# Patient Record
Sex: MALE | Race: BLACK OR AFRICAN AMERICAN | Employment: FULL TIME | URBAN - METROPOLITAN AREA
[De-identification: names, ages, dates, MRNs, and addresses within clinical notes are randomized per-mention and may not be internally consistent; named-entity substitution may affect disease eponyms.]

---

## 2017-01-23 DIAGNOSIS — F32.A DEPRESSION, UNSPECIFIED DEPRESSION TYPE: Primary | ICD-10-CM

## 2017-01-23 DIAGNOSIS — I48.0 PAROXYSMAL ATRIAL FIBRILLATION (HCC): Primary | ICD-10-CM

## 2017-01-23 DIAGNOSIS — I10 ESSENTIAL HYPERTENSION: ICD-10-CM

## 2017-01-23 DIAGNOSIS — F51.04 PSYCHOPHYSIOLOGICAL INSOMNIA: ICD-10-CM

## 2017-01-23 RX ORDER — RIVAROXABAN 20 MG/1
TABLET, FILM COATED ORAL
Qty: 30 TABLET | Refills: 5 | Status: SHIPPED | OUTPATIENT
Start: 2017-01-23 | End: 2017-03-21

## 2017-01-23 RX ORDER — CARVEDILOL 25 MG/1
TABLET ORAL
Qty: 180 TABLET | Refills: 1 | Status: SHIPPED | OUTPATIENT
Start: 2017-01-23 | End: 2017-04-20

## 2017-01-23 RX ORDER — AMLODIPINE BESYLATE 5 MG/1
TABLET ORAL
Qty: 90 TABLET | Refills: 1 | Status: SHIPPED | OUTPATIENT
Start: 2017-01-23 | End: 2017-04-20

## 2017-01-23 RX ORDER — ESCITALOPRAM OXALATE 10 MG/1
TABLET ORAL
Qty: 90 TABLET | Refills: 0 | Status: SHIPPED | OUTPATIENT
Start: 2017-01-23 | End: 2017-03-21 | Stop reason: ALTCHOICE

## 2017-01-23 RX ORDER — LOSARTAN POTASSIUM AND HYDROCHLOROTHIAZIDE 25; 100 MG/1; MG/1
TABLET ORAL
Qty: 90 TABLET | Refills: 1 | Status: SHIPPED | OUTPATIENT
Start: 2017-01-23 | End: 2017-04-20

## 2017-02-09 ENCOUNTER — PATIENT MESSAGE (OUTPATIENT)
Dept: INTERNAL MEDICINE CLINIC | Facility: CLINIC | Age: 57
End: 2017-02-09

## 2017-02-09 DIAGNOSIS — H40.9 GLAUCOMA OF BOTH EYES, UNSPECIFIED GLAUCOMA: Primary | ICD-10-CM

## 2017-02-10 ENCOUNTER — PATIENT MESSAGE (OUTPATIENT)
Dept: INTERNAL MEDICINE CLINIC | Facility: CLINIC | Age: 57
End: 2017-02-10

## 2017-02-10 NOTE — TELEPHONE ENCOUNTER
From: Cristal Montiel  To: Keith Navarro MD  Sent: 2/9/2017 4:42 PM CST  Subject: Other    I am in need referrals for Dr montalvo who I seen today and a future one for sept 12 for Dr cochran

## 2017-02-13 ENCOUNTER — MED REC SCAN ONLY (OUTPATIENT)
Dept: INTERNAL MEDICINE CLINIC | Facility: CLINIC | Age: 57
End: 2017-02-13

## 2017-02-25 ENCOUNTER — LAB ENCOUNTER (OUTPATIENT)
Dept: LAB | Facility: HOSPITAL | Age: 57
End: 2017-02-25
Attending: INTERNAL MEDICINE
Payer: COMMERCIAL

## 2017-02-25 DIAGNOSIS — C61 PROSTATE CANCER (HCC): ICD-10-CM

## 2017-02-25 DIAGNOSIS — E11.65 TYPE 2 DIABETES MELLITUS WITH HYPERGLYCEMIA (HCC): Primary | ICD-10-CM

## 2017-02-25 LAB
BUN BLD-MCNC: 12 MG/DL (ref 8–20)
CALCIUM BLD-MCNC: 8.7 MG/DL (ref 8.3–10.3)
CHLORIDE: 108 MMOL/L (ref 101–111)
CHOLEST SMN-MCNC: 141 MG/DL (ref ?–200)
CO2: 30 MMOL/L (ref 22–32)
CREAT BLD-MCNC: 1.46 MG/DL (ref 0.7–1.3)
GLUCOSE BLD-MCNC: 131 MG/DL (ref 70–99)
HDLC SERPL-MCNC: 45 MG/DL (ref 45–?)
HDLC SERPL: 3.13 {RATIO} (ref ?–4.97)
LDLC SERPL CALC-MCNC: 80 MG/DL (ref ?–130)
NONHDLC SERPL-MCNC: 96 MG/DL (ref ?–130)
POTASSIUM SERPL-SCNC: 4.1 MMOL/L (ref 3.6–5.1)
PSA SERPL-MCNC: 6.17 NG/ML (ref 0.01–4)
SODIUM SERPL-SCNC: 142 MMOL/L (ref 136–144)
TRIGLYCERIDES: 81 MG/DL (ref ?–150)
VLDL: 16 MG/DL (ref 5–40)

## 2017-02-25 PROCEDURE — 80048 BASIC METABOLIC PNL TOTAL CA: CPT

## 2017-02-25 PROCEDURE — 80061 LIPID PANEL: CPT

## 2017-02-25 PROCEDURE — 36415 COLL VENOUS BLD VENIPUNCTURE: CPT

## 2017-02-25 PROCEDURE — 84153 ASSAY OF PSA TOTAL: CPT

## 2017-03-18 ENCOUNTER — PATIENT MESSAGE (OUTPATIENT)
Dept: INTERNAL MEDICINE CLINIC | Facility: CLINIC | Age: 57
End: 2017-03-18

## 2017-03-18 DIAGNOSIS — I48.0 PAROXYSMAL ATRIAL FIBRILLATION (HCC): Primary | ICD-10-CM

## 2017-03-21 PROBLEM — C61 PROSTATE CANCER (HCC): Status: ACTIVE | Noted: 2017-03-21

## 2017-03-21 NOTE — PATIENT INSTRUCTIONS
Take antibiotic as directed until completely finished. Contact us if you experience any adverse reaction to the medication.    Continue clonazepam as needed if anxiety is severe  Continue bupropion  Stop lexapro  Start cymbalta, once daily, and monitor depr

## 2017-03-21 NOTE — PROGRESS NOTES
HPI:   Rodney Dudley is a 49year old male who presents for upper respiratory symptoms for  3  days. Patient reports cough, PND, ear pain, fever up to 101, mild cough and thick sputum, sob at night. Patient denies gi symptoms or rash.   Treatments tried: Disp: 90 tablet Rfl: 1   Pravastatin Sodium 40 MG Oral Tab Take 1 tablet (40 mg total) by mouth nightly.  Disp: 90 tablet Rfl: 1   CIALIS 20 MG Oral Tab  Disp:  Rfl: 2   Exenatide (BYETTA 10 MCG PEN) 10 MCG/0.04ML Subcutaneous Solution Pen-injector Inject 1 denies blurred vision, eye irritation or discharge  HEENT: congested; no difficulty swallowing or discharge from ears  LUNGS: denies shortness of breath, wheezing, hemoptysis  CARDIOVASCULAR: denies chest pain, palpitations or edema  GI: no nausea, vomitin The patient indicates understanding of these issues and agrees to the plan. The patient is asked to return if sx's persist or worsen.   6/15/2017 as scheduled with Dr. Mic Comer

## 2017-03-22 RX ORDER — CLONAZEPAM 1 MG/1
TABLET ORAL
Qty: 60 TABLET | Refills: 2 | OUTPATIENT
Start: 2017-03-22

## 2017-03-23 ENCOUNTER — TELEPHONE (OUTPATIENT)
Dept: INTERNAL MEDICINE CLINIC | Facility: CLINIC | Age: 57
End: 2017-03-23

## 2017-03-23 NOTE — TELEPHONE ENCOUNTER
Steve from [facility] called and asked for a CB regarding patient's Ambien and Clonazepam. Is it ok to dispense due to patient's depression, and has glaucoma. Please advise.

## 2017-03-23 NOTE — TELEPHONE ENCOUNTER
Provider aware and would like patient to continue on both medications. Patient is compliant with his eye doctor on a yearly basis. Steve informed and she verbalized understanding.

## 2017-03-30 ENCOUNTER — LAB ENCOUNTER (OUTPATIENT)
Dept: LAB | Facility: HOSPITAL | Age: 57
End: 2017-03-30
Attending: INTERNAL MEDICINE
Payer: COMMERCIAL

## 2017-03-30 DIAGNOSIS — E11.65 TYPE II DIABETES MELLITUS, UNCONTROLLED (HCC): Primary | ICD-10-CM

## 2017-03-30 PROCEDURE — 36415 COLL VENOUS BLD VENIPUNCTURE: CPT

## 2017-03-30 PROCEDURE — 80048 BASIC METABOLIC PNL TOTAL CA: CPT

## 2017-04-19 DIAGNOSIS — F41.1 GENERALIZED ANXIETY DISORDER: ICD-10-CM

## 2017-04-19 DIAGNOSIS — H65.01 RIGHT ACUTE SEROUS OTITIS MEDIA, RECURRENCE NOT SPECIFIED: ICD-10-CM

## 2017-04-19 DIAGNOSIS — F34.1 DYSTHYMIA: ICD-10-CM

## 2017-04-19 DIAGNOSIS — E78.2 MIXED HYPERLIPIDEMIA: ICD-10-CM

## 2017-04-19 DIAGNOSIS — F32.A DEPRESSION, UNSPECIFIED DEPRESSION TYPE: ICD-10-CM

## 2017-04-19 DIAGNOSIS — F51.04 PSYCHOPHYSIOLOGICAL INSOMNIA: ICD-10-CM

## 2017-04-19 DIAGNOSIS — I10 ESSENTIAL HYPERTENSION: ICD-10-CM

## 2017-04-20 RX ORDER — DULOXETIN HYDROCHLORIDE 60 MG/1
60 CAPSULE, DELAYED RELEASE ORAL DAILY
Qty: 30 CAPSULE | Refills: 2 | Status: SHIPPED | OUTPATIENT
Start: 2017-04-20 | End: 2017-06-12

## 2017-04-20 RX ORDER — PRAVASTATIN SODIUM 40 MG
40 TABLET ORAL NIGHTLY
Qty: 90 TABLET | Refills: 1 | Status: SHIPPED | OUTPATIENT
Start: 2017-04-20 | End: 2017-10-27

## 2017-04-20 RX ORDER — LOSARTAN POTASSIUM AND HYDROCHLOROTHIAZIDE 25; 100 MG/1; MG/1
1 TABLET ORAL DAILY
Qty: 90 TABLET | Refills: 1 | Status: SHIPPED | OUTPATIENT
Start: 2017-04-20 | End: 2017-08-22

## 2017-04-20 RX ORDER — ZOLPIDEM TARTRATE 10 MG/1
10 TABLET ORAL NIGHTLY PRN
Qty: 30 TABLET | Refills: 1 | Status: SHIPPED
Start: 2017-04-20 | End: 2017-08-03

## 2017-04-20 RX ORDER — BUPROPION HYDROCHLORIDE 300 MG/1
300 TABLET ORAL
Qty: 90 TABLET | Refills: 0 | Status: SHIPPED | OUTPATIENT
Start: 2017-04-20 | End: 2017-06-15

## 2017-04-20 RX ORDER — CLONAZEPAM 1 MG/1
TABLET ORAL
Qty: 60 TABLET | Refills: 2 | Status: SHIPPED
Start: 2017-04-20 | End: 2017-06-15

## 2017-04-20 RX ORDER — CARVEDILOL 25 MG/1
25 TABLET ORAL 2 TIMES DAILY WITH MEALS
Qty: 180 TABLET | Refills: 1 | Status: SHIPPED | OUTPATIENT
Start: 2017-04-20 | End: 2017-10-23

## 2017-04-20 RX ORDER — AMLODIPINE BESYLATE 5 MG/1
5 TABLET ORAL DAILY
Qty: 90 TABLET | Refills: 1 | Status: SHIPPED | OUTPATIENT
Start: 2017-04-20 | End: 2017-08-22

## 2017-04-20 RX ORDER — AMOXICILLIN AND CLAVULANATE POTASSIUM 875; 125 MG/1; MG/1
1 TABLET, FILM COATED ORAL 2 TIMES DAILY
Qty: 14 TABLET | Refills: 0 | OUTPATIENT
Start: 2017-04-20

## 2017-04-20 NOTE — TELEPHONE ENCOUNTER
From: Lara Barajas  To: Aleena Montano MD  Sent: 4/19/2017 5:33 PM CDT  Subject: Medication Renewal Request    Original authorizing provider: MD Lara Teixeira would like a refill of the following medications:  Pravastatin Sodium 40 MG

## 2017-04-20 NOTE — TELEPHONE ENCOUNTER
From: Tj Kelly  To: Lora Subramanian PA-C  Sent: 4/19/2017 5:33 PM CDT  Subject: Medication Renewal Request    Original authorizing provider: JOSH Montes De Oca would like a refill of the following medications:  DULoxetine

## 2017-05-03 ENCOUNTER — MED REC SCAN ONLY (OUTPATIENT)
Dept: INTERNAL MEDICINE CLINIC | Facility: CLINIC | Age: 57
End: 2017-05-03

## 2017-05-09 ENCOUNTER — TELEPHONE (OUTPATIENT)
Dept: INTERNAL MEDICINE CLINIC | Facility: CLINIC | Age: 57
End: 2017-05-09

## 2017-05-09 DIAGNOSIS — M25.851 IMPINGEMENT SYNDROME, HIP, RIGHT: Primary | ICD-10-CM

## 2017-05-09 NOTE — TELEPHONE ENCOUNTER
Please enter a new referral for PT DX M25.851- right hip impingement syndrome. His other referral was  on 2016.

## 2017-05-11 DIAGNOSIS — I10 ESSENTIAL HYPERTENSION: Primary | ICD-10-CM

## 2017-05-11 RX ORDER — AMLODIPINE BESYLATE 5 MG/1
TABLET ORAL
Qty: 30 TABLET | Refills: 2 | Status: SHIPPED | OUTPATIENT
Start: 2017-05-11 | End: 2017-06-08

## 2017-05-12 ENCOUNTER — OFFICE VISIT (OUTPATIENT)
Dept: INTERNAL MEDICINE CLINIC | Facility: CLINIC | Age: 57
End: 2017-05-12

## 2017-05-12 VITALS
OXYGEN SATURATION: 96 % | HEIGHT: 71 IN | SYSTOLIC BLOOD PRESSURE: 128 MMHG | TEMPERATURE: 98 F | HEART RATE: 105 BPM | BODY MASS INDEX: 44.1 KG/M2 | DIASTOLIC BLOOD PRESSURE: 86 MMHG | WEIGHT: 315 LBS | RESPIRATION RATE: 16 BRPM

## 2017-05-12 DIAGNOSIS — N39.490 OVERFLOW INCONTINENCE: Primary | ICD-10-CM

## 2017-05-12 DIAGNOSIS — R31.9 HEMATURIA: ICD-10-CM

## 2017-05-12 PROCEDURE — 81003 URINALYSIS AUTO W/O SCOPE: CPT | Performed by: STUDENT IN AN ORGANIZED HEALTH CARE EDUCATION/TRAINING PROGRAM

## 2017-05-12 PROCEDURE — 99213 OFFICE O/P EST LOW 20 MIN: CPT | Performed by: STUDENT IN AN ORGANIZED HEALTH CARE EDUCATION/TRAINING PROGRAM

## 2017-05-14 NOTE — PROGRESS NOTES
HPI:    Patient ID: Dale Beverly is a 56year old male. HPI  Patient presents today for the evaluation of her incontinence. Symptoms started about 2 weeks ago.  He started Invokana about 3-4 weeks ago and noticed polyuria and polydipsia, of which he w 25 MG Oral Tab Take 1 tablet (25 mg total) by mouth 2 (two) times daily with meals. Disp: 180 tablet Rfl: 1   Losartan Potassium-HCTZ 100-25 MG Oral Tab Take 1 tablet by mouth daily.  Disp: 90 tablet Rfl: 1   Zolpidem Tartrate 10 MG Oral Tab Take 1 tablet ( Pulmonary/Chest: Effort normal and breath sounds normal.   Abdominal: Soft. Bowel sounds are normal.   Genitourinary:   Patient declined HESHAM. Musculoskeletal: Normal range of motion. Neurological: He is alert and oriented to person, place, and time.

## 2017-05-24 ENCOUNTER — OFFICE VISIT (OUTPATIENT)
Dept: INTERNAL MEDICINE CLINIC | Facility: CLINIC | Age: 57
End: 2017-05-24

## 2017-05-24 VITALS
HEART RATE: 88 BPM | SYSTOLIC BLOOD PRESSURE: 124 MMHG | BODY MASS INDEX: 44.1 KG/M2 | TEMPERATURE: 98 F | RESPIRATION RATE: 16 BRPM | WEIGHT: 315 LBS | OXYGEN SATURATION: 95 % | HEIGHT: 71 IN | DIASTOLIC BLOOD PRESSURE: 84 MMHG

## 2017-05-24 DIAGNOSIS — M16.11 PRIMARY OSTEOARTHRITIS OF RIGHT HIP: Primary | ICD-10-CM

## 2017-05-24 DIAGNOSIS — N39.490 OVERFLOW INCONTINENCE OF URINE: ICD-10-CM

## 2017-05-24 PROCEDURE — 99214 OFFICE O/P EST MOD 30 MIN: CPT | Performed by: STUDENT IN AN ORGANIZED HEALTH CARE EDUCATION/TRAINING PROGRAM

## 2017-05-24 RX ORDER — HYDROCODONE BITARTRATE AND ACETAMINOPHEN 5; 325 MG/1; MG/1
1 TABLET ORAL EVERY 6 HOURS PRN
Qty: 20 TABLET | Refills: 0 | Status: SHIPPED | OUTPATIENT
Start: 2017-05-24 | End: 2017-06-15 | Stop reason: ALTCHOICE

## 2017-05-24 RX ORDER — GLIPIZIDE 5 MG/1
5 TABLET, EXTENDED RELEASE ORAL DAILY
Refills: 1 | COMMUNITY
Start: 2017-05-17 | End: 2019-11-05

## 2017-05-24 NOTE — PROGRESS NOTES
[facility]    REASON FOR VISIT:    Current Complaints: Patient presents with: Incontinence: 2 wk fu       HPI:  [place] is a 56year old male who presents for 2 weeks f/u. Patient was seen 2 weeks ago with c/o urinary incontinence.  Rosario Take 1 tablet (20 mg total) by mouth daily with food.  Disp: 90 tablet Rfl: 3   LANTUS SOLOSTAR 100 UNIT/ML Subcutaneous Solution Pen-injector  Disp:  Rfl: 3   VICTOZA 18 MG/3ML Subcutaneous Solution Pen-injector  Disp:  Rfl: 2   CIALIS 20 MG Oral Tab  Disp round, and reactive to light. No scleral icterus. Neck: Normal range of motion. No thyromegaly present. Cardiovascular: Normal rate, regular rhythm and normal heart sounds. Exam reveals no friction rub. No murmur heard.   Pulmonary/Chest: Effort nor

## 2017-06-05 ENCOUNTER — TELEPHONE (OUTPATIENT)
Dept: FAMILY MEDICINE CLINIC | Facility: CLINIC | Age: 57
End: 2017-06-05

## 2017-06-08 ENCOUNTER — OFFICE VISIT (OUTPATIENT)
Dept: INTERNAL MEDICINE CLINIC | Facility: CLINIC | Age: 57
End: 2017-06-08

## 2017-06-08 ENCOUNTER — APPOINTMENT (OUTPATIENT)
Dept: LAB | Age: 57
End: 2017-06-08
Attending: NURSE PRACTITIONER
Payer: COMMERCIAL

## 2017-06-08 ENCOUNTER — TELEPHONE (OUTPATIENT)
Dept: INTERNAL MEDICINE CLINIC | Facility: CLINIC | Age: 57
End: 2017-06-08

## 2017-06-08 VITALS
SYSTOLIC BLOOD PRESSURE: 140 MMHG | HEIGHT: 71 IN | BODY MASS INDEX: 44.1 KG/M2 | HEART RATE: 80 BPM | DIASTOLIC BLOOD PRESSURE: 90 MMHG | RESPIRATION RATE: 16 BRPM | WEIGHT: 315 LBS

## 2017-06-08 DIAGNOSIS — I48.0 PAROXYSMAL ATRIAL FIBRILLATION (HCC): ICD-10-CM

## 2017-06-08 DIAGNOSIS — Z79.4 TYPE 2 DIABETES MELLITUS WITHOUT COMPLICATION, WITH LONG-TERM CURRENT USE OF INSULIN (HCC): ICD-10-CM

## 2017-06-08 DIAGNOSIS — E66.01 MORBID OBESITY WITH BMI OF 45.0-49.9, ADULT (HCC): ICD-10-CM

## 2017-06-08 DIAGNOSIS — F32.A DEPRESSION, UNSPECIFIED DEPRESSION TYPE: ICD-10-CM

## 2017-06-08 DIAGNOSIS — F43.9 STRESS: ICD-10-CM

## 2017-06-08 DIAGNOSIS — Z51.81 ENCOUNTER FOR THERAPEUTIC DRUG MONITORING: ICD-10-CM

## 2017-06-08 DIAGNOSIS — E11.9 TYPE 2 DIABETES MELLITUS WITHOUT COMPLICATION, WITH LONG-TERM CURRENT USE OF INSULIN (HCC): ICD-10-CM

## 2017-06-08 DIAGNOSIS — I10 ESSENTIAL HYPERTENSION: ICD-10-CM

## 2017-06-08 DIAGNOSIS — Z51.81 ENCOUNTER FOR THERAPEUTIC DRUG MONITORING: Primary | ICD-10-CM

## 2017-06-08 PROCEDURE — 82397 CHEMILUMINESCENT ASSAY: CPT

## 2017-06-08 PROCEDURE — 82306 VITAMIN D 25 HYDROXY: CPT

## 2017-06-08 PROCEDURE — 80048 BASIC METABOLIC PNL TOTAL CA: CPT

## 2017-06-08 PROCEDURE — 99213 OFFICE O/P EST LOW 20 MIN: CPT | Performed by: NURSE PRACTITIONER

## 2017-06-08 PROCEDURE — 82607 VITAMIN B-12: CPT

## 2017-06-08 RX ORDER — LACTOBACIL 2/BIFIDO 1/S.THERMO 450B CELL
1 PACKET (EA) ORAL DAILY
Qty: 60 CAPSULE | Refills: 2 | Status: SHIPPED | COMMUNITY
Start: 2017-06-08 | End: 2018-02-13

## 2017-06-08 RX ORDER — TOPIRAMATE 25 MG/1
25 TABLET ORAL 2 TIMES DAILY
Qty: 60 TABLET | Refills: 11 | Status: SHIPPED | OUTPATIENT
Start: 2017-06-08 | End: 2017-07-06

## 2017-06-08 NOTE — TELEPHONE ENCOUNTER
Calling Dr. Aracely Levy office for clearance of topamax w/ hx of possible glaucoma. Left message on nurse line for further information.

## 2017-06-08 NOTE — PATIENT INSTRUCTIONS
Please try to work on the following dietary changes this first month  1. Drink lots of water and cut down on soda consumption if soda drinker  2.  Eat breakfast daily and focus on protein such as greek yogurt with fruit, cottage cheese, string cheese, hard Habits don’t change overnight. Setting your goals too high can leave you feeling discouraged if you can’t reach them. Be realistic. Choose one or two small changes you can make now. Set an action plan for how you are going to make these changes.  When you c · Sugars occur naturally in foods such as fruit, milk, honey, and molasses. Sugars can also be added to many foods, from cereals and yogurt to candy and desserts. Sugars raise blood sugar. · Starches are found in bread, cereals, pasta, and dried beans.  Hazel Willy · Hydrogenated oils and trans fats are formed when vegetable oils are processed into solid fats. They are found in many processed foods. Hydrogenated oils and trans fats raise LDL cholesterol and lower HDL (healthy) cholesterol.  They are not healthy for yo

## 2017-06-08 NOTE — PROGRESS NOTES
CC: Patient presents with:  Weight Check: silvio lost almost 80 pounds 1995. no regular exercise       HPI:    Obesity,  History of weight increased after getting  in [address] [place] [address] and gained 100 lbs since then and wife.  Has 4 kids between wife and him and 3 Sodium 40 MG Oral Tab Take 1 tablet (40 mg total) by mouth nightly. Disp: 90 tablet Rfl: 1   BuPROPion HCl ER, XL, 300 MG Oral Tablet 24 Hr Take 1 tablet (300 mg total) by mouth once daily. Take with 150 mg to equal 450 mg.  Disp: 90 tablet Rfl: 0   AmLODIP ([facility] [address]     Elevated PSA     Essential hypertension     Mixed hyperlipidemia     Prostate cancer [facility])          Past Surgical History    HIP REPLACEMENT SURGERY      COLONOSCOPY      OTHER SURGICAL HISTORY      Comment vocal cord palops      Family History Prescriptions Disp Refills    Probiotic Product (VSL#3) Oral Cap 60 capsule 2      Sig: Take 1 capsule by mouth daily. topiramate 25 MG Oral Tab 60 tablet 11      Sig: Take 1 tablet (25 mg total) by mouth 2 (two) times daily.           None     ASSESSM Opthamologist) that patient had been suspect of glaucoma in past and put on drops and then had stopped and no problems. Francisco Myers contacted Dr. Kenyon Yun office and cleared for use of topamax please see phone notes.  Pharmacy also concerned with topamax and dx

## 2017-06-08 NOTE — TELEPHONE ENCOUNTER
Pharmacy would like to clarify rx, sts pt has diagnosis of glaucoma and depression, still ok to give to pt?    topiramate 25 MG Oral Tab 60 tablet 11 6/8/2017 8/7/2017      Sig :  Take 1 tablet (25 mg total) by mouth 2 (two) times daily.       Route: Katie Number

## 2017-06-08 NOTE — TELEPHONE ENCOUNTER
Pharmacy informed of MD and PA recommendations,  verbalized understanding with intent to relay information to patient and will place RX on hold until pt gets clearance. All questions were answered.

## 2017-06-09 ENCOUNTER — TELEPHONE (OUTPATIENT)
Dept: INTERNAL MEDICINE CLINIC | Facility: CLINIC | Age: 57
End: 2017-06-09

## 2017-06-09 ENCOUNTER — HOSPITAL ENCOUNTER (OUTPATIENT)
Dept: PHYSICAL THERAPY | Facility: HOSPITAL | Age: 57
Setting detail: THERAPIES SERIES
Discharge: HOME | End: 2017-06-09
Attending: INTERNAL MEDICINE
Payer: COMMERCIAL

## 2017-06-09 DIAGNOSIS — M25.851 IMPINGEMENT SYNDROME, HIP, RIGHT: Primary | ICD-10-CM

## 2017-06-09 PROCEDURE — 97110 THERAPEUTIC EXERCISES: CPT

## 2017-06-09 PROCEDURE — 97161 PT EVAL LOW COMPLEX 20 MIN: CPT

## 2017-06-09 NOTE — TELEPHONE ENCOUNTER
Spoke with nurse from doctor [facility] office who relayed message clearing patient to take topamax. OK per opthalmology. Spoke with pharmacy and patient. Everyone informed.

## 2017-06-09 NOTE — TELEPHONE ENCOUNTER
Per Pharmacy Topiramate has contraindication with Depression. Notified pharmacy provider aware of depression, patient has documentation of this dx in his chart. Pharmacy verbalized understanding.

## 2017-06-11 DIAGNOSIS — F32.A DEPRESSION, UNSPECIFIED DEPRESSION TYPE: Primary | ICD-10-CM

## 2017-06-11 DIAGNOSIS — F41.1 GENERALIZED ANXIETY DISORDER: ICD-10-CM

## 2017-06-11 NOTE — PROGRESS NOTES
Quick Note:    Notified patient via [employer] normal BMP and B12 very low vitamin d ordered 50,000 twice weekly    ______

## 2017-06-12 RX ORDER — DULOXETIN HYDROCHLORIDE 60 MG/1
CAPSULE, DELAYED RELEASE ORAL
Qty: 30 CAPSULE | Refills: 2 | Status: SHIPPED | OUTPATIENT
Start: 2017-06-12 | End: 2017-10-23

## 2017-06-12 NOTE — PROGRESS NOTES
LOWER EXTREMITY EVALUATION:   Referring Physician: Dr. Maranda Anaya  Diagnosis: R hip OA     Date of Service: 6/9/2017     PATIENT SUMMARY   Chula Qiu is a 56year old male who presents to therapy today with complaints of R lateral and anterior hip pain kale impairments to decrease pain and maximize functional mobility    Precautions:  None  OBJECTIVE:   Observation: obese, anterior pelvic tilt, bilateral knee crepitus with squatting motions  Gait: R compensated trendelenberg, limited R hip extension    AROM: exercise program instructions    Education or treatment limitation: None  Rehab Potential:fair    FOTO: 33/100    Patient/Family/Caregiver was advised of these findings, precautions, and treatment options and has agreed to actively participate in planning

## 2017-06-13 ENCOUNTER — HOSPITAL ENCOUNTER (OUTPATIENT)
Dept: PHYSICAL THERAPY | Facility: HOSPITAL | Age: 57
Setting detail: THERAPIES SERIES
Discharge: HOME | End: 2017-06-13
Attending: INTERNAL MEDICINE
Payer: COMMERCIAL

## 2017-06-13 PROCEDURE — 97140 MANUAL THERAPY 1/> REGIONS: CPT

## 2017-06-13 PROCEDURE — 97110 THERAPEUTIC EXERCISES: CPT

## 2017-06-13 NOTE — PROGRESS NOTES
Dx: R hip OA         Authorized # of Visits:  61         Next MD visit: none scheduled  Fall Risk: standard         Precautions: n/a             Subjective: Patient reports that his hip felt pretty over the weekend until yesterday when he had more pain.  Kierra Love

## 2017-06-15 ENCOUNTER — TELEPHONE (OUTPATIENT)
Dept: INTERNAL MEDICINE CLINIC | Facility: CLINIC | Age: 57
End: 2017-06-15

## 2017-06-15 ENCOUNTER — OFFICE VISIT (OUTPATIENT)
Dept: INTERNAL MEDICINE CLINIC | Facility: CLINIC | Age: 57
End: 2017-06-15

## 2017-06-15 VITALS
DIASTOLIC BLOOD PRESSURE: 60 MMHG | WEIGHT: 315 LBS | HEIGHT: 71 IN | RESPIRATION RATE: 16 BRPM | TEMPERATURE: 98 F | HEART RATE: 88 BPM | SYSTOLIC BLOOD PRESSURE: 120 MMHG | BODY MASS INDEX: 44.1 KG/M2

## 2017-06-15 DIAGNOSIS — F32.A DEPRESSION, UNSPECIFIED DEPRESSION TYPE: ICD-10-CM

## 2017-06-15 DIAGNOSIS — E11.9 TYPE 2 DIABETES MELLITUS WITHOUT COMPLICATION, WITH LONG-TERM CURRENT USE OF INSULIN (HCC): Primary | ICD-10-CM

## 2017-06-15 DIAGNOSIS — I10 ESSENTIAL HYPERTENSION: ICD-10-CM

## 2017-06-15 DIAGNOSIS — Z79.4 TYPE 2 DIABETES MELLITUS WITHOUT COMPLICATION, WITH LONG-TERM CURRENT USE OF INSULIN (HCC): Primary | ICD-10-CM

## 2017-06-15 DIAGNOSIS — E78.2 MIXED HYPERLIPIDEMIA: ICD-10-CM

## 2017-06-15 PROCEDURE — 99214 OFFICE O/P EST MOD 30 MIN: CPT | Performed by: INTERNAL MEDICINE

## 2017-06-15 RX ORDER — BUPROPION HYDROCHLORIDE 300 MG/1
300 TABLET ORAL
Qty: 90 TABLET | Refills: 0 | Status: SHIPPED | OUTPATIENT
Start: 2017-06-15 | End: 2017-06-15

## 2017-06-15 RX ORDER — BUPROPION HYDROCHLORIDE 300 MG/1
300 TABLET ORAL DAILY
Qty: 270 TABLET | Refills: 1 | Status: SHIPPED | OUTPATIENT
Start: 2017-06-15 | End: 2017-12-06

## 2017-06-15 NOTE — PROGRESS NOTES
HPI:    Patient ID: Hoda Leiva is a 56year old male. Diabetes  He presents for his follow-up diabetic visit. He has type 2 diabetes mellitus. No MedicAlert identification noted. His disease course has been stable.  There are no hypoglycemic associa and decreased concentration. The patient is not nervous/anxious and is not hyperactive. All other systems reviewed and are negative.            Current Outpatient Prescriptions:  BuPROPion HCl ER, XL, 300 MG Oral Tablet 24 Hr Take 1 tablet (300 mg total) (LANTUS) 100 UNIT/ML Subcutaneous Solution Inject  into the skin nightly. 60 units qam and 80 units qhs Disp:  Rfl:    [DISCONTINUED] BuPROPion HCl ER, XL, 300 MG Oral Tablet 24 Hr Take 1 tablet (300 mg total) by mouth once daily.  Take with 150 mg to equal cont mod intense dose statin  - depression- controlled.  Cont wellbutrin    Orders Placed This Encounter  Microalb/Creat Ratio, Random Urine [E]    Meds This Visit:  Signed Prescriptions Disp Refills    BuPROPion HCl ER, XL, 300 MG Oral Tablet 24 Hr 270 tab

## 2017-06-15 NOTE — TELEPHONE ENCOUNTER
Per Dr. Sanya Iglesias pt needs microalbumin and has placed order.       LMOVM informing pt to complete test.

## 2017-06-15 NOTE — PATIENT INSTRUCTIONS
Diet: Diabetes  Food is an important tool that you can use to control diabetes and stay healthy. Eating well-balanced meals in the correct amounts will help you control your blood glucose levels and prevent low blood sugar reactions.  It will also help yo · Avoid added salt. It can contribute to high blood pressure, which can cause heart disease. People with diabetes already have a risk of high blood pressure and heart disease. · Stay at a healthy weight.  If you need to lose weight, cut down on your portio

## 2017-06-15 NOTE — TELEPHONE ENCOUNTER
Spoke with pharmacy last filled 5/18/17 and no refills on file. Ok per Dr Nicole Tena to refill. Called in .

## 2017-06-19 RX ORDER — CLONAZEPAM 1 MG/1
TABLET ORAL
Qty: 60 TABLET | Refills: 2 | OUTPATIENT
Start: 2017-06-19

## 2017-06-20 ENCOUNTER — HOSPITAL ENCOUNTER (OUTPATIENT)
Dept: PHYSICAL THERAPY | Facility: HOSPITAL | Age: 57
Setting detail: THERAPIES SERIES
Discharge: HOME | End: 2017-06-20
Attending: INTERNAL MEDICINE
Payer: COMMERCIAL

## 2017-06-20 PROCEDURE — 97110 THERAPEUTIC EXERCISES: CPT

## 2017-06-20 PROCEDURE — 97140 MANUAL THERAPY 1/> REGIONS: CPT

## 2017-06-20 NOTE — PROGRESS NOTES
Dx: R hip OA         Authorized # of Visits:  61         Next MD visit: none scheduled  Fall Risk: standard         Precautions: n/a             Subjective: Patient states that his hip is feeling better.  He still has some stiffness after prolonged sitting

## 2017-06-22 ENCOUNTER — HOSPITAL ENCOUNTER (OUTPATIENT)
Dept: PHYSICAL THERAPY | Facility: HOSPITAL | Age: 57
Setting detail: THERAPIES SERIES
Discharge: HOME | End: 2017-06-22
Attending: INTERNAL MEDICINE
Payer: COMMERCIAL

## 2017-06-22 ENCOUNTER — APPOINTMENT (OUTPATIENT)
Dept: LAB | Age: 57
End: 2017-06-22
Attending: INTERNAL MEDICINE
Payer: COMMERCIAL

## 2017-06-22 DIAGNOSIS — E11.9 TYPE 2 DIABETES MELLITUS WITHOUT COMPLICATION, WITH LONG-TERM CURRENT USE OF INSULIN (HCC): ICD-10-CM

## 2017-06-22 DIAGNOSIS — Z79.4 TYPE 2 DIABETES MELLITUS WITHOUT COMPLICATION, WITH LONG-TERM CURRENT USE OF INSULIN (HCC): ICD-10-CM

## 2017-06-22 PROCEDURE — 97140 MANUAL THERAPY 1/> REGIONS: CPT

## 2017-06-22 PROCEDURE — 82043 UR ALBUMIN QUANTITATIVE: CPT

## 2017-06-22 PROCEDURE — 97110 THERAPEUTIC EXERCISES: CPT

## 2017-06-22 PROCEDURE — 82570 ASSAY OF URINE CREATININE: CPT

## 2017-06-22 NOTE — PROGRESS NOTES
Dx: R hip OA         Authorized # of Visits:  61         Next MD visit: none scheduled  Fall Risk: standard         Precautions: n/a             Subjective: Patient reports that his hip feels \"tight\" today but remains improved overall.     Objective:   R lying hip extension stretch 6\" lateral step up, 2x10         Lateral step down, 10                  Charges: Jaycob 2( 30 min) manual x 1 ( 15 min)   Total Timed Treatment: 45 min     Total Treatment Time: 45 min

## 2017-06-27 ENCOUNTER — HOSPITAL ENCOUNTER (OUTPATIENT)
Dept: PHYSICAL THERAPY | Facility: HOSPITAL | Age: 57
Setting detail: THERAPIES SERIES
Discharge: HOME | End: 2017-06-27
Attending: INTERNAL MEDICINE
Payer: COMMERCIAL

## 2017-06-27 PROCEDURE — 97110 THERAPEUTIC EXERCISES: CPT

## 2017-06-27 PROCEDURE — 97140 MANUAL THERAPY 1/> REGIONS: CPT

## 2017-06-27 NOTE — PROGRESS NOTES
Dx: R hip OA         Authorized # of Visits:  61         Next MD visit: none scheduled  Fall Risk: standard         Precautions: n/a             Subjective: Patient was able to put down mulch over the weekend without any increase in pain.  He states that in Single leg stance w/ lateral reach      Partial lunge on step, 2x10 Side lying hip extension stretch 6\" lateral step up, 2x10 airex step to balance, 3x10        Lateral step down, 10                  Charges: Jaycob 2( 35 min) manual x 1 ( 10 min)   Total Ti

## 2017-06-30 ENCOUNTER — APPOINTMENT (OUTPATIENT)
Dept: PHYSICAL THERAPY | Facility: HOSPITAL | Age: 57
End: 2017-06-30
Attending: INTERNAL MEDICINE
Payer: COMMERCIAL

## 2017-07-06 ENCOUNTER — OFFICE VISIT (OUTPATIENT)
Dept: INTERNAL MEDICINE CLINIC | Facility: CLINIC | Age: 57
End: 2017-07-06

## 2017-07-06 VITALS
WEIGHT: 315 LBS | HEART RATE: 84 BPM | SYSTOLIC BLOOD PRESSURE: 118 MMHG | DIASTOLIC BLOOD PRESSURE: 72 MMHG | BODY MASS INDEX: 44.1 KG/M2 | HEIGHT: 71 IN | RESPIRATION RATE: 16 BRPM

## 2017-07-06 DIAGNOSIS — E66.01 MORBID OBESITY WITH BMI OF 45.0-49.9, ADULT (HCC): ICD-10-CM

## 2017-07-06 DIAGNOSIS — Z51.81 ENCOUNTER FOR THERAPEUTIC DRUG MONITORING: ICD-10-CM

## 2017-07-06 PROCEDURE — 99213 OFFICE O/P EST LOW 20 MIN: CPT | Performed by: NURSE PRACTITIONER

## 2017-07-06 RX ORDER — TOPIRAMATE 25 MG/1
25 TABLET ORAL 2 TIMES DAILY
Qty: 60 TABLET | Refills: 1 | Status: SHIPPED | OUTPATIENT
Start: 2017-07-06 | End: 2017-08-03

## 2017-07-06 NOTE — PATIENT INSTRUCTIONS
Keep  Up the great work!!      Weight Management: Exercise and Activity  Studies show that people who exercise are the most likely to lose weight and keep it off. Exercise burns calories. It helps build muscle to make your body stronger.  Make exercise an i © 6776-7846 [address], [address] [place] [employer]. All rights reserved. This information is not intended as a substitute for professional medical care. Always follow your healthcare professional's instructions.

## 2017-07-06 NOTE — PROGRESS NOTES
CC: Patient presents with:  Weight Check: lost 11 pounds       HPI:    Obesity. Patient doing great on topamax and no changes in vision and no parasthesia feels curbing carbs and he is tracking in [facility] and walking more.      Wt Readings from Last 6 Encounter mg total) by mouth nightly as needed for Sleep. Disp: 30 tablet Rfl: 1   Rivaroxaban (XARELTO) 20 MG Oral Tab Take 1 tablet (20 mg total) by mouth daily with food.  Disp: 90 tablet Rfl: 3   VICTOZA 18 MG/3ML Subcutaneous Solution Pen-injector  Disp:  Rfl: 2 SYSTEMS:   EYES:denies vision changes   LUNGS: denies shortness of breath   CARDIOVASCULAR: denies chest pain, atrial fibrillation  GI: denies constipation  PSYCHE:depression  On wellbutrinor anxiety      EXAM:   /72   Pulse 84   Resp 16   Ht 71\" pharmacy so won't fill topamax. Saw Dr. Mor Jones( Opthamologist) that patient had been suspect of glaucoma in past and put on drops and then had stopped and no problems.  Chris Low contacted Dr. Ximena Hu office and cleared for use of topamax please see phone n

## 2017-07-31 ENCOUNTER — HOSPITAL ENCOUNTER (OUTPATIENT)
Dept: GENERAL RADIOLOGY | Age: 57
Discharge: HOME | End: 2017-07-31
Attending: PHYSICIAN ASSISTANT
Payer: COMMERCIAL

## 2017-07-31 ENCOUNTER — OFFICE VISIT (OUTPATIENT)
Dept: INTERNAL MEDICINE CLINIC | Facility: CLINIC | Age: 57
End: 2017-07-31

## 2017-07-31 VITALS
RESPIRATION RATE: 16 BRPM | OXYGEN SATURATION: 99 % | DIASTOLIC BLOOD PRESSURE: 70 MMHG | TEMPERATURE: 98 F | WEIGHT: 315 LBS | HEIGHT: 70.5 IN | SYSTOLIC BLOOD PRESSURE: 136 MMHG | HEART RATE: 99 BPM | BODY MASS INDEX: 44.59 KG/M2
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
## 2017-07-31 DIAGNOSIS — Z79.4 TYPE 2 DIABETES MELLITUS WITHOUT COMPLICATION, WITH LONG-TERM CURRENT USE OF INSULIN (HCC): ICD-10-CM

## 2017-07-31 DIAGNOSIS — M25.512 ACUTE PAIN OF LEFT SHOULDER: ICD-10-CM

## 2017-07-31 DIAGNOSIS — S46.812A TRAPEZIUS MUSCLE STRAIN, LEFT, INITIAL ENCOUNTER: ICD-10-CM

## 2017-07-31 DIAGNOSIS — E11.9 TYPE 2 DIABETES MELLITUS WITHOUT COMPLICATION, WITH LONG-TERM CURRENT USE OF INSULIN (HCC): ICD-10-CM

## 2017-07-31 DIAGNOSIS — M25.512 ACUTE PAIN OF LEFT SHOULDER: Primary | ICD-10-CM

## 2017-07-31 PROCEDURE — 73030 X-RAY EXAM OF SHOULDER: CPT | Performed by: PHYSICIAN ASSISTANT

## 2017-07-31 PROCEDURE — 99214 OFFICE O/P EST MOD 30 MIN: CPT | Performed by: PHYSICIAN ASSISTANT

## 2017-07-31 PROCEDURE — 73000 X-RAY EXAM OF COLLAR BONE: CPT | Performed by: PHYSICIAN ASSISTANT

## 2017-07-31 RX ORDER — ACETAMINOPHEN AND CODEINE PHOSPHATE 300; 30 MG/1; MG/1
1 TABLET ORAL EVERY 6 HOURS PRN
Qty: 30 TABLET | Refills: 0 | Status: SHIPPED | OUTPATIENT
Start: 2017-07-31 | End: 2018-02-13

## 2017-07-31 NOTE — PATIENT INSTRUCTIONS
Have x-ray completed and we will send results    Schedule to start physical therapy. Use tylenol #3 as needed for pain. Will consider other treatment as well if x-ray is negative.

## 2017-07-31 NOTE — PROGRESS NOTES
HPI:    Patient ID: Zeynep Vargas is a 56year old male. Patient presents with:  Fall: pt fell and landed on left shoulder three weeks ago on vacation, pain still persists     Shoulder Pain    The pain is present in the left shoulder and neck.  This is a equal 450 mg. Disp: 270 tablet Rfl: 1   CLONAZEPAM 1 MG Oral Tab TAKE 1 TABLET BY MOUTH TWICE A DAY AS NEEDED FOR ANXIETY Disp: 60 tablet Rfl: 2   BuPROPion HCl ER, XL, 150 MG Oral Tablet 24 Hr Take 1 tablet (150 mg total) by mouth once daily.  Take with 30 distress. Cardiovascular: Normal rate, regular rhythm, normal heart sounds and intact distal pulses. No murmur heard. Pulmonary/Chest: Effort normal and breath sounds normal. He has no wheezes. He has no rales. He exhibits no tenderness.    Musculoske

## 2017-08-03 ENCOUNTER — OFFICE VISIT (OUTPATIENT)
Dept: INTERNAL MEDICINE CLINIC | Facility: CLINIC | Age: 57
End: 2017-08-03

## 2017-08-03 VITALS
HEIGHT: 71 IN | BODY MASS INDEX: 44.1 KG/M2 | RESPIRATION RATE: 16 BRPM | SYSTOLIC BLOOD PRESSURE: 103 MMHG | HEART RATE: 80 BPM | DIASTOLIC BLOOD PRESSURE: 80 MMHG | WEIGHT: 315 LBS

## 2017-08-03 DIAGNOSIS — E66.01 MORBID OBESITY WITH BMI OF 45.0-49.9, ADULT (HCC): ICD-10-CM

## 2017-08-03 DIAGNOSIS — Z51.81 ENCOUNTER FOR THERAPEUTIC DRUG MONITORING: ICD-10-CM

## 2017-08-03 DIAGNOSIS — F51.04 PSYCHOPHYSIOLOGICAL INSOMNIA: ICD-10-CM

## 2017-08-03 PROCEDURE — 99213 OFFICE O/P EST LOW 20 MIN: CPT | Performed by: NURSE PRACTITIONER

## 2017-08-03 RX ORDER — ZOLPIDEM TARTRATE 10 MG/1
TABLET ORAL
Qty: 30 TABLET | Refills: 1 | Status: SHIPPED
Start: 2017-08-03 | End: 2017-10-05

## 2017-08-03 RX ORDER — TOPIRAMATE 25 MG/1
25 TABLET ORAL 2 TIMES DAILY
Qty: 60 TABLET | Refills: 1 | Status: CANCELLED | OUTPATIENT
Start: 2017-08-03 | End: 2017-10-02

## 2017-08-03 RX ORDER — TOPIRAMATE 25 MG/1
50 TABLET ORAL 2 TIMES DAILY
Qty: 120 TABLET | Refills: 0 | Status: SHIPPED | OUTPATIENT
Start: 2017-08-03 | End: 2017-09-19 | Stop reason: DRUGHIGH

## 2017-08-03 NOTE — PATIENT INSTRUCTIONS
1. Drop back down to 1 tablet twice a day if any problems with eyes or other side effects. Weight Management: Fact and Fiction  Knowing the truth about losing weight can help you separate what works from what doesn’t.  Don’t be taken in by expensive w Fact: Going back to your old eating habits and giving up exercise is a sure way to regain any weight you’ve lost. The lifestyle changes that help you lose extra weight can also help keep it off.  This is why you need to make realistic changes you can stick

## 2017-08-03 NOTE — PROGRESS NOTES
CC: Patient presents with:  Weight Check: lost 1 pound       HPI:    Obesity.  Patient doing great on topamax and no changes in vision or  parasthesia but has been feelin glike carbs more and did not eat as healthy as [place] to go to [place] as wife's fa (XARELTO) 20 MG Oral Tab Take 1 tablet (20 mg total) by mouth daily with food.  Disp: 90 tablet Rfl: 3   VICTOZA 18 MG/3ML Subcutaneous Solution Pen-injector  Disp:  Rfl: 2   CIALIS 20 MG Oral Tab  Disp:  Rfl: 2   aspirin EC 81 MG Oral Tab EC Take 1 tablet CARDIOVASCULAR: denies chest pain, atrial fibrillation  GI: denies constipation  PSYCHE:depression  On wellbutrin       EXAM:   /80   Pulse 80   Resp 16   Ht 71\"   Wt (!) 340 lb   BMI 47.42 kg/m²   Body mass index is 47.42 kg/m².    GENERAL: well d

## 2017-08-15 DIAGNOSIS — E55.9 VITAMIN D DEFICIENCY: ICD-10-CM

## 2017-08-15 DIAGNOSIS — F32.A DEPRESSION, UNSPECIFIED DEPRESSION TYPE: ICD-10-CM

## 2017-08-15 RX ORDER — BUPROPION HYDROCHLORIDE 150 MG/1
150 TABLET ORAL
Qty: 90 TABLET | Refills: 1 | Status: SHIPPED
Start: 2017-08-15 | End: 2017-12-06

## 2017-08-15 RX ORDER — ERGOCALCIFEROL 1.25 MG/1
50000 CAPSULE ORAL
Qty: 8 CAPSULE | Refills: 5
Start: 2017-08-17 | End: 2018-02-13

## 2017-08-15 NOTE — TELEPHONE ENCOUNTER
From: Chayito Carrington  Sent: 8/15/2017 9:05 AM CDT  Subject: Medication Renewal Request    Rio ROLANHuber Napoles would like a refill of the following medications:  BuPROPion HCl ER, XL, 150 MG Oral Tablet 24 Hr Baltazar Garcia MD]    [facility]

## 2017-08-15 NOTE — TELEPHONE ENCOUNTER
From: Parmjit Denise  Sent: 8/15/2017 9:05 AM CDT  Subject: Medication Renewal Request    Rio Medina would like a refill of the following medications:  ergocalciferol 87758 units Oral Cap DONNA Robins]    Preferred pharmacy: [facility]

## 2017-08-18 ENCOUNTER — TELEPHONE (OUTPATIENT)
Dept: INTERNAL MEDICINE CLINIC | Facility: CLINIC | Age: 57
End: 2017-08-18

## 2017-08-18 NOTE — TELEPHONE ENCOUNTER
Received letter from [facility] order. Called patient for more information. Patient insurance is changing and would like 90 days supply to [facility] mail order. Verified with patient.  Doing well on 50 mg. Verified instructions with [facility] topNewportx 25

## 2017-08-21 ENCOUNTER — TELEPHONE (OUTPATIENT)
Dept: INTERNAL MEDICINE CLINIC | Facility: CLINIC | Age: 57
End: 2017-08-21

## 2017-08-21 NOTE — TELEPHONE ENCOUNTER
Left shoulder pain and a fall  Placed a referral on 07/31/17 but was cancelled patient does need referral. Hospital called asking for referral.

## 2017-08-21 NOTE — TELEPHONE ENCOUNTER
Referral was canceled as additional visits were added to previous referral. This request will be cancelled, additional visits added to [id number]. OV with the above information.

## 2017-08-22 ENCOUNTER — TELEPHONE (OUTPATIENT)
Dept: INTERNAL MEDICINE CLINIC | Facility: CLINIC | Age: 57
End: 2017-08-22

## 2017-08-22 ENCOUNTER — HOSPITAL ENCOUNTER (OUTPATIENT)
Dept: PHYSICAL THERAPY | Facility: HOSPITAL | Age: 57
Setting detail: THERAPIES SERIES
Discharge: HOME | End: 2017-08-22
Attending: PHYSICIAN ASSISTANT
Payer: COMMERCIAL

## 2017-08-22 DIAGNOSIS — M25.851 IMPINGEMENT SYNDROME, HIP, RIGHT: ICD-10-CM
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
## 2017-08-22 DIAGNOSIS — M25.512 ACUTE PAIN OF LEFT SHOULDER: ICD-10-CM

## 2017-08-22 DIAGNOSIS — I10 ESSENTIAL HYPERTENSION: ICD-10-CM

## 2017-08-22 PROCEDURE — 97110 THERAPEUTIC EXERCISES: CPT

## 2017-08-22 PROCEDURE — 97161 PT EVAL LOW COMPLEX 20 MIN: CPT

## 2017-08-22 RX ORDER — AMLODIPINE BESYLATE 5 MG/1
5 TABLET ORAL DAILY
Qty: 90 TABLET | Refills: 1 | OUTPATIENT
Start: 2017-08-22 | End: 2017-12-06

## 2017-08-22 RX ORDER — LOSARTAN POTASSIUM AND HYDROCHLOROTHIAZIDE 25; 100 MG/1; MG/1
1 TABLET ORAL DAILY
Qty: 90 TABLET | Refills: 1 | OUTPATIENT
Start: 2017-08-22 | End: 2017-12-06

## 2017-08-22 NOTE — TELEPHONE ENCOUNTER
Name of Medication:  Probiotic Product (VSL#3) Oral Cap 60 capsule 2 6/8/2017    Sig :  Take 1 capsule by mouth daily. Route:   Oral     Class:   Mail Order Print     Order #:   [id number]           Dose:     How is medication prescribed:    Specific

## 2017-08-22 NOTE — TELEPHONE ENCOUNTER
[facility] called and requested refills on Amlodipine, Losartan, and HCTZ. QTY 90; please call [phone] if needed.

## 2017-08-23 NOTE — PROGRESS NOTES
UPPER EXTREMITY EVALUATION:   Referring Provider: JASON Kenney  Diagnosis: L shoulder pain     Date of Service: 8/22/2017     PATIENT SUMMARY   Parmjit Denise is a 56year old male who presents to therapy today with complaints of L shoulder pain None  OBJECTIVE:   Observation/Posture: rounded shoulders, increased thoracic kyphosis  Cervical Screen: Shoulder pain with extension, R rotation, R extension quadrant  Spurlings negative  Palpation: TTP L upper trapezius, L ACJ  Sensation: intact to light weeks.     Frequency / Duration: Patient will be seen for 2 x/week or a total of 10 visits over a 90 day period. Treatment will include: Manual Therapy; Therapeutic Exercises; Neuromuscular Re-education; Therapeutic Activity;  Pt education; Home exercise pr

## 2017-08-25 ENCOUNTER — PATIENT MESSAGE (OUTPATIENT)
Dept: INTERNAL MEDICINE CLINIC | Facility: CLINIC | Age: 57
End: 2017-08-25

## 2017-08-25 NOTE — TELEPHONE ENCOUNTER
From: Davis Mejia  To: DONNA Gimenez  Sent: 8/25/2017 8:39 AM CDT  Subject: Prescription Question    looks like my mail order has the prescription for the 25 topamax and not the 50 can you send this to them.

## 2017-08-28 NOTE — TELEPHONE ENCOUNTER
Time started: 10:58am    Time ended: 11:09am    Total time spent on chart: 11 min    Per OV notes from 8/3/17:  Has paroxysmal a fib will not use any stimulants.  Denies any chest pain, or vision changes will continue topamax but increase dose to 50 mg bid

## 2017-08-29 ENCOUNTER — MED REC SCAN ONLY (OUTPATIENT)
Dept: INTERNAL MEDICINE CLINIC | Facility: CLINIC | Age: 57
End: 2017-08-29

## 2017-08-29 ENCOUNTER — APPOINTMENT (OUTPATIENT)
Dept: PHYSICAL THERAPY | Facility: HOSPITAL | Age: 57
End: 2017-08-29
Attending: PHYSICIAN ASSISTANT
Payer: COMMERCIAL

## 2017-09-05 DIAGNOSIS — F41.1 GENERALIZED ANXIETY DISORDER: ICD-10-CM

## 2017-09-06 DIAGNOSIS — F41.1 GENERALIZED ANXIETY DISORDER: ICD-10-CM

## 2017-09-06 RX ORDER — CLONAZEPAM 1 MG/1
1 TABLET ORAL 2 TIMES DAILY PRN
Qty: 60 TABLET | Refills: 2 | Status: SHIPPED
Start: 2017-09-06 | End: 2017-11-28

## 2017-09-06 RX ORDER — CLONAZEPAM 1 MG/1
TABLET ORAL
Qty: 60 TABLET | Refills: 2 | OUTPATIENT
Start: 2017-09-06

## 2017-09-06 NOTE — TELEPHONE ENCOUNTER
From: Kiel Gillette  Sent: 9/5/2017 10:11 PM CDT  Subject: Medication Renewal Request    Rio Estrella would like a refill of the following medications:  CLONAZEPAM 1 MG Oral Tab Yo Bowman MD]    Preferred pharmacy: [facility] [place] -

## 2017-09-07 DIAGNOSIS — E55.9 VITAMIN D DEFICIENCY: ICD-10-CM

## 2017-09-07 RX ORDER — ERGOCALCIFEROL 1.25 MG/1
50000 CAPSULE ORAL
Qty: 8 CAPSULE | Refills: 5
Start: 2017-09-07 | End: 2018-03-06

## 2017-09-07 NOTE — TELEPHONE ENCOUNTER
From: Kell Coronado  Sent: 9/7/2017 8:55 AM CDT  Subject: Medication Renewal Request    R[facility] Palm would like a refill of the following medications:  ergocalciferol 87275 units Oral Cap DONNA Meléndez]    Preferred pharmacy: [facility]

## 2017-09-07 NOTE — TELEPHONE ENCOUNTER
From: Parmjit Denise  Sent: 9/7/2017 8:56 AM CDT  Subject: Medication Renewal Request    R[facility] Medina would like a refill of the following medications:  ergocalciferol 18447 units Oral Cap DONNA Robins]    Preferred pharmacy: [id number] -

## 2017-09-08 ENCOUNTER — OFFICE VISIT (OUTPATIENT)
Dept: INTERNAL MEDICINE CLINIC | Facility: CLINIC | Age: 57
End: 2017-09-08

## 2017-09-08 ENCOUNTER — APPOINTMENT (OUTPATIENT)
Dept: PHYSICAL THERAPY | Facility: HOSPITAL | Age: 57
End: 2017-09-08
Attending: PHYSICIAN ASSISTANT
Payer: COMMERCIAL

## 2017-09-08 VITALS
HEART RATE: 102 BPM | BODY MASS INDEX: 44.1 KG/M2 | HEIGHT: 71 IN | TEMPERATURE: 98 F | DIASTOLIC BLOOD PRESSURE: 78 MMHG | SYSTOLIC BLOOD PRESSURE: 116 MMHG | OXYGEN SATURATION: 97 % | WEIGHT: 315 LBS

## 2017-09-08 DIAGNOSIS — Z23 NEED FOR INFLUENZA VACCINATION: ICD-10-CM

## 2017-09-08 DIAGNOSIS — J06.9 ACUTE URI: Primary | ICD-10-CM

## 2017-09-08 PROCEDURE — 90471 IMMUNIZATION ADMIN: CPT | Performed by: STUDENT IN AN ORGANIZED HEALTH CARE EDUCATION/TRAINING PROGRAM

## 2017-09-08 PROCEDURE — 90686 IIV4 VACC NO PRSV 0.5 ML IM: CPT | Performed by: STUDENT IN AN ORGANIZED HEALTH CARE EDUCATION/TRAINING PROGRAM

## 2017-09-08 PROCEDURE — 99213 OFFICE O/P EST LOW 20 MIN: CPT | Performed by: STUDENT IN AN ORGANIZED HEALTH CARE EDUCATION/TRAINING PROGRAM

## 2017-09-08 RX ORDER — LEVOCETIRIZINE DIHYDROCHLORIDE 5 MG/1
5 TABLET, FILM COATED ORAL EVERY EVENING
Qty: 30 TABLET | Refills: 0 | Status: SHIPPED | OUTPATIENT
Start: 2017-09-08 | End: 2018-04-27 | Stop reason: ALTCHOICE

## 2017-09-08 RX ORDER — AMOXICILLIN 500 MG/1
CAPSULE ORAL
Refills: 3 | COMMUNITY
Start: 2017-08-22 | End: 2017-09-19

## 2017-09-08 RX ORDER — AMOXICILLIN AND CLAVULANATE POTASSIUM 875; 125 MG/1; MG/1
1 TABLET, FILM COATED ORAL 2 TIMES DAILY
Qty: 20 TABLET | Refills: 0 | Status: SHIPPED | OUTPATIENT
Start: 2017-09-08 | End: 2017-09-18

## 2017-09-08 RX ORDER — FLUTICASONE PROPIONATE 50 MCG
1 SPRAY, SUSPENSION (ML) NASAL DAILY
Qty: 1 BOTTLE | Refills: 0 | Status: SHIPPED | OUTPATIENT
Start: 2017-09-08 | End: 2017-09-15

## 2017-09-08 NOTE — PROGRESS NOTES
HPI:    Patient ID: Rodney Dudley is a 56year old male. URI    This is a new problem. Episode onset: 4 days ago. The problem has been unchanged. Maximum temperature: Low-grade fever as per patient. The fever has been present for 1 to 2 days.  Associat (5 mg total) by mouth daily. Disp: 90 tablet Rfl: 1   BuPROPion HCl ER, XL, 150 MG Oral Tablet 24 Hr Take 1 tablet (150 mg total) by mouth once daily. Take with 300 mg to equal 450 mg.  Disp: 90 tablet Rfl: 1   ZOLPIDEM TARTRATE 10 MG Oral Tab TAKE 1 TABLET Physical Exam   Constitutional: He is oriented to person, place, and time. He appears well-developed and well-nourished. HENT:   Head: Normocephalic and atraumatic.    Right Ear: Hearing, tympanic membrane, external ear and ear canal normal.   Left Ear: Visit:  Signed Prescriptions Disp Refills    Fluticasone Propionate 50 MCG/ACT Nasal Suspension 1 Bottle 0      Si spray by Each Nare route daily.       Levocetirizine Dihydrochloride 5 MG Oral Tab 30 tablet 0      Sig: Take 1 tablet (5 mg total) by macy

## 2017-09-12 ENCOUNTER — APPOINTMENT (OUTPATIENT)
Dept: PHYSICAL THERAPY | Facility: HOSPITAL | Age: 57
End: 2017-09-12
Attending: PHYSICIAN ASSISTANT
Payer: COMMERCIAL

## 2017-09-15 ENCOUNTER — HOSPITAL ENCOUNTER (OUTPATIENT)
Dept: PHYSICAL THERAPY | Facility: HOSPITAL | Age: 57
Setting detail: THERAPIES SERIES
Discharge: HOME | End: 2017-09-15
Attending: PHYSICIAN ASSISTANT
Payer: COMMERCIAL

## 2017-09-15 DIAGNOSIS — M25.851 IMPINGEMENT SYNDROME, HIP, RIGHT: ICD-10-CM

## 2017-09-15 PROCEDURE — 97140 MANUAL THERAPY 1/> REGIONS: CPT

## 2017-09-15 PROCEDURE — 97110 THERAPEUTIC EXERCISES: CPT

## 2017-09-18 NOTE — PROGRESS NOTES
Dx: L shoulder pain         Authorized # of Visits:  12         Next MD visit: none scheduled  Fall Risk: standard         Precautions: n/a             Subjective: Patient reports that his L shoulder pain has improved since initial evaluation and that HEP

## 2017-09-19 ENCOUNTER — OFFICE VISIT (OUTPATIENT)
Dept: INTERNAL MEDICINE CLINIC | Facility: CLINIC | Age: 57
End: 2017-09-19

## 2017-09-19 ENCOUNTER — APPOINTMENT (OUTPATIENT)
Dept: PHYSICAL THERAPY | Facility: HOSPITAL | Age: 57
End: 2017-09-19
Attending: PHYSICIAN ASSISTANT
Payer: COMMERCIAL

## 2017-09-19 VITALS
DIASTOLIC BLOOD PRESSURE: 90 MMHG | HEIGHT: 71 IN | HEART RATE: 84 BPM | SYSTOLIC BLOOD PRESSURE: 140 MMHG | WEIGHT: 315 LBS | RESPIRATION RATE: 18 BRPM | BODY MASS INDEX: 44.1 KG/M2

## 2017-09-19 DIAGNOSIS — Z51.81 ENCOUNTER FOR THERAPEUTIC DRUG MONITORING: Primary | ICD-10-CM

## 2017-09-19 DIAGNOSIS — Z79.4 TYPE 2 DIABETES MELLITUS WITHOUT COMPLICATION, WITH LONG-TERM CURRENT USE OF INSULIN (HCC): ICD-10-CM

## 2017-09-19 DIAGNOSIS — E11.9 TYPE 2 DIABETES MELLITUS WITHOUT COMPLICATION, WITH LONG-TERM CURRENT USE OF INSULIN (HCC): ICD-10-CM

## 2017-09-19 DIAGNOSIS — E66.01 MORBID OBESITY WITH BMI OF 45.0-49.9, ADULT (HCC): ICD-10-CM

## 2017-09-19 PROCEDURE — 99213 OFFICE O/P EST LOW 20 MIN: CPT | Performed by: NURSE PRACTITIONER

## 2017-09-19 RX ORDER — TOPIRAMATE 50 MG/1
50 TABLET, FILM COATED ORAL 2 TIMES DAILY
Qty: 180 TABLET | Refills: 3 | Status: SHIPPED | OUTPATIENT
Start: 2017-09-19 | End: 2018-04-27 | Stop reason: ALTCHOICE

## 2017-09-19 NOTE — PROGRESS NOTES
CC: Patient presents with:  Weight Check: one pound weight gain       HPI:    Obesity. Patient doing great on increased topamax and no changes in vision or parasthesia.       Current Outpatient Prescriptions:  topiramate (TOPAMAX) 50 MG Oral Tab Take 1 tabl Rivaroxaban (XARELTO) 20 MG Oral Tab Take 1 tablet (20 mg total) by mouth daily with food.  Disp: 90 tablet Rfl: 3   VICTOZA 18 MG/3ML Subcutaneous Solution Pen-injector  Disp:  Rfl: 2   CIALIS 20 MG Oral Tab  Disp:  Rfl: 2   aspirin EC 81 MG Oral Tab EC chest pain, atrial fibrillation  GI: denies constipation  PSYCHE:depression  On wellbutrin       EXAM:   /90   Pulse 84   Resp 18   Ht 71\"   Wt (!) 341 lb   BMI 47.56 kg/m²   Body mass index is 47.56 kg/m².    GENERAL: well developed, well nourished,

## 2017-10-03 ENCOUNTER — HOSPITAL ENCOUNTER (OUTPATIENT)
Dept: GENERAL RADIOLOGY | Facility: HOSPITAL | Age: 57
Discharge: HOME | End: 2017-10-03
Attending: STUDENT IN AN ORGANIZED HEALTH CARE EDUCATION/TRAINING PROGRAM
Payer: COMMERCIAL

## 2017-10-03 ENCOUNTER — TELEPHONE (OUTPATIENT)
Dept: INTERNAL MEDICINE CLINIC | Facility: CLINIC | Age: 57
End: 2017-10-03

## 2017-10-03 ENCOUNTER — APPOINTMENT (OUTPATIENT)
Dept: LAB | Age: 57
End: 2017-10-03
Attending: UROLOGY
Payer: COMMERCIAL

## 2017-10-03 ENCOUNTER — APPOINTMENT (OUTPATIENT)
Dept: LAB | Facility: HOSPITAL | Age: 57
End: 2017-10-03
Attending: STUDENT IN AN ORGANIZED HEALTH CARE EDUCATION/TRAINING PROGRAM
Payer: COMMERCIAL

## 2017-10-03 DIAGNOSIS — R06.02 SOB (SHORTNESS OF BREATH): ICD-10-CM

## 2017-10-03 DIAGNOSIS — N52.9 IMPOTENCE, ORGANIC: ICD-10-CM

## 2017-10-03 DIAGNOSIS — J18.9 PNEUMONIA OF RIGHT UPPER LOBE DUE TO INFECTIOUS ORGANISM: Primary | ICD-10-CM

## 2017-10-03 DIAGNOSIS — R05.9 COUGH: ICD-10-CM

## 2017-10-03 DIAGNOSIS — C61 PROSTATE CANCER (HCC): ICD-10-CM

## 2017-10-03 PROCEDURE — 71020 XR CHEST PA + LAT CHEST (CPT=71020): CPT | Performed by: STUDENT IN AN ORGANIZED HEALTH CARE EDUCATION/TRAINING PROGRAM

## 2017-10-03 PROCEDURE — 36415 COLL VENOUS BLD VENIPUNCTURE: CPT

## 2017-10-03 PROCEDURE — 84153 ASSAY OF PSA TOTAL: CPT

## 2017-10-03 RX ORDER — LEVOFLOXACIN 750 MG/1
750 TABLET ORAL DAILY
Qty: 7 TABLET | Refills: 0 | Status: SHIPPED | OUTPATIENT
Start: 2017-10-03 | End: 2017-10-12 | Stop reason: ALTCHOICE

## 2017-10-03 NOTE — TELEPHONE ENCOUNTER
[facility] [place] [facility] radiology is on the line for patient's chest xray findings. Tested positive. Henny Paiz is on the line.

## 2017-10-03 NOTE — TELEPHONE ENCOUNTER
CONCLUSION:    1. Right upper lobe infiltrate may reflect pneumonia in the proper clinical setting. 2. Prominent eventration of the right hemidiaphragm. Per Dr. Desmond Smith, Rx sent to the pharmacy for Levaquin 750mg daily for 7 days.  The pt is to FU in the

## 2017-10-05 DIAGNOSIS — F51.04 PSYCHOPHYSIOLOGICAL INSOMNIA: ICD-10-CM

## 2017-10-05 RX ORDER — ZOLPIDEM TARTRATE 10 MG/1
TABLET ORAL
Qty: 30 TABLET | Refills: 1 | Status: SHIPPED
Start: 2017-10-05 | End: 2017-12-24

## 2017-10-09 ENCOUNTER — TELEPHONE (OUTPATIENT)
Dept: INTERNAL MEDICINE CLINIC | Facility: CLINIC | Age: 57
End: 2017-10-09

## 2017-10-09 NOTE — TELEPHONE ENCOUNTER
Please call patient  Patient went to work today and had seen the company nurse and they said he needed to go home to get more rest.

## 2017-10-09 NOTE — TELEPHONE ENCOUNTER
Pt returned to work. The pt was seen by the company doctor and was instructed to stay home until FU in the office on Thursday. The pt states does not need another note for work since it was the company doctor's recommendation.  The pt will need a note to re

## 2017-10-12 ENCOUNTER — OFFICE VISIT (OUTPATIENT)
Dept: INTERNAL MEDICINE CLINIC | Facility: CLINIC | Age: 57
End: 2017-10-12

## 2017-10-12 VITALS
BODY MASS INDEX: 47 KG/M2 | SYSTOLIC BLOOD PRESSURE: 136 MMHG | TEMPERATURE: 98 F | RESPIRATION RATE: 16 BRPM | HEART RATE: 57 BPM | OXYGEN SATURATION: 97 % | DIASTOLIC BLOOD PRESSURE: 88 MMHG | WEIGHT: 315 LBS

## 2017-10-12 DIAGNOSIS — E11.9 TYPE 2 DIABETES MELLITUS WITHOUT COMPLICATION, WITH LONG-TERM CURRENT USE OF INSULIN (HCC): ICD-10-CM

## 2017-10-12 DIAGNOSIS — E78.2 MIXED HYPERLIPIDEMIA: ICD-10-CM

## 2017-10-12 DIAGNOSIS — Z13.0 SCREENING FOR IRON DEFICIENCY ANEMIA: ICD-10-CM

## 2017-10-12 DIAGNOSIS — Z13.89 SCREENING FOR GENITOURINARY CONDITION: ICD-10-CM

## 2017-10-12 DIAGNOSIS — Z13.228 SCREENING FOR METABOLIC DISORDER: ICD-10-CM

## 2017-10-12 DIAGNOSIS — E55.9 VITAMIN D DEFICIENCY: ICD-10-CM

## 2017-10-12 DIAGNOSIS — Z79.4 TYPE 2 DIABETES MELLITUS WITHOUT COMPLICATION, WITH LONG-TERM CURRENT USE OF INSULIN (HCC): ICD-10-CM

## 2017-10-12 DIAGNOSIS — Z12.5 SPECIAL SCREENING FOR MALIGNANT NEOPLASM OF PROSTATE: ICD-10-CM

## 2017-10-12 DIAGNOSIS — Z13.29 SCREENING FOR THYROID DISORDER: ICD-10-CM

## 2017-10-12 DIAGNOSIS — J18.9 PNEUMONIA OF RIGHT UPPER LOBE DUE TO INFECTIOUS ORGANISM: Primary | ICD-10-CM

## 2017-10-12 PROCEDURE — 99213 OFFICE O/P EST LOW 20 MIN: CPT | Performed by: STUDENT IN AN ORGANIZED HEALTH CARE EDUCATION/TRAINING PROGRAM

## 2017-10-12 NOTE — PROGRESS NOTES
[facility]    REASON FOR VISIT:    Current Complaints: Patient presents with:  Pneumonia: 1 w fu      HPI:  Rodney Dudley is a 56year old male who presents for a f/u visit.  Patient was recently diagnosed with pneumonia, he completed a course o Disp: 8 capsule Rfl: 5   Probiotic Product (VSL#3) Oral Cap Take 1 capsule by mouth daily. Disp: 60 capsule Rfl: 2   GLIPIZIDE XL 5 MG Oral Tablet 24 Hr  Disp:  Rfl: 1   Pravastatin Sodium 40 MG Oral Tab Take 1 tablet (40 mg total) by mouth nightly.  Disp: Constitutional: Appears stated age, nourished, and pleasant. Vital signs reviewed as noted   Head: Normocephalic and atraumatic. Nose: Nose normal.   Eyes: EOM are normal. Pupils are equal, round, and reactive to light. No scleral icterus.    Neck: Norm

## 2017-10-17 ENCOUNTER — OFFICE VISIT (OUTPATIENT)
Dept: INTERNAL MEDICINE CLINIC | Facility: CLINIC | Age: 57
End: 2017-10-17

## 2017-10-17 VITALS
BODY MASS INDEX: 44.1 KG/M2 | SYSTOLIC BLOOD PRESSURE: 142 MMHG | HEART RATE: 80 BPM | DIASTOLIC BLOOD PRESSURE: 92 MMHG | WEIGHT: 315 LBS | RESPIRATION RATE: 16 BRPM | HEIGHT: 71 IN

## 2017-10-17 DIAGNOSIS — Z79.4 TYPE 2 DIABETES MELLITUS WITHOUT COMPLICATION, WITH LONG-TERM CURRENT USE OF INSULIN (HCC): ICD-10-CM

## 2017-10-17 DIAGNOSIS — E66.01 MORBID OBESITY WITH BMI OF 45.0-49.9, ADULT (HCC): ICD-10-CM

## 2017-10-17 DIAGNOSIS — E11.9 TYPE 2 DIABETES MELLITUS WITHOUT COMPLICATION, WITH LONG-TERM CURRENT USE OF INSULIN (HCC): ICD-10-CM

## 2017-10-17 DIAGNOSIS — Z51.81 ENCOUNTER FOR THERAPEUTIC DRUG MONITORING: Primary | ICD-10-CM

## 2017-10-17 PROCEDURE — 99213 OFFICE O/P EST LOW 20 MIN: CPT | Performed by: NURSE PRACTITIONER

## 2017-10-17 NOTE — PROGRESS NOTES
CC: Patient presents with:  Weight Check: lost 3 pounds       HPI:    Obesity. Patient doing great on increased topamax and no changes in vision or parasthesia.  He exercised 2 weeks regularly but had pneumonia for about 2 weeks so had been out of work and tablet (25 mg total) by mouth 2 (two) times daily with meals. Disp: 180 tablet Rfl: 1   Rivaroxaban (XARELTO) 20 MG Oral Tab Take 1 tablet (20 mg total) by mouth daily with food.  Disp: 90 tablet Rfl: 3   VICTOZA 18 MG/3ML Subcutaneous Solution Pen-injector History:  Smoking status: Never Smoker                                                              Smokeless tobacco: Never Used                      Alcohol use:  No                  REVIEW OF SYSTEMS:   EYES:denies vision changes   LUNGS: denies shortnes (around 11/14/2017).

## 2017-10-21 DIAGNOSIS — F41.1 GENERALIZED ANXIETY DISORDER: ICD-10-CM

## 2017-10-21 DIAGNOSIS — F32.A DEPRESSION, UNSPECIFIED DEPRESSION TYPE: ICD-10-CM

## 2017-10-21 DIAGNOSIS — I10 ESSENTIAL HYPERTENSION: ICD-10-CM

## 2017-10-21 NOTE — TELEPHONE ENCOUNTER
Fax request from [employer] mail order for refills on Duloxetine DR 60 mg qty 60 and Carvedilol 25 mg tabs qty 180. Papers in triage.

## 2017-10-23 ENCOUNTER — PATIENT MESSAGE (OUTPATIENT)
Dept: INTERNAL MEDICINE CLINIC | Facility: CLINIC | Age: 57
End: 2017-10-23

## 2017-10-23 RX ORDER — CARVEDILOL 25 MG/1
25 TABLET ORAL 2 TIMES DAILY WITH MEALS
Qty: 180 TABLET | Refills: 1 | Status: SHIPPED | OUTPATIENT
Start: 2017-10-23 | End: 2018-04-17

## 2017-10-23 RX ORDER — DULOXETIN HYDROCHLORIDE 60 MG/1
CAPSULE, DELAYED RELEASE ORAL
Qty: 90 CAPSULE | Refills: 0 | Status: SHIPPED | OUTPATIENT
Start: 2017-10-23 | End: 2017-12-06

## 2017-10-23 NOTE — TELEPHONE ENCOUNTER
From: Dipak Hill  To: Nyla Ellington MD  Sent: 10/23/2017 10:42 AM CDT  Subject: Other    Test for physical..  Will any of those require fasting

## 2017-10-27 DIAGNOSIS — E78.2 MIXED HYPERLIPIDEMIA: ICD-10-CM

## 2017-10-27 RX ORDER — PRAVASTATIN SODIUM 40 MG
40 TABLET ORAL NIGHTLY
Qty: 90 TABLET | Refills: 0 | Status: SHIPPED | OUTPATIENT
Start: 2017-10-27 | End: 2017-12-06

## 2017-10-27 NOTE — TELEPHONE ENCOUNTER
Rx routed to mail order. Pt was reminded to have labs completed. An appointment is scheduled for 11/2017.

## 2017-10-29 ENCOUNTER — LAB ENCOUNTER (OUTPATIENT)
Dept: LAB | Facility: HOSPITAL | Age: 57
End: 2017-10-29
Attending: UROLOGY
Payer: COMMERCIAL

## 2017-10-29 DIAGNOSIS — C61 PROSTATE CANCER (HCC): ICD-10-CM

## 2017-10-29 DIAGNOSIS — Z79.4 TYPE 2 DIABETES MELLITUS WITHOUT COMPLICATION, WITH LONG-TERM CURRENT USE OF INSULIN (HCC): ICD-10-CM

## 2017-10-29 DIAGNOSIS — Z13.29 SCREENING FOR THYROID DISORDER: ICD-10-CM

## 2017-10-29 DIAGNOSIS — Z13.0 SCREENING FOR IRON DEFICIENCY ANEMIA: ICD-10-CM

## 2017-10-29 DIAGNOSIS — N52.9 IMPOTENCE, ORGANIC: ICD-10-CM

## 2017-10-29 DIAGNOSIS — Z13.228 SCREENING FOR METABOLIC DISORDER: ICD-10-CM

## 2017-10-29 DIAGNOSIS — Z13.89 SCREENING FOR GENITOURINARY CONDITION: ICD-10-CM

## 2017-10-29 DIAGNOSIS — R97.20 ELEVATED PSA: ICD-10-CM

## 2017-10-29 DIAGNOSIS — E11.9 TYPE 2 DIABETES MELLITUS WITHOUT COMPLICATION, WITH LONG-TERM CURRENT USE OF INSULIN (HCC): ICD-10-CM

## 2017-10-29 DIAGNOSIS — E55.9 VITAMIN D DEFICIENCY: ICD-10-CM

## 2017-10-29 DIAGNOSIS — Z12.5 SPECIAL SCREENING FOR MALIGNANT NEOPLASM OF PROSTATE: ICD-10-CM

## 2017-10-29 DIAGNOSIS — E78.2 MIXED HYPERLIPIDEMIA: ICD-10-CM

## 2017-10-29 PROCEDURE — 83036 HEMOGLOBIN GLYCOSYLATED A1C: CPT

## 2017-10-29 PROCEDURE — 85025 COMPLETE CBC W/AUTO DIFF WBC: CPT

## 2017-10-29 PROCEDURE — 80053 COMPREHEN METABOLIC PANEL: CPT

## 2017-10-29 PROCEDURE — 80061 LIPID PANEL: CPT

## 2017-10-29 PROCEDURE — 82306 VITAMIN D 25 HYDROXY: CPT

## 2017-10-29 PROCEDURE — 36415 COLL VENOUS BLD VENIPUNCTURE: CPT

## 2017-10-29 PROCEDURE — 84153 ASSAY OF PSA TOTAL: CPT

## 2017-10-29 PROCEDURE — 82043 UR ALBUMIN QUANTITATIVE: CPT

## 2017-10-29 PROCEDURE — 82570 ASSAY OF URINE CREATININE: CPT

## 2017-10-29 PROCEDURE — 84443 ASSAY THYROID STIM HORMONE: CPT

## 2017-11-09 ENCOUNTER — OFFICE VISIT (OUTPATIENT)
Dept: INTERNAL MEDICINE CLINIC | Facility: CLINIC | Age: 57
End: 2017-11-09

## 2017-11-09 VITALS
WEIGHT: 315 LBS | TEMPERATURE: 98 F | OXYGEN SATURATION: 98 % | SYSTOLIC BLOOD PRESSURE: 124 MMHG | BODY MASS INDEX: 45.61 KG/M2 | HEIGHT: 69.5 IN | HEART RATE: 102 BPM | DIASTOLIC BLOOD PRESSURE: 86 MMHG | RESPIRATION RATE: 16 BRPM

## 2017-11-09 DIAGNOSIS — Z00.00 ANNUAL PHYSICAL EXAM: Primary | ICD-10-CM

## 2017-11-09 DIAGNOSIS — L91.8 CUTANEOUS SKIN TAGS: ICD-10-CM

## 2017-11-09 DIAGNOSIS — Z23 NEED FOR PNEUMOCOCCAL VACCINATION: ICD-10-CM

## 2017-11-09 PROCEDURE — 99396 PREV VISIT EST AGE 40-64: CPT | Performed by: STUDENT IN AN ORGANIZED HEALTH CARE EDUCATION/TRAINING PROGRAM

## 2017-11-09 PROCEDURE — 90471 IMMUNIZATION ADMIN: CPT | Performed by: STUDENT IN AN ORGANIZED HEALTH CARE EDUCATION/TRAINING PROGRAM

## 2017-11-09 PROCEDURE — 90670 PCV13 VACCINE IM: CPT | Performed by: STUDENT IN AN ORGANIZED HEALTH CARE EDUCATION/TRAINING PROGRAM

## 2017-11-09 RX ORDER — INSULIN GLARGINE 100 [IU]/ML
INJECTION, SOLUTION SUBCUTANEOUS
Refills: 2 | COMMUNITY
Start: 2017-10-24 | End: 2017-12-11

## 2017-11-09 NOTE — PROGRESS NOTES
[facility]    REASON FOR VISIT:    Davis Mejia is a 56year old male who presents for an [address]. Current Complaints: No complaints. Reports compliance with the medications, denies any side effects. Vaccinations:  Tdap and Screening Screen those at high risk plus screen one time for adults born 1943-1 9[birth year] No results found for: HCVAB    Tuberculosis Screen If high risk No components found for: PPDINDURAT      ALLERGIES:   No Known Allergies    CURRENT MEDICATIONS:     Current a week. with a meal. Disp: 8 capsule Rfl: 5   Probiotic Product (VSL#3) Oral Cap Take 1 capsule by mouth daily.  Disp: 60 capsule Rfl: 2   GLIPIZIDE XL 5 MG Oral Tablet 24 Hr  Disp:  Rfl: 1   Rivaroxaban (XARELTO) 20 MG Oral Tab Take 1 tablet (20 mg total) Negative for cough and shortness of breath. Cardiovascular: Negative for chest pain and palpitations. Gastrointestinal: Negative for nausea, vomiting, abdominal pain and diarrhea.    Genitourinary: Negative for urgency, frequency and difficulty urinati tags on the right side of the neck. Psychiatric: He has a normal mood and affect. His behavior is normal.     After patient consented, histofreezer was used to apply cryotherapy to the lesion on the right hand for 40 seconds. Pt tolerated well.  Instructed visits for fasting labs. Patient/Caregiver Education: There are no barriers to learning. Medical education done. Educated by: MD     The patient indicates understanding of these issues and agrees to the plan.     Diet counseling perfomed  Exercise coun

## 2017-11-14 ENCOUNTER — OFFICE VISIT (OUTPATIENT)
Dept: INTERNAL MEDICINE CLINIC | Facility: CLINIC | Age: 57
End: 2017-11-14

## 2017-11-14 VITALS
HEART RATE: 80 BPM | RESPIRATION RATE: 16 BRPM | HEIGHT: 71 IN | BODY MASS INDEX: 44.1 KG/M2 | SYSTOLIC BLOOD PRESSURE: 132 MMHG | WEIGHT: 315 LBS | DIASTOLIC BLOOD PRESSURE: 86 MMHG

## 2017-11-14 DIAGNOSIS — Z79.4 TYPE 2 DIABETES MELLITUS WITHOUT COMPLICATION, WITH LONG-TERM CURRENT USE OF INSULIN (HCC): ICD-10-CM

## 2017-11-14 DIAGNOSIS — E66.01 MORBID OBESITY WITH BMI OF 45.0-49.9, ADULT (HCC): ICD-10-CM

## 2017-11-14 DIAGNOSIS — Z51.81 ENCOUNTER FOR THERAPEUTIC DRUG MONITORING: Primary | ICD-10-CM

## 2017-11-14 DIAGNOSIS — E11.9 TYPE 2 DIABETES MELLITUS WITHOUT COMPLICATION, WITH LONG-TERM CURRENT USE OF INSULIN (HCC): ICD-10-CM

## 2017-11-14 PROCEDURE — 99213 OFFICE O/P EST LOW 20 MIN: CPT | Performed by: NURSE PRACTITIONER

## 2017-11-14 NOTE — PROGRESS NOTES
CC: Patient presents with:  Weight Check: no weight change       HPI:    Obesity. Patient doing great on topamax and no changes in vision or parasthesia. He hasnt got back to exercising regularly again.         Current Outpatient Prescriptions:  LANTUS SOLO 1   ergocalciferol 17458 units Oral Cap Take 1 capsule (50,000 Units total) by mouth twice a week. with a meal. Disp: 8 capsule Rfl: 5   Probiotic Product (VSL#3) Oral Cap Take 1 capsule by mouth daily.  Disp: 60 capsule Rfl: 2   GLIPIZIDE XL 5 MG Oral Tabl 72     aneurism   • Diabetes Sister       Social History:  Smoking status: Never Smoker                                                              Smokeless tobacco: Never Used                      Alcohol use:  No                  REVIEW OF SYSTEMS:   EY out of system    3. Stress/Depression He is doing well on wellbutrin and reminded him we have psychologist in clinic that he is welcomme to see. Still will \"think about it\" but feels better controlled right now.      Patient agrees with plan and question

## 2017-11-28 DIAGNOSIS — E55.9 VITAMIN D DEFICIENCY: ICD-10-CM

## 2017-11-28 DIAGNOSIS — F41.1 GENERALIZED ANXIETY DISORDER: ICD-10-CM

## 2017-11-28 RX ORDER — ERGOCALCIFEROL 1.25 MG/1
50000 CAPSULE ORAL
Qty: 8 CAPSULE | Refills: 5
Start: 2017-11-30 | End: 2018-05-29

## 2017-11-28 RX ORDER — CLONAZEPAM 1 MG/1
1 TABLET ORAL 2 TIMES DAILY PRN
Qty: 60 TABLET | Refills: 2 | Status: SHIPPED
Start: 2017-11-28 | End: 2018-02-25

## 2017-11-28 NOTE — TELEPHONE ENCOUNTER
From: Juanito Fagan  Sent: 11/28/2017 9:16 AM CST  Subject: Medication Renewal Request    Yuliya Donato.  Cyrus Denise would like a refill of the following medications:     ClonazePAM 1 MG Oral Tab Shonna Viveros PA-C]    Preferred pharmacy: [facility] - 90 days raul

## 2017-11-28 NOTE — TELEPHONE ENCOUNTER
From: Salima Hensley  Sent: 11/28/2017 9:16 AM CST  Subject: Medication Renewal Request    Lisette Geiger.  Joann Ordoñez would like a refill of the following medications:     ergocalciferol 27539 units Oral Cap DONNA Luna]    Preferred pharmacy: [address]

## 2017-11-30 DIAGNOSIS — E55.9 VITAMIN D DEFICIENCY: ICD-10-CM

## 2017-12-01 RX ORDER — ERGOCALCIFEROL 1.25 MG/1
CAPSULE ORAL
Qty: 8 CAPSULE | Refills: 5 | OUTPATIENT
Start: 2017-12-01

## 2017-12-06 ENCOUNTER — TELEPHONE (OUTPATIENT)
Dept: INTERNAL MEDICINE CLINIC | Facility: CLINIC | Age: 57
End: 2017-12-06

## 2017-12-06 DIAGNOSIS — F32.A DEPRESSION, UNSPECIFIED DEPRESSION TYPE: ICD-10-CM

## 2017-12-06 DIAGNOSIS — F41.1 GENERALIZED ANXIETY DISORDER: ICD-10-CM

## 2017-12-06 DIAGNOSIS — E78.2 MIXED HYPERLIPIDEMIA: ICD-10-CM

## 2017-12-06 DIAGNOSIS — I10 ESSENTIAL HYPERTENSION: ICD-10-CM

## 2017-12-06 RX ORDER — BUPROPION HYDROCHLORIDE 300 MG/1
300 TABLET ORAL DAILY
Qty: 90 TABLET | Refills: 1 | Status: SHIPPED | OUTPATIENT
Start: 2017-12-06 | End: 2017-12-06

## 2017-12-06 RX ORDER — PRAVASTATIN SODIUM 40 MG
TABLET ORAL
Qty: 90 TABLET | Refills: 2 | Status: SHIPPED | OUTPATIENT
Start: 2017-12-06 | End: 2018-10-23

## 2017-12-06 RX ORDER — BUPROPION HYDROCHLORIDE 150 MG/1
TABLET ORAL
Qty: 90 TABLET | Refills: 0 | Status: SHIPPED | OUTPATIENT
Start: 2017-12-06 | End: 2018-03-14

## 2017-12-06 RX ORDER — LOSARTAN POTASSIUM AND HYDROCHLOROTHIAZIDE 25; 100 MG/1; MG/1
1 TABLET ORAL DAILY
Qty: 90 TABLET | Refills: 1 | Status: SHIPPED | OUTPATIENT
Start: 2017-12-06 | End: 2018-04-17

## 2017-12-06 RX ORDER — AMLODIPINE BESYLATE 5 MG/1
5 TABLET ORAL DAILY
Qty: 90 TABLET | Refills: 1 | Status: SHIPPED | OUTPATIENT
Start: 2017-12-06 | End: 2018-04-17

## 2017-12-06 RX ORDER — DULOXETIN HYDROCHLORIDE 60 MG/1
CAPSULE, DELAYED RELEASE ORAL
Qty: 90 CAPSULE | Refills: 0 | Status: SHIPPED | OUTPATIENT
Start: 2017-12-06 | End: 2018-02-13

## 2017-12-06 NOTE — TELEPHONE ENCOUNTER
Refill request from [facility] mail service for   AmLODIPine Besylate 5 MG   BuPROPion HCl ER, XL, 300 MG  Losartan Potassium-HCTZ 100-25 MG     Send to [place] on file. Complete and fax forms back to [phone].

## 2017-12-08 DIAGNOSIS — F32.A DEPRESSION, UNSPECIFIED DEPRESSION TYPE: ICD-10-CM

## 2017-12-08 RX ORDER — BUPROPION HYDROCHLORIDE 150 MG/1
150 TABLET ORAL
Qty: 90 TABLET | Refills: 1 | OUTPATIENT
Start: 2017-12-08

## 2017-12-11 ENCOUNTER — TELEPHONE (OUTPATIENT)
Dept: INTERNAL MEDICINE CLINIC | Facility: CLINIC | Age: 57
End: 2017-12-11

## 2017-12-11 RX ORDER — INSULIN GLARGINE 100 [IU]/ML
60 INJECTION, SOLUTION SUBCUTANEOUS 2 TIMES DAILY
Refills: 2 | COMMUNITY
Start: 2017-12-11 | End: 2017-12-12

## 2017-12-11 NOTE — TELEPHONE ENCOUNTER
Fax request from [facility] in [id number] [facility] for a PA on [employer] . Paper in triage.

## 2017-12-11 NOTE — TELEPHONE ENCOUNTER
Need to verify dose of lantus. LMOVM TCOB. Will proceed with PA once verified. Spoke with pt he is using Lantus 60 units BID. Pt also requesting a supply of insulin syringes with needles.  Sapheneia message sent to verify if syringes that we have on

## 2017-12-12 NOTE — TELEPHONE ENCOUNTER
Request for Alissa Varela has been DENIED. Formulary alternatives include: Lantus, Levemir or [place]. Case# [id number]  ID# [id number]  [facility] [phone]    Rx resubmitted as LANTUS.

## 2017-12-18 ENCOUNTER — PATIENT MESSAGE (OUTPATIENT)
Dept: INTERNAL MEDICINE CLINIC | Facility: CLINIC | Age: 57
End: 2017-12-18

## 2017-12-18 DIAGNOSIS — E11.9 TYPE 2 DIABETES MELLITUS WITHOUT COMPLICATION, WITH LONG-TERM CURRENT USE OF INSULIN (HCC): Primary | ICD-10-CM

## 2017-12-18 DIAGNOSIS — Z79.4 TYPE 2 DIABETES MELLITUS WITHOUT COMPLICATION, WITH LONG-TERM CURRENT USE OF INSULIN (HCC): Primary | ICD-10-CM

## 2017-12-18 RX ORDER — BLOOD SUGAR DIAGNOSTIC
STRIP MISCELLANEOUS
Qty: 90 EACH | Refills: 5 | Status: SHIPPED | OUTPATIENT
Start: 2017-12-18

## 2017-12-18 NOTE — TELEPHONE ENCOUNTER
From: Ashly Sage  To: Demetrius Reyes MD  Sent: 12/18/2017 8:44 AM CST  Subject: Prescription Question    [place] Alejo Ponce  You stated that my request for BD INSULIN SYRINGE ULTRAFINE 31G X 5/16\" 1 ML     was sent to your local pharmacy.  I am not seeing thi

## 2017-12-24 DIAGNOSIS — F51.04 PSYCHOPHYSIOLOGICAL INSOMNIA: ICD-10-CM

## 2017-12-26 RX ORDER — ZOLPIDEM TARTRATE 10 MG/1
TABLET ORAL
Qty: 30 TABLET | Refills: 2 | Status: SHIPPED
Start: 2017-12-26 | End: 2018-04-27 | Stop reason: ALTCHOICE

## 2017-12-29 ENCOUNTER — PATIENT MESSAGE (OUTPATIENT)
Dept: INTERNAL MEDICINE CLINIC | Facility: CLINIC | Age: 57
End: 2017-12-29

## 2017-12-29 DIAGNOSIS — M25.851 RIGHT HIP IMPINGEMENT SYNDROME: Primary | ICD-10-CM

## 2017-12-29 RX ORDER — ACETAMINOPHEN AND CODEINE PHOSPHATE 300; 30 MG/1; MG/1
1 TABLET ORAL EVERY 8 HOURS PRN
Qty: 60 TABLET | Refills: 0 | OUTPATIENT
Start: 2017-12-29 | End: 2018-02-13

## 2017-12-29 NOTE — TELEPHONE ENCOUNTER
From: Aracelis Kwok  To: Osmany Maza MD  Sent: 12/29/2017 1:15 PM CST  Subject: Prescription Question    My right hip is in pain again can I get a referral for additional physical therapy. Until then I am in need of temporary pain meds. Urmila Robles

## 2018-01-03 ENCOUNTER — MED REC SCAN ONLY (OUTPATIENT)
Dept: INTERNAL MEDICINE CLINIC | Facility: CLINIC | Age: 58
End: 2018-01-03

## 2018-01-12 ENCOUNTER — HOSPITAL ENCOUNTER (OUTPATIENT)
Dept: PHYSICAL THERAPY | Facility: HOSPITAL | Age: 58
Setting detail: THERAPIES SERIES
Discharge: HOME | End: 2018-01-12
Attending: INTERNAL MEDICINE
Payer: COMMERCIAL

## 2018-01-12 DIAGNOSIS — M25.851 RIGHT HIP IMPINGEMENT SYNDROME: ICD-10-CM

## 2018-01-12 PROCEDURE — 97161 PT EVAL LOW COMPLEX 20 MIN: CPT

## 2018-01-12 PROCEDURE — 97110 THERAPEUTIC EXERCISES: CPT

## 2018-01-12 NOTE — PROGRESS NOTES
LOWER EXTREMITY EVALUATION:   Referring Physician: Dr. Nicole Tena  Diagnosis: R hip OA     Date of Service: 1/12/2018     PATIENT SUMMARY   Maria A Palma is a 56year old male who presents to therapy today with complaints of R lateral hip and thigh pain descr Hip Knee   Flexion: R 65; L 80  Extension: R -5; L 0  Abduction: R 30; L 40  ER: R 40; L 50  IR: R 10; L 30 Extension: R 10; L 10      PROM:   Hip   Flexion: R 70; L 90  Extension: R 0; L 10      Accessory motion: N/T     Flexibility:  Hip Flexor: R  Sev these findings, precautions, and treatment options and has agreed to actively participate in planning and for this course of care. Thank you for your referral. Please co-sign or sign and return this letter via fax as soon as possible to [phone].  If

## 2018-01-15 ENCOUNTER — HOSPITAL ENCOUNTER (OUTPATIENT)
Dept: PHYSICAL THERAPY | Facility: HOSPITAL | Age: 58
Setting detail: THERAPIES SERIES
Discharge: HOME | End: 2018-01-15
Attending: INTERNAL MEDICINE
Payer: COMMERCIAL

## 2018-01-15 PROCEDURE — 97110 THERAPEUTIC EXERCISES: CPT

## 2018-01-15 PROCEDURE — 97140 MANUAL THERAPY 1/> REGIONS: CPT

## 2018-01-15 NOTE — PROGRESS NOTES
Dx: R hip OA         Authorized # of Visits:  8         Next MD visit: none scheduled  Fall Risk: standard         Precautions: n/a             Subjective: Patient reports that his hip was feeling better after initial evaluation, except some pain with stai

## 2018-01-22 ENCOUNTER — TELEPHONE (OUTPATIENT)
Dept: INTERNAL MEDICINE CLINIC | Facility: CLINIC | Age: 58
End: 2018-01-22

## 2018-01-22 ENCOUNTER — HOSPITAL ENCOUNTER (OUTPATIENT)
Dept: PHYSICAL THERAPY | Facility: HOSPITAL | Age: 58
Setting detail: THERAPIES SERIES
Discharge: HOME | End: 2018-01-22
Attending: INTERNAL MEDICINE
Payer: COMMERCIAL

## 2018-01-22 PROCEDURE — 97110 THERAPEUTIC EXERCISES: CPT

## 2018-01-22 PROCEDURE — 97140 MANUAL THERAPY 1/> REGIONS: CPT

## 2018-01-22 NOTE — TELEPHONE ENCOUNTER
Kong:pt left a vm asking if its possible to get a letter stating that the probiotics are medically mandated for his flexible spending

## 2018-01-22 NOTE — PROGRESS NOTES
Dx: R hip OA         Authorized # of Visits:  8         Next MD visit: none scheduled  Fall Risk: standard         Precautions: n/a             Subjective: Patient reports that his hip feels stiff if he doesn't stretch it but that hip extension stretches h min

## 2018-01-23 NOTE — TELEPHONE ENCOUNTER
Patient wants a letter stating that it is medically necessary for him to take the VSL #3 so that it can be covered by his FSA. Please advise.     LOV: 11/14/17 Charli Orellana 69  RTC: 4 weeks  Future Appointments  Date Time Provider Quincy Esteban   1/24/2018 2:1

## 2018-01-24 ENCOUNTER — APPOINTMENT (OUTPATIENT)
Dept: PHYSICAL THERAPY | Facility: HOSPITAL | Age: 58
End: 2018-01-24
Attending: INTERNAL MEDICINE
Payer: COMMERCIAL

## 2018-01-24 DIAGNOSIS — E55.9 VITAMIN D DEFICIENCY: ICD-10-CM

## 2018-01-24 RX ORDER — ERGOCALCIFEROL 1.25 MG/1
CAPSULE ORAL
Qty: 8 CAPSULE | Refills: 5 | OUTPATIENT
Start: 2018-01-24

## 2018-01-24 NOTE — TELEPHONE ENCOUNTER
Can you write just a simple letter just saying VSL #3 prescribed as the probiotic can help with weight loss and prevent diarrhea or constipation with the use of weight loss medications.

## 2018-01-29 ENCOUNTER — HOSPITAL ENCOUNTER (OUTPATIENT)
Dept: PHYSICAL THERAPY | Facility: HOSPITAL | Age: 58
Setting detail: THERAPIES SERIES
Discharge: HOME | End: 2018-01-29
Attending: INTERNAL MEDICINE
Payer: COMMERCIAL

## 2018-01-29 PROCEDURE — 97110 THERAPEUTIC EXERCISES: CPT

## 2018-01-29 PROCEDURE — 97140 MANUAL THERAPY 1/> REGIONS: CPT

## 2018-01-29 NOTE — PROGRESS NOTES
Dx: R hip OA         Authorized # of Visits:  8         Next MD visit: none scheduled  Fall Risk: standard         Precautions: n/a             Subjective: Patient reports that his hip feels stiff if he doesn't stretch it but that hip extension stretches h 10 min X, 10 min        Seated hip extension stretch, 3x20 sec X, 3x20 sec         Long axis distraction, 5 min                  Charges: Jaycob 2( 30 min) manual x1 ( 15 min)   Total Timed Treatment: 45 min     Total Treatment Time: 45 min

## 2018-02-02 ENCOUNTER — APPOINTMENT (OUTPATIENT)
Dept: PHYSICAL THERAPY | Facility: HOSPITAL | Age: 58
End: 2018-02-02
Attending: INTERNAL MEDICINE
Payer: COMMERCIAL

## 2018-02-02 RX ORDER — ACETAMINOPHEN AND CODEINE PHOSPHATE 300; 30 MG/1; MG/1
TABLET ORAL
Qty: 60 TABLET | Refills: 0 | OUTPATIENT
Start: 2018-02-02 | End: 2018-02-25

## 2018-02-13 ENCOUNTER — OFFICE VISIT (OUTPATIENT)
Dept: INTERNAL MEDICINE CLINIC | Facility: CLINIC | Age: 58
End: 2018-02-13

## 2018-02-13 VITALS
RESPIRATION RATE: 16 BRPM | SYSTOLIC BLOOD PRESSURE: 128 MMHG | HEART RATE: 100 BPM | HEIGHT: 71 IN | DIASTOLIC BLOOD PRESSURE: 82 MMHG | TEMPERATURE: 98 F | WEIGHT: 315 LBS | BODY MASS INDEX: 44.1 KG/M2

## 2018-02-13 DIAGNOSIS — J20.9 ACUTE BRONCHITIS, UNSPECIFIED ORGANISM: Primary | ICD-10-CM

## 2018-02-13 PROCEDURE — 99213 OFFICE O/P EST LOW 20 MIN: CPT | Performed by: PHYSICIAN ASSISTANT

## 2018-02-13 RX ORDER — CODEINE PHOSPHATE AND GUAIFENESIN 10; 100 MG/5ML; MG/5ML
5 SOLUTION ORAL NIGHTLY PRN
Qty: 120 ML | Refills: 0 | Status: SHIPPED | OUTPATIENT
Start: 2018-02-13 | End: 2018-03-13 | Stop reason: ALTCHOICE

## 2018-02-13 NOTE — PROGRESS NOTES
HPI:   Justin Magdaleno is a 56year old male who presents for upper respiratory symptoms for  2  weeks. Patient reports dry cough, worse at night with some wheezing . Patient denies sob, fever, congestion. Treatments tried: otc cough/cold.       Current Ou Dihydrochloride 5 MG Oral Tab Take 1 tablet (5 mg total) by mouth every evening. Disp: 30 tablet Rfl: 0   GLIPIZIDE XL 5 MG Oral Tablet 24 Hr  Disp:  Rfl: 1   Rivaroxaban (XARELTO) 20 MG Oral Tab Take 1 tablet (20 mg total) by mouth daily with food.  Disp: apparent distress  SKIN: no rashes, no suspicious lesions  EYES: PERRLA, EOMI, conjunctiva are clear  HEENT: atraumatic, normocephalic, TM's pearly gray with light reflex, oropharynx without erythema, exudates or tonsillar hypertrophy  NECK: supple, no diana

## 2018-02-13 NOTE — PATIENT INSTRUCTIONS
Start symbicort: 2 puffs twice a day. Rinse mouth after use.    Take cheratussin if needed at bedtime  Use flonase daily before bed for postnasal drip

## 2018-02-15 PROBLEM — H40.003 GLAUCOMA SUSPECT OF BOTH EYES: Status: ACTIVE | Noted: 2018-02-15

## 2018-02-25 ENCOUNTER — TELEPHONE (OUTPATIENT)
Dept: INTERNAL MEDICINE CLINIC | Facility: CLINIC | Age: 58
End: 2018-02-25

## 2018-02-25 DIAGNOSIS — F41.1 GENERALIZED ANXIETY DISORDER: ICD-10-CM

## 2018-02-26 RX ORDER — CLONAZEPAM 1 MG/1
TABLET ORAL
Qty: 60 TABLET | Refills: 2 | Status: SHIPPED
Start: 2018-02-26 | End: 2018-05-21

## 2018-02-26 RX ORDER — ACETAMINOPHEN AND CODEINE PHOSPHATE 300; 30 MG/1; MG/1
TABLET ORAL
Qty: 60 TABLET | Refills: 0 | Status: SHIPPED
Start: 2018-02-26 | End: 2018-03-14

## 2018-03-12 RX ORDER — INSULIN GLARGINE 100 [IU]/ML
INJECTION, SOLUTION SUBCUTANEOUS
Qty: 105 ML | Refills: 2 | Status: SHIPPED | OUTPATIENT
Start: 2018-03-12 | End: 2018-10-23

## 2018-03-13 ENCOUNTER — OFFICE VISIT (OUTPATIENT)
Dept: FAMILY MEDICINE CLINIC | Facility: CLINIC | Age: 58
End: 2018-03-13

## 2018-03-13 VITALS
TEMPERATURE: 98 F | OXYGEN SATURATION: 98 % | RESPIRATION RATE: 18 BRPM | HEART RATE: 90 BPM | SYSTOLIC BLOOD PRESSURE: 106 MMHG | DIASTOLIC BLOOD PRESSURE: 60 MMHG

## 2018-03-13 DIAGNOSIS — J40 BRONCHITIS: Primary | ICD-10-CM

## 2018-03-13 PROCEDURE — 94640 AIRWAY INHALATION TREATMENT: CPT | Performed by: PHYSICIAN ASSISTANT

## 2018-03-13 PROCEDURE — 99213 OFFICE O/P EST LOW 20 MIN: CPT | Performed by: PHYSICIAN ASSISTANT

## 2018-03-13 RX ORDER — FLUTICASONE PROPIONATE 50 MCG
2 SPRAY, SUSPENSION (ML) NASAL DAILY
Qty: 1 INHALER | Refills: 0 | Status: SHIPPED | OUTPATIENT
Start: 2018-03-13 | End: 2018-04-27 | Stop reason: ALTCHOICE

## 2018-03-13 RX ORDER — BUDESONIDE AND FORMOTEROL FUMARATE DIHYDRATE 80; 4.5 UG/1; UG/1
2 AEROSOL RESPIRATORY (INHALATION) 2 TIMES DAILY
Qty: 1 INHALER | Refills: 0 | Status: SHIPPED | OUTPATIENT
Start: 2018-03-13 | End: 2018-04-27 | Stop reason: ALTCHOICE

## 2018-03-13 RX ORDER — BENZONATATE 200 MG/1
200 CAPSULE ORAL 3 TIMES DAILY PRN
Qty: 30 CAPSULE | Refills: 0 | Status: SHIPPED | OUTPATIENT
Start: 2018-03-13 | End: 2018-04-27 | Stop reason: ALTCHOICE

## 2018-03-13 RX ORDER — ALBUTEROL SULFATE 2.5 MG/3ML
2.5 SOLUTION RESPIRATORY (INHALATION) ONCE
Status: COMPLETED | OUTPATIENT
Start: 2018-03-13 | End: 2018-03-13

## 2018-03-13 RX ORDER — ALBUTEROL SULFATE 90 UG/1
2 AEROSOL, METERED RESPIRATORY (INHALATION) EVERY 4 HOURS PRN
Qty: 1 INHALER | Refills: 0 | Status: SHIPPED | OUTPATIENT
Start: 2018-03-13 | End: 2018-04-27 | Stop reason: ALTCHOICE

## 2018-03-13 RX ORDER — ALBUTEROL SULFATE 2.5 MG/3ML
2.5 SOLUTION RESPIRATORY (INHALATION) EVERY 6 HOURS PRN
Qty: 1 BOTTLE | Refills: 0 | Status: SHIPPED | OUTPATIENT
Start: 2018-03-13 | End: 2018-04-27 | Stop reason: ALTCHOICE

## 2018-03-13 RX ADMIN — ALBUTEROL SULFATE 2.5 MG: 2.5 SOLUTION RESPIRATORY (INHALATION) at 11:58:00

## 2018-03-13 NOTE — PROGRESS NOTES
CHIEF COMPLAINT:   Patient presents with:  Cough        HPI:   Dale Beverly is a 56year old male who presents for cough for  2  days. Can't sleep well due to cough.   Seen 1 month ago for same symptoms and started on symbicort, cheratussin, flonase wit directed Disp: 90 each Rfl: 5   PRAVASTATIN SODIUM 40 MG Oral Tab TAKE 1 TABLET BY MOUTH NIGHTLY. PLEASE HAVE LABS COMPLETED Disp: 90 tablet Rfl: 2   AmLODIPine Besylate 5 MG Oral Tab Take 1 tablet (5 mg total) by mouth daily.  Disp: 90 tablet Rfl: 1   Johanna Social History:  Smoking status: Never Smoker                                                              Smokeless tobacco: Never Used                      Alcohol use: No                 REVIEW OF SYSTEMS:     Positive for stated complaint: cough. benzonatate 200 MG Oral Cap 30 capsule 0      Sig: Take 1 capsule (200 mg total) by mouth 3 (three) times daily as needed for cough.       albuterol sulfate (2.5 MG/3ML) 0.083% Inhalation Nebu Soln 1 Bottle 0      Sig: Take 3 mL (2.5 mg total) by Jenelle Haines

## 2018-03-14 ENCOUNTER — OFFICE VISIT (OUTPATIENT)
Dept: INTERNAL MEDICINE CLINIC | Facility: CLINIC | Age: 58
End: 2018-03-14

## 2018-03-14 VITALS
WEIGHT: 315 LBS | HEART RATE: 80 BPM | SYSTOLIC BLOOD PRESSURE: 122 MMHG | HEIGHT: 71 IN | DIASTOLIC BLOOD PRESSURE: 80 MMHG | BODY MASS INDEX: 44.1 KG/M2 | RESPIRATION RATE: 16 BRPM

## 2018-03-14 DIAGNOSIS — E11.9 TYPE 2 DIABETES MELLITUS WITHOUT COMPLICATION, WITH LONG-TERM CURRENT USE OF INSULIN (HCC): ICD-10-CM

## 2018-03-14 DIAGNOSIS — Z79.4 TYPE 2 DIABETES MELLITUS WITHOUT COMPLICATION, WITH LONG-TERM CURRENT USE OF INSULIN (HCC): ICD-10-CM

## 2018-03-14 DIAGNOSIS — I48.0 PAROXYSMAL ATRIAL FIBRILLATION (HCC): ICD-10-CM

## 2018-03-14 DIAGNOSIS — H40.003 GLAUCOMA SUSPECT OF BOTH EYES: ICD-10-CM

## 2018-03-14 DIAGNOSIS — Z51.81 ENCOUNTER FOR THERAPEUTIC DRUG MONITORING: Primary | ICD-10-CM

## 2018-03-14 DIAGNOSIS — E66.01 MORBID OBESITY WITH BMI OF 45.0-49.9, ADULT (HCC): ICD-10-CM

## 2018-03-14 PROCEDURE — 99214 OFFICE O/P EST MOD 30 MIN: CPT | Performed by: INTERNAL MEDICINE

## 2018-03-14 NOTE — PROGRESS NOTES
HISTORY OF PRESENT ILLNESS  Patient presents with:  Weight Check: up 5 lb      Dale Beverly is a 56year old male here for follow up in medical weight loss program.     Denies chest pain, shortness of breath, dizziness, blurred vision, headache, paresth 7.6 10/29/2017   ALB 3.6 10/29/2017    10/29/2017   K 3.9 10/29/2017    10/29/2017   CO2 26.0 10/29/2017       Lab Results  Component Value Date   EAG  10/29/2017   Comment: To be confirmed   A1C  10/29/2017   Comment:    To be confirmed   A1C 5/16\" 1 ML Does not apply Misc As directed Disp: 90 each Rfl: 5   PRAVASTATIN SODIUM 40 MG Oral Tab TAKE 1 TABLET BY MOUTH NIGHTLY.  PLEASE HAVE LABS COMPLETED Disp: 90 tablet Rfl: 2   AmLODIPine Besylate 5 MG Oral Tab Take 1 tablet (5 mg total) by mouth d diabetes mellitus without complication, with long-term current use of insulin (HCC)    Glaucoma suspect of both eyes    Other orders  -     Naltrexone-Bupropion HCl ER (CONTRAVE) 8-90 MG Oral Tablet 12 Hr; Week 1: 1 tablet in AM      None in PM  Week 2: 1

## 2018-04-03 ENCOUNTER — OFFICE VISIT (OUTPATIENT)
Dept: INTERNAL MEDICINE CLINIC | Facility: CLINIC | Age: 58
End: 2018-04-03

## 2018-04-03 DIAGNOSIS — Z79.4 TYPE 2 DIABETES MELLITUS WITHOUT COMPLICATION, WITH LONG-TERM CURRENT USE OF INSULIN (HCC): ICD-10-CM

## 2018-04-03 DIAGNOSIS — E11.9 TYPE 2 DIABETES MELLITUS WITHOUT COMPLICATION, WITH LONG-TERM CURRENT USE OF INSULIN (HCC): ICD-10-CM

## 2018-04-03 DIAGNOSIS — E66.01 CLASS 3 SEVERE OBESITY DUE TO EXCESS CALORIES WITH SERIOUS COMORBIDITY AND BODY MASS INDEX (BMI) OF 45.0 TO 49.9 IN ADULT (HCC): Primary | ICD-10-CM

## 2018-04-03 PROCEDURE — 97802 MEDICAL NUTRITION INDIV IN: CPT | Performed by: DIETITIAN, REGISTERED

## 2018-04-04 VITALS — BODY MASS INDEX: 48 KG/M2 | WEIGHT: 315 LBS

## 2018-04-04 NOTE — PROGRESS NOTES
INITIAL OUTPATIENT NUTRITION CONSULTATION    Nutrition Assessment    Medical Diagnosis: Obesity     Physical Findings: hip pain    Client Age and Gender: 56year old male    Marital Status and Occupation:  with children 4 living at home.   Works by mouth 3 (three) times daily as needed for cough. Disp: 30 capsule Rfl: 0   albuterol sulfate (2.5 MG/3ML) 0.083% Inhalation Nebu Soln Take 3 mL (2.5 mg total) by nebulization every 6 (six) hours as needed for Wheezing.  Disp: 1 Bottle Rfl: 0   LANTUS SOL Disp:  Rfl:      No current facility-administered medications on file prior to visit.      Labs:   No components found for: HGBA1C    TRIGLYCERIDES   Date Value Ref Range Status   08/08/2014 69 <150 mg/dL Final   ----------  Triglycerides   Date Value Ref R weight. Meal pattern: 3 meals/d, 2-3 snacks/d    Number of meals/week eaten at restaurants: ? Alcohol Intake: 0 ozs/wk    Diet Quality: Unable to assess currently.   Pt was seen in 2013 and at this time diet quality was poor    Estimated caloric n

## 2018-04-05 ENCOUNTER — PATIENT MESSAGE (OUTPATIENT)
Dept: INTERNAL MEDICINE CLINIC | Facility: CLINIC | Age: 58
End: 2018-04-05

## 2018-04-05 DIAGNOSIS — M16.11 PRIMARY OSTEOARTHRITIS OF RIGHT HIP: Primary | ICD-10-CM

## 2018-04-06 ENCOUNTER — TELEPHONE (OUTPATIENT)
Dept: INTERNAL MEDICINE CLINIC | Facility: CLINIC | Age: 58
End: 2018-04-06

## 2018-04-06 RX ORDER — ACETAMINOPHEN AND CODEINE PHOSPHATE 300; 30 MG/1; MG/1
TABLET ORAL
Qty: 60 TABLET | Refills: 0 | Status: SHIPPED
Start: 2018-04-06 | End: 2018-04-25

## 2018-04-06 NOTE — TELEPHONE ENCOUNTER
Informed the pharmacy the pt has discontinued the Rx for Contrave. There is no interaction for this pt. The pharmacist verbalized understanding.

## 2018-04-06 NOTE — TELEPHONE ENCOUNTER
From: Krayn Rogers  To: Helio Camargo MD  Sent: 4/5/2018 11:01 PM CDT  Subject: Prescription Question    I have been taking the contrave as prescribed. . I am experiencing swelling in my feet. .  I am discontinuing the medication until I hear from you.   Malissa Rice

## 2018-04-06 NOTE — TELEPHONE ENCOUNTER
[facility] pharmacy called and stated they are refilling Tylenol  #3, and see a RX prescribed by a Dr. Elle Sherwood that interacts with this Tylenol. Please advise if ok to dispense.

## 2018-04-06 NOTE — TELEPHONE ENCOUNTER
From: Janae Kenny  To: Larisa Roman MD  Sent: 4/5/2018 11:09 PM CDT  Subject: Prescription Question    Good day. .  The weight loss clinic prescribed contrave for weight loss. I am experiencing swelling into my feet.  I am discontinuing the medication

## 2018-04-09 ENCOUNTER — PATIENT MESSAGE (OUTPATIENT)
Dept: INTERNAL MEDICINE CLINIC | Facility: CLINIC | Age: 58
End: 2018-04-09

## 2018-04-10 RX ORDER — LACTOBACIL 2/BIFIDO 1/S.THERMO 450B CELL
PACKET (EA) ORAL
Qty: 90 CAPSULE | Refills: 0 | Status: SHIPPED | OUTPATIENT
Start: 2018-04-10 | End: 2018-08-16

## 2018-04-10 NOTE — TELEPHONE ENCOUNTER
From: Cristal Montiel  To: Denver Shadow, MD  Sent: 4/9/2018 9:13 PM CDT  Subject: Prescription Question    Good day. .. I am in need of a prescription for the VLS#3 probiotic so it will be covered under my flexible spending. Will this be possible.   Andree Smith

## 2018-04-10 NOTE — TELEPHONE ENCOUNTER
Garima Goldberg has given patient prescription to patient for this in the past and even wrote a letter for him to provide to his insurance for it to be covered under his FSA.

## 2018-04-10 NOTE — TELEPHONE ENCOUNTER
Requesting Probiotic VSL#3  LOV: 3/14/18  RTC: 4 weeks        Filled:  # with  refills    Future Appointments  Date Time Provider Quincy Parkisti   4/16/2018 3:50 PM Angely Frank MD [facility] [place]   4/19/2018 1:20 PM Galen Porter MD E

## 2018-04-13 ENCOUNTER — PATIENT MESSAGE (OUTPATIENT)
Dept: INTERNAL MEDICINE CLINIC | Facility: CLINIC | Age: 58
End: 2018-04-13

## 2018-04-13 NOTE — TELEPHONE ENCOUNTER
From: Mary Watkins  To: Forest Mendiola MD  Sent: 4/13/2018 2:43 PM CDT  Subject: Referral Request    I have scheduled an appointment with Dr Jimmy Farnsworth for my hip impingement for April 24 at 11:00 am.  Thanks in advance  Sergio Guidry

## 2018-04-17 DIAGNOSIS — I10 ESSENTIAL HYPERTENSION: ICD-10-CM

## 2018-04-17 RX ORDER — AMLODIPINE BESYLATE 5 MG/1
TABLET ORAL
Qty: 90 TABLET | Refills: 0 | Status: SHIPPED | OUTPATIENT
Start: 2018-04-17 | End: 2018-07-31

## 2018-04-17 RX ORDER — CARVEDILOL 25 MG/1
TABLET ORAL
Qty: 180 TABLET | Refills: 0 | Status: SHIPPED | OUTPATIENT
Start: 2018-04-17 | End: 2018-07-20

## 2018-04-17 RX ORDER — LOSARTAN POTASSIUM AND HYDROCHLOROTHIAZIDE 25; 100 MG/1; MG/1
1 TABLET ORAL DAILY
Qty: 90 TABLET | Refills: 0 | Status: SHIPPED | OUTPATIENT
Start: 2018-04-17 | End: 2018-07-31

## 2018-04-24 ENCOUNTER — TELEPHONE (OUTPATIENT)
Dept: SURGERY | Facility: CLINIC | Age: 58
End: 2018-04-24

## 2018-04-24 ENCOUNTER — TELEPHONE (OUTPATIENT)
Dept: INTERNAL MEDICINE CLINIC | Facility: CLINIC | Age: 58
End: 2018-04-24

## 2018-04-24 DIAGNOSIS — M25.851 RIGHT HIP IMPINGEMENT SYNDROME: Primary | ICD-10-CM

## 2018-04-24 DIAGNOSIS — M16.11 PRIMARY OSTEOARTHRITIS OF RIGHT HIP: Primary | ICD-10-CM

## 2018-04-24 NOTE — TELEPHONE ENCOUNTER
[facility]   Cc: [facility] Referral Pool   Phone Number: [phone]             .Reason for the order/referral:Hip Pain   PCP: Caron Reardon   Refer to Provider: Sameer Mendoza   Specialty:Pain Management   Patient Insurance: [place]

## 2018-04-24 NOTE — TELEPHONE ENCOUNTER
Please enter a new referral for patient; he is being seen in a few hours with Dr. Delle Aschoff for M25.851 (ICD-10-CM) - 726.5 (ICD-9-CM) - Right hip impingement syndrome.

## 2018-04-24 NOTE — TELEPHONE ENCOUNTER
Pt scheduled NP appt 4/25 with Dr Araceli Espinoza for right hip pain,referred by Dr Bibiana Aldana referral also

## 2018-04-25 ENCOUNTER — OFFICE VISIT (OUTPATIENT)
Dept: SURGERY | Facility: CLINIC | Age: 58
End: 2018-04-25

## 2018-04-25 VITALS
HEART RATE: 93 BPM | WEIGHT: 315 LBS | BODY MASS INDEX: 44.1 KG/M2 | SYSTOLIC BLOOD PRESSURE: 130 MMHG | HEIGHT: 71 IN | DIASTOLIC BLOOD PRESSURE: 90 MMHG

## 2018-04-25 DIAGNOSIS — M16.11 PRIMARY OSTEOARTHRITIS OF RIGHT HIP: ICD-10-CM

## 2018-04-25 DIAGNOSIS — M54.16 LUMBAR RADICULITIS: Primary | ICD-10-CM

## 2018-04-25 PROCEDURE — 99204 OFFICE O/P NEW MOD 45 MIN: CPT | Performed by: ANESTHESIOLOGY

## 2018-04-25 NOTE — PROGRESS NOTES
HPI:    Patient ID: Hoda Leiva is a 56year old male.     HPI    Review of Systems         Current Outpatient Prescriptions:  CARVEDILOL 25 MG Oral Tab TAKE 1 TABLET BY MOUTH TWICE DAILY WITH MEALS Disp: 180 tablet Rfl: 0   LOSARTAN POTASSIUM-HCTZ 100- Insulin Syringe-Needle U-100 (BD INSULIN SYRINGE ULTRAFINE) 31G X 5/16\" 1 ML Does not apply Misc As directed Disp: 90 each Rfl: 5   PRAVASTATIN SODIUM 40 MG Oral Tab TAKE 1 TABLET BY MOUTH NIGHTLY.  PLEASE HAVE LABS COMPLETED Disp: 90 tablet Rfl: 2   MAYRA Past Treatments for Current Pain Condition:   Other n/a    Prior diagnostic testing for your pain:  yes

## 2018-04-25 NOTE — PATIENT INSTRUCTIONS
Refill policies:    • Allow 2-3 business days for refills; controlled substances may take longer.   • Contact your pharmacy at least 5 days prior to running out of medication and have them send an electronic request or submit request through the “request re entire amount billed. Precertification and Prior Authorizations: If your physician has recommended that you have a procedure or additional testing performed.   [facility]) will contact your insurance carrier to obtain pre-certi procedure if you are experiencing any symptoms of infection such as cough, fever, chills, urinary symptoms, or have recently been prescribed antibiotics, have open wounds, have recently had surgery or dental procedures.       Day of Procedure:  • Please tyron requiring a preauthorization for all procedures. In the event that your insurance does not authorize your procedure within 48 hours of the scheduled date, your procedure will be cancelled and rescheduled to a later date.   Please contact your insurance car

## 2018-04-26 RX ORDER — ACETAMINOPHEN AND CODEINE PHOSPHATE 300; 30 MG/1; MG/1
TABLET ORAL
Qty: 60 TABLET | Refills: 0 | Status: SHIPPED
Start: 2018-04-26 | End: 2018-05-10

## 2018-04-27 ENCOUNTER — TELEPHONE (OUTPATIENT)
Dept: SURGERY | Facility: CLINIC | Age: 58
End: 2018-04-27

## 2018-04-27 DIAGNOSIS — M70.61 TROCHANTERIC BURSITIS OF RIGHT HIP: ICD-10-CM

## 2018-04-27 DIAGNOSIS — M16.11 OSTEOARTHRITIS OF RIGHT HIP, UNSPECIFIED OSTEOARTHRITIS TYPE: Primary | ICD-10-CM

## 2018-04-27 NOTE — TELEPHONE ENCOUNTER
Clearance requested from Dr. Demetria Amezquita for upcoming procedure on 5/29/18. Awaiting response.

## 2018-04-28 NOTE — PROGRESS NOTES
Name: Kiel Gillette   : 1960   DOS: 2018     Chief complaint: Low back pain    History of present illness:  Kiel Gillette is a 56year old male complaining of   pain in the lower back for 1 year.  Pain started suddenly without any injury or Disp: 90 tablet Rfl: 3   LANTUS SOLOSTAR 100 UNIT/ML Subcutaneous Solution Pen-injector INJECT 60 UNITS UNDER THE SKIN TWICE DAILY (Patient taking differently: INJECT 50 UNITS UNDER THE SKIN TWICE DAILY) Disp: 105 mL Rfl: 2   CLONAZEPAM 1 MG Oral Tab TAKE other endocrine problems. Respiratory:  Denies shortness of breath, wheezing, coughing. Gastrointestinal:  Denies abdominal pain, nausea, vomiting, constipation, diarrhea, symptoms of GI bleeding. Neurologic: Negative.  Specifically denies any fainting, Examination:    R   L   C5:     N   N   C6:     N   N   C7:     N   N   C8:     N   N   T1:     N   N    Cardiovascular system: Regular rate and rhythm. S1, S2 normal. No murmurs. Respiratory system: Breath sounds equal bilaterally. No crackles, rhonchi. right hip  Sacroiliitis  Lumbar radiculitis    Plan: My recommendation would be right hip joint injection and right trochanteric bursa injection. I will also  recommend him to do home physical therapy.   If needed I will bring him back for right sacroiliac

## 2018-05-01 ENCOUNTER — HOSPITAL ENCOUNTER (OUTPATIENT)
Dept: MRI IMAGING | Age: 58
Discharge: HOME | End: 2018-05-01
Attending: ANESTHESIOLOGY
Payer: COMMERCIAL

## 2018-05-01 ENCOUNTER — HOSPITAL ENCOUNTER (OUTPATIENT)
Dept: MRI IMAGING | Facility: HOSPITAL | Age: 58
Discharge: HOME | End: 2018-05-01
Attending: ANESTHESIOLOGY
Payer: COMMERCIAL

## 2018-05-01 DIAGNOSIS — M54.16 LUMBAR RADICULITIS: ICD-10-CM

## 2018-05-01 PROCEDURE — 72148 MRI LUMBAR SPINE W/O DYE: CPT | Performed by: ANESTHESIOLOGY

## 2018-05-03 ENCOUNTER — TELEPHONE (OUTPATIENT)
Dept: SURGERY | Facility: CLINIC | Age: 58
End: 2018-05-03

## 2018-05-03 NOTE — TELEPHONE ENCOUNTER
----- Message from DONNA Fuchs sent at 5/3/2018  1:09 PM CDT -----  Dr. Pradeep Lomeli has reviewed MRI lumbar spine. He has minimal bulging discs and arthritis. Dr. Pradeep Lomeli recommends proceeding with scheduled injections and follow up afterward.

## 2018-05-07 ENCOUNTER — TELEPHONE (OUTPATIENT)
Dept: INTERNAL MEDICINE CLINIC | Facility: CLINIC | Age: 58
End: 2018-05-07

## 2018-05-07 DIAGNOSIS — I10 ESSENTIAL HYPERTENSION: ICD-10-CM

## 2018-05-07 DIAGNOSIS — Z79.4 TYPE 2 DIABETES MELLITUS WITHOUT COMPLICATION, WITH LONG-TERM CURRENT USE OF INSULIN (HCC): ICD-10-CM

## 2018-05-07 DIAGNOSIS — I48.20 CHRONIC ATRIAL FIBRILLATION (HCC): ICD-10-CM

## 2018-05-07 DIAGNOSIS — E78.2 MIXED HYPERLIPIDEMIA: ICD-10-CM

## 2018-05-07 DIAGNOSIS — C61 PROSTATE CANCER (HCC): ICD-10-CM

## 2018-05-07 DIAGNOSIS — E11.9 TYPE 2 DIABETES MELLITUS WITHOUT COMPLICATION, WITH LONG-TERM CURRENT USE OF INSULIN (HCC): ICD-10-CM

## 2018-05-07 DIAGNOSIS — Z01.84 IMMUNITY STATUS TESTING: Primary | ICD-10-CM

## 2018-05-07 NOTE — TELEPHONE ENCOUNTER
Nery Resendiz from [facility] called and stated that she was looking at patient's DM follow up's etc, and noticed that patient had completed an A1C on 10/29/2017, and per ADA and IHP it is recommended to re-do in 6 months.  She is requesting an order to be placed, and l

## 2018-05-07 NOTE — TELEPHONE ENCOUNTER
Pt due for 6 mo f/u and labs. LMOVM TCOB and schedule and that lab orders have been placed for Lisa Platts. Please complete prior to appt. Message included: You will need to fast 8-12 hours prior to having your blood drawn.  The lab will also be collecting a ur

## 2018-05-08 ENCOUNTER — TELEPHONE (OUTPATIENT)
Dept: SURGERY | Facility: CLINIC | Age: 58
End: 2018-05-08

## 2018-05-08 ENCOUNTER — TELEPHONE (OUTPATIENT)
Dept: NEUROLOGY | Facility: CLINIC | Age: 58
End: 2018-05-08

## 2018-05-08 RX ORDER — PREGABALIN 75 MG/1
75 CAPSULE ORAL EVERY 12 HOURS
Qty: 60 CAPSULE | Refills: 0 | Status: SHIPPED
Start: 2018-05-08 | End: 2018-06-05

## 2018-05-08 RX ORDER — METHOCARBAMOL 750 MG/1
750 TABLET, FILM COATED ORAL EVERY 8 HOURS PRN
Qty: 90 TABLET | Refills: 0 | Status: SHIPPED | OUTPATIENT
Start: 2018-05-08 | End: 2018-05-31

## 2018-05-08 NOTE — TELEPHONE ENCOUNTER
Called pt to informed him of the medical clearance instructions and he stated that currently he takes Tylenol# 3 for the pain with no relief. Pt asking for different medication. Tylenol# 3 is prescribed by pt's PCP.  RN explained that providers will be noti

## 2018-05-08 NOTE — TELEPHONE ENCOUNTER
Spoke to pt and notified him that per Dr. Eileen Laureano he can hold his Xarelto for 3 days prior to his 5/29 injection with Dr. Kavon Rocha. Document send to scanning.

## 2018-05-08 NOTE — TELEPHONE ENCOUNTER
Response received to Medical Clearance Letter from Dr Heidi Amezquita for upcoming Dr Steven Connolly procedures,endorsed to Pain Nurse Medical Clearance folder in [address] [place] [address]

## 2018-05-08 NOTE — TELEPHONE ENCOUNTER
I have discussed with Dr. Zafar Skinner who has again reviewed his MRI. Dr. Zafar Skinner didn't feel that his bulging disc is severe enough to warrant a stronger opioid medication so we will leave that decision to Dr. Juanito Owens.  Dr. Zafar Skinner has recommended that patient add to his

## 2018-05-09 ENCOUNTER — LAB ENCOUNTER (OUTPATIENT)
Dept: LAB | Age: 58
End: 2018-05-09
Attending: INTERNAL MEDICINE
Payer: COMMERCIAL

## 2018-05-09 DIAGNOSIS — E11.9 TYPE 2 DIABETES MELLITUS WITHOUT COMPLICATION, WITH LONG-TERM CURRENT USE OF INSULIN (HCC): ICD-10-CM

## 2018-05-09 DIAGNOSIS — Z01.84 IMMUNITY STATUS TESTING: ICD-10-CM

## 2018-05-09 DIAGNOSIS — I10 ESSENTIAL HYPERTENSION: ICD-10-CM

## 2018-05-09 DIAGNOSIS — Z79.4 TYPE 2 DIABETES MELLITUS WITHOUT COMPLICATION, WITH LONG-TERM CURRENT USE OF INSULIN (HCC): ICD-10-CM

## 2018-05-09 DIAGNOSIS — C61 PROSTATE CANCER (HCC): ICD-10-CM

## 2018-05-09 DIAGNOSIS — I48.20 CHRONIC ATRIAL FIBRILLATION (HCC): ICD-10-CM

## 2018-05-09 DIAGNOSIS — E78.2 MIXED HYPERLIPIDEMIA: ICD-10-CM

## 2018-05-09 PROCEDURE — 81003 URINALYSIS AUTO W/O SCOPE: CPT

## 2018-05-09 PROCEDURE — 80061 LIPID PANEL: CPT

## 2018-05-09 PROCEDURE — 36415 COLL VENOUS BLD VENIPUNCTURE: CPT

## 2018-05-09 PROCEDURE — 82043 UR ALBUMIN QUANTITATIVE: CPT

## 2018-05-09 PROCEDURE — 86803 HEPATITIS C AB TEST: CPT

## 2018-05-09 PROCEDURE — 84153 ASSAY OF PSA TOTAL: CPT

## 2018-05-09 PROCEDURE — 85025 COMPLETE CBC W/AUTO DIFF WBC: CPT

## 2018-05-09 PROCEDURE — 80053 COMPREHEN METABOLIC PANEL: CPT

## 2018-05-09 PROCEDURE — 82570 ASSAY OF URINE CREATININE: CPT

## 2018-05-09 PROCEDURE — 83036 HEMOGLOBIN GLYCOSYLATED A1C: CPT

## 2018-05-09 NOTE — TELEPHONE ENCOUNTER
Spoke with patient and informed him of Mari's recommendations. Verbalized understanding. Instructed him to call if he is not able to  Lyrica; it may need additional insurance authorizations. He noted that he picked up the Robaxin yesterday.   Aylin

## 2018-05-10 ENCOUNTER — OFFICE VISIT (OUTPATIENT)
Dept: INTERNAL MEDICINE CLINIC | Facility: CLINIC | Age: 58
End: 2018-05-10

## 2018-05-10 ENCOUNTER — TELEPHONE (OUTPATIENT)
Dept: NEUROLOGY | Facility: CLINIC | Age: 58
End: 2018-05-10

## 2018-05-10 VITALS
TEMPERATURE: 98 F | HEIGHT: 71 IN | SYSTOLIC BLOOD PRESSURE: 126 MMHG | OXYGEN SATURATION: 97 % | BODY MASS INDEX: 44.1 KG/M2 | WEIGHT: 315 LBS | HEART RATE: 107 BPM | RESPIRATION RATE: 16 BRPM | DIASTOLIC BLOOD PRESSURE: 74 MMHG

## 2018-05-10 DIAGNOSIS — L91.8 SKIN TAGS, MULTIPLE ACQUIRED: ICD-10-CM

## 2018-05-10 DIAGNOSIS — E78.2 MIXED HYPERLIPIDEMIA: ICD-10-CM

## 2018-05-10 DIAGNOSIS — E11.9 TYPE 2 DIABETES MELLITUS WITHOUT COMPLICATION, WITH LONG-TERM CURRENT USE OF INSULIN (HCC): Primary | ICD-10-CM

## 2018-05-10 DIAGNOSIS — I10 ESSENTIAL HYPERTENSION: ICD-10-CM

## 2018-05-10 DIAGNOSIS — M16.11 PRIMARY OSTEOARTHRITIS OF RIGHT HIP: ICD-10-CM

## 2018-05-10 DIAGNOSIS — Z79.4 TYPE 2 DIABETES MELLITUS WITHOUT COMPLICATION, WITH LONG-TERM CURRENT USE OF INSULIN (HCC): Primary | ICD-10-CM

## 2018-05-10 PROCEDURE — 17110 DESTRUCTION B9 LES UP TO 14: CPT | Performed by: STUDENT IN AN ORGANIZED HEALTH CARE EDUCATION/TRAINING PROGRAM

## 2018-05-10 PROCEDURE — 99214 OFFICE O/P EST MOD 30 MIN: CPT | Performed by: STUDENT IN AN ORGANIZED HEALTH CARE EDUCATION/TRAINING PROGRAM

## 2018-05-10 RX ORDER — ACETAMINOPHEN AND CODEINE PHOSPHATE 300; 30 MG/1; MG/1
TABLET ORAL
Qty: 60 TABLET | Refills: 0 | Status: SHIPPED | OUTPATIENT
Start: 2018-05-10 | End: 2018-05-31

## 2018-05-10 NOTE — TELEPHONE ENCOUNTER
Left a message for patient to return call. When returns call please relay recommendations below. Patient called back. Informed below. He verbalized understanding.

## 2018-05-10 NOTE — TELEPHONE ENCOUNTER
Would you ask him to call us one week after the right hip injection and let us know how he is doing. If he continues to have pain in the right sacral area, we can then plan the right SI joint injection. Thanks!

## 2018-05-10 NOTE — TELEPHONE ENCOUNTER
Patient called back. Relayed results and recommendations below. Patient agreeable to proceeding with back injections. He is scheduled for Hip joint injection 5/29/18.      Per Dr Kyler Munoz notes 4/25/18:   Plan: My recommendation would be right hip joint i

## 2018-05-10 NOTE — TELEPHONE ENCOUNTER
----- Message from DONNA Busch sent at 5/3/2018  1:09 PM CDT -----  Dr. Jovanny Hansen has reviewed MRI lumbar spine. He has minimal bulging discs and arthritis. Dr. Jovanny Hansen recommends proceeding with scheduled injections and follow up afterward.

## 2018-05-10 NOTE — PROGRESS NOTES
[facility]    REASON FOR VISIT:    Current Complaints: Patient presents with:  Diabetes: 6 m f/u  Lab Results      HPI:  Tom Ill is a 66year old male who presents for 6 month f/u for DM ,HTN, HLD.     DM: he sees endocrinology  who MOUTH EVERY DAY WITH FOOD Disp: 90 tablet Rfl: 3   LANTUS SOLOSTAR 100 UNIT/ML Subcutaneous Solution Pen-injector INJECT 60 UNITS UNDER THE SKIN TWICE DAILY (Patient taking differently: INJECT 50 UNITS UNDER THE SKIN TWICE DAILY) Disp: 105 mL Rfl: 2   CLON Neurological: Patient is alert and oriented to person, place and time. Reflexes are normal.   HENT: Negative for hearing loss, congestion, sore throat and neck pain. Eyes: Negative for pain and visual disturbance.    Respiratory: Negative for cough an histofreezer was used to apply cryotherapy to the lesion on the right hand for 40 seconds. Pt tolerated well. Instructed on monitoring and f/u.     ASSESSMENT AND PLAN:   Karyn Rogers is a 66year old male who presents with    Daphne Wright was seen today for d

## 2018-05-11 RX ORDER — CODEINE PHOSPHATE AND GUAIFENESIN 10; 100 MG/5ML; MG/5ML
SOLUTION ORAL
Refills: 0 | COMMUNITY
Start: 2018-02-13 | End: 2018-06-18 | Stop reason: ALTCHOICE

## 2018-05-11 RX ORDER — TOPIRAMATE 50 MG/1
TABLET, FILM COATED ORAL
Refills: 3 | COMMUNITY
Start: 2018-03-14 | End: 2018-05-25

## 2018-05-11 RX ORDER — FLUTICASONE PROPIONATE 50 MCG
SPRAY, SUSPENSION (ML) NASAL
Refills: 0 | COMMUNITY
Start: 2018-03-13 | End: 2018-06-18

## 2018-05-11 RX ORDER — BENZONATATE 200 MG/1
CAPSULE ORAL
Refills: 0 | COMMUNITY
Start: 2018-03-13 | End: 2018-06-18 | Stop reason: ALTCHOICE

## 2018-05-11 RX ORDER — ALBUTEROL SULFATE 2.5 MG/3ML
SOLUTION RESPIRATORY (INHALATION)
Refills: 0 | COMMUNITY
Start: 2018-03-13 | End: 2018-06-18

## 2018-05-11 RX ORDER — BUDESONIDE AND FORMOTEROL FUMARATE DIHYDRATE 80; 4.5 UG/1; UG/1
AEROSOL RESPIRATORY (INHALATION)
Refills: 0 | COMMUNITY
Start: 2018-03-13 | End: 2018-08-16

## 2018-05-16 ENCOUNTER — MED REC SCAN ONLY (OUTPATIENT)
Dept: INTERNAL MEDICINE CLINIC | Facility: CLINIC | Age: 58
End: 2018-05-16

## 2018-05-17 ENCOUNTER — PATIENT MESSAGE (OUTPATIENT)
Dept: INTERNAL MEDICINE CLINIC | Facility: CLINIC | Age: 58
End: 2018-05-17

## 2018-05-17 ENCOUNTER — OFFICE VISIT (OUTPATIENT)
Dept: INTERNAL MEDICINE CLINIC | Facility: CLINIC | Age: 58
End: 2018-05-17

## 2018-05-17 VITALS
HEIGHT: 71 IN | WEIGHT: 315 LBS | RESPIRATION RATE: 20 BRPM | OXYGEN SATURATION: 97 % | BODY MASS INDEX: 44.1 KG/M2 | SYSTOLIC BLOOD PRESSURE: 132 MMHG | DIASTOLIC BLOOD PRESSURE: 80 MMHG | HEART RATE: 98 BPM

## 2018-05-17 DIAGNOSIS — I48.20 CHRONIC ATRIAL FIBRILLATION (HCC): ICD-10-CM

## 2018-05-17 DIAGNOSIS — Z51.81 ENCOUNTER FOR THERAPEUTIC DRUG MONITORING: Primary | ICD-10-CM

## 2018-05-17 DIAGNOSIS — E11.9 TYPE 2 DIABETES MELLITUS WITHOUT COMPLICATION, WITH LONG-TERM CURRENT USE OF INSULIN (HCC): ICD-10-CM

## 2018-05-17 DIAGNOSIS — E66.01 MORBID OBESITY WITH BMI OF 45.0-49.9, ADULT (HCC): ICD-10-CM

## 2018-05-17 DIAGNOSIS — G47.33 OSA (OBSTRUCTIVE SLEEP APNEA): ICD-10-CM

## 2018-05-17 DIAGNOSIS — Z79.4 TYPE 2 DIABETES MELLITUS WITHOUT COMPLICATION, WITH LONG-TERM CURRENT USE OF INSULIN (HCC): ICD-10-CM

## 2018-05-17 DIAGNOSIS — H40.003 GLAUCOMA SUSPECT OF BOTH EYES: ICD-10-CM

## 2018-05-17 PROCEDURE — 99214 OFFICE O/P EST MOD 30 MIN: CPT | Performed by: INTERNAL MEDICINE

## 2018-05-17 RX ORDER — CANAGLIFLOZIN 100 MG/1
TABLET, FILM COATED ORAL
Refills: 3 | COMMUNITY
Start: 2018-05-11 | End: 2018-11-26 | Stop reason: ALTCHOICE

## 2018-05-18 NOTE — TELEPHONE ENCOUNTER
From: Fitz Munoz  To: Catalina Pulido MD  Sent: 5/17/2018 4:57 PM CDT  Subject: Referral Request    Were you able to have me set up for the arthritis class through my insurance. Sarah Sims

## 2018-05-21 DIAGNOSIS — F41.1 GENERALIZED ANXIETY DISORDER: ICD-10-CM

## 2018-05-21 RX ORDER — CLONAZEPAM 1 MG/1
TABLET ORAL
Qty: 60 TABLET | Refills: 2 | Status: SHIPPED
Start: 2018-05-21 | End: 2018-08-14

## 2018-05-24 ENCOUNTER — PATIENT MESSAGE (OUTPATIENT)
Dept: INTERNAL MEDICINE CLINIC | Facility: CLINIC | Age: 58
End: 2018-05-24

## 2018-05-24 ENCOUNTER — TELEPHONE (OUTPATIENT)
Dept: SURGERY | Facility: CLINIC | Age: 58
End: 2018-05-24

## 2018-05-24 NOTE — TELEPHONE ENCOUNTER
I am not sure what patient is talking about and the office note is not completed for the day he was seen. Please advise.

## 2018-05-24 NOTE — PROGRESS NOTES
HISTORY OF PRESENT ILLNESS  Patient presents with:  Weight Check: 1 month follow up - up Armaan Watts is a 66year old male here for follow up in medical weight loss program.     Denies chest pain, shortness of breath, dizziness, blurred visio BILT 0.8 05/09/2018   TP 7.2 05/09/2018   ALB 3.6 05/09/2018    05/09/2018   K 3.9 05/09/2018    05/09/2018   CO2 27.0 05/09/2018       Lab Results  Component Value Date    (H) 05/09/2018   A1C 6.8 (H) 05/09/2018       Lab Results  Com Tab TAKE 1 TABLET BY MOUTH DAILY Disp: 90 tablet Rfl: 0   AMLODIPINE BESYLATE 5 MG Oral Tab TAKE 1 TABLET BY MOUTH DAILY Disp: 90 tablet Rfl: 0   Probiotic Product (VSL#3) Oral Cap Take 1 capsule by mouth daily.  Disp: 90 capsule Rfl: 0   XARELTO 20 MG Oral should help. · Due to his history and co-morbid conditions I do believe he would be a good candidate for surgery. Information given for seminar but is not ready yet to consider.   · Also given information for pool classes at [facility] and fitness cent

## 2018-05-24 NOTE — TELEPHONE ENCOUNTER
Left message to call back for patient to confirm procedure date of 5/29/18 with Dr Ace Hawkins and to be checked in at outpatient registration at 7:45 am. Patient to be instructed to call pre-procedure line before procedure at [phone].  Patient to be instruct
Spoke to patient, confirmed procedure date of 5/29/18 and to be checked in at outpatient registration at 7:45 am. Patient instructed to call pre-procedure line before procedure at [phone].  Patient instructed to call office if there are additional ques
1

## 2018-05-25 ENCOUNTER — PATIENT MESSAGE (OUTPATIENT)
Dept: INTERNAL MEDICINE CLINIC | Facility: CLINIC | Age: 58
End: 2018-05-25

## 2018-05-25 NOTE — TELEPHONE ENCOUNTER
Per last OV notes from 5/17/18 with NS:  PLAN  · Advised to hold topamax right now due to recent ophth visit that showed IOP      Topiramate will not be refilled.      Future Appointments  Date Time Provider Quincy Esteban   6/18/2018 1:00 PM Ruben Brochure

## 2018-05-25 NOTE — TELEPHONE ENCOUNTER
2 options we discussed  I gave him the aqua therapy class through [facility] which we discussed was cheaper then physical therapy co pay   Can you give him the info for [facility] and fitness so he can call and schedule

## 2018-05-25 NOTE — TELEPHONE ENCOUNTER
From: Kell Coronado  To: Star Petersen MD  Sent: 5/24/2018 3:42 PM CDT  Subject: Prescription Question    I can use topiramate again. . please call in a prescription

## 2018-05-29 ENCOUNTER — APPOINTMENT (OUTPATIENT)
Dept: GENERAL RADIOLOGY | Facility: HOSPITAL | Age: 58
End: 2018-05-29
Attending: ANESTHESIOLOGY
Payer: COMMERCIAL

## 2018-05-29 ENCOUNTER — HOSPITAL ENCOUNTER (OUTPATIENT)
Facility: HOSPITAL | Age: 58
Setting detail: HOSPITAL OUTPATIENT SURGERY
Discharge: HOME | End: 2018-05-29
Attending: ANESTHESIOLOGY | Admitting: ANESTHESIOLOGY
Payer: COMMERCIAL

## 2018-05-29 ENCOUNTER — SURGERY (OUTPATIENT)
Age: 58
End: 2018-05-29

## 2018-05-29 VITALS
OXYGEN SATURATION: 96 % | HEART RATE: 87 BPM | RESPIRATION RATE: 18 BRPM | DIASTOLIC BLOOD PRESSURE: 77 MMHG | SYSTOLIC BLOOD PRESSURE: 126 MMHG | TEMPERATURE: 98 F

## 2018-05-29 DIAGNOSIS — M16.11 OSTEOARTHRITIS OF RIGHT HIP, UNSPECIFIED OSTEOARTHRITIS TYPE: ICD-10-CM

## 2018-05-29 DIAGNOSIS — M70.61 TROCHANTERIC BURSITIS OF RIGHT HIP: ICD-10-CM

## 2018-05-29 PROCEDURE — 82962 GLUCOSE BLOOD TEST: CPT

## 2018-05-29 PROCEDURE — 77002 NEEDLE LOCALIZATION BY XRAY: CPT | Performed by: ANESTHESIOLOGY

## 2018-05-29 PROCEDURE — 3E0U3BZ INTRODUCTION OF ANESTHETIC AGENT INTO JOINTS, PERCUTANEOUS APPROACH: ICD-10-PCS | Performed by: ANESTHESIOLOGY

## 2018-05-29 RX ORDER — LIDOCAINE HYDROCHLORIDE 10 MG/ML
INJECTION, SOLUTION EPIDURAL; INFILTRATION; INTRACAUDAL; PERINEURAL AS NEEDED
Status: DISCONTINUED | OUTPATIENT
Start: 2018-05-29 | End: 2018-05-29 | Stop reason: HOSPADM

## 2018-05-29 RX ORDER — ONDANSETRON 2 MG/ML
4 INJECTION INTRAMUSCULAR; INTRAVENOUS ONCE AS NEEDED
Status: DISCONTINUED | OUTPATIENT
Start: 2018-05-29 | End: 2018-05-29

## 2018-05-29 RX ORDER — METHYLPREDNISOLONE ACETATE 40 MG/ML
INJECTION, SUSPENSION INTRA-ARTICULAR; INTRALESIONAL; INTRAMUSCULAR; SOFT TISSUE AS NEEDED
Status: DISCONTINUED | OUTPATIENT
Start: 2018-05-29 | End: 2018-05-29 | Stop reason: HOSPADM

## 2018-05-29 RX ORDER — DIPHENHYDRAMINE HYDROCHLORIDE 50 MG/ML
50 INJECTION INTRAMUSCULAR; INTRAVENOUS ONCE AS NEEDED
Status: DISCONTINUED | OUTPATIENT
Start: 2018-05-29 | End: 2018-05-29

## 2018-05-29 RX ORDER — TOPIRAMATE 50 MG/1
50 TABLET, FILM COATED ORAL 2 TIMES DAILY
Qty: 60 TABLET | Refills: 0 | Status: SHIPPED | OUTPATIENT
Start: 2018-05-29 | End: 2018-08-02

## 2018-05-29 NOTE — OPERATIVE REPORT
[facility]  Operative Report  2018     Salima Hensley Patient Status:  Hospital Outpatient Surgery    1960 MRN [id number]   Location [address] [place] [address] Attending Jossy Bill MD   [facility] Day # 0 PCP Carol Alex procedure very well. The patient had complete understanding of the risks and benefits of the procedure. (2) Right greater trochanteric bursa injection under fluoroscopy.     Procedure Description:   After reviewing the patient's history and performing

## 2018-05-29 NOTE — H&P
History & Physical Examination    Patient Name: Tom Pepe  MRN: [id number]  CSN: [id number]  YOB: 1960    Pre-Operative Diagnosis:  Trochanteric bursitis of right hip [M70.61]  Osteoarthritis of right hip, unspecified osteoarthritis type POTASSIUM-HCTZ 100-25 MG Oral Tab TAKE 1 TABLET BY MOUTH DAILY Disp: 90 tablet Rfl: 0 Taking   AMLODIPINE BESYLATE 5 MG Oral Tab TAKE 1 TABLET BY MOUTH DAILY Disp: 90 tablet Rfl: 0 Taking   Probiotic Product (VSL#3) Oral Cap Take 1 capsule by mouth daily. OTHER SURGICAL HISTORY      Comment: vocal cord palops  Family History   Problem Relation Age of Onset   • Cancer Father 59     lung   • Other [OTHER] Mother 67     aneurism   • Diabetes Sister         Smoking status: Never Smoker    Smokeless tobacco: Nev

## 2018-05-29 NOTE — TELEPHONE ENCOUNTER
From: Maria A Palma  To: Alex Valderrama MD  Sent: 5/25/2018 10:05 AM CDT  Subject: Prescription Question    Eye pressure. . I met with my eye dr yesterday and he informed me that the topomate was not affecting my eye pressures. . please contact dr Mónica Meyer

## 2018-05-29 NOTE — TELEPHONE ENCOUNTER
"PT Acute: Initial Evaluation          Pt seen on 12T nursing unit. Frequency Comments: SAT: home mon?, M-F                                                                                                               Admitting complaint: Cough [R05]  Generalized weakness [R53.1]  Chemotherapy-induced neutropenia (CMS/HCC) [D70.1]  Unable to ambulate [R26.2]  Pancytopenia (CMS/HCC) [X04.734]  Malignant neoplasm of upper lobe of left lung (CMS/HCC) [C34.12]                                     Precautions  Other Precautions: fall risk due to weakness (09/22/17 0920)    SUBJECTIVE: Patient's Personal Goal: return home (09/22/17 1355)  Subjective: pt. agreed to therapy and reports feeling ""a little better. \"" (09/22/17 1355)  Subjective/Objective Comments: RN aware of session. Pt supine in bed upon arrival and at end of session with call light in reach. 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 Patient admitted with weakness with pt from home living alone (daughter has been staying with pt the past month) using 4ww for mobility and overall independent at baseline. Pt agreeable for session and reports feeling a little better this afternoon.  Patient presents below her reported baseline with decreased " Please see patient message. Ophthalmology visit with Dr. Vince Crump was on 5/24/18 and is in [address] Please advise if patient should be taking re-starting topiramate. If yes, then he will need a refill.     Future Appointments  Date Time Provider Ryan Bustamante activity tolerance, functional weakness, dynamic standing balance deficits impacting functional mobility. See below functional mobility and required assistance. Further amb deferred due to fatigue with time educating pt on recovery breathing, benefits of multiple bouts of paced activity, and having pt rest breaks between tasks. Patient educated on mobility sequencing, diaphragmatic breathing, LE strengthening, mobility recommendations outside of therapy sessions, neutral sitting/standing posture and therapy plan of care. Continue skilled PT to improve above stated deficits and pt may benefit from additional rehab prior to returning home pending on consistent progress and no further decline in status. Time collaborating with pt and family for PT plan of care and continue to monitor for rehab benefits pending on progress. EDUCATION:   On this date, the patient, patient's spouse and family member was educated on see above and flowsheet. The response to education was: Verbalizes understanding and Demonstrates understanding    PT Identified Barriers to Discharge: medical status, weakness, decreased activity tolerance     PLAN:   Continue skilled PT, including the following Treatment/Interventions: Functional transfer training;Strengthening; Endurance training;Patient/Family training;Bed mobility;Gait training;Stairs retraining; Safety Education; Neuromuscular re-education (09/22/17 1355)   Frequency Comments: SAT: home mon?, M-F (09/22/17 1355)    Treatment Plan for Next Session: sitting postural exericses, repeat transfers for endurance, progress amb with ww (poss chair follow), progress to stairs (1 to enter and 12 within home)          RECOMMENDATIONS FOR DISCHARGE:  Recommendation for Discharge: PT: Home;Home therapy; Intensive therapy program (pending on progress) (09/22/17 1355)    PT/OT Mobility Equipment for Discharge: has 4ww and manual w/c (09/22/17 1355)  PT/OT ADL Equipment for Discharge: continue to assess (09/22/17 0920)    ICU Mobility Assesment (PERME):       Last 24 hours of Functional Data  Bed Mobility   Bed Mobility  Rolling to the Right: Minimal Assist (Min) (09/22/17 1355)  Rolling to the Left: Minimal Assist (Min) (09/22/17 1355)  Supine to Sit: Minimal Assist (Min) (09/22/17 1355)  Sit to Supine: Minimal Assist (Min) (09/22/17 1355)  Bed Mobility Comments: pt educated on supine to sit and reverse sequencing with light trunk and hip advancement assistance (09/22/17 1355)    Transfers  Transfers  Sit to Stand: Supervision Jonathan Swain) (09/22/17 1355)  Stand to Sit: Minimal Assist (Min) (09/22/17 1355)  Toilet Transfers: Minimal Assist (Min) (09/22/17 1355)  Assistive Device/: 1 Person;Gait Belt;2-wheeled walker (09/22/17 1355)  Transfer Comments 1: pt educated on sit to stand and reverse sequencing with superivsion to Min A of 1 upon standing and Min A of 1 in reverse for eccentric control. total of 4 transfer trials.  (09/22/17 1355)      Gait  Gait  Gait Assistance: Minimal Assist (Min) (09/22/17 1355)  Assistive Device/: 2-wheeled walker;1 Person;Gait Belt (09/22/17 1355)  Ambulation Distance (Feet):  (15 ft x 2) (09/22/17 1355)  Pattern: Shuffle (decreased gait speed) (09/22/17 1355)  Ambulation Surface: Tile (09/22/17 1355)  Gait Comments 1: pt educated on ww sequencing, netural posture and recovery breathing.  further deferred due to moderate fatigue.  (09/22/17 1355),      Stairs          Neuromuscular Re-education       Balance  Balance  Sitting - Static: Modified Independent (09/22/17 1355)  Sitting - Dynamic: Supervision (Supv) (09/22/17 1355)  Standing - Static: Minimal Assist (Min) (09/22/17 1355)    Wheelchair Mobility       Patient's Personal Goal: return home (09/22/17 1355)    Therapy Goals:    Goals  Short Term Goals to Be Reviewed On: 09/29/17 (09/22/17 9859)  Short Term Goals = Discharge Goals: Yes (09/22/17 8847)  Goal Agreement: Patient agrees with goals and treatment plan (09/22/17 1355)  Bed Mobility Discharge Goal: modified independent from flat bed (09/22/17 1355)  Transfer Discharge Goal: consistently demonstrate modified independent from various heights and 4ww (09/22/17 1355)  Ambulation Discharge Goal: will amb >100 ft bouts consistently with modified independence and 4ww for household and short community amb (09/22/17 1355)  Stairs Discharge Goal: will negotaite 1 step to enter and 12 stairs within home and modified independence (09/22/17 1355)  Therapeutic Exercise Discharge Goal: indep with HEP (09/22/17 1355)        PT Time Spent: 55 minutes (09/22/17 1355)    See PT flowsheet for full details regarding the PT therapy provided.

## 2018-05-31 DIAGNOSIS — M16.11 PRIMARY OSTEOARTHRITIS OF RIGHT HIP: ICD-10-CM

## 2018-05-31 RX ORDER — ACETAMINOPHEN AND CODEINE PHOSPHATE 300; 30 MG/1; MG/1
TABLET ORAL
Qty: 60 TABLET | Refills: 1 | Status: SHIPPED
Start: 2018-05-31 | End: 2018-07-08

## 2018-05-31 RX ORDER — ACETAMINOPHEN AND CODEINE PHOSPHATE 300; 30 MG/1; MG/1
TABLET ORAL
Qty: 60 TABLET | Refills: 1
Start: 2018-05-31

## 2018-06-01 RX ORDER — METHOCARBAMOL 750 MG/1
TABLET, FILM COATED ORAL
Qty: 90 TABLET | Refills: 0 | Status: SHIPPED | OUTPATIENT
Start: 2018-06-01 | End: 2018-07-23

## 2018-06-05 ENCOUNTER — TELEPHONE (OUTPATIENT)
Dept: SURGERY | Facility: CLINIC | Age: 58
End: 2018-06-05

## 2018-06-05 RX ORDER — LACTOBACIL 2/BIFIDO 1/S.THERMO 450B CELL
1 PACKET (EA) ORAL DAILY
Qty: 60 CAPSULE | Refills: 3 | Status: SHIPPED | OUTPATIENT
Start: 2018-06-05 | End: 2018-06-18

## 2018-06-05 NOTE — TELEPHONE ENCOUNTER
Medication: pregabalin 75 MG     Date of last refill: 5/8/18    [place] 0[place]    Last office visit: 4/25/18  Due back to clinic per last office note:  None noted  Date next office visit scheduled:  6/18/18    Last OV no

## 2018-06-05 NOTE — TELEPHONE ENCOUNTER
Requesting vsl#3  LOV: 5/17/18  RTC: 4 weeks  Last Relevant Labs:   Filled:  # with  refills    Future Appointments  Date Time Provider Quincy Eulalia   6/18/2018 1:00 PM Leigha Knowles MD [facility]   6/22/2018 12:40 PM Cheryl Holloway MD [facility]

## 2018-06-06 NOTE — TELEPHONE ENCOUNTER
[facility] [place] as they did not receive Rx via Fax. Rx information given. No additional needs at this time.

## 2018-06-18 ENCOUNTER — OFFICE VISIT (OUTPATIENT)
Dept: SURGERY | Facility: CLINIC | Age: 58
End: 2018-06-18

## 2018-06-18 VITALS
HEIGHT: 71 IN | WEIGHT: 315 LBS | OXYGEN SATURATION: 94 % | HEART RATE: 67 BPM | DIASTOLIC BLOOD PRESSURE: 82 MMHG | SYSTOLIC BLOOD PRESSURE: 126 MMHG | BODY MASS INDEX: 44.1 KG/M2

## 2018-06-18 DIAGNOSIS — M16.11 PRIMARY OSTEOARTHRITIS OF RIGHT HIP: Primary | ICD-10-CM

## 2018-06-18 PROCEDURE — 99214 OFFICE O/P EST MOD 30 MIN: CPT | Performed by: ANESTHESIOLOGY

## 2018-06-18 NOTE — PROGRESS NOTES
Name: Kell Coronado   : 1960   DOS: 2018     Pain Clinic Follow Up Visit:   Kell Coronado is a 66year old male who presents for recheck of his chronic hip pain.   He is status post right hip joint and right greater trochanteric bursa inject differently: INJECT 50 UNITS UNDER THE SKIN TWICE DAILY) Disp: 105 mL Rfl: 2   Insulin Syringe-Needle U-100 (BD INSULIN SYRINGE ULTRAFINE) 31G X 5/16\" 1 ML Does not apply Misc As directed Disp: 90 each Rfl: 5   PRAVASTATIN SODIUM 40 MG Oral Tab TAKE 1 TAB orders and consultations:None    The patient indicates understanding of these issues and agrees to the plan. No Follow-up on file.     Rusty Dominguez MD, 6/18/2018, 1:51 PM

## 2018-06-18 NOTE — PATIENT INSTRUCTIONS
Refill policies:    • Allow 2-3 business days for refills; controlled substances may take longer.   • Contact your pharmacy at least 5 days prior to running out of medication and have them send an electronic request or submit request through the “request re entire amount billed. Precertification and Prior Authorizations: If your physician has recommended that you have a procedure or additional testing performed.   [facility]) will contact your insurance carrier to obtain pre-certi

## 2018-06-24 NOTE — PROGRESS NOTES
HISTORY OF PRESENT ILLNESS  Patient presents with:  Weight Check: lost 6 pounds      Arnaldo Acosta is a 66year old male here for follow up with medical weight loss program for the treatment of overweight, obesity, or morbid obesity.  Patient has lost -#6 Value Date   GLU 84 05/09/2018   BUN 12 05/09/2018   CREATSERUM 1.12 05/09/2018   GFR 68 10/29/2017   GFRNAA 73 05/09/2018   GFRAA 84 05/09/2018   CA 8.8 05/09/2018   ALKPHO 91 05/09/2018   AST 18 05/09/2018   ALT 21 05/09/2018   BILT 0.8 05/09/2018   TP 7 Oral Tab TAKE 1 TABLET BY MOUTH DAILY Disp: 90 tablet Rfl: 0   Probiotic Product (VSL#3) Oral Cap Take 1 capsule by mouth daily.  Disp: 90 capsule Rfl: 0   XARELTO 20 MG Oral Tab TAKE 1 TABLET BY MOUTH EVERY DAY WITH FOOD Disp: 90 tablet Rfl: 3   LANTUS SOL cannot take contrave. · On victoza, invokana through his endo which should help. · Eat dinner earlier   · Due to his history and co-morbid conditions I do believe he would be a good candidate for surgery.  Information given for seminar but is not ready y

## 2018-06-25 ENCOUNTER — OFFICE VISIT (OUTPATIENT)
Dept: INTERNAL MEDICINE CLINIC | Facility: CLINIC | Age: 58
End: 2018-06-25

## 2018-06-25 VITALS
BODY MASS INDEX: 44.1 KG/M2 | RESPIRATION RATE: 18 BRPM | DIASTOLIC BLOOD PRESSURE: 84 MMHG | WEIGHT: 315 LBS | HEART RATE: 80 BPM | HEIGHT: 71 IN | SYSTOLIC BLOOD PRESSURE: 128 MMHG

## 2018-06-25 DIAGNOSIS — H40.003 GLAUCOMA SUSPECT OF BOTH EYES: ICD-10-CM

## 2018-06-25 DIAGNOSIS — F32.A DEPRESSION, UNSPECIFIED DEPRESSION TYPE: ICD-10-CM

## 2018-06-25 DIAGNOSIS — C61 PROSTATE CANCER (HCC): ICD-10-CM

## 2018-06-25 DIAGNOSIS — Z51.81 ENCOUNTER FOR THERAPEUTIC DRUG MONITORING: ICD-10-CM

## 2018-06-25 DIAGNOSIS — E66.01 MORBID OBESITY WITH BMI OF 45.0-49.9, ADULT (HCC): ICD-10-CM

## 2018-06-25 DIAGNOSIS — G47.33 OSA (OBSTRUCTIVE SLEEP APNEA): ICD-10-CM

## 2018-06-25 DIAGNOSIS — I10 ESSENTIAL HYPERTENSION: ICD-10-CM

## 2018-06-25 DIAGNOSIS — I48.20 CHRONIC ATRIAL FIBRILLATION (HCC): Primary | ICD-10-CM

## 2018-06-25 DIAGNOSIS — E11.9 TYPE 2 DIABETES MELLITUS WITHOUT COMPLICATION, WITH LONG-TERM CURRENT USE OF INSULIN (HCC): ICD-10-CM

## 2018-06-25 DIAGNOSIS — Z79.4 TYPE 2 DIABETES MELLITUS WITHOUT COMPLICATION, WITH LONG-TERM CURRENT USE OF INSULIN (HCC): ICD-10-CM

## 2018-06-25 PROCEDURE — 99213 OFFICE O/P EST LOW 20 MIN: CPT | Performed by: NURSE PRACTITIONER

## 2018-06-25 RX ORDER — LIRAGLUTIDE 6 MG/ML
1.8 INJECTION SUBCUTANEOUS DAILY
Refills: 2 | Status: CANCELLED | OUTPATIENT
Start: 2018-06-25

## 2018-06-25 NOTE — PATIENT INSTRUCTIONS
Plan:  Continue with medications: topamax, victoza, and other meds from endocrine MD  Schedule follow up appointments: Mary Prasad (dietitian)    Please try to work on the following dietary changes:  1.  Drink lots of water and cut down on soda/juice consum

## 2018-07-08 DIAGNOSIS — M16.11 PRIMARY OSTEOARTHRITIS OF RIGHT HIP: ICD-10-CM

## 2018-07-09 RX ORDER — ACETAMINOPHEN AND CODEINE PHOSPHATE 300; 30 MG/1; MG/1
TABLET ORAL
Qty: 60 TABLET | Refills: 1 | Status: SHIPPED
Start: 2018-07-09 | End: 2018-08-17

## 2018-07-20 DIAGNOSIS — I10 ESSENTIAL HYPERTENSION: ICD-10-CM

## 2018-07-20 RX ORDER — CARVEDILOL 25 MG/1
TABLET ORAL
Qty: 180 TABLET | Refills: 0 | Status: SHIPPED | OUTPATIENT
Start: 2018-07-20 | End: 2018-10-23

## 2018-07-25 RX ORDER — METHOCARBAMOL 750 MG/1
TABLET, FILM COATED ORAL
Qty: 90 TABLET | Refills: 0 | Status: SHIPPED | OUTPATIENT
Start: 2018-07-25 | End: 2018-08-20

## 2018-07-25 RX ORDER — PREGABALIN 75 MG/1
75 CAPSULE ORAL 2 TIMES DAILY
Qty: 60 CAPSULE | Refills: 0 | Status: SHIPPED | OUTPATIENT
Start: 2018-07-25 | End: 2018-08-21

## 2018-07-25 NOTE — TELEPHONE ENCOUNTER
Medication:   METHOCARBAMOL 750 MG Oral Tab 90 tablet 0 6/1/2018    Sig :  TAKE 1 TABLET BY MOUTH EVERY 8 HOURS AS NEEDED FOR MUSCLE SPASM       LYRICA 75 MG Oral Cap 60 capsule 0 6/7/2018    Sig :  TAKE ONE CAPSULE BY MOUTH EVERY 12 HOURS       Date of la

## 2018-07-31 DIAGNOSIS — I10 ESSENTIAL HYPERTENSION: ICD-10-CM

## 2018-07-31 RX ORDER — LOSARTAN POTASSIUM AND HYDROCHLOROTHIAZIDE 25; 100 MG/1; MG/1
1 TABLET ORAL DAILY
Qty: 90 TABLET | Refills: 0 | Status: SHIPPED | OUTPATIENT
Start: 2018-07-31 | End: 2018-08-02

## 2018-07-31 RX ORDER — AMLODIPINE BESYLATE 5 MG/1
TABLET ORAL
Qty: 90 TABLET | Refills: 0 | Status: SHIPPED | OUTPATIENT
Start: 2018-07-31 | End: 2018-10-23

## 2018-08-02 ENCOUNTER — OFFICE VISIT (OUTPATIENT)
Dept: INTERNAL MEDICINE CLINIC | Facility: CLINIC | Age: 58
End: 2018-08-02

## 2018-08-02 VITALS
DIASTOLIC BLOOD PRESSURE: 80 MMHG | HEART RATE: 84 BPM | HEIGHT: 71 IN | RESPIRATION RATE: 20 BRPM | OXYGEN SATURATION: 94 % | SYSTOLIC BLOOD PRESSURE: 120 MMHG | WEIGHT: 315 LBS | BODY MASS INDEX: 44.1 KG/M2 | TEMPERATURE: 99 F

## 2018-08-02 DIAGNOSIS — I10 ESSENTIAL HYPERTENSION, BENIGN: ICD-10-CM

## 2018-08-02 DIAGNOSIS — R06.02 SOB (SHORTNESS OF BREATH): Primary | ICD-10-CM

## 2018-08-02 DIAGNOSIS — I48.20 CHRONIC ATRIAL FIBRILLATION (HCC): ICD-10-CM

## 2018-08-02 DIAGNOSIS — R60.0 BILATERAL LOWER EXTREMITY EDEMA: ICD-10-CM

## 2018-08-02 PROCEDURE — 99214 OFFICE O/P EST MOD 30 MIN: CPT | Performed by: NURSE PRACTITIONER

## 2018-08-02 RX ORDER — FUROSEMIDE 20 MG/1
40 TABLET ORAL DAILY
Qty: 90 TABLET | Refills: 0 | Status: SHIPPED | OUTPATIENT
Start: 2018-08-02 | End: 2018-08-16

## 2018-08-02 RX ORDER — LOSARTAN POTASSIUM 100 MG/1
100 TABLET ORAL DAILY
Qty: 90 TABLET | Refills: 0 | Status: SHIPPED | OUTPATIENT
Start: 2018-08-02 | End: 2018-10-16

## 2018-08-02 NOTE — PATIENT INSTRUCTIONS
Stop Losartan 100/HCTZ 25 mg  Start Losartn 100 mg daily  Start Lasix 40 mg x 4 days then decrease to 20 mg daily  US of heart to assess heart function  Come back to see me in 2 weeks  Lab work when you come back to see me  Call me on Monday with weight

## 2018-08-02 NOTE — PROGRESS NOTES
HPI:    Patient ID: Chayito Carrington is a 66year old male. Patient presents with:  Shortness Of Breath: c/o shortness of breath with excertion, dizziness and a headache since Sunday; Rm 1    Shortness Of Breath   This is a new problem.  The current episo Social History Main Topics    Smoking status: Never Smoker                                                                Smokeless tobacco: Never Used                        Alcohol use: Yes           0.0 oz/week    Drug use: No            Other Syringe-Needle U-100 (BD INSULIN SYRINGE ULTRAFINE) 31G X 5/16\" 1 ML Does not apply Misc As directed Disp: 90 each Rfl: 5   PRAVASTATIN SODIUM 40 MG Oral Tab TAKE 1 TABLET BY MOUTH NIGHTLY.  PLEASE HAVE LABS COMPLETED Disp: 90 tablet Rfl: 2   GLIPIZIDE XL Temp 98.7 °F (37.1 °C) (Oral)   Resp 20   Ht 71\"   Wt (!) 346 lb   SpO2 94%   BMI 48.26 kg/m²   Physical Exam   Vitals reviewed. Constitutional: He is oriented to person, place, and time and obese. He appears well-developed.    Neck: Normal range of meng days then decrease to 20 mg daily  US of heart to assess heart function  Come back to see me in 2 weeks  Lab work when you come back to see me  Call me on Monday with weight       DONNA Clay

## 2018-08-06 ENCOUNTER — OFFICE VISIT (OUTPATIENT)
Dept: PAIN CLINIC | Facility: CLINIC | Age: 58
End: 2018-08-06

## 2018-08-06 ENCOUNTER — TELEPHONE (OUTPATIENT)
Dept: INTERNAL MEDICINE CLINIC | Facility: CLINIC | Age: 58
End: 2018-08-06

## 2018-08-06 VITALS
SYSTOLIC BLOOD PRESSURE: 136 MMHG | HEIGHT: 71 IN | HEART RATE: 61 BPM | OXYGEN SATURATION: 93 % | WEIGHT: 315 LBS | DIASTOLIC BLOOD PRESSURE: 78 MMHG | BODY MASS INDEX: 44.1 KG/M2

## 2018-08-06 DIAGNOSIS — M47.817 SPONDYLOSIS OF LUMBOSACRAL REGION WITHOUT MYELOPATHY OR RADICULOPATHY: Primary | ICD-10-CM

## 2018-08-06 PROCEDURE — 99214 OFFICE O/P EST MOD 30 MIN: CPT | Performed by: ANESTHESIOLOGY

## 2018-08-06 NOTE — PATIENT INSTRUCTIONS
Refill policies:    • Allow 2-3 business days for refills; controlled substances may take longer.   • Contact your pharmacy at least 5 days prior to running out of medication and have them send an electronic request or submit request through the “request re entire amount billed. Precertification and Prior Authorizations: If your physician has recommended that you have a procedure or additional testing performed.   [facility]) will contact your insurance carrier to obtain pre-certi procedure if you are experiencing any symptoms of infection such as cough, fever, chills, urinary symptoms, or have recently been prescribed antibiotics, have open wounds, have recently had surgery or dental procedures.      As you will be unable to shower days  • Eliquis (Apixaban) 3 days  • Xarelto (Rivaroxaban) 3 days  • Lovenox (Enoxaparin) 24 hours  • Aspirin  • 81mg 24 hours  • Greater than 81 mg (325mg) 7 days  • Coumadin       5 days  • Procedure may be cancelled if INR is elevated.    • Excedrin (wit CANCELLATION AND/OR RESCHEDULING: PLEASE CALL [facility] PRE-PROCEDURE LINE -[phone] FOR DETAILED INSTRUCTIONS FIVE TO SEVEN DAYS PRIOR TO PROCEDURE**

## 2018-08-06 NOTE — TELEPHONE ENCOUNTER
Spoke with pt his shortness of breath has improved slightly and he did not eat anything out of the ordinary that would have caused his 5# weight gain on 8/5.       Per Chelsea Katz NP continue Lasix 40mg daily, restrict sodium to 2 grams daily, call office on Feng Pemberton

## 2018-08-08 ENCOUNTER — TELEPHONE (OUTPATIENT)
Dept: PAIN CLINIC | Facility: CLINIC | Age: 58
End: 2018-08-08

## 2018-08-08 DIAGNOSIS — M46.1 SACROILIITIS (HCC): ICD-10-CM

## 2018-08-08 DIAGNOSIS — M47.819 SPONDYLOSIS WITHOUT MYELOPATHY: Primary | ICD-10-CM

## 2018-08-08 NOTE — TELEPHONE ENCOUNTER
Medical clearance requested from Dr. Kt Flores for patient to hold Xarelto for 3 days for 8/23 procedure. Awaiting response.

## 2018-08-10 NOTE — PROGRESS NOTES
Name: Mike Nielson   : 1960   DOS: 2018     Pain Clinic Follow Up Visit:   Mike Nielson is a 66year old male who presents for recheck of his chronic hip pain.   He is status post right hip joint and right greater trochanteric bursa injecti capsule Rfl: 0   XARELTO 20 MG Oral Tab TAKE 1 TABLET BY MOUTH EVERY DAY WITH FOOD Disp: 90 tablet Rfl: 3   LANTUS SOLOSTAR 100 UNIT/ML Subcutaneous Solution Pen-injector INJECT 60 UNITS UNDER THE SKIN TWICE DAILY (Patient taking differently: INJECT 50 UNI home.    Orders:No orders of the defined types were placed in this encounter.       Medications filled today:  No prescriptions requested or ordered in this encounter    Radiology orders and consultations:None    The patient indicates understanding of these

## 2018-08-14 DIAGNOSIS — F41.1 GENERALIZED ANXIETY DISORDER: ICD-10-CM

## 2018-08-15 ENCOUNTER — HOSPITAL ENCOUNTER (OUTPATIENT)
Dept: CV DIAGNOSTICS | Facility: HOSPITAL | Age: 58
Discharge: HOME | End: 2018-08-15
Attending: NURSE PRACTITIONER
Payer: COMMERCIAL

## 2018-08-15 DIAGNOSIS — I48.20 CHRONIC ATRIAL FIBRILLATION (HCC): ICD-10-CM

## 2018-08-15 DIAGNOSIS — R06.02 SOB (SHORTNESS OF BREATH): ICD-10-CM

## 2018-08-15 PROCEDURE — 93306 TTE W/DOPPLER COMPLETE: CPT | Performed by: NURSE PRACTITIONER

## 2018-08-15 RX ORDER — CLONAZEPAM 1 MG/1
TABLET ORAL
Qty: 60 TABLET | Refills: 2 | Status: SHIPPED
Start: 2018-08-15 | End: 2018-11-07

## 2018-08-16 ENCOUNTER — OFFICE VISIT (OUTPATIENT)
Dept: INTERNAL MEDICINE CLINIC | Facility: CLINIC | Age: 58
End: 2018-08-16

## 2018-08-16 ENCOUNTER — APPOINTMENT (OUTPATIENT)
Dept: LAB | Age: 58
End: 2018-08-16
Attending: NURSE PRACTITIONER
Payer: COMMERCIAL

## 2018-08-16 ENCOUNTER — APPOINTMENT (OUTPATIENT)
Dept: LAB | Age: 58
End: 2018-08-16
Attending: UROLOGY
Payer: COMMERCIAL

## 2018-08-16 VITALS
DIASTOLIC BLOOD PRESSURE: 70 MMHG | HEART RATE: 80 BPM | TEMPERATURE: 98 F | HEIGHT: 71 IN | RESPIRATION RATE: 16 BRPM | SYSTOLIC BLOOD PRESSURE: 110 MMHG | BODY MASS INDEX: 44.1 KG/M2 | WEIGHT: 315 LBS

## 2018-08-16 DIAGNOSIS — R60.0 BILATERAL LOWER EXTREMITY EDEMA: ICD-10-CM

## 2018-08-16 DIAGNOSIS — F41.1 GENERALIZED ANXIETY DISORDER: ICD-10-CM

## 2018-08-16 DIAGNOSIS — R06.02 SOB (SHORTNESS OF BREATH): ICD-10-CM

## 2018-08-16 DIAGNOSIS — R97.20 ELEVATED PSA: ICD-10-CM

## 2018-08-16 LAB
ANION GAP SERPL CALC-SCNC: 9 MMOL/L (ref 0–18)
BUN BLD-MCNC: 20 MG/DL (ref 8–20)
BUN/CREAT SERPL: 14.1 (ref 10–20)
CALCIUM BLD-MCNC: 9 MG/DL (ref 8.3–10.3)
CHLORIDE SERPL-SCNC: 104 MMOL/L (ref 101–111)
CO2 SERPL-SCNC: 27 MMOL/L (ref 22–32)
CREAT BLD-MCNC: 1.42 MG/DL (ref 0.7–1.3)
GLUCOSE BLD-MCNC: 143 MG/DL (ref 70–99)
OSMOLALITY SERPL CALC.SUM OF ELEC: 295 MOSM/KG (ref 275–295)
POTASSIUM SERPL-SCNC: 4 MMOL/L (ref 3.6–5.1)
PSA FREE MFR SERPL: 11 %
PSA FREE SERPL-MCNC: 1.07 NG/ML
PSA SERPL-MCNC: 9.38 NG/ML (ref 0.01–4)
SODIUM SERPL-SCNC: 140 MMOL/L (ref 136–144)

## 2018-08-16 PROCEDURE — 36415 COLL VENOUS BLD VENIPUNCTURE: CPT

## 2018-08-16 PROCEDURE — 84153 ASSAY OF PSA TOTAL: CPT

## 2018-08-16 PROCEDURE — 84154 ASSAY OF PSA FREE: CPT

## 2018-08-16 PROCEDURE — 99213 OFFICE O/P EST LOW 20 MIN: CPT | Performed by: NURSE PRACTITIONER

## 2018-08-16 PROCEDURE — 80048 BASIC METABOLIC PNL TOTAL CA: CPT

## 2018-08-16 RX ORDER — CLONAZEPAM 1 MG/1
TABLET ORAL
Qty: 60 TABLET | Refills: 2 | OUTPATIENT
Start: 2018-08-16

## 2018-08-16 RX ORDER — FUROSEMIDE 20 MG/1
20 TABLET ORAL DAILY
Qty: 90 TABLET | Refills: 3 | Status: SHIPPED | OUTPATIENT
Start: 2018-08-16 | End: 2019-01-13

## 2018-08-16 NOTE — PROGRESS NOTES
HPI:    Patient ID: Lara Barajas is a 66year old male. Patient presents with:  Shortness Of Breath: f/u   Lump: left forearm x 2 weeks       Shortness Of Breath   This is a recurrent problem. The current episode started 1 to 4 weeks ago.  The problem Smoking status: Never Smoker                                                                Smokeless tobacco: Never Used                        Alcohol use:  No              Drug use: No            Other Topics            Concern  Caffeine Concern PRAVASTATIN SODIUM 40 MG Oral Tab TAKE 1 TABLET BY MOUTH NIGHTLY. PLEASE HAVE LABS COMPLETED Disp: 90 tablet Rfl: 2   GLIPIZIDE XL 5 MG Oral Tablet 24 Hr Take 5 mg by mouth daily.    Disp:  Rfl: 1   VICTOZA 18 MG/3ML Subcutaneous Solution Pen-injector Injec /70   Pulse 80   Temp 97.7 °F (36.5 °C)   Resp 16   Ht 71\"   Wt (!) 342 lb 6.4 oz   BMI 47.76 kg/m²   Physical Exam    Constitutional: He is oriented to person, place, and time. He appears well-developed and well-nourished.    Cardiovascular: Normal

## 2018-08-16 NOTE — PATIENT INSTRUCTIONS
I will call you with echo results as soon as I have them  Continue lasix 40 mg for now, when we talk we will readjust then  Lab work today  Follow up in 3 months with Dr. Kilo Ordaz for routine follow up

## 2018-08-17 ENCOUNTER — TELEPHONE (OUTPATIENT)
Dept: SURGERY | Facility: CLINIC | Age: 58
End: 2018-08-17

## 2018-08-17 DIAGNOSIS — M16.11 PRIMARY OSTEOARTHRITIS OF RIGHT HIP: ICD-10-CM

## 2018-08-17 RX ORDER — ACETAMINOPHEN AND CODEINE PHOSPHATE 300; 30 MG/1; MG/1
TABLET ORAL
Qty: 60 TABLET | Refills: 1 | Status: SHIPPED
Start: 2018-08-17 | End: 2018-09-20

## 2018-08-17 NOTE — TELEPHONE ENCOUNTER
Left message for patient, confirmed procedure date of 08/23 and to be checked in at outpatient registration at 1:15 pm. Patient instructed to call pre-procedure line before procedure at [phone].  Patient instructed to call office if there are additiona

## 2018-08-21 RX ORDER — METHOCARBAMOL 750 MG/1
TABLET, FILM COATED ORAL
Qty: 90 TABLET | Refills: 0 | Status: SHIPPED | OUTPATIENT
Start: 2018-08-23 | End: 2018-09-19

## 2018-08-21 NOTE — TELEPHONE ENCOUNTER
Medication: methocarbamol 750 MG Oral Tab    Date of last refill: 7/25/18  [place]21[place]36 ET marco on site    Last office visit: 8/6/18  Due back to clinic per last office note:  None noted  Date next office visit schedul

## 2018-08-22 RX ORDER — PREGABALIN 75 MG/1
75 CAPSULE ORAL 2 TIMES DAILY
Qty: 60 CAPSULE | Refills: 0 | Status: SHIPPED
Start: 2018-08-23 | End: 2018-09-24

## 2018-08-22 NOTE — TELEPHONE ENCOUNTER
Medication: pregabalin (LYRICA) 75 MG Oral Cap    Date of last refill: 7/25/18  [place]    Last office visit:8/6/18  Due back to clinic per last office note:  Not noted  Date next office visit scheduled:  None schedu

## 2018-08-23 ENCOUNTER — HOSPITAL ENCOUNTER (OUTPATIENT)
Facility: HOSPITAL | Age: 58
Setting detail: HOSPITAL OUTPATIENT SURGERY
Discharge: HOME | End: 2018-08-23
Attending: ANESTHESIOLOGY | Admitting: ANESTHESIOLOGY
Payer: COMMERCIAL

## 2018-08-23 ENCOUNTER — APPOINTMENT (OUTPATIENT)
Dept: GENERAL RADIOLOGY | Facility: HOSPITAL | Age: 58
End: 2018-08-23
Attending: ANESTHESIOLOGY
Payer: COMMERCIAL

## 2018-08-23 ENCOUNTER — SURGERY (OUTPATIENT)
Age: 58
End: 2018-08-23

## 2018-08-23 VITALS
DIASTOLIC BLOOD PRESSURE: 99 MMHG | HEART RATE: 107 BPM | TEMPERATURE: 97 F | SYSTOLIC BLOOD PRESSURE: 150 MMHG | OXYGEN SATURATION: 96 % | RESPIRATION RATE: 15 BRPM

## 2018-08-23 DIAGNOSIS — M47.819 SPONDYLOSIS WITHOUT MYELOPATHY: ICD-10-CM

## 2018-08-23 DIAGNOSIS — M46.1 SACROILIITIS (HCC): ICD-10-CM

## 2018-08-23 LAB
GLUCOSE BLD-MCNC: 105 MG/DL (ref 65–99)
GLUCOSE BLD-MCNC: 48 MG/DL (ref 65–99)
GLUCOSE BLD-MCNC: 64 MG/DL (ref 65–99)
GLUCOSE BLD-MCNC: 88 MG/DL (ref 65–99)

## 2018-08-23 PROCEDURE — 3E0U3BZ INTRODUCTION OF ANESTHETIC AGENT INTO JOINTS, PERCUTANEOUS APPROACH: ICD-10-PCS | Performed by: ANESTHESIOLOGY

## 2018-08-23 PROCEDURE — 3E0U33Z INTRODUCTION OF ANTI-INFLAMMATORY INTO JOINTS, PERCUTANEOUS APPROACH: ICD-10-PCS | Performed by: ANESTHESIOLOGY

## 2018-08-23 PROCEDURE — 82962 GLUCOSE BLOOD TEST: CPT

## 2018-08-23 PROCEDURE — 99152 MOD SED SAME PHYS/QHP 5/>YRS: CPT | Performed by: ANESTHESIOLOGY

## 2018-08-23 RX ORDER — ONDANSETRON 2 MG/ML
4 INJECTION INTRAMUSCULAR; INTRAVENOUS ONCE AS NEEDED
Status: CANCELLED | OUTPATIENT
Start: 2018-08-23 | End: 2018-08-23

## 2018-08-23 RX ORDER — DIPHENHYDRAMINE HYDROCHLORIDE 50 MG/ML
50 INJECTION INTRAMUSCULAR; INTRAVENOUS ONCE AS NEEDED
Status: CANCELLED | OUTPATIENT
Start: 2018-08-23 | End: 2018-08-23

## 2018-08-23 RX ORDER — LIDOCAINE HYDROCHLORIDE 10 MG/ML
INJECTION, SOLUTION EPIDURAL; INFILTRATION; INTRACAUDAL; PERINEURAL AS NEEDED
Status: DISCONTINUED | OUTPATIENT
Start: 2018-08-23 | End: 2018-08-23 | Stop reason: HOSPADM

## 2018-08-23 RX ORDER — DEXTROSE MONOHYDRATE 25 G/50ML
50 INJECTION, SOLUTION INTRAVENOUS
Status: DISCONTINUED | OUTPATIENT
Start: 2018-08-23 | End: 2018-08-23 | Stop reason: HOSPADM

## 2018-08-23 RX ORDER — METHYLPREDNISOLONE ACETATE 40 MG/ML
INJECTION, SUSPENSION INTRA-ARTICULAR; INTRALESIONAL; INTRAMUSCULAR; SOFT TISSUE AS NEEDED
Status: DISCONTINUED | OUTPATIENT
Start: 2018-08-23 | End: 2018-08-23 | Stop reason: HOSPADM

## 2018-08-23 RX ORDER — ONDANSETRON 2 MG/ML
4 INJECTION INTRAMUSCULAR; INTRAVENOUS ONCE AS NEEDED
Status: DISCONTINUED | OUTPATIENT
Start: 2018-08-23 | End: 2018-08-23 | Stop reason: HOSPADM

## 2018-08-23 RX ORDER — MIDAZOLAM HYDROCHLORIDE 1 MG/ML
INJECTION INTRAMUSCULAR; INTRAVENOUS AS NEEDED
Status: DISCONTINUED | OUTPATIENT
Start: 2018-08-23 | End: 2018-08-23 | Stop reason: HOSPADM

## 2018-08-23 RX ORDER — DEXTROSE MONOHYDRATE 25 G/50ML
INJECTION, SOLUTION INTRAVENOUS
Status: COMPLETED
Start: 2018-08-23 | End: 2018-08-23

## 2018-08-23 RX ORDER — SODIUM CHLORIDE, SODIUM LACTATE, POTASSIUM CHLORIDE, CALCIUM CHLORIDE 600; 310; 30; 20 MG/100ML; MG/100ML; MG/100ML; MG/100ML
INJECTION, SOLUTION INTRAVENOUS CONTINUOUS
Status: DISCONTINUED | OUTPATIENT
Start: 2018-08-23 | End: 2018-08-23

## 2018-08-23 RX ORDER — INSULIN ASPART 100 [IU]/ML
3 INJECTION, SOLUTION INTRAVENOUS; SUBCUTANEOUS ONCE
Status: DISCONTINUED | OUTPATIENT
Start: 2018-08-23 | End: 2018-08-23 | Stop reason: HOSPADM

## 2018-08-23 NOTE — H&P
History & Physical Examination    Patient Name: Candie Guajardo  MRN: [id number]  CSN: [id number]  YOB: 1960    Pre-Operative Diagnosis:  [facility] [M46.1]  Spondylosis without myelopathy [M47.819]    Present Illness: A 66year old mal DAILY WITH MEALS Disp: 180 tablet Rfl: 0 Taking   XARELTO 20 MG Oral Tab TAKE 1 TABLET BY MOUTH EVERY DAY WITH FOOD Disp: 90 tablet Rfl: 3 8/17/2018   Insulin Syringe-Needle U-100 (BD INSULIN SYRINGE ULTRAFINE) 31G X 5/16\" 1 ML Does not apply Misc As dire Review is Normal Check if Physical Exam is Normal If not normal, please explain:   HEENT [x ] [x ]    NECK & BACK [ ] [ ] Tenderness to palpation right sacroiliac joint.     HEART [x ] [x ]    LUNGS [x ] [x ]    ABDOMEN [x ] [x ]    UROGENITAL [x ] [x ]

## 2018-08-23 NOTE — OPERATIVE REPORT
[facility]  Operative Report  2018     Parmjit Denise Patient Status:  Hospital Outpatient Surgery    1960 MRN [id number]   [facility] Lance Cat MD   [facility] Day # 0 PCP Gerri Oliva MD 22-gauge, Quincke spinal needle was advanced into the middle portion of the corresponding sacroiliac joint. After the needle  positions were confirmed by AP and lateral views of fluoroscopy, 1 cc Omnipaque-240 was injected into the sacroiliac joint.  There

## 2018-09-20 DIAGNOSIS — M16.11 PRIMARY OSTEOARTHRITIS OF RIGHT HIP: ICD-10-CM

## 2018-09-20 RX ORDER — METHOCARBAMOL 750 MG/1
TABLET, FILM COATED ORAL
Qty: 90 TABLET | Refills: 0 | Status: SHIPPED | OUTPATIENT
Start: 2018-09-20 | End: 2018-10-21

## 2018-09-21 DIAGNOSIS — M16.11 PRIMARY OSTEOARTHRITIS OF RIGHT HIP: ICD-10-CM

## 2018-09-21 PROCEDURE — 88344 IMHCHEM/IMCYTCHM EA MLT ANTB: CPT | Performed by: UROLOGY

## 2018-09-21 PROCEDURE — 88305 TISSUE EXAM BY PATHOLOGIST: CPT | Performed by: UROLOGY

## 2018-09-21 RX ORDER — ACETAMINOPHEN AND CODEINE PHOSPHATE 300; 30 MG/1; MG/1
TABLET ORAL
Qty: 60 TABLET | Refills: 1 | OUTPATIENT
Start: 2018-09-21

## 2018-09-21 RX ORDER — ACETAMINOPHEN AND CODEINE PHOSPHATE 300; 30 MG/1; MG/1
1 TABLET ORAL 3 TIMES DAILY PRN
Qty: 60 TABLET | Refills: 1 | Status: SHIPPED
Start: 2018-09-21 | End: 2018-10-08 | Stop reason: ALTCHOICE

## 2018-09-24 DIAGNOSIS — N52.9 IMPOTENCE, ORGANIC: ICD-10-CM

## 2018-09-24 RX ORDER — PREGABALIN 75 MG/1
75 CAPSULE ORAL 2 TIMES DAILY
Qty: 60 CAPSULE | Refills: 0 | Status: SHIPPED
Start: 2018-09-24 | End: 2018-10-22

## 2018-09-24 NOTE — TELEPHONE ENCOUNTER
Medication: pregabalin (LYRICA) 75 MG    Date of last refill: 8/23/18    [place] 7/25/43    Last office visit: 8/6/18  Due back to clinic per last office note:  None noted  Date next office visit scheduled:  None schedul

## 2018-10-08 ENCOUNTER — OFFICE VISIT (OUTPATIENT)
Dept: INTERNAL MEDICINE CLINIC | Facility: CLINIC | Age: 58
End: 2018-10-08

## 2018-10-08 VITALS
DIASTOLIC BLOOD PRESSURE: 72 MMHG | HEIGHT: 71 IN | SYSTOLIC BLOOD PRESSURE: 112 MMHG | HEART RATE: 90 BPM | RESPIRATION RATE: 16 BRPM | BODY MASS INDEX: 44.1 KG/M2 | WEIGHT: 315 LBS | TEMPERATURE: 98 F

## 2018-10-08 DIAGNOSIS — Z79.4 TYPE 2 DIABETES MELLITUS WITHOUT COMPLICATION, WITH LONG-TERM CURRENT USE OF INSULIN (HCC): Primary | ICD-10-CM

## 2018-10-08 DIAGNOSIS — I10 ESSENTIAL HYPERTENSION: ICD-10-CM

## 2018-10-08 DIAGNOSIS — M16.11 PRIMARY OSTEOARTHRITIS OF RIGHT HIP: ICD-10-CM

## 2018-10-08 DIAGNOSIS — Z23 NEED FOR INFLUENZA VACCINATION: ICD-10-CM

## 2018-10-08 DIAGNOSIS — E78.2 MIXED HYPERLIPIDEMIA: ICD-10-CM

## 2018-10-08 DIAGNOSIS — E11.9 TYPE 2 DIABETES MELLITUS WITHOUT COMPLICATION, WITH LONG-TERM CURRENT USE OF INSULIN (HCC): Primary | ICD-10-CM

## 2018-10-08 PROBLEM — R06.02 SOB (SHORTNESS OF BREATH): Status: RESOLVED | Noted: 2018-08-02 | Resolved: 2018-10-08

## 2018-10-08 PROBLEM — R60.0 BILATERAL LOWER EXTREMITY EDEMA: Status: RESOLVED | Noted: 2018-08-02 | Resolved: 2018-10-08

## 2018-10-08 PROCEDURE — 90686 IIV4 VACC NO PRSV 0.5 ML IM: CPT | Performed by: INTERNAL MEDICINE

## 2018-10-08 PROCEDURE — 90471 IMMUNIZATION ADMIN: CPT | Performed by: INTERNAL MEDICINE

## 2018-10-08 PROCEDURE — 99214 OFFICE O/P EST MOD 30 MIN: CPT | Performed by: INTERNAL MEDICINE

## 2018-10-08 RX ORDER — HYDROCODONE BITARTRATE AND ACETAMINOPHEN 10; 325 MG/1; MG/1
1 TABLET ORAL EVERY 8 HOURS PRN
Qty: 90 TABLET | Refills: 0 | Status: SHIPPED | OUTPATIENT
Start: 2018-10-08 | End: 2018-11-06

## 2018-10-08 NOTE — PROGRESS NOTES
HPI:    Patient ID: Angie Carr is a 66year old male. Hyperlipidemia     Diabetes     Hypertension       HPI:   Angie Carr is a 66year old male who presents for recheck of his diabetes, htn and hyperlipidemia.  Patient’s FBS have been at goal. tablet Rfl: 0   pregabalin (LYRICA) 75 MG Oral Cap Take 1 capsule (75 mg total) by mouth 2 (two) times daily. Disp: 60 capsule Rfl: 0   tamsulosin HCl 0.4 MG Oral Cap Take 1 capsule (0.4 mg total) by mouth daily.  1/2 hr after dinner Disp: 30 capsule Rfl: 1 mg total) by mouth daily. Disp: 90 tablet Rfl: 1   MetFORMIN HCl (GLUCOPHAGE) 500 MG Oral Tab Take 500 mg by mouth 2 (two) times daily with meals.  Disp:  Rfl:       Past Medical History:   Diagnosis Date   • Insomnia    • Obesity    • Other and unspecified of both feet is normal.  Pulsation pedal pulse exam of both lower legs/feet is normal as well. ASSESSMENT AND PLAN:   Fitz Munoz is a 66year old male who presents for a recheck of his diabetes.  Diabetic control is at goal. Sees endo. htn and hy TABLET BY MOUTH DAILY Disp: 90 tablet Rfl: 0   CARVEDILOL 25 MG Oral Tab TAKE 1 TABLET BY MOUTH TWICE DAILY WITH MEALS Disp: 180 tablet Rfl: 0   INVOKANA 100 MG Oral Tab TK 1 T PO  ONCE A DAY Disp:  Rfl: 3   XARELTO 20 MG Oral Tab TAKE 1 TABLET BY MOUTH EV

## 2018-10-16 DIAGNOSIS — I10 ESSENTIAL HYPERTENSION, BENIGN: ICD-10-CM

## 2018-10-17 RX ORDER — LOSARTAN POTASSIUM 100 MG/1
TABLET ORAL
Qty: 90 TABLET | Refills: 1 | Status: ON HOLD | OUTPATIENT
Start: 2018-10-17 | End: 2018-12-19

## 2018-10-22 RX ORDER — PREGABALIN 75 MG/1
75 CAPSULE ORAL 2 TIMES DAILY
Qty: 60 CAPSULE | Refills: 0 | Status: SHIPPED
Start: 2018-10-24 | End: 2018-11-26

## 2018-10-22 RX ORDER — METHOCARBAMOL 750 MG/1
TABLET, FILM COATED ORAL
Qty: 90 TABLET | Refills: 0 | Status: SHIPPED | OUTPATIENT
Start: 2018-10-22 | End: 2018-11-26

## 2018-10-22 NOTE — TELEPHONE ENCOUNTER
Medication: pregabalin (LYRICA) 75     Date of last refill: 9/24/18    [place] 2[place]39/02    Last office visit: 8/6/18  Due back to clinic per last office note:  None noted  Date next office visit scheduled:  None scheduled

## 2018-10-23 ENCOUNTER — LAB ENCOUNTER (OUTPATIENT)
Dept: LAB | Facility: HOSPITAL | Age: 58
End: 2018-10-23
Attending: NURSE PRACTITIONER
Payer: COMMERCIAL

## 2018-10-23 ENCOUNTER — TELEPHONE (OUTPATIENT)
Dept: INTERNAL MEDICINE CLINIC | Facility: CLINIC | Age: 58
End: 2018-10-23

## 2018-10-23 DIAGNOSIS — E78.2 MIXED HYPERLIPIDEMIA: ICD-10-CM

## 2018-10-23 DIAGNOSIS — Z79.4 TYPE 2 DIABETES MELLITUS WITHOUT COMPLICATION, WITH LONG-TERM CURRENT USE OF INSULIN (HCC): ICD-10-CM

## 2018-10-23 DIAGNOSIS — I10 ESSENTIAL HYPERTENSION: ICD-10-CM

## 2018-10-23 DIAGNOSIS — R79.89 ELEVATED SERUM CREATININE: ICD-10-CM

## 2018-10-23 DIAGNOSIS — E11.9 TYPE 2 DIABETES MELLITUS WITHOUT COMPLICATION, WITH LONG-TERM CURRENT USE OF INSULIN (HCC): ICD-10-CM

## 2018-10-23 PROCEDURE — 80048 BASIC METABOLIC PNL TOTAL CA: CPT

## 2018-10-23 PROCEDURE — 83036 HEMOGLOBIN GLYCOSYLATED A1C: CPT

## 2018-10-23 PROCEDURE — 36415 COLL VENOUS BLD VENIPUNCTURE: CPT

## 2018-10-23 RX ORDER — CARVEDILOL 25 MG/1
TABLET ORAL
Qty: 180 TABLET | Refills: 1 | Status: ON HOLD | OUTPATIENT
Start: 2018-10-23 | End: 2018-12-19

## 2018-10-23 RX ORDER — INSULIN GLARGINE 100 [IU]/ML
INJECTION, SOLUTION SUBCUTANEOUS
Qty: 105 ML | Refills: 1 | Status: ON HOLD | OUTPATIENT
Start: 2018-10-23 | End: 2018-12-19

## 2018-10-23 RX ORDER — AMLODIPINE BESYLATE 5 MG/1
TABLET ORAL
Qty: 90 TABLET | Refills: 1 | Status: ON HOLD | OUTPATIENT
Start: 2018-10-23 | End: 2018-12-19

## 2018-10-23 RX ORDER — PRAVASTATIN SODIUM 40 MG
TABLET ORAL
Qty: 90 TABLET | Refills: 0 | Status: ON HOLD | OUTPATIENT
Start: 2018-10-23 | End: 2018-12-19

## 2018-10-23 RX ORDER — TOPIRAMATE 50 MG/1
TABLET, FILM COATED ORAL
Qty: 180 TABLET | Refills: 0 | OUTPATIENT
Start: 2018-10-23

## 2018-10-27 DIAGNOSIS — M16.11 PRIMARY OSTEOARTHRITIS OF RIGHT HIP: ICD-10-CM

## 2018-10-29 RX ORDER — HYDROCODONE BITARTRATE AND ACETAMINOPHEN 10; 325 MG/1; MG/1
1 TABLET ORAL EVERY 8 HOURS PRN
Qty: 90 TABLET | Refills: 0 | OUTPATIENT
Start: 2018-10-29

## 2018-11-06 DIAGNOSIS — M16.11 PRIMARY OSTEOARTHRITIS OF RIGHT HIP: ICD-10-CM

## 2018-11-06 RX ORDER — HYDROCODONE BITARTRATE AND ACETAMINOPHEN 10; 325 MG/1; MG/1
1 TABLET ORAL EVERY 8 HOURS PRN
Qty: 90 TABLET | Refills: 0 | Status: SHIPPED | OUTPATIENT
Start: 2018-11-06 | End: 2018-12-03

## 2018-11-07 DIAGNOSIS — F41.1 GENERALIZED ANXIETY DISORDER: ICD-10-CM

## 2018-11-08 RX ORDER — CLONAZEPAM 1 MG/1
TABLET ORAL
Qty: 60 TABLET | Refills: 2 | Status: SHIPPED
Start: 2018-11-08 | End: 2019-01-31

## 2018-11-26 ENCOUNTER — OFFICE VISIT (OUTPATIENT)
Dept: INTERNAL MEDICINE CLINIC | Facility: CLINIC | Age: 58
End: 2018-11-26

## 2018-11-26 VITALS
DIASTOLIC BLOOD PRESSURE: 76 MMHG | BODY MASS INDEX: 44.1 KG/M2 | OXYGEN SATURATION: 96 % | SYSTOLIC BLOOD PRESSURE: 124 MMHG | WEIGHT: 315 LBS | RESPIRATION RATE: 18 BRPM | TEMPERATURE: 98 F | HEIGHT: 71 IN | HEART RATE: 88 BPM

## 2018-11-26 DIAGNOSIS — E11.9 TYPE 2 DIABETES MELLITUS WITHOUT COMPLICATION, WITH LONG-TERM CURRENT USE OF INSULIN (HCC): ICD-10-CM

## 2018-11-26 DIAGNOSIS — Z01.810 PREOP CARDIOVASCULAR EXAM: Primary | ICD-10-CM

## 2018-11-26 DIAGNOSIS — I48.20 CHRONIC ATRIAL FIBRILLATION (HCC): ICD-10-CM

## 2018-11-26 DIAGNOSIS — C61 PROSTATE CANCER (HCC): ICD-10-CM

## 2018-11-26 DIAGNOSIS — Z79.4 TYPE 2 DIABETES MELLITUS WITHOUT COMPLICATION, WITH LONG-TERM CURRENT USE OF INSULIN (HCC): ICD-10-CM

## 2018-11-26 PROCEDURE — 99214 OFFICE O/P EST MOD 30 MIN: CPT | Performed by: INTERNAL MEDICINE

## 2018-11-26 RX ORDER — TADALAFIL 20 MG
TABLET ORAL
Refills: 0 | COMMUNITY
Start: 2018-08-02 | End: 2018-11-26

## 2018-11-26 RX ORDER — LOSARTAN POTASSIUM AND HYDROCHLOROTHIAZIDE 25; 100 MG/1; MG/1
TABLET ORAL
Refills: 0 | COMMUNITY
Start: 2018-08-03 | End: 2018-11-26

## 2018-11-26 NOTE — H&P
ED [facility], [address] [facility]    History and Physical    Thom Clause Patient Status:  No patient class for patient encounter    1960 MRN [id number]   Location [address] [facility], [address] [facility] Attending No att. provider DAILY Disp: 105 mL Rfl: 1   PRAVASTATIN SODIUM 40 MG Oral Tab TAKE 1 TABLET BY MOUTH NIGHTLY.  PLEASE HAVE LABS COMPLETED Disp: 90 tablet Rfl: 0   LATANOPROST 0.005 % Ophthalmic Solution INSTILL 1 DROP INTO BOTH EYES NIGHTLY Disp: 10 mL Rfl: 0   AMLODIPINE 8/23/2018    Performed by Lugenia Cowden, MD at [facility] MAIN OR      Family History   Problem Relation Age of Onset   • Cancer Father 59        lung   • Other (Other) Mother 67        aneurism   • Diabetes Sister       Social History:   Social History    [place] physical exam. Patient is to have robotic assisted prostatectomy, to be done by Dr. Winnie Castleman on 12/19/2018. According to the ACC/AHA guidelines, the patient does not have a history of acute coronary syndrome.  His estimated perioperative risk for mahad source Oral, resp. rate 18, height 71\", weight (!) 346 lb, SpO2 96 %.   Physical Exam      Results:     Lab Results   Component Value Date    WBC 6.4 05/09/2018    HGB 13.8 05/09/2018    HCT 42.1 05/09/2018    .0 05/09/2018    CREATSERUM 1.43 (H) 10

## 2018-11-26 NOTE — TELEPHONE ENCOUNTER
Medication: pregabalin (LYRICA) 75 MG Oral Cap    Date of last refill: 10/24/18  [place]25[place]18 ET marco on site. ET called and confirmed 9/25/18 dispense date at [facility] pharmacy on file.     Last office visit: 8/6/18  Due damien

## 2018-11-26 NOTE — TELEPHONE ENCOUNTER
Called and spoke with patient to determine how patient is taking medication. Patient stated he is taking the Lyrica every other day and does not want a refill. Also stated that he is not taking the methocarbamol.  Right hip current pain is 3/10 per pt and i

## 2018-12-02 DIAGNOSIS — M16.11 PRIMARY OSTEOARTHRITIS OF RIGHT HIP: ICD-10-CM

## 2018-12-02 DIAGNOSIS — F41.1 GENERALIZED ANXIETY DISORDER: ICD-10-CM

## 2018-12-02 RX ORDER — CLONAZEPAM 1 MG/1
TABLET ORAL
Qty: 60 TABLET | Refills: 2 | Status: CANCELLED | OUTPATIENT
Start: 2018-12-02

## 2018-12-02 RX ORDER — HYDROCODONE BITARTRATE AND ACETAMINOPHEN 10; 325 MG/1; MG/1
1 TABLET ORAL EVERY 8 HOURS PRN
Qty: 90 TABLET | Refills: 0 | Status: CANCELLED | OUTPATIENT
Start: 2018-12-02

## 2018-12-03 DIAGNOSIS — M16.11 PRIMARY OSTEOARTHRITIS OF RIGHT HIP: ICD-10-CM

## 2018-12-03 RX ORDER — HYDROCODONE BITARTRATE AND ACETAMINOPHEN 10; 325 MG/1; MG/1
1 TABLET ORAL EVERY 8 HOURS PRN
Qty: 90 TABLET | Refills: 0 | Status: SHIPPED | OUTPATIENT
Start: 2018-12-03 | End: 2019-01-02

## 2018-12-05 ENCOUNTER — LABORATORY ENCOUNTER (OUTPATIENT)
Dept: LAB | Facility: HOSPITAL | Age: 58
End: 2018-12-05
Attending: UROLOGY
Payer: COMMERCIAL

## 2018-12-05 DIAGNOSIS — C61 PROSTATE CANCER (HCC): ICD-10-CM

## 2018-12-05 PROCEDURE — 86850 RBC ANTIBODY SCREEN: CPT

## 2018-12-05 PROCEDURE — 86900 BLOOD TYPING SEROLOGIC ABO: CPT

## 2018-12-05 PROCEDURE — 86901 BLOOD TYPING SEROLOGIC RH(D): CPT

## 2018-12-19 ENCOUNTER — ANESTHESIA EVENT (OUTPATIENT)
Dept: SURGERY | Facility: HOSPITAL | Age: 58
End: 2018-12-19
Payer: COMMERCIAL

## 2018-12-19 ENCOUNTER — ANESTHESIA (OUTPATIENT)
Dept: SURGERY | Facility: HOSPITAL | Age: 58
End: 2018-12-19
Payer: COMMERCIAL

## 2018-12-19 ENCOUNTER — HOSPITAL ENCOUNTER (OUTPATIENT)
Facility: HOSPITAL | Age: 58
Setting detail: HOSPITAL OUTPATIENT SURGERY
Discharge: HOME | End: 2018-12-19
Attending: UROLOGY | Admitting: UROLOGY
Payer: COMMERCIAL

## 2018-12-19 VITALS
BODY MASS INDEX: 44.1 KG/M2 | HEIGHT: 71 IN | WEIGHT: 315 LBS | OXYGEN SATURATION: 95 % | RESPIRATION RATE: 20 BRPM | DIASTOLIC BLOOD PRESSURE: 74 MMHG | SYSTOLIC BLOOD PRESSURE: 132 MMHG | TEMPERATURE: 98 F | HEART RATE: 102 BPM

## 2018-12-19 DIAGNOSIS — C61 PROSTATE CANCER (HCC): Primary | ICD-10-CM

## 2018-12-19 PROCEDURE — 82330 ASSAY OF CALCIUM: CPT

## 2018-12-19 PROCEDURE — 0WJG4ZZ INSPECTION OF PERITONEAL CAVITY, PERCUTANEOUS ENDOSCOPIC APPROACH: ICD-10-PCS | Performed by: UROLOGY

## 2018-12-19 PROCEDURE — 82803 BLOOD GASES ANY COMBINATION: CPT

## 2018-12-19 PROCEDURE — 85025 COMPLETE CBC W/AUTO DIFF WBC: CPT

## 2018-12-19 PROCEDURE — 84295 ASSAY OF SERUM SODIUM: CPT

## 2018-12-19 PROCEDURE — 82962 GLUCOSE BLOOD TEST: CPT

## 2018-12-19 PROCEDURE — 85014 HEMATOCRIT: CPT

## 2018-12-19 PROCEDURE — 84132 ASSAY OF SERUM POTASSIUM: CPT

## 2018-12-19 RX ORDER — NALOXONE HYDROCHLORIDE 0.4 MG/ML
80 INJECTION, SOLUTION INTRAMUSCULAR; INTRAVENOUS; SUBCUTANEOUS AS NEEDED
Status: DISCONTINUED | OUTPATIENT
Start: 2018-12-19 | End: 2018-12-19 | Stop reason: HOSPADM

## 2018-12-19 RX ORDER — HYDROCODONE BITARTRATE AND ACETAMINOPHEN 5; 325 MG/1; MG/1
2 TABLET ORAL AS NEEDED
Status: DISCONTINUED | OUTPATIENT
Start: 2018-12-19 | End: 2018-12-19 | Stop reason: HOSPADM

## 2018-12-19 RX ORDER — SODIUM CHLORIDE, SODIUM LACTATE, POTASSIUM CHLORIDE, CALCIUM CHLORIDE 600; 310; 30; 20 MG/100ML; MG/100ML; MG/100ML; MG/100ML
INJECTION, SOLUTION INTRAVENOUS CONTINUOUS
Status: DISCONTINUED | OUTPATIENT
Start: 2018-12-19 | End: 2018-12-19

## 2018-12-19 RX ORDER — AMLODIPINE BESYLATE 5 MG/1
5 TABLET ORAL DAILY
COMMUNITY
End: 2019-04-03

## 2018-12-19 RX ORDER — HEPARIN SODIUM 5000 [USP'U]/ML
5000 INJECTION, SOLUTION INTRAVENOUS; SUBCUTANEOUS ONCE
Status: COMPLETED | OUTPATIENT
Start: 2018-12-19 | End: 2018-12-19

## 2018-12-19 RX ORDER — ACETAMINOPHEN 500 MG
1000 TABLET ORAL ONCE
COMMUNITY
End: 2019-01-07 | Stop reason: ALTCHOICE

## 2018-12-19 RX ORDER — LABETALOL HYDROCHLORIDE 5 MG/ML
5 INJECTION, SOLUTION INTRAVENOUS EVERY 5 MIN PRN
Status: DISCONTINUED | OUTPATIENT
Start: 2018-12-19 | End: 2018-12-19 | Stop reason: HOSPADM

## 2018-12-19 RX ORDER — HYDROCODONE BITARTRATE AND ACETAMINOPHEN 5; 325 MG/1; MG/1
1 TABLET ORAL ONCE
Status: COMPLETED | OUTPATIENT
Start: 2018-12-19 | End: 2018-12-19

## 2018-12-19 RX ORDER — DEXTROSE MONOHYDRATE 25 G/50ML
50 INJECTION, SOLUTION INTRAVENOUS
Status: DISCONTINUED | OUTPATIENT
Start: 2018-12-19 | End: 2018-12-19 | Stop reason: HOSPADM

## 2018-12-19 RX ORDER — INSULIN ASPART 100 [IU]/ML
INJECTION, SOLUTION INTRAVENOUS; SUBCUTANEOUS
Status: COMPLETED
Start: 2018-12-19 | End: 2018-12-19

## 2018-12-19 RX ORDER — HYDROCODONE BITARTRATE AND ACETAMINOPHEN 5; 325 MG/1; MG/1
1 TABLET ORAL AS NEEDED
Status: DISCONTINUED | OUTPATIENT
Start: 2018-12-19 | End: 2018-12-19 | Stop reason: HOSPADM

## 2018-12-19 RX ORDER — MIDAZOLAM HYDROCHLORIDE 1 MG/ML
1 INJECTION INTRAMUSCULAR; INTRAVENOUS EVERY 5 MIN PRN
Status: DISCONTINUED | OUTPATIENT
Start: 2018-12-19 | End: 2018-12-19 | Stop reason: HOSPADM

## 2018-12-19 RX ORDER — HYDROMORPHONE HYDROCHLORIDE 1 MG/ML
INJECTION, SOLUTION INTRAMUSCULAR; INTRAVENOUS; SUBCUTANEOUS
Status: COMPLETED
Start: 2018-12-19 | End: 2018-12-19

## 2018-12-19 RX ORDER — SODIUM CHLORIDE, SODIUM LACTATE, POTASSIUM CHLORIDE, CALCIUM CHLORIDE 600; 310; 30; 20 MG/100ML; MG/100ML; MG/100ML; MG/100ML
INJECTION, SOLUTION INTRAVENOUS CONTINUOUS
Status: DISCONTINUED | OUTPATIENT
Start: 2018-12-19 | End: 2018-12-19 | Stop reason: HOSPADM

## 2018-12-19 RX ORDER — HYDROCODONE BITARTRATE AND ACETAMINOPHEN 5; 325 MG/1; MG/1
2 TABLET ORAL ONCE
Status: COMPLETED | OUTPATIENT
Start: 2018-12-19 | End: 2018-12-19

## 2018-12-19 RX ORDER — LOSARTAN POTASSIUM 100 MG/1
100 TABLET ORAL DAILY
COMMUNITY
End: 2019-04-09

## 2018-12-19 RX ORDER — ONDANSETRON 2 MG/ML
4 INJECTION INTRAMUSCULAR; INTRAVENOUS AS NEEDED
Status: DISCONTINUED | OUTPATIENT
Start: 2018-12-19 | End: 2018-12-19 | Stop reason: HOSPADM

## 2018-12-19 RX ORDER — INSULIN ASPART 100 [IU]/ML
INJECTION, SOLUTION INTRAVENOUS; SUBCUTANEOUS ONCE
Status: COMPLETED | OUTPATIENT
Start: 2018-12-19 | End: 2018-12-19

## 2018-12-19 RX ORDER — ACETAMINOPHEN 500 MG
1000 TABLET ORAL ONCE
Status: DISCONTINUED | OUTPATIENT
Start: 2018-12-19 | End: 2018-12-19 | Stop reason: HOSPADM

## 2018-12-19 RX ORDER — BUPIVACAINE HYDROCHLORIDE 5 MG/ML
INJECTION, SOLUTION EPIDURAL; INTRACAUDAL AS NEEDED
Status: DISCONTINUED | OUTPATIENT
Start: 2018-12-19 | End: 2018-12-19 | Stop reason: HOSPADM

## 2018-12-19 RX ORDER — HYDROMORPHONE HYDROCHLORIDE 1 MG/ML
0.4 INJECTION, SOLUTION INTRAMUSCULAR; INTRAVENOUS; SUBCUTANEOUS EVERY 5 MIN PRN
Status: DISCONTINUED | OUTPATIENT
Start: 2018-12-19 | End: 2018-12-19 | Stop reason: HOSPADM

## 2018-12-19 RX ORDER — CARVEDILOL 25 MG/1
25 TABLET ORAL 2 TIMES DAILY WITH MEALS
COMMUNITY
End: 2019-04-03

## 2018-12-19 RX ORDER — HYDROCODONE BITARTRATE AND ACETAMINOPHEN 5; 325 MG/1; MG/1
TABLET ORAL
Status: COMPLETED
Start: 2018-12-19 | End: 2018-12-19

## 2018-12-19 RX ORDER — LATANOPROST 50 UG/ML
1 SOLUTION/ DROPS OPHTHALMIC NIGHTLY
COMMUNITY
End: 2019-01-13

## 2018-12-19 RX ORDER — CEPHALEXIN 500 MG/1
500 CAPSULE ORAL 3 TIMES DAILY
Qty: 6 CAPSULE | Refills: 0 | Status: SHIPPED | OUTPATIENT
Start: 2018-12-19 | End: 2018-12-21

## 2018-12-19 RX ORDER — PRAVASTATIN SODIUM 40 MG
40 TABLET ORAL NIGHTLY
COMMUNITY
End: 2019-01-13

## 2018-12-19 NOTE — ANESTHESIA PREPROCEDURE EVALUATION
PRE-OP EVALUATION    Patient Name: Sue Lara    Pre-op Diagnosis: Prostate cancer ([facility] [address] Elizabeth Walters    Procedure(s):  ROBOTIC ASSISTED LAPAROSCOPIC PROSTATECTOMY WITH PELVIC LYMPH NODE DISSECTION    Surgeon(s) and Role:     * Rakesh Hankins MD - Primary    Pr tablet by mouth every 8 (eight) hours as needed for Pain.  Disp: 90 tablet Rfl: 0   CLONAZEPAM 1 MG Oral Tab TAKE 1 TABLET BY MOUTH TWICE A DAY AS NEEDED FOR ANXIETY Disp: 60 tablet Rfl: 2   tamsulosin HCl 0.4 MG Oral Cap Take 1 capsule (0.4 mg total) by mo Surgical History:   Procedure Laterality Date   • COLONOSCOPY     • HIP JOINT INJECTION Right 5/29/2018    Performed by Aloysius Klinefelter, MD at [facility] MAIN OR   • HIP REPLACEMENT SURGERY     • OTHER SURGICAL HISTORY      vocal cord palops   • SACROILIAC JOINT

## 2018-12-19 NOTE — H&P
[facility], [address] [facility]     History and Physical           Hank Kelvin Patient Status:  No patient class for patient encounter    1960 MRN [id number]   Location [facility] Attending No att. THE SKIN TWICE DAILY Disp: 105 mL Rfl: 1   PRAVASTATIN SODIUM 40 MG Oral Tab TAKE 1 TABLET BY MOUTH NIGHTLY.  PLEASE HAVE LABS COMPLETED Disp: 90 tablet Rfl: 0   LATANOPROST 0.005 % Ophthalmic Solution INSTILL 1 DROP INTO BOTH EYES NIGHTLY Disp: 10 mL Rfl: SACROILIAC JOINT INJECTION RIGHT OR LEFT Right 8/23/2018     Performed by Mary Aguilar MD at [facility] MAIN OR               Family History   Problem Relation Age of Onset   • Cancer Father 59         lung   • Other (Other) Mother 67         aneurism   • Soledad HTN who presents for cardiac risk assessment and a pre-operative physical exam. Patient is to have robotic assisted prostatectomy, to be done by Dr. Galina Maciel on 12/19/2018.       According to the ACC/AHA guidelines, the patient does not have a history of    Review of Systems:   Review of Systems     Physical Exam:   Vital Signs:  Blood pressure 124/76, pulse 88, temperature 98.2 °F (36.8 °C), temperature source Oral, resp. rate 18, height 71\", weight (!) 346 lb, SpO2 96 %.   Physical Exam        Result

## 2018-12-19 NOTE — ANESTHESIA POSTPROCEDURE EVALUATION
[address] Patient Status:  Surgery Admit   Age/Gender 66year old male MRN [id number]   [facility] SURGERY Attending Anabelle Brandt MD   Hosp Day # 0 PCP Aleena Gutiérrez MD       Anesthesia Post-op Note    Procedure(s):  La

## 2018-12-19 NOTE — PROGRESS NOTES
[place] male with Intermediate risk prostate cancer. 4 cores Gl 6 prostate cancer. Initially on surveillance, but core involvement increased from one core on left in 2016, to 5 cores and 50% involvement of one the cores in 2018.    PSA : 9.3 Prosta

## 2018-12-19 NOTE — OPERATIVE REPORT
[address] Patient Status:  Surgery Admit    1960 MRN [id number]   Location [facility] Attending Olya Solomon MD   Hosp Day # 0 PCP Nithya Dallas MD        DATE OF OPERATION;  1 was passed through this. The body habitus was limiting of visualization. There simply was not a lot of space in the pelvis. He was placed into slight Trendelenburg position. I increased the pressure to 20 mmHg.   The trocar locations were somewhat diffi

## 2018-12-28 ENCOUNTER — PATIENT MESSAGE (OUTPATIENT)
Dept: INTERNAL MEDICINE CLINIC | Facility: CLINIC | Age: 58
End: 2018-12-28

## 2018-12-28 NOTE — TELEPHONE ENCOUNTER
From: Thom Castanon  To: Nichol Matos MD  Sent: 12/28/2018 12:04 AM CST  Subject: Other      Good day. .  I have visits scheduled in my chart for the 6th and 7th. . which should I attend  Cecilia Shelton

## 2019-01-02 DIAGNOSIS — M16.11 PRIMARY OSTEOARTHRITIS OF RIGHT HIP: ICD-10-CM

## 2019-01-02 RX ORDER — HYDROCODONE BITARTRATE AND ACETAMINOPHEN 10; 325 MG/1; MG/1
1 TABLET ORAL EVERY 8 HOURS PRN
Qty: 90 TABLET | Refills: 0 | Status: CANCELLED | OUTPATIENT
Start: 2019-01-02

## 2019-01-02 RX ORDER — HYDROCODONE BITARTRATE AND ACETAMINOPHEN 10; 325 MG/1; MG/1
1 TABLET ORAL EVERY 8 HOURS PRN
Qty: 90 TABLET | Refills: 0 | Status: SHIPPED | OUTPATIENT
Start: 2019-01-02 | End: 2019-01-07

## 2019-01-07 ENCOUNTER — OFFICE VISIT (OUTPATIENT)
Dept: INTERNAL MEDICINE CLINIC | Facility: CLINIC | Age: 59
End: 2019-01-07

## 2019-01-07 VITALS
HEIGHT: 71 IN | BODY MASS INDEX: 44.1 KG/M2 | SYSTOLIC BLOOD PRESSURE: 122 MMHG | DIASTOLIC BLOOD PRESSURE: 72 MMHG | TEMPERATURE: 98 F | RESPIRATION RATE: 16 BRPM | WEIGHT: 315 LBS | HEART RATE: 88 BPM

## 2019-01-07 DIAGNOSIS — Z00.00 ANNUAL PHYSICAL EXAM: Primary | ICD-10-CM

## 2019-01-07 DIAGNOSIS — Z79.4 TYPE 2 DIABETES MELLITUS WITHOUT COMPLICATION, WITH LONG-TERM CURRENT USE OF INSULIN (HCC): ICD-10-CM

## 2019-01-07 DIAGNOSIS — Z13.220 LIPID SCREENING: ICD-10-CM

## 2019-01-07 DIAGNOSIS — Z00.00 LABORATORY EXAMINATION ORDERED AS PART OF A ROUTINE GENERAL MEDICAL EXAMINATION: ICD-10-CM

## 2019-01-07 DIAGNOSIS — Z13.0 SCREENING, IRON DEFICIENCY ANEMIA: ICD-10-CM

## 2019-01-07 DIAGNOSIS — Z13.89 SCREENING FOR GENITOURINARY CONDITION: ICD-10-CM

## 2019-01-07 DIAGNOSIS — E11.9 TYPE 2 DIABETES MELLITUS WITHOUT COMPLICATION, WITH LONG-TERM CURRENT USE OF INSULIN (HCC): ICD-10-CM

## 2019-01-07 DIAGNOSIS — Z13.29 THYROID DISORDER SCREEN: ICD-10-CM

## 2019-01-07 DIAGNOSIS — M16.11 PRIMARY OSTEOARTHRITIS OF RIGHT HIP: ICD-10-CM

## 2019-01-07 PROCEDURE — 99396 PREV VISIT EST AGE 40-64: CPT | Performed by: INTERNAL MEDICINE

## 2019-01-07 RX ORDER — CANAGLIFLOZIN 100 MG/1
TABLET, FILM COATED ORAL
Refills: 3 | COMMUNITY
Start: 2019-01-03 | End: 2019-03-13

## 2019-01-07 RX ORDER — HYDROCODONE BITARTRATE AND ACETAMINOPHEN 10; 325 MG/1; MG/1
1 TABLET ORAL EVERY 8 HOURS PRN
Qty: 90 TABLET | Refills: 0 | Status: SHIPPED | OUTPATIENT
Start: 2019-02-02 | End: 2019-02-26

## 2019-01-07 NOTE — PROGRESS NOTES
HPI:    Patient ID: Hank Warren is a 66year old male.     HPI  Hank Warren is a 66year old male who presents for a complete physical exam.   HPI:   Pt complains of nothing today, has been on a wt loss plan due to recent failed prostatectomy for prosta 05/09/2018    ALT 22 10/29/2017    ALT 20 10/10/2016     Lab Results   Component Value Date    PSA 9.38 (H) 08/16/2018    PSA 8.50 (H) 05/09/2018    PSA 9.430 (H) 10/29/2017          Current Outpatient Medications:  INVOKANA 100 MG Oral Tab TK 1 T PO  ONCE 90 tablet Rfl: 1   MetFORMIN HCl (GLUCOPHAGE) 500 MG Oral Tab Take 500 mg by mouth 2 (two) times daily with meals.  Disp:  Rfl:       Past Medical History:   Diagnosis Date   • Arrhythmia     A-FIB DX 2 YRS AGO   • Insomnia    • Obesity    • Other and unspe headaches  PSYCHE: denies depression or anxiety  HEMATOLOGIC: denies hx of anemia  ENDOCRINE: denies thyroid history  ALL/ASTHMA: denies hx of allergy or asthma    EXAM:   /72   Pulse 88   Temp 98.1 °F (36.7 °C) (Oral)   Resp 16   Ht 71\"   Wt (!) 33 3   [START ON 2/2/2019] HYDROcodone-acetaminophen (NORCO)  MG Oral Tab Take 1 tablet by mouth every 8 (eight) hours as needed for Pain.  Disp: 90 tablet Rfl: 0   TAMSULOSIN HCL 0.4 MG Oral Cap TAKE ONE CAPSULE BY MOUTH EVERY DAY 1/2 HOUR AFTER DINNER diagnosis)  Lipid screening  Screening for genitourinary condition  Screening, iron deficiency anemia  Laboratory examination ordered as part of a routine general medical examination  Thyroid disorder screen  Primary osteoarthritis of right hip  Type 2 fernandez

## 2019-01-12 ENCOUNTER — LAB ENCOUNTER (OUTPATIENT)
Dept: LAB | Facility: HOSPITAL | Age: 59
End: 2019-01-12
Attending: INTERNAL MEDICINE
Payer: COMMERCIAL

## 2019-01-12 DIAGNOSIS — Z13.89 SCREENING FOR GENITOURINARY CONDITION: ICD-10-CM

## 2019-01-12 DIAGNOSIS — Z13.29 THYROID DISORDER SCREEN: ICD-10-CM

## 2019-01-12 DIAGNOSIS — Z00.00 LABORATORY EXAMINATION ORDERED AS PART OF A ROUTINE GENERAL MEDICAL EXAMINATION: ICD-10-CM

## 2019-01-12 DIAGNOSIS — Z79.4 TYPE 2 DIABETES MELLITUS WITHOUT COMPLICATION, WITH LONG-TERM CURRENT USE OF INSULIN (HCC): ICD-10-CM

## 2019-01-12 DIAGNOSIS — E11.9 TYPE 2 DIABETES MELLITUS WITHOUT COMPLICATION, WITH LONG-TERM CURRENT USE OF INSULIN (HCC): ICD-10-CM

## 2019-01-12 DIAGNOSIS — Z13.220 LIPID SCREENING: ICD-10-CM

## 2019-01-12 DIAGNOSIS — Z13.0 SCREENING, IRON DEFICIENCY ANEMIA: ICD-10-CM

## 2019-01-12 LAB
ALBUMIN SERPL-MCNC: 3.6 G/DL (ref 3.1–4.5)
ALBUMIN/GLOB SERPL: 1 {RATIO} (ref 1–2)
ALP LIVER SERPL-CCNC: 104 U/L (ref 45–117)
ALT SERPL-CCNC: 18 U/L (ref 17–63)
ANION GAP SERPL CALC-SCNC: 7 MMOL/L (ref 0–18)
AST SERPL-CCNC: 15 U/L (ref 15–41)
BASOPHILS # BLD AUTO: 0.04 X10(3) UL (ref 0–0.1)
BASOPHILS NFR BLD AUTO: 0.7 %
BILIRUB SERPL-MCNC: 1 MG/DL (ref 0.1–2)
BILIRUB UR QL STRIP.AUTO: NEGATIVE
BUN BLD-MCNC: 13 MG/DL (ref 8–20)
BUN/CREAT SERPL: 9.8 (ref 10–20)
CALCIUM BLD-MCNC: 8.4 MG/DL (ref 8.3–10.3)
CHLORIDE SERPL-SCNC: 108 MMOL/L (ref 101–111)
CHOLEST SMN-MCNC: 145 MG/DL (ref ?–200)
CLARITY UR REFRACT.AUTO: CLEAR
CO2 SERPL-SCNC: 26 MMOL/L (ref 22–32)
CREAT BLD-MCNC: 1.32 MG/DL (ref 0.7–1.3)
CREAT UR-SCNC: 66 MG/DL
EOSINOPHIL # BLD AUTO: 0.15 X10(3) UL (ref 0–0.3)
EOSINOPHIL NFR BLD AUTO: 2.5 %
ERYTHROCYTE [DISTWIDTH] IN BLOOD BY AUTOMATED COUNT: 14.8 % (ref 11.5–16)
EST. AVERAGE GLUCOSE BLD GHB EST-MCNC: 143 MG/DL (ref 68–126)
GLOBULIN PLAS-MCNC: 3.7 G/DL (ref 2.8–4.4)
GLUCOSE BLD-MCNC: 138 MG/DL (ref 70–99)
GLUCOSE UR STRIP.AUTO-MCNC: >=500 MG/DL
HBA1C MFR BLD HPLC: 6.6 % (ref ?–5.7)
HCT VFR BLD AUTO: 43.4 % (ref 37–53)
HDLC SERPL-MCNC: 38 MG/DL (ref 40–59)
HGB BLD-MCNC: 14.4 G/DL (ref 13–17)
IMMATURE GRANULOCYTE COUNT: 0.01 X10(3) UL (ref 0–1)
IMMATURE GRANULOCYTE RATIO %: 0.2 %
KETONES UR STRIP.AUTO-MCNC: NEGATIVE MG/DL
LDLC SERPL CALC-MCNC: 88 MG/DL (ref ?–100)
LEUKOCYTE ESTERASE UR QL STRIP.AUTO: NEGATIVE
LYMPHOCYTES # BLD AUTO: 2.16 X10(3) UL (ref 0.9–4)
LYMPHOCYTES NFR BLD AUTO: 35.9 %
M PROTEIN MFR SERPL ELPH: 7.3 G/DL (ref 6.4–8.2)
MCH RBC QN AUTO: 25.1 PG (ref 27–33.2)
MCHC RBC AUTO-ENTMCNC: 33.2 G/DL (ref 31–37)
MCV RBC AUTO: 75.6 FL (ref 80–99)
MICROALBUMIN UR-MCNC: 2.95 MG/DL
MICROALBUMIN/CREAT 24H UR-RTO: 44.7 UG/MG (ref ?–30)
MONOCYTES # BLD AUTO: 0.58 X10(3) UL (ref 0.1–1)
MONOCYTES NFR BLD AUTO: 9.6 %
NEUTROPHIL ABS PRELIM: 3.08 X10 (3) UL (ref 1.3–6.7)
NEUTROPHILS # BLD AUTO: 3.08 X10(3) UL (ref 1.3–6.7)
NEUTROPHILS NFR BLD AUTO: 51.1 %
NITRITE UR QL STRIP.AUTO: NEGATIVE
NONHDLC SERPL-MCNC: 107 MG/DL (ref ?–130)
OSMOLALITY SERPL CALC.SUM OF ELEC: 294 MOSM/KG (ref 275–295)
PH UR STRIP.AUTO: 7 [PH] (ref 4.5–8)
PLATELET # BLD AUTO: 194 10(3)UL (ref 150–450)
POTASSIUM SERPL-SCNC: 4 MMOL/L (ref 3.6–5.1)
PROT UR STRIP.AUTO-MCNC: NEGATIVE MG/DL
RBC # BLD AUTO: 5.74 X10(6)UL (ref 4.3–5.7)
RBC UR QL AUTO: NEGATIVE
RED CELL DISTRIBUTION WIDTH-SD: 39.3 FL (ref 35.1–46.3)
SODIUM SERPL-SCNC: 141 MMOL/L (ref 136–144)
SP GR UR STRIP.AUTO: 1.01 (ref 1–1.03)
TRIGL SERPL-MCNC: 96 MG/DL (ref 30–149)
TSI SER-ACNC: 0.87 MIU/ML (ref 0.35–5.5)
UROBILINOGEN UR STRIP.AUTO-MCNC: <2 MG/DL
VLDLC SERPL CALC-MCNC: 19 MG/DL (ref 0–30)
WBC # BLD AUTO: 6 X10(3) UL (ref 4–13)

## 2019-01-12 PROCEDURE — 82043 UR ALBUMIN QUANTITATIVE: CPT

## 2019-01-12 PROCEDURE — 80061 LIPID PANEL: CPT

## 2019-01-12 PROCEDURE — 82570 ASSAY OF URINE CREATININE: CPT

## 2019-01-12 PROCEDURE — 80053 COMPREHEN METABOLIC PANEL: CPT

## 2019-01-12 PROCEDURE — 83036 HEMOGLOBIN GLYCOSYLATED A1C: CPT

## 2019-01-12 PROCEDURE — 85025 COMPLETE CBC W/AUTO DIFF WBC: CPT

## 2019-01-12 PROCEDURE — 81003 URINALYSIS AUTO W/O SCOPE: CPT

## 2019-01-12 PROCEDURE — 36415 COLL VENOUS BLD VENIPUNCTURE: CPT

## 2019-01-12 PROCEDURE — 84443 ASSAY THYROID STIM HORMONE: CPT

## 2019-01-13 DIAGNOSIS — E11.9 TYPE 2 DIABETES MELLITUS WITHOUT COMPLICATION, WITH LONG-TERM CURRENT USE OF INSULIN (HCC): ICD-10-CM

## 2019-01-13 DIAGNOSIS — Z79.4 TYPE 2 DIABETES MELLITUS WITHOUT COMPLICATION, WITH LONG-TERM CURRENT USE OF INSULIN (HCC): ICD-10-CM

## 2019-01-13 DIAGNOSIS — R60.0 BILATERAL LOWER EXTREMITY EDEMA: ICD-10-CM

## 2019-01-13 DIAGNOSIS — R06.02 SOB (SHORTNESS OF BREATH): ICD-10-CM

## 2019-01-13 DIAGNOSIS — E78.2 MIXED HYPERLIPIDEMIA: Primary | ICD-10-CM

## 2019-01-14 RX ORDER — FUROSEMIDE 20 MG/1
20 TABLET ORAL DAILY
Qty: 90 TABLET | Refills: 3 | Status: SHIPPED | OUTPATIENT
Start: 2019-01-14 | End: 2019-08-20

## 2019-01-14 RX ORDER — PRAVASTATIN SODIUM 40 MG
40 TABLET ORAL NIGHTLY
Qty: 90 TABLET | Refills: 2 | Status: SHIPPED | OUTPATIENT
Start: 2019-01-14 | End: 2019-04-03

## 2019-01-23 ENCOUNTER — TELEPHONE (OUTPATIENT)
Dept: PAIN CLINIC | Facility: CLINIC | Age: 59
End: 2019-01-23

## 2019-01-23 ENCOUNTER — OFFICE VISIT (OUTPATIENT)
Dept: PAIN CLINIC | Facility: CLINIC | Age: 59
End: 2019-01-23

## 2019-01-23 VITALS
HEART RATE: 112 BPM | BODY MASS INDEX: 44.1 KG/M2 | SYSTOLIC BLOOD PRESSURE: 130 MMHG | DIASTOLIC BLOOD PRESSURE: 62 MMHG | WEIGHT: 315 LBS | HEIGHT: 71 IN | OXYGEN SATURATION: 96 %

## 2019-01-23 DIAGNOSIS — M16.11 OSTEOARTHRITIS OF RIGHT HIP, UNSPECIFIED OSTEOARTHRITIS TYPE: Primary | ICD-10-CM

## 2019-01-23 DIAGNOSIS — M70.61 TROCHANTERIC BURSITIS OF RIGHT HIP: ICD-10-CM

## 2019-01-23 DIAGNOSIS — M16.11 PRIMARY OSTEOARTHRITIS OF RIGHT HIP: Primary | ICD-10-CM

## 2019-01-23 PROCEDURE — 99214 OFFICE O/P EST MOD 30 MIN: CPT | Performed by: ANESTHESIOLOGY

## 2019-01-23 NOTE — TELEPHONE ENCOUNTER
Medical clearance needed- Yes- Isabelle/Dr. Claudette Schaffer    Pt seen in OV today by Dr. Lindsay Linares and recommended for hip joint and trochanteric bursa (X 1). Please begin PA process for procedure(s).      Laterality: right  Level(s): hip joint and trochanteric bu

## 2019-01-23 NOTE — TELEPHONE ENCOUNTER
Medical clearance requested from Dr. Tex Atkinson for patient to hold xarelto for 3 days for TBD procedure. Awaiting response.

## 2019-01-27 NOTE — PROGRESS NOTES
Name: Thom Castanon   : 1960   DOS: 2019     Pain Clinic Follow Up Visit:   Thom Castanon is a 66year old male who presents for recheck of his chronic hip pain.   He is status post right hip joint, right sacroiliac joint injection and right gre Oral Tab Take 100 mg by mouth daily. Disp:  Rfl:    Rivaroxaban (XARELTO) 20 MG Oral Tab Take 20 mg by mouth daily with food.  Disp:  Rfl:    CLONAZEPAM 1 MG Oral Tab TAKE 1 TABLET BY MOUTH TWICE A DAY AS NEEDED FOR ANXIETY Disp: 60 tablet Rfl: 2   Insulin prescriptions requested or ordered in this encounter       Radiology orders and consultations:None    The patient indicates understanding of these issues and agrees to the plan. No Follow-up on file.     Александр Charles MD, 1/23/2019, 1:51 PM

## 2019-01-29 NOTE — TELEPHONE ENCOUNTER
Medical clearance approved for patient to hold xarelto for 3 days for TBD procedure. Sent to scanning.

## 2019-01-30 DIAGNOSIS — F41.1 GENERALIZED ANXIETY DISORDER: ICD-10-CM

## 2019-01-31 DIAGNOSIS — F41.1 GENERALIZED ANXIETY DISORDER: ICD-10-CM

## 2019-02-01 RX ORDER — CLONAZEPAM 1 MG/1
TABLET ORAL
Qty: 60 TABLET | Refills: 2 | OUTPATIENT
Start: 2019-02-01

## 2019-02-01 RX ORDER — CLONAZEPAM 1 MG/1
1 TABLET ORAL 2 TIMES DAILY PRN
Qty: 60 TABLET | Refills: 2 | Status: SHIPPED
Start: 2019-02-01 | End: 2019-04-27

## 2019-02-01 NOTE — TELEPHONE ENCOUNTER
Called patient and scheduled for:    Appointment Date:  2/19/19  Procedure Time:  115 pm  Check-in Time:  1215 pm    Pre-procedure instructions reviewed with patient. Patient verbalized understanding.

## 2019-02-14 ENCOUNTER — TELEPHONE (OUTPATIENT)
Dept: SURGERY | Facility: CLINIC | Age: 59
End: 2019-02-14

## 2019-02-14 NOTE — TELEPHONE ENCOUNTER
Spoke to patient, confirmed procedure date of 2/19/19 and to be checked in at outpatient registration at 12:15 pm. Patient called pre-procedure line and understood instructions. Patient instructed to call office if there are additional questions .

## 2019-02-19 ENCOUNTER — HOSPITAL ENCOUNTER (OUTPATIENT)
Facility: HOSPITAL | Age: 59
Setting detail: HOSPITAL OUTPATIENT SURGERY
Discharge: HOME | End: 2019-02-19
Attending: ANESTHESIOLOGY | Admitting: ANESTHESIOLOGY
Payer: COMMERCIAL

## 2019-02-19 ENCOUNTER — APPOINTMENT (OUTPATIENT)
Dept: GENERAL RADIOLOGY | Facility: HOSPITAL | Age: 59
End: 2019-02-19
Attending: ANESTHESIOLOGY
Payer: COMMERCIAL

## 2019-02-19 VITALS
OXYGEN SATURATION: 94 % | TEMPERATURE: 98 F | HEART RATE: 81 BPM | RESPIRATION RATE: 18 BRPM | SYSTOLIC BLOOD PRESSURE: 146 MMHG | DIASTOLIC BLOOD PRESSURE: 88 MMHG

## 2019-02-19 DIAGNOSIS — M70.61 TROCHANTERIC BURSITIS OF RIGHT HIP: ICD-10-CM

## 2019-02-19 DIAGNOSIS — M16.11 OSTEOARTHRITIS OF RIGHT HIP, UNSPECIFIED OSTEOARTHRITIS TYPE: ICD-10-CM

## 2019-02-19 LAB — GLUCOSE BLD-MCNC: 117 MG/DL (ref 70–99)

## 2019-02-19 PROCEDURE — 3E0U3BZ INTRODUCTION OF ANESTHETIC AGENT INTO JOINTS, PERCUTANEOUS APPROACH: ICD-10-PCS | Performed by: ANESTHESIOLOGY

## 2019-02-19 PROCEDURE — 99152 MOD SED SAME PHYS/QHP 5/>YRS: CPT | Performed by: ANESTHESIOLOGY

## 2019-02-19 PROCEDURE — 77002 NEEDLE LOCALIZATION BY XRAY: CPT | Performed by: ANESTHESIOLOGY

## 2019-02-19 PROCEDURE — 3E0U33Z INTRODUCTION OF ANTI-INFLAMMATORY INTO JOINTS, PERCUTANEOUS APPROACH: ICD-10-PCS | Performed by: ANESTHESIOLOGY

## 2019-02-19 PROCEDURE — BW1C1ZZ FLUOROSCOPY OF LOWER EXTREMITY USING LOW OSMOLAR CONTRAST: ICD-10-PCS | Performed by: ANESTHESIOLOGY

## 2019-02-19 PROCEDURE — 82962 GLUCOSE BLOOD TEST: CPT

## 2019-02-19 RX ORDER — LIDOCAINE HYDROCHLORIDE 10 MG/ML
INJECTION, SOLUTION EPIDURAL; INFILTRATION; INTRACAUDAL; PERINEURAL AS NEEDED
Status: DISCONTINUED | OUTPATIENT
Start: 2019-02-19 | End: 2019-02-19

## 2019-02-19 RX ORDER — MIDAZOLAM HYDROCHLORIDE 1 MG/ML
INJECTION INTRAMUSCULAR; INTRAVENOUS AS NEEDED
Status: DISCONTINUED | OUTPATIENT
Start: 2019-02-19 | End: 2019-02-19

## 2019-02-19 RX ORDER — INSULIN ASPART 100 [IU]/ML
3 INJECTION, SOLUTION INTRAVENOUS; SUBCUTANEOUS ONCE
Status: DISCONTINUED | OUTPATIENT
Start: 2019-02-19 | End: 2019-02-19

## 2019-02-19 RX ORDER — ONDANSETRON 2 MG/ML
4 INJECTION INTRAMUSCULAR; INTRAVENOUS ONCE AS NEEDED
Status: DISCONTINUED | OUTPATIENT
Start: 2019-02-19 | End: 2019-02-19

## 2019-02-19 RX ORDER — SODIUM CHLORIDE, SODIUM LACTATE, POTASSIUM CHLORIDE, CALCIUM CHLORIDE 600; 310; 30; 20 MG/100ML; MG/100ML; MG/100ML; MG/100ML
100 INJECTION, SOLUTION INTRAVENOUS CONTINUOUS
Status: DISCONTINUED | OUTPATIENT
Start: 2019-02-19 | End: 2019-02-19

## 2019-02-19 RX ORDER — METHYLPREDNISOLONE ACETATE 40 MG/ML
INJECTION, SUSPENSION INTRA-ARTICULAR; INTRALESIONAL; INTRAMUSCULAR; SOFT TISSUE AS NEEDED
Status: DISCONTINUED | OUTPATIENT
Start: 2019-02-19 | End: 2019-02-19

## 2019-02-19 RX ORDER — DEXTROSE MONOHYDRATE 25 G/50ML
50 INJECTION, SOLUTION INTRAVENOUS
Status: DISCONTINUED | OUTPATIENT
Start: 2019-02-19 | End: 2019-02-19

## 2019-02-19 RX ORDER — DIPHENHYDRAMINE HYDROCHLORIDE 50 MG/ML
50 INJECTION INTRAMUSCULAR; INTRAVENOUS ONCE AS NEEDED
Status: DISCONTINUED | OUTPATIENT
Start: 2019-02-19 | End: 2019-02-19

## 2019-02-19 NOTE — OPERATIVE REPORT
[facility]  Operative Report  2019     Ashley Jennings Patient Status:  Hospital Outpatient Surgery    1960 MRN [id number]   [facility] ENDOSCOPY Attending Ermias Decker MD   Hosp Day # 0 PCP Rob Steven MD     Indication: Depo-Medrol mixed with 4 cc 1% Lidocaine was injected, 2 cc inside the joint and 2 cc over the capsule.   The right greater trochanteric bursa was identified with the help of fluoroscopy and injected from a solution of 40 mg Depo-Medrol mixed with 5 cc 1% p

## 2019-02-19 NOTE — H&P
History & Physical Examination    Patient Name: Shandra Shine  MRN: [id number]  [facility]: [id number]  YOB: 1960    Pre-Operative Diagnosis:  Osteoarthritis of right hip, unspecified osteoarthritis type [M16.11]  Trochanteric bursitis of right hip [M Disp:  Rfl:  2/15/2019   Insulin Syringe-Needle U-100 (BD INSULIN SYRINGE ULTRAFINE) 31G X 5/16\" 1 ML Does not apply Misc As directed Disp: 90 each Rfl: 5 Taking   GLIPIZIDE XL 5 MG Oral Tablet 24 Hr Take 5 mg by mouth daily.    Disp:  Rfl: 1 Taking   VICT normal, please explain:   HEENT [x ] [x ]    NECK & BACK [x ] [x ]    HEART [x ] [x ]    LUNGS [x ] [x ]    ABDOMEN [x ] [x ]    UROGENITAL [x ] [x ]    EXTREMITIES [ ] [] Tenderness to outer right hip and thigh   OTHER        [ x ] I have discussed the ri

## 2019-02-26 DIAGNOSIS — M16.11 PRIMARY OSTEOARTHRITIS OF RIGHT HIP: ICD-10-CM

## 2019-02-26 RX ORDER — HYDROCODONE BITARTRATE AND ACETAMINOPHEN 10; 325 MG/1; MG/1
1 TABLET ORAL EVERY 8 HOURS PRN
Qty: 90 TABLET | Refills: 0 | Status: SHIPPED | OUTPATIENT
Start: 2019-03-02 | End: 2019-03-20

## 2019-03-05 ENCOUNTER — MED REC SCAN ONLY (OUTPATIENT)
Dept: INTERNAL MEDICINE CLINIC | Facility: CLINIC | Age: 59
End: 2019-03-05

## 2019-03-13 ENCOUNTER — OFFICE VISIT (OUTPATIENT)
Dept: INTERNAL MEDICINE CLINIC | Facility: CLINIC | Age: 59
End: 2019-03-13

## 2019-03-13 ENCOUNTER — TELEPHONE (OUTPATIENT)
Dept: INTERNAL MEDICINE CLINIC | Facility: CLINIC | Age: 59
End: 2019-03-13

## 2019-03-13 VITALS
WEIGHT: 315 LBS | HEIGHT: 71 IN | OXYGEN SATURATION: 95 % | RESPIRATION RATE: 18 BRPM | DIASTOLIC BLOOD PRESSURE: 90 MMHG | BODY MASS INDEX: 44.1 KG/M2 | TEMPERATURE: 98 F | SYSTOLIC BLOOD PRESSURE: 130 MMHG | HEART RATE: 106 BPM

## 2019-03-13 DIAGNOSIS — E66.01 MORBID OBESITY WITH BMI OF 45.0-49.9, ADULT (HCC): ICD-10-CM

## 2019-03-13 DIAGNOSIS — H60.332 ACUTE SWIMMER'S EAR OF LEFT SIDE: Primary | ICD-10-CM

## 2019-03-13 PROCEDURE — 99214 OFFICE O/P EST MOD 30 MIN: CPT | Performed by: NURSE PRACTITIONER

## 2019-03-13 RX ORDER — CANAGLIFLOZIN 300 MG/1
300 TABLET, FILM COATED ORAL DAILY
Qty: 30 TABLET | Refills: 3 | COMMUNITY
Start: 2019-03-13 | End: 2020-01-07

## 2019-03-13 RX ORDER — PHENTERMINE HYDROCHLORIDE 15 MG/1
15 CAPSULE ORAL EVERY MORNING
Qty: 30 CAPSULE | Refills: 0 | OUTPATIENT
Start: 2019-03-13 | End: 2019-04-18 | Stop reason: DRUGHIGH

## 2019-03-13 RX ORDER — OFLOXACIN 3 MG/ML
3 SOLUTION AURICULAR (OTIC) 4 TIMES DAILY
Qty: 1 BOTTLE | Refills: 0 | Status: SHIPPED | OUTPATIENT
Start: 2019-03-13 | End: 2019-03-13 | Stop reason: RX

## 2019-03-13 RX ORDER — OFLOXACIN 3 MG/ML
SOLUTION/ DROPS OPHTHALMIC
Qty: 5 ML | Refills: 0 | Status: SHIPPED | OUTPATIENT
Start: 2019-03-13 | End: 2019-06-17

## 2019-03-13 NOTE — TELEPHONE ENCOUNTER
Fax from [facility] pharmacy - ofloxacin 0.3 % Otic Solution on backorder; ok to switch to eye drops? Form in triage.

## 2019-03-13 NOTE — PROGRESS NOTES
HPI:    Patient ID: Sue Lara is a 66year old male. Ear Pain    There is pain in the left ear. This is a new problem. The current episode started in the past 7 days. The problem occurs constantly. The problem has been unchanged.  There has been no f FOOD Disp: 90 tablet Rfl: 2   ClonazePAM 1 MG Oral Tab Take 1 tablet (1 mg total) by mouth 2 (two) times daily as needed for Anxiety. Disp: 60 tablet Rfl: 2   Pravastatin Sodium 40 MG Oral Tab Take 1 tablet (40 mg total) by mouth nightly.  Disp: 90 tablet R normal pulses. An irregular rhythm present. No murmur heard. Pulmonary/Chest: Effort normal and breath sounds normal. No respiratory distress. Skin: Skin is warm and dry. Psychiatric: He has a normal mood and affect.  His behavior is normal.   Vitals

## 2019-03-13 NOTE — PROGRESS NOTES
Patient was seen and examined by me as well as APN student. Agree with assessment and plan.    DONNA Pinto

## 2019-03-20 ENCOUNTER — PATIENT MESSAGE (OUTPATIENT)
Dept: INTERNAL MEDICINE CLINIC | Facility: CLINIC | Age: 59
End: 2019-03-20

## 2019-03-20 DIAGNOSIS — M25.551 RIGHT HIP PAIN: Primary | ICD-10-CM

## 2019-03-20 DIAGNOSIS — M16.11 PRIMARY OSTEOARTHRITIS OF RIGHT HIP: ICD-10-CM

## 2019-03-20 DIAGNOSIS — E66.01 MORBID OBESITY WITH BMI OF 45.0-49.9, ADULT (HCC): ICD-10-CM

## 2019-03-20 NOTE — TELEPHONE ENCOUNTER
From: Robin Kennedy  To: Sandra Flynn MD  Sent: 3/20/2019 11:40 AM CDT  Subject: Referral Request    I will need a referral for Dr Mindy Gillette for my right hip. Goran Perez

## 2019-03-21 RX ORDER — HYDROCODONE BITARTRATE AND ACETAMINOPHEN 10; 325 MG/1; MG/1
1 TABLET ORAL EVERY 8 HOURS PRN
Qty: 90 TABLET | Refills: 0 | Status: SHIPPED | OUTPATIENT
Start: 2019-04-02 | End: 2019-04-29

## 2019-03-22 ENCOUNTER — PATIENT MESSAGE (OUTPATIENT)
Dept: INTERNAL MEDICINE CLINIC | Facility: CLINIC | Age: 59
End: 2019-03-22

## 2019-03-25 NOTE — TELEPHONE ENCOUNTER
From: Mia Weems  To: Larisa Roman MD  Sent: 3/22/2019 5:38 PM CDT  Subject: Prescription Question    Good day. .  I visited Dr Jacque Slaughter about my hip pain and he prescribed CELECOXIB 200 MG CAPSULE. [facility] is waiting to hear back from you. Is there an issue.

## 2019-04-03 DIAGNOSIS — Z79.4 TYPE 2 DIABETES MELLITUS WITHOUT COMPLICATION, WITH LONG-TERM CURRENT USE OF INSULIN (HCC): ICD-10-CM

## 2019-04-03 DIAGNOSIS — I10 ESSENTIAL HYPERTENSION: Primary | ICD-10-CM

## 2019-04-03 DIAGNOSIS — E11.9 TYPE 2 DIABETES MELLITUS WITHOUT COMPLICATION, WITH LONG-TERM CURRENT USE OF INSULIN (HCC): ICD-10-CM

## 2019-04-03 DIAGNOSIS — E78.2 MIXED HYPERLIPIDEMIA: ICD-10-CM

## 2019-04-03 RX ORDER — PRAVASTATIN SODIUM 40 MG
40 TABLET ORAL DAILY
Qty: 90 TABLET | Refills: 1 | Status: SHIPPED | OUTPATIENT
Start: 2019-04-03 | End: 2019-12-27

## 2019-04-03 RX ORDER — AMLODIPINE BESYLATE 5 MG/1
TABLET ORAL
Qty: 90 TABLET | Refills: 1 | Status: SHIPPED | OUTPATIENT
Start: 2019-04-03 | End: 2019-11-05

## 2019-04-03 RX ORDER — CARVEDILOL 25 MG/1
TABLET ORAL
Qty: 180 TABLET | Refills: 1 | Status: SHIPPED | OUTPATIENT
Start: 2019-04-03 | End: 2019-09-26

## 2019-04-09 DIAGNOSIS — I10 ESSENTIAL HYPERTENSION, BENIGN: ICD-10-CM

## 2019-04-09 RX ORDER — LOSARTAN POTASSIUM 100 MG/1
TABLET ORAL
Qty: 90 TABLET | Refills: 1 | Status: SHIPPED | OUTPATIENT
Start: 2019-04-09 | End: 2019-08-20

## 2019-04-13 DIAGNOSIS — E66.01 MORBID OBESITY WITH BMI OF 45.0-49.9, ADULT (HCC): ICD-10-CM

## 2019-04-15 RX ORDER — PHENTERMINE HYDROCHLORIDE 15 MG/1
CAPSULE ORAL
Qty: 30 CAPSULE | Refills: 2
Start: 2019-04-15

## 2019-04-15 NOTE — TELEPHONE ENCOUNTER
Patient missed appt on Friday for follow-up, can not refill until seen in clinic for blood pressure check

## 2019-04-18 ENCOUNTER — OFFICE VISIT (OUTPATIENT)
Dept: INTERNAL MEDICINE CLINIC | Facility: CLINIC | Age: 59
End: 2019-04-18

## 2019-04-18 VITALS
SYSTOLIC BLOOD PRESSURE: 118 MMHG | HEART RATE: 75 BPM | BODY MASS INDEX: 44.1 KG/M2 | DIASTOLIC BLOOD PRESSURE: 86 MMHG | TEMPERATURE: 98 F | HEIGHT: 71 IN | WEIGHT: 315 LBS | RESPIRATION RATE: 16 BRPM

## 2019-04-18 DIAGNOSIS — E88.81 METABOLIC SYNDROME: Primary | ICD-10-CM

## 2019-04-18 PROCEDURE — 99213 OFFICE O/P EST LOW 20 MIN: CPT | Performed by: INTERNAL MEDICINE

## 2019-04-18 RX ORDER — PHENTERMINE HYDROCHLORIDE 30 MG/1
30 CAPSULE ORAL EVERY MORNING
Qty: 30 CAPSULE | Refills: 2 | Status: SHIPPED | OUTPATIENT
Start: 2019-04-18 | End: 2019-06-27

## 2019-04-18 NOTE — PROGRESS NOTES
HPI:    Patient ID: Paco Cardoza is a 66year old male. HPI  Here for follow up on phentermine  No side effects  Fair lifestyle adherence    Review of Systems   Constitutional: Negative for activity change, appetite change and diaphoresis.    Respirator Sildenafil Citrate 100 MG Oral Tab Take 1-1.5 tablets (100-150 mg total) by mouth daily as needed for Erectile Dysfunction.  Disp: 24 tablet Rfl: 5   insulin glargine (LANTUS SOLOSTAR) 100 UNIT/ML Subcutaneous Solution Pen-injector Inject 60 Units into th capsule (30 mg total) by mouth every morning.        Imaging & Referrals:  None       ASHOK#[id number]

## 2019-04-27 DIAGNOSIS — F41.1 GENERALIZED ANXIETY DISORDER: ICD-10-CM

## 2019-04-29 DIAGNOSIS — M16.11 PRIMARY OSTEOARTHRITIS OF RIGHT HIP: ICD-10-CM

## 2019-04-29 RX ORDER — CLONAZEPAM 1 MG/1
TABLET ORAL
Qty: 60 TABLET | Refills: 2 | Status: SHIPPED
Start: 2019-04-29 | End: 2019-07-25

## 2019-04-29 RX ORDER — HYDROCODONE BITARTRATE AND ACETAMINOPHEN 10; 325 MG/1; MG/1
1 TABLET ORAL EVERY 8 HOURS PRN
Qty: 90 TABLET | Refills: 0 | Status: SHIPPED | OUTPATIENT
Start: 2019-04-29 | End: 2019-05-22

## 2019-05-08 ENCOUNTER — LAB ENCOUNTER (OUTPATIENT)
Dept: LAB | Facility: HOSPITAL | Age: 59
End: 2019-05-08
Attending: UROLOGY
Payer: COMMERCIAL

## 2019-05-08 DIAGNOSIS — C61 PROSTATE CANCER (HCC): ICD-10-CM

## 2019-05-08 DIAGNOSIS — E11.65 TYPE II DIABETES MELLITUS, UNCONTROLLED (HCC): Primary | ICD-10-CM

## 2019-05-08 PROCEDURE — 36415 COLL VENOUS BLD VENIPUNCTURE: CPT

## 2019-05-08 PROCEDURE — 84153 ASSAY OF PSA TOTAL: CPT

## 2019-05-08 PROCEDURE — 80048 BASIC METABOLIC PNL TOTAL CA: CPT

## 2019-05-22 ENCOUNTER — TELEPHONE (OUTPATIENT)
Dept: INTERNAL MEDICINE CLINIC | Facility: CLINIC | Age: 59
End: 2019-05-22

## 2019-05-22 DIAGNOSIS — M16.11 PRIMARY OSTEOARTHRITIS OF RIGHT HIP: ICD-10-CM

## 2019-05-23 RX ORDER — HYDROCODONE BITARTRATE AND ACETAMINOPHEN 10; 325 MG/1; MG/1
1 TABLET ORAL EVERY 8 HOURS PRN
Qty: 90 TABLET | Refills: 0 | Status: SHIPPED | OUTPATIENT
Start: 2019-05-23 | End: 2019-06-26

## 2019-06-13 ENCOUNTER — PATIENT OUTREACH (OUTPATIENT)
Dept: INTERNAL MEDICINE CLINIC | Facility: CLINIC | Age: 59
End: 2019-06-13

## 2019-06-17 DIAGNOSIS — H60.332 ACUTE SWIMMER'S EAR OF LEFT SIDE: ICD-10-CM

## 2019-06-18 RX ORDER — OFLOXACIN 3 MG/ML
SOLUTION/ DROPS OPHTHALMIC
Qty: 5 ML | Refills: 0 | Status: SHIPPED | OUTPATIENT
Start: 2019-06-18 | End: 2019-07-03

## 2019-06-26 ENCOUNTER — TELEPHONE (OUTPATIENT)
Dept: INTERNAL MEDICINE CLINIC | Facility: CLINIC | Age: 59
End: 2019-06-26

## 2019-06-26 DIAGNOSIS — M16.11 PRIMARY OSTEOARTHRITIS OF RIGHT HIP: ICD-10-CM

## 2019-06-27 ENCOUNTER — OFFICE VISIT (OUTPATIENT)
Dept: INTERNAL MEDICINE CLINIC | Facility: CLINIC | Age: 59
End: 2019-06-27

## 2019-06-27 VITALS
TEMPERATURE: 99 F | DIASTOLIC BLOOD PRESSURE: 82 MMHG | HEART RATE: 104 BPM | RESPIRATION RATE: 18 BRPM | HEIGHT: 71 IN | SYSTOLIC BLOOD PRESSURE: 134 MMHG | BODY MASS INDEX: 43.96 KG/M2 | WEIGHT: 314 LBS

## 2019-06-27 DIAGNOSIS — I10 ESSENTIAL HYPERTENSION: Primary | ICD-10-CM

## 2019-06-27 DIAGNOSIS — M16.11 PRIMARY OSTEOARTHRITIS OF RIGHT HIP: ICD-10-CM

## 2019-06-27 DIAGNOSIS — E88.81 METABOLIC SYNDROME: ICD-10-CM

## 2019-06-27 DIAGNOSIS — H92.03 EAR PAIN, BILATERAL: ICD-10-CM

## 2019-06-27 PROCEDURE — 99214 OFFICE O/P EST MOD 30 MIN: CPT | Performed by: INTERNAL MEDICINE

## 2019-06-27 RX ORDER — PHENTERMINE HYDROCHLORIDE 30 MG/1
30 CAPSULE ORAL EVERY MORNING
Qty: 30 CAPSULE | Refills: 2 | Status: SHIPPED | OUTPATIENT
Start: 2019-06-27 | End: 2019-10-12

## 2019-06-27 RX ORDER — HYDROCODONE BITARTRATE AND ACETAMINOPHEN 10; 325 MG/1; MG/1
1 TABLET ORAL EVERY 8 HOURS PRN
Qty: 90 TABLET | Refills: 0 | Status: SHIPPED | OUTPATIENT
Start: 2019-06-27 | End: 2019-07-22

## 2019-06-28 NOTE — PROGRESS NOTES
HPI:    Patient ID: Gil Luo is a 64year old male. HPI  Gil Luo is a 64year old male. HPI:   Patient presents for recheck of his hypertension and metabolic syndrome.  Continuing wt loss program in hopes to have right hip replacement surg total) by mouth daily as needed for Erectile Dysfunction. Disp: 24 tablet Rfl: 5   LIDOCAINE 5 % External Patch PLACE 1 PATCH ONTO THE SKIN DAILY.  Disp: 30 patch Rfl: 0   TAMSULOSIN HCL 0.4 MG Oral Cap TAKE 1 CAPSULE BY MOUTH EVERY DAY Disp: 30 capsule Rfl hyperlipidemia    • Type II or unspecified type diabetes mellitus without mention of complication, not stated as uncontrolled    • Unspecified essential hypertension    • Unspecified sleep apnea Edward PSG 8-13-13 TX 12-30-13    AHI 65 SaO2 comfort 69 % BIPA PLAN: will continue present medications, check fasting lipids and CMP, reviewed diet, exercise and weight control, cont phentemrine for metabolic syndrome contro. cont wt loss prgram. refer to ent for eval of ear pain.   Cont prn nsaids and wt loss for con UNIT/ML Subcutaneous Solution Pen-injector Inject 50 Units into the skin 2 (two) times daily. Disp:  Rfl:    Rivaroxaban (XARELTO) 20 MG Oral Tab Take 20 mg by mouth daily with food.  Disp:  Rfl:    GLIPIZIDE XL 5 MG Oral Tablet 24 Hr Take 5 mg by mouth d

## 2019-07-02 ENCOUNTER — TELEPHONE (OUTPATIENT)
Dept: INTERNAL MEDICINE CLINIC | Facility: CLINIC | Age: 59
End: 2019-07-02

## 2019-07-02 DIAGNOSIS — Z79.4 TYPE 2 DIABETES MELLITUS WITHOUT COMPLICATION, WITH LONG-TERM CURRENT USE OF INSULIN (HCC): Primary | ICD-10-CM

## 2019-07-02 DIAGNOSIS — E11.9 TYPE 2 DIABETES MELLITUS WITHOUT COMPLICATION, WITH LONG-TERM CURRENT USE OF INSULIN (HCC): Primary | ICD-10-CM

## 2019-07-03 ENCOUNTER — TELEPHONE (OUTPATIENT)
Dept: INTERNAL MEDICINE CLINIC | Facility: CLINIC | Age: 59
End: 2019-07-03

## 2019-07-03 DIAGNOSIS — H60.332 ACUTE SWIMMER'S EAR OF LEFT SIDE: ICD-10-CM

## 2019-07-03 RX ORDER — OFLOXACIN 3 MG/ML
SOLUTION/ DROPS OPHTHALMIC
Qty: 5 ML | Refills: 0 | OUTPATIENT
Start: 2019-07-03

## 2019-07-03 RX ORDER — OFLOXACIN 3 MG/ML
SOLUTION/ DROPS OPHTHALMIC
Qty: 5 ML | Refills: 0 | Status: SHIPPED | OUTPATIENT
Start: 2019-07-03 | End: 2019-08-19 | Stop reason: ALTCHOICE

## 2019-07-03 NOTE — TELEPHONE ENCOUNTER
Swimmer's ear should have resolved with 1 week of therapy, please contact patient for update of symptoms

## 2019-07-03 NOTE — TELEPHONE ENCOUNTER
Pt seen Dr. Misa Del Angel and infection is still present in left ear along with psoriasis. Dr. Misa Del Angel suggested repeat treatment with Ofloxacin. Ok per [employer]. Pt aware and rx sent.

## 2019-07-11 ENCOUNTER — MED REC SCAN ONLY (OUTPATIENT)
Dept: INTERNAL MEDICINE CLINIC | Facility: CLINIC | Age: 59
End: 2019-07-11

## 2019-07-22 DIAGNOSIS — M16.11 PRIMARY OSTEOARTHRITIS OF RIGHT HIP: ICD-10-CM

## 2019-07-23 RX ORDER — HYDROCODONE BITARTRATE AND ACETAMINOPHEN 10; 325 MG/1; MG/1
1 TABLET ORAL EVERY 8 HOURS PRN
Qty: 90 TABLET | Refills: 0 | Status: SHIPPED | OUTPATIENT
Start: 2019-07-23 | End: 2019-08-26

## 2019-07-23 RX ORDER — HYDROCODONE BITARTRATE AND ACETAMINOPHEN 10; 325 MG/1; MG/1
1 TABLET ORAL EVERY 8 HOURS PRN
Qty: 90 TABLET | Refills: 0 | Status: SHIPPED
Start: 2019-07-23 | End: 2019-07-23

## 2019-07-24 ENCOUNTER — OFFICE VISIT (OUTPATIENT)
Dept: INTERNAL MEDICINE CLINIC | Facility: CLINIC | Age: 59
End: 2019-07-24

## 2019-07-24 VITALS
WEIGHT: 304 LBS | TEMPERATURE: 98 F | SYSTOLIC BLOOD PRESSURE: 112 MMHG | HEART RATE: 84 BPM | RESPIRATION RATE: 16 BRPM | BODY MASS INDEX: 42.56 KG/M2 | HEIGHT: 71 IN | DIASTOLIC BLOOD PRESSURE: 70 MMHG

## 2019-07-24 DIAGNOSIS — M62.830 LUMBAR PARASPINAL MUSCLE SPASM: Primary | ICD-10-CM

## 2019-07-24 PROCEDURE — 99213 OFFICE O/P EST LOW 20 MIN: CPT | Performed by: PHYSICIAN ASSISTANT

## 2019-07-24 RX ORDER — CYCLOBENZAPRINE HCL 10 MG
10 TABLET ORAL 2 TIMES DAILY PRN
Qty: 30 TABLET | Refills: 0 | Status: SHIPPED | OUTPATIENT
Start: 2019-07-24 | End: 2019-08-09

## 2019-07-24 NOTE — PATIENT INSTRUCTIONS
Take cyclobenzaprine nightly, and again in the morning if necessary. Caution, will cause drowsiness.

## 2019-07-24 NOTE — PROGRESS NOTES
HPI:    Patient ID: Diane Green is a 64year old male. Patient presents with:  Low Back Pain: low back pain that started saturday, left low catherine pain that radiates down left leg, hurts to walk     Low Back Pain   This is a new problem.  The current episod needed for Pain. Disp: 90 tablet Rfl: 0   ofloxacin 0.3 % Ophthalmic Solution PLACE 3 DROPS INTO THE LEFT EAR 4 TIMES A DAY FOR 7 DAYS Disp: 5 mL Rfl: 0   Phentermine HCl 30 MG Oral Cap Take 1 capsule (30 mg total) by mouth every morning.  Disp: 30 capsule mg total) by mouth daily. Disp: 90 tablet Rfl: 1   MetFORMIN HCl (GLUCOPHAGE) 500 MG Oral Tab Take 500 mg by mouth 2 (two) times daily with meals.  Disp:  Rfl:      Allergies:No Known Allergies   PHYSICAL EXAM:   Physical Exam   Nursing note and vitals revi (10 mg total) by mouth 2 (two) times daily as needed for Muscle spasms.        Imaging & Referrals:  None         #[id number]

## 2019-07-25 DIAGNOSIS — F41.1 GENERALIZED ANXIETY DISORDER: ICD-10-CM

## 2019-07-28 RX ORDER — CLONAZEPAM 1 MG/1
TABLET ORAL
Qty: 60 TABLET | Refills: 1 | OUTPATIENT
Start: 2019-07-28 | End: 2019-09-17

## 2019-08-12 RX ORDER — CYCLOBENZAPRINE HCL 10 MG
TABLET ORAL
Qty: 30 TABLET | Refills: 0 | Status: SHIPPED | OUTPATIENT
Start: 2019-08-12 | End: 2019-08-19 | Stop reason: ALTCHOICE

## 2019-08-19 ENCOUNTER — OFFICE VISIT (OUTPATIENT)
Dept: INTERNAL MEDICINE CLINIC | Facility: CLINIC | Age: 59
End: 2019-08-19

## 2019-08-19 VITALS
BODY MASS INDEX: 42.7 KG/M2 | TEMPERATURE: 98 F | WEIGHT: 305 LBS | HEIGHT: 71 IN | DIASTOLIC BLOOD PRESSURE: 72 MMHG | SYSTOLIC BLOOD PRESSURE: 124 MMHG | RESPIRATION RATE: 16 BRPM | HEART RATE: 84 BPM

## 2019-08-19 DIAGNOSIS — M54.16 LUMBAR BACK PAIN WITH RADICULOPATHY AFFECTING LEFT LOWER EXTREMITY: Primary | ICD-10-CM

## 2019-08-19 PROCEDURE — 99213 OFFICE O/P EST LOW 20 MIN: CPT | Performed by: PHYSICIAN ASSISTANT

## 2019-08-19 RX ORDER — METHYLPREDNISOLONE 4 MG/1
TABLET ORAL
Qty: 1 KIT | Refills: 0 | Status: SHIPPED | OUTPATIENT
Start: 2019-08-19 | End: 2019-11-05 | Stop reason: ALTCHOICE

## 2019-08-19 RX ORDER — TIZANIDINE 4 MG/1
4 TABLET ORAL EVERY 8 HOURS PRN
Qty: 20 TABLET | Refills: 0 | Status: SHIPPED | OUTPATIENT
Start: 2019-08-19 | End: 2019-10-17

## 2019-08-19 NOTE — PROGRESS NOTES
HPI:    Patient ID: Diane Green is a 64year old male. Patient presents with:  Low Back Pain: c/o low back and mid back pain; Rm 5     Low Back Pain   This is a new problem. The current episode started more than 1 month ago.  The problem occurs constantl AS NEEDED FOR ANXIETY Disp: 60 tablet Rfl: 1   HYDROcodone-acetaminophen (NORCO)  MG Oral Tab Take 1 tablet by mouth every 8 (eight) hours as needed for Pain.  Disp: 90 tablet Rfl: 0   Phentermine HCl 30 MG Oral Cap Take 1 capsule (30 mg total) by macy Allergies:No Known Allergies   PHYSICAL EXAM:   Physical Exam   Nursing note and vitals reviewed. Constitutional: He appears well-developed and well-nourished. Cardiovascular: Normal rate, regular rhythm and normal heart sounds. No murmur heard. Imaging & Referrals:  None         #[id number]

## 2019-08-19 NOTE — PATIENT INSTRUCTIONS
Take medrol dose ivette as directed  Take zanaflex if needed, up to every 8 hours.  Caution, will cause drowsiness  Schedule follow-up with Dr. Denney Primrose if pain does not resolve

## 2019-08-20 ENCOUNTER — TELEPHONE (OUTPATIENT)
Dept: INTERNAL MEDICINE CLINIC | Facility: CLINIC | Age: 59
End: 2019-08-20

## 2019-08-20 DIAGNOSIS — R06.02 SOB (SHORTNESS OF BREATH): ICD-10-CM

## 2019-08-20 DIAGNOSIS — R60.0 BILATERAL LOWER EXTREMITY EDEMA: ICD-10-CM

## 2019-08-20 DIAGNOSIS — I10 ESSENTIAL HYPERTENSION, BENIGN: ICD-10-CM

## 2019-08-20 RX ORDER — LOSARTAN POTASSIUM 100 MG/1
100 TABLET ORAL
Qty: 90 TABLET | Refills: 1 | Status: SHIPPED | OUTPATIENT
Start: 2019-08-20 | End: 2019-10-25

## 2019-08-20 RX ORDER — FUROSEMIDE 20 MG/1
20 TABLET ORAL DAILY
Qty: 90 TABLET | Refills: 1 | Status: SHIPPED | OUTPATIENT
Start: 2019-08-20 | End: 2019-12-27

## 2019-08-20 NOTE — TELEPHONE ENCOUNTER
[facility], Julita Queen PT they sent us a fax questioning if there will be any complications due to PT's glaucoma. No fax was received.      methylPREDNISolone (MEDROL) 4 MG Oral Tablet Therapy Pack    Please advise

## 2019-08-20 NOTE — PROGRESS NOTES
Name: Janice Munoz   : 1960   DOS: 2019     Pain Clinic Follow Up Visit:   Janice Munoz is a 64year old male who presents for recheck of his chronic low back pain which is worse in the sacral areas bilaterally but worse on the left currently tablet Rfl: 0   CLONAZEPAM 1 MG Oral Tab TAKE 1 TABLET BY MOUTH TWICE A DAY AS NEEDED FOR ANXIETY Disp: 60 tablet Rfl: 1   HYDROcodone-acetaminophen (NORCO)  MG Oral Tab Take 1 tablet by mouth every 8 (eight) hours as needed for Pain.  Disp: 90 tablet Ht 71\"   Wt (!) 301 lb   SpO2 99%   BMI 41.98 kg/m²   General: 64year old male in no acute distress. Neurologic: Alert, oriented, articulate. Stands slowly from chair. Antalgic gait. Toe walking is difficult. Psychiatric: Appropriate mood.   Back: Pain stenosis. Moderate to severe bilateral neural foraminal stenosis. L4-L5:  Right paracentral disc bulge with facet hypertrophic changes is noted. There is no spinal canal stenosis. There is moderate to severe bilateral neural foraminal stenosis.   L5-S1: due to steroids he is taking orally and used in the injection procedures. He should have a plan in place with his diabetes physician to manage higher than usual blood glucose levels.    If bilateral sacroiliac joint injection provides at least one week of r

## 2019-08-21 ENCOUNTER — TELEPHONE (OUTPATIENT)
Dept: PAIN CLINIC | Facility: CLINIC | Age: 59
End: 2019-08-21

## 2019-08-21 ENCOUNTER — OFFICE VISIT (OUTPATIENT)
Dept: PAIN CLINIC | Facility: CLINIC | Age: 59
End: 2019-08-21

## 2019-08-21 VITALS
BODY MASS INDEX: 42.14 KG/M2 | WEIGHT: 301 LBS | DIASTOLIC BLOOD PRESSURE: 80 MMHG | HEART RATE: 109 BPM | SYSTOLIC BLOOD PRESSURE: 148 MMHG | OXYGEN SATURATION: 99 % | HEIGHT: 71 IN

## 2019-08-21 DIAGNOSIS — M16.11 OSTEOARTHRITIS OF RIGHT HIP, UNSPECIFIED OSTEOARTHRITIS TYPE: ICD-10-CM

## 2019-08-21 DIAGNOSIS — M46.1 SACROILIITIS (HCC): Primary | ICD-10-CM

## 2019-08-21 DIAGNOSIS — M70.61 GREATER TROCHANTERIC BURSITIS OF RIGHT HIP: ICD-10-CM

## 2019-08-21 DIAGNOSIS — M47.817 SPONDYLOSIS OF LUMBOSACRAL REGION, UNSPECIFIED SPINAL OSTEOARTHRITIS COMPLICATION STATUS: ICD-10-CM

## 2019-08-21 DIAGNOSIS — M54.16 LUMBAR RADICULITIS: ICD-10-CM

## 2019-08-21 PROCEDURE — 99213 OFFICE O/P EST LOW 20 MIN: CPT | Performed by: NURSE PRACTITIONER

## 2019-08-21 NOTE — TELEPHONE ENCOUNTER
Please add:    Right hip joint and troch bursa        Mari Mensah APRNAPNSigned  3:41 PM                I had also wanted to add a right hip joint and greater trochanteric bursa injection at a separate appointment time after the bilateral sacroiliac join

## 2019-08-21 NOTE — TELEPHONE ENCOUNTER
Medical clearance needed- yes- Xarelto and ASA 81 / Dr. Mehrdad Mistry    Pt seen in [address] today by Mari and recommended for [address] with Dr. Denver San (X 1). Please begin PA process for procedure(s).      Laterality: bilateral  Level(s): SI joint    Pt informed callbac

## 2019-08-21 NOTE — PROGRESS NOTES
Spoke with patient regarding injection(s). Reviewed prior auth/medical clearance process and pre-op instructions. Patient verbalized understanding. Agreeable to holding ASA 81 and xarelto (pending clearance) medications as indicated in instructions listed i

## 2019-08-21 NOTE — PROGRESS NOTES
Patient presents in office today with reported pain in left sided low back with radicular pain into left buttocks, sometimes into leg. Patient reports pain was up to a 9/10 on this past Saturday, patient was in bed all day due to the pain.      Current pain

## 2019-08-21 NOTE — TELEPHONE ENCOUNTER
I had also wanted to add a right hip joint and greater trochanteric bursa injection at a separate appointment time after the bilateral sacroiliac joint injection.  I have placed cases for both, would you please call him to schedule the right hip injection a

## 2019-08-21 NOTE — PATIENT INSTRUCTIONS
Refill policies:    • Allow 2-3 business days for refills; controlled substances may take longer.   • Contact your pharmacy at least 5 days prior to running out of medication and have them send an electronic request or submit request through the “request re Depending on your insurance carrier, approval may take 3-10 days. It is highly recommended patients contact their insurance carrier directly to determine coverage.   If test is done without insurance authorization, patient may be responsible for the entire AND/OR RESCHEDULING: PLEASE CALL [facility] PRE-PROCEDURE LINE [phone] FOR DETAILED INSTRUCTIONS FIVE TO SEVEN DAYS PRIOR TO PROCEDURE**        Prior to the procedure:  Please update us prior to the procedure if you are experiencing any symptoms of infect (Etanercept) 24 hours   • Fragmin (Dalteparin) 24 hours   • Pletal (Cilostazol) 7 days  • Effient (Prasugrel) 7 days  • Pradaxa 10 days  • Trental 7 days  • Eliquis (Apixaban) 3 days  • Xarelto (Rivaroxaban) 3 days  • Lovenox (Enoxaparin) 24 hours  • Aspir sugar rises as you may require some medication adjustment. It is normal to have increased pain at injection site for up to 3-5 days after procedure, you can        use heat or ice (20 minutes on 20 minutes off) for comfort.     ** TO AVOID CANCELLATI local anesthetic. The injection needle is then placed using X-ray guidance. Once in place, the injection is carried out. After the injection, the needle is removed and an adhesive bandage is applied. What should I expect after the sacroiliac injection? injections. This is because the effect of the medication injected frequently lasts for six months or more. If three injections have not helped, you may be a candidate for a different procedure.  It is important to attend follow up appointments to clearly co

## 2019-08-21 NOTE — TELEPHONE ENCOUNTER
Medical clearance requested from Dr. Wyatt Juárez for patient to hold xarelto for 3 days and ASA 81 for 24 hours  for TBD procedure. Awaiting response. See PAIN TE for recommendations.

## 2019-08-22 NOTE — TELEPHONE ENCOUNTER
Added Right hip joint and troch bursa (20610) to [facility] Referral.   Case Pending

## 2019-08-23 NOTE — TELEPHONE ENCOUNTER
Prior authorization request completed for: [facility] and [facility] [place] [address] #[id number]     Authorization dates: 08/21/19 - 11/19/19  CPT codes approved: 45733,84091  Number of visits/dates of service approved: 2  Physician: Jean Carlos Wooten

## 2019-08-26 DIAGNOSIS — M16.11 PRIMARY OSTEOARTHRITIS OF RIGHT HIP: ICD-10-CM

## 2019-08-26 RX ORDER — HYDROCODONE BITARTRATE AND ACETAMINOPHEN 10; 325 MG/1; MG/1
1 TABLET ORAL EVERY 8 HOURS PRN
Qty: 90 TABLET | Refills: 0 | Status: SHIPPED | OUTPATIENT
Start: 2019-08-26 | End: 2019-09-22

## 2019-08-26 NOTE — TELEPHONE ENCOUNTER
Spoke to Reilly Caballeror at Dr Arcenio Tripp' office in re to Medical Clearance Letter for upcoming Dr Matteo Gonzales requesting Clearance Letter be refaxed-Letter refaxed,fax complete

## 2019-08-28 NOTE — TELEPHONE ENCOUNTER
Response received to Medical Clearance Letter from Dr Keyla Johnston for upcoming Dr Ron Londono procedures, endorsed to Pain Nurse Medical Clearance folder in y [address] [phone]

## 2019-08-28 NOTE — TELEPHONE ENCOUNTER
Medical clearance approved for patient to hold xarelto for 3 days and ASA 81 for 24 hours for TBD procedure. Sent to scanning.

## 2019-08-28 NOTE — TELEPHONE ENCOUNTER
Clearance received. Spoke with patient and scheduled injection(s). Reviewed pre-op instructions. Patient verbalized understanding. Agreeable to holding xarelto and ASA 81 medications as indicated in instructions listed below.  Discussed medications with Please update us prior to the procedure if you are experiencing any symptoms of infection such as cough, fever, chills, urinary symptoms, or have recently been prescribed antibiotics, have open wounds, have recently had surgery or dental procedures. ? Eliquis (Apixaban) 3 days  ? Xarelto (Rivaroxaban) 3 days  ? Lovenox (Enoxaparin) 24 hours  ? Aspirin  ? 81mg 24 hours  ? Greater than 81 mg (325mg) 7 days  ? Coumadin       5 days  ? Procedure may be cancelled if INR is elevated. ?  Excedrin (with aspi It is normal to have increased pain at injection site for up to 3-5 days after procedure, you can        use heat or ice (20 minutes on 20 minutes off) for comfort.     ** TO AVOID CANCELLATION AND/OR RESCHEDULING: PLEASE CALL SCOTTY PRE-PROCEDURE LINE A It is typically done with the patient lying on their stomach and it is done under x-ray. Patients receiving IV sedation are monitored with EKG, blood pressure cuff and blood oxygen-monitoring device.  The skin in the back is cleansed with antiseptic solutio If the first sacroiliac injection does not relieve your symptoms within one week, a second injection may be recommended.  Similarly, if the second sacroiliac injection does not completely relieve your symptoms in 1 to 2 weeks a third injection may be recomm

## 2019-08-28 NOTE — TELEPHONE ENCOUNTER
Called patient and notified of approval and instructions. Patient verbalized understanding. See PAIN TE for scheduling.

## 2019-09-03 ENCOUNTER — HOSPITAL ENCOUNTER (OUTPATIENT)
Dept: MRI IMAGING | Facility: HOSPITAL | Age: 59
Discharge: HOME | End: 2019-09-03
Attending: NURSE PRACTITIONER
Payer: COMMERCIAL

## 2019-09-03 DIAGNOSIS — M54.16 LUMBAR RADICULITIS: ICD-10-CM

## 2019-09-03 PROCEDURE — 72148 MRI LUMBAR SPINE W/O DYE: CPT | Performed by: NURSE PRACTITIONER

## 2019-09-06 ENCOUNTER — TELEPHONE (OUTPATIENT)
Dept: PAIN CLINIC | Facility: CLINIC | Age: 59
End: 2019-09-06

## 2019-09-06 NOTE — TELEPHONE ENCOUNTER
Spoke to patient, confirmed procedure date of 9/12/19 and to be checked in at outpatient registration at 11:30am. Patient called pre-procedure line and understood instructions.  Patient instructed to call office if there are additional questions after liste

## 2019-09-12 ENCOUNTER — HOSPITAL ENCOUNTER (OUTPATIENT)
Facility: HOSPITAL | Age: 59
Setting detail: HOSPITAL OUTPATIENT SURGERY
Discharge: HOME | End: 2019-09-12
Attending: ANESTHESIOLOGY | Admitting: ANESTHESIOLOGY
Payer: COMMERCIAL

## 2019-09-12 ENCOUNTER — APPOINTMENT (OUTPATIENT)
Dept: GENERAL RADIOLOGY | Facility: HOSPITAL | Age: 59
End: 2019-09-12
Attending: ANESTHESIOLOGY
Payer: COMMERCIAL

## 2019-09-12 VITALS
SYSTOLIC BLOOD PRESSURE: 128 MMHG | OXYGEN SATURATION: 98 % | DIASTOLIC BLOOD PRESSURE: 78 MMHG | RESPIRATION RATE: 16 BRPM | TEMPERATURE: 98 F | HEART RATE: 91 BPM

## 2019-09-12 DIAGNOSIS — M47.817 SPONDYLOSIS OF LUMBOSACRAL REGION, UNSPECIFIED SPINAL OSTEOARTHRITIS COMPLICATION STATUS: ICD-10-CM

## 2019-09-12 DIAGNOSIS — M46.1 SACROILIITIS (HCC): ICD-10-CM

## 2019-09-12 LAB — GLUCOSE BLD-MCNC: 87 MG/DL (ref 70–99)

## 2019-09-12 PROCEDURE — 99152 MOD SED SAME PHYS/QHP 5/>YRS: CPT | Performed by: ANESTHESIOLOGY

## 2019-09-12 PROCEDURE — 3E0U33Z INTRODUCTION OF ANTI-INFLAMMATORY INTO JOINTS, PERCUTANEOUS APPROACH: ICD-10-PCS | Performed by: ANESTHESIOLOGY

## 2019-09-12 PROCEDURE — 3E0U3BZ INTRODUCTION OF ANESTHETIC AGENT INTO JOINTS, PERCUTANEOUS APPROACH: ICD-10-PCS | Performed by: ANESTHESIOLOGY

## 2019-09-12 PROCEDURE — 82962 GLUCOSE BLOOD TEST: CPT

## 2019-09-12 RX ORDER — LIDOCAINE HYDROCHLORIDE 10 MG/ML
INJECTION, SOLUTION EPIDURAL; INFILTRATION; INTRACAUDAL; PERINEURAL AS NEEDED
Status: DISCONTINUED | OUTPATIENT
Start: 2019-09-12 | End: 2019-09-12

## 2019-09-12 RX ORDER — MIDAZOLAM HYDROCHLORIDE 1 MG/ML
INJECTION INTRAMUSCULAR; INTRAVENOUS AS NEEDED
Status: DISCONTINUED | OUTPATIENT
Start: 2019-09-12 | End: 2019-09-12

## 2019-09-12 RX ORDER — INSULIN ASPART 100 [IU]/ML
3 INJECTION, SOLUTION INTRAVENOUS; SUBCUTANEOUS ONCE
Status: DISCONTINUED | OUTPATIENT
Start: 2019-09-12 | End: 2019-09-12

## 2019-09-12 RX ORDER — ONDANSETRON 2 MG/ML
4 INJECTION INTRAMUSCULAR; INTRAVENOUS ONCE AS NEEDED
Status: DISCONTINUED | OUTPATIENT
Start: 2019-09-12 | End: 2019-09-12

## 2019-09-12 RX ORDER — DIPHENHYDRAMINE HYDROCHLORIDE 50 MG/ML
50 INJECTION INTRAMUSCULAR; INTRAVENOUS ONCE AS NEEDED
Status: DISCONTINUED | OUTPATIENT
Start: 2019-09-12 | End: 2019-09-12

## 2019-09-12 RX ORDER — SODIUM CHLORIDE, SODIUM LACTATE, POTASSIUM CHLORIDE, CALCIUM CHLORIDE 600; 310; 30; 20 MG/100ML; MG/100ML; MG/100ML; MG/100ML
100 INJECTION, SOLUTION INTRAVENOUS CONTINUOUS
Status: DISCONTINUED | OUTPATIENT
Start: 2019-09-12 | End: 2019-09-12

## 2019-09-12 RX ORDER — DEXTROSE MONOHYDRATE 25 G/50ML
50 INJECTION, SOLUTION INTRAVENOUS
Status: DISCONTINUED | OUTPATIENT
Start: 2019-09-12 | End: 2019-09-12

## 2019-09-12 RX ORDER — METHYLPREDNISOLONE ACETATE 40 MG/ML
INJECTION, SUSPENSION INTRA-ARTICULAR; INTRALESIONAL; INTRAMUSCULAR; SOFT TISSUE AS NEEDED
Status: DISCONTINUED | OUTPATIENT
Start: 2019-09-12 | End: 2019-09-12

## 2019-09-12 NOTE — H&P
History & Physical Examination    Patient Name: Flower Porter  MRN: [id number]  CSN: [id number]  YOB: 1960    Pre-Operative Diagnosis:  [facility] [M46.1]  Spondylosis of lumbosacral region, unspecified spinal osteoarthritis complicatio mL Rfl: 3 Taking   insulin glargine (LANTUS SOLOSTAR) 100 UNIT/ML Subcutaneous Solution Pen-injector Inject 50 Units into the skin every morning. Disp:  Rfl:  Taking   Rivaroxaban (XARELTO) 20 MG Oral Tab Take 20 mg by mouth daily with food.  Disp:  Rfl: Procedure Laterality Date   • COLONOSCOPY     • HIP JOINT INJECTION Right 2/19/2019    Performed by Mary Carmen Vides MD at [address] Right 5/29/2018    Performed by Mary Carmen Vides MD at [address]

## 2019-09-12 NOTE — OPERATIVE REPORT
[facility]  Operative Report  2019     Komal Potts Patient Status:  Hospital Outpatient Surgery    1960 MRN [id number]   [facility] ENDOSCOPY Attending No att. providers found   Hosp Day # 0 PCP Cherl Hatcher, MD Edman Lundborg aspects of the sacroiliac joint were overlapped. A 22-gauge, Quincke spinal needle was advanced into the middle portion of the first sacroiliac joint.  After the needle  positions were confirmed by AP and lateral views of fluoroscopy, 1 cc Omnipaque-240 was

## 2019-09-17 ENCOUNTER — MED REC SCAN ONLY (OUTPATIENT)
Dept: INTERNAL MEDICINE CLINIC | Facility: CLINIC | Age: 59
End: 2019-09-17

## 2019-09-17 DIAGNOSIS — F41.1 GENERALIZED ANXIETY DISORDER: ICD-10-CM

## 2019-09-17 RX ORDER — CLONAZEPAM 1 MG/1
TABLET ORAL
Qty: 60 TABLET | Refills: 2 | Status: ON HOLD | OUTPATIENT
Start: 2019-09-17 | End: 2019-11-15

## 2019-09-22 DIAGNOSIS — M16.11 PRIMARY OSTEOARTHRITIS OF RIGHT HIP: ICD-10-CM

## 2019-09-23 RX ORDER — HYDROCODONE BITARTRATE AND ACETAMINOPHEN 10; 325 MG/1; MG/1
1 TABLET ORAL EVERY 8 HOURS PRN
Qty: 90 TABLET | Refills: 0 | Status: SHIPPED | OUTPATIENT
Start: 2019-09-23 | End: 2019-10-23

## 2019-09-24 ENCOUNTER — NURSE ONLY (OUTPATIENT)
Dept: INTERNAL MEDICINE CLINIC | Facility: CLINIC | Age: 59
End: 2019-09-24

## 2019-09-24 DIAGNOSIS — Z23 NEED FOR PROPHYLACTIC VACCINATION AND INOCULATION AGAINST INFLUENZA: Primary | ICD-10-CM

## 2019-09-24 PROCEDURE — 90471 IMMUNIZATION ADMIN: CPT | Performed by: INTERNAL MEDICINE

## 2019-09-24 PROCEDURE — 90686 IIV4 VACC NO PRSV 0.5 ML IM: CPT | Performed by: INTERNAL MEDICINE

## 2019-09-26 DIAGNOSIS — I10 ESSENTIAL HYPERTENSION: ICD-10-CM

## 2019-09-26 DIAGNOSIS — E11.9 TYPE 2 DIABETES MELLITUS WITHOUT COMPLICATION, WITH LONG-TERM CURRENT USE OF INSULIN (HCC): Primary | ICD-10-CM

## 2019-09-26 DIAGNOSIS — Z79.4 TYPE 2 DIABETES MELLITUS WITHOUT COMPLICATION, WITH LONG-TERM CURRENT USE OF INSULIN (HCC): Primary | ICD-10-CM

## 2019-09-26 RX ORDER — CARVEDILOL 25 MG/1
TABLET ORAL
Qty: 180 TABLET | Refills: 1 | Status: ON HOLD | OUTPATIENT
Start: 2019-09-26 | End: 2019-11-15

## 2019-09-26 RX ORDER — AMLODIPINE BESYLATE 5 MG/1
TABLET ORAL
Qty: 90 TABLET | Refills: 1 | Status: ON HOLD | OUTPATIENT
Start: 2019-09-26 | End: 2019-11-15

## 2019-09-26 NOTE — TELEPHONE ENCOUNTER
amlodipine  Last time medication was refilled 04/2019   Quantity  and number of refills 90 with 1 refill    Last office visit 08/2019    Next office visit 01/2020 already scheduled .

## 2019-09-26 NOTE — TELEPHONE ENCOUNTER
Fax request from [employer] for a refill on Carvedilol 25 mg tabs qty 180. Please see fax in triage.

## 2019-09-30 ENCOUNTER — TELEPHONE (OUTPATIENT)
Dept: SURGERY | Facility: CLINIC | Age: 59
End: 2019-09-30

## 2019-09-30 NOTE — TELEPHONE ENCOUNTER
Spoke to patient, confirmed procedure date of 10-03-19 and to be checked in at outpatient registration at 11:45 am. Patient called pre-procedure line and understood instructions

## 2019-10-03 ENCOUNTER — APPOINTMENT (OUTPATIENT)
Dept: GENERAL RADIOLOGY | Facility: HOSPITAL | Age: 59
End: 2019-10-03
Attending: ANESTHESIOLOGY
Payer: COMMERCIAL

## 2019-10-03 ENCOUNTER — HOSPITAL ENCOUNTER (OUTPATIENT)
Facility: HOSPITAL | Age: 59
Setting detail: HOSPITAL OUTPATIENT SURGERY
Discharge: HOME | End: 2019-10-03
Attending: ANESTHESIOLOGY | Admitting: ANESTHESIOLOGY
Payer: COMMERCIAL

## 2019-10-03 VITALS
SYSTOLIC BLOOD PRESSURE: 130 MMHG | DIASTOLIC BLOOD PRESSURE: 86 MMHG | RESPIRATION RATE: 16 BRPM | TEMPERATURE: 98 F | HEART RATE: 96 BPM | OXYGEN SATURATION: 95 %

## 2019-10-03 DIAGNOSIS — M70.61 GREATER TROCHANTERIC BURSITIS OF RIGHT HIP: ICD-10-CM

## 2019-10-03 DIAGNOSIS — M16.11 OSTEOARTHRITIS OF RIGHT HIP, UNSPECIFIED OSTEOARTHRITIS TYPE: ICD-10-CM

## 2019-10-03 PROCEDURE — 3E0U3BZ INTRODUCTION OF ANESTHETIC AGENT INTO JOINTS, PERCUTANEOUS APPROACH: ICD-10-PCS | Performed by: ANESTHESIOLOGY

## 2019-10-03 PROCEDURE — 77002 NEEDLE LOCALIZATION BY XRAY: CPT | Performed by: ANESTHESIOLOGY

## 2019-10-03 PROCEDURE — BQ101ZZ FLUOROSCOPY OF RIGHT HIP USING LOW OSMOLAR CONTRAST: ICD-10-PCS | Performed by: ANESTHESIOLOGY

## 2019-10-03 PROCEDURE — 99152 MOD SED SAME PHYS/QHP 5/>YRS: CPT | Performed by: ANESTHESIOLOGY

## 2019-10-03 PROCEDURE — 3E0U329 INTRODUCTION OF OTHER ANTI-INFECTIVE INTO JOINTS, PERCUTANEOUS APPROACH: ICD-10-PCS | Performed by: ANESTHESIOLOGY

## 2019-10-03 PROCEDURE — 82962 GLUCOSE BLOOD TEST: CPT

## 2019-10-03 RX ORDER — LIDOCAINE HYDROCHLORIDE 10 MG/ML
INJECTION, SOLUTION EPIDURAL; INFILTRATION; INTRACAUDAL; PERINEURAL AS NEEDED
Status: DISCONTINUED | OUTPATIENT
Start: 2019-10-03 | End: 2019-10-03

## 2019-10-03 RX ORDER — INSULIN ASPART 100 [IU]/ML
3 INJECTION, SOLUTION INTRAVENOUS; SUBCUTANEOUS ONCE
Status: DISCONTINUED | OUTPATIENT
Start: 2019-10-03 | End: 2019-10-03

## 2019-10-03 RX ORDER — ONDANSETRON 2 MG/ML
4 INJECTION INTRAMUSCULAR; INTRAVENOUS ONCE AS NEEDED
Status: DISCONTINUED | OUTPATIENT
Start: 2019-10-03 | End: 2019-10-03

## 2019-10-03 RX ORDER — DIPHENHYDRAMINE HYDROCHLORIDE 50 MG/ML
50 INJECTION INTRAMUSCULAR; INTRAVENOUS ONCE AS NEEDED
Status: DISCONTINUED | OUTPATIENT
Start: 2019-10-03 | End: 2019-10-03

## 2019-10-03 RX ORDER — SODIUM CHLORIDE, SODIUM LACTATE, POTASSIUM CHLORIDE, CALCIUM CHLORIDE 600; 310; 30; 20 MG/100ML; MG/100ML; MG/100ML; MG/100ML
100 INJECTION, SOLUTION INTRAVENOUS CONTINUOUS
Status: DISCONTINUED | OUTPATIENT
Start: 2019-10-03 | End: 2019-10-03

## 2019-10-03 RX ORDER — METHYLPREDNISOLONE ACETATE 40 MG/ML
INJECTION, SUSPENSION INTRA-ARTICULAR; INTRALESIONAL; INTRAMUSCULAR; SOFT TISSUE AS NEEDED
Status: DISCONTINUED | OUTPATIENT
Start: 2019-10-03 | End: 2019-10-03

## 2019-10-03 RX ORDER — MIDAZOLAM HYDROCHLORIDE 1 MG/ML
INJECTION INTRAMUSCULAR; INTRAVENOUS AS NEEDED
Status: DISCONTINUED | OUTPATIENT
Start: 2019-10-03 | End: 2019-10-03

## 2019-10-03 RX ORDER — DEXTROSE MONOHYDRATE 25 G/50ML
50 INJECTION, SOLUTION INTRAVENOUS
Status: DISCONTINUED | OUTPATIENT
Start: 2019-10-03 | End: 2019-10-03

## 2019-10-04 NOTE — OPERATIVE REPORT
[facility]  Operative Report  10/3/2019     Valley Brought Patient Status:  Hospital Outpatient Surgery    1960 MRN [id number]   [facility] ENDOSCOPY Attending No att. providers found   Hosp Day # 0 PCP MD Kaushal Baxter After that, 40 mg Depo-Medrol mixed with 4 cc 1% Lidocaine was injected, 2 cc inside the joint and 2 cc over the right greater trochanteric bursa. The patient tolerated the procedure very well.  The patient had complete understanding of the risks and benefi

## 2019-10-12 DIAGNOSIS — E88.81 METABOLIC SYNDROME: ICD-10-CM

## 2019-10-14 RX ORDER — PHENTERMINE HYDROCHLORIDE 30 MG/1
CAPSULE ORAL
Qty: 30 CAPSULE | Refills: 2 | Status: ON HOLD | OUTPATIENT
Start: 2019-10-14 | End: 2019-11-15

## 2019-10-17 ENCOUNTER — PATIENT MESSAGE (OUTPATIENT)
Dept: INTERNAL MEDICINE CLINIC | Facility: CLINIC | Age: 59
End: 2019-10-17

## 2019-10-17 DIAGNOSIS — M54.16 LUMBAR BACK PAIN WITH RADICULOPATHY AFFECTING LEFT LOWER EXTREMITY: ICD-10-CM

## 2019-10-17 RX ORDER — TIZANIDINE 4 MG/1
4 TABLET ORAL EVERY 8 HOURS PRN
Qty: 20 TABLET | Refills: 0 | Status: SHIPPED | OUTPATIENT
Start: 2019-10-17 | End: 2019-11-25 | Stop reason: ALTCHOICE

## 2019-10-17 NOTE — PROGRESS NOTES
The pt was last seen in the office on 8/19/2019 for lumbar pain with radiculopathy affecting the left side. The pt was prescribed a medrol dose pack and zanaflex 4mg tid prn #20.

## 2019-10-17 NOTE — TELEPHONE ENCOUNTER
From: Thom Castanon  To: Karey Russo MD  Sent: 10/17/2019 9:28 AM CDT  Subject: Other    Good day. .  On tuesday i tripped on my right leg which is affecting my hip. . I am now having spasms in the right leg radiating from the hip down my leg which at some

## 2019-10-22 ENCOUNTER — LAB ENCOUNTER (OUTPATIENT)
Dept: LAB | Facility: HOSPITAL | Age: 59
End: 2019-10-22
Attending: INTERNAL MEDICINE
Payer: COMMERCIAL

## 2019-10-22 DIAGNOSIS — I48.21 PERMANENT ATRIAL FIBRILLATION (HCC): ICD-10-CM

## 2019-10-22 PROCEDURE — 80053 COMPREHEN METABOLIC PANEL: CPT

## 2019-10-22 PROCEDURE — 36415 COLL VENOUS BLD VENIPUNCTURE: CPT

## 2019-10-22 PROCEDURE — 85025 COMPLETE CBC W/AUTO DIFF WBC: CPT

## 2019-10-22 PROCEDURE — 83880 ASSAY OF NATRIURETIC PEPTIDE: CPT

## 2019-10-23 DIAGNOSIS — M16.11 PRIMARY OSTEOARTHRITIS OF RIGHT HIP: ICD-10-CM

## 2019-10-24 DIAGNOSIS — I10 ESSENTIAL HYPERTENSION, BENIGN: ICD-10-CM

## 2019-10-24 RX ORDER — HYDROCODONE BITARTRATE AND ACETAMINOPHEN 10; 325 MG/1; MG/1
1 TABLET ORAL EVERY 8 HOURS PRN
Qty: 90 TABLET | Refills: 0 | Status: SHIPPED | OUTPATIENT
Start: 2019-10-24 | End: 2019-11-21

## 2019-10-25 RX ORDER — LOSARTAN POTASSIUM 100 MG/1
TABLET ORAL
Qty: 90 TABLET | Refills: 1 | Status: ON HOLD | OUTPATIENT
Start: 2019-10-25 | End: 2019-11-15

## 2019-10-25 NOTE — PROGRESS NOTES
Yan Thompson blood counts are good, and your kidney and liver function tests are stable. The BNP was mildly elevated, suggesting that you may have some strain on your heart from extra fluid, which could be contributing to your shortness of breath.  Com

## 2019-11-05 ENCOUNTER — OFFICE VISIT (OUTPATIENT)
Dept: INTERNAL MEDICINE CLINIC | Facility: CLINIC | Age: 59
End: 2019-11-05

## 2019-11-05 VITALS
BODY MASS INDEX: 41.58 KG/M2 | TEMPERATURE: 98 F | HEIGHT: 71 IN | HEART RATE: 88 BPM | WEIGHT: 297 LBS | SYSTOLIC BLOOD PRESSURE: 138 MMHG | DIASTOLIC BLOOD PRESSURE: 86 MMHG | OXYGEN SATURATION: 99 %

## 2019-11-05 DIAGNOSIS — M16.11 PRIMARY OSTEOARTHRITIS OF RIGHT HIP: ICD-10-CM

## 2019-11-05 DIAGNOSIS — M25.551 RIGHT HIP PAIN: Primary | ICD-10-CM

## 2019-11-05 PROCEDURE — 99213 OFFICE O/P EST LOW 20 MIN: CPT | Performed by: PHYSICIAN ASSISTANT

## 2019-11-05 RX ORDER — CYCLOBENZAPRINE HCL 10 MG
10 TABLET ORAL NIGHTLY
Qty: 30 TABLET | Refills: 0 | Status: SHIPPED | OUTPATIENT
Start: 2019-11-05 | End: 2019-12-01

## 2019-11-08 NOTE — PATIENT INSTRUCTIONS
Iliotibial Band Stretch (Flexibility)    1. Stand next to a chair. Hold onto the chair with your right hand for support. Cross your right leg behind your left leg. 2. Lean your right hip toward the right. Feel the stretch at the outside of your hip.   3.

## 2019-11-08 NOTE — PROGRESS NOTES
HPI:    Patient ID: Janice Munoz is a 64year old male. Patient presents with:  Spasms: radiates from the Rt hip down to his toes. Pt states that the pain will wake him from a sound sleep     Hip Pain    The pain is present in the right hip.  This is a ne TAMSULOSIN HCL 0.4 MG Oral Cap TAKE 1 CAPSULE BY MOUTH EVERY DAY 30 capsule 2   • PHENTERMINE HCL 30 MG Oral Cap TAKE 1 CAPSULE BY MOUTH DAILY IN THE MORNING.  30 capsule 2   • AMLODIPINE BESYLATE 5 MG Oral Tab TAKE 1 TABLET BY MOUTH DAILY 90 tablet 1   • c normal. He has no wheezes. He has no rales. Musculoskeletal:      Right hip: He exhibits decreased range of motion (pain w/ adduction) and tenderness (lateral hip at IT band, greater trochanter). He exhibits normal strength, no swelling and no deformity.

## 2019-11-11 ENCOUNTER — ANESTHESIA EVENT (OUTPATIENT)
Dept: SURGERY | Facility: HOSPITAL | Age: 59
DRG: 707 | End: 2019-11-11
Payer: COMMERCIAL

## 2019-11-11 ENCOUNTER — HOSPITAL ENCOUNTER (OUTPATIENT)
Dept: CV DIAGNOSTICS | Facility: HOSPITAL | Age: 59
Discharge: HOME | End: 2019-11-11
Attending: INTERNAL MEDICINE
Payer: COMMERCIAL

## 2019-11-11 DIAGNOSIS — I48.21 PERMANENT ATRIAL FIBRILLATION (HCC): ICD-10-CM

## 2019-11-11 PROCEDURE — 93306 TTE W/DOPPLER COMPLETE: CPT | Performed by: INTERNAL MEDICINE

## 2019-11-12 NOTE — PROGRESS NOTES
Mr. Danae Stewart,     Your echocardiogram shows that the pumping function of your heart was good, and your valves are working well.      [facility]

## 2019-11-15 ENCOUNTER — ANESTHESIA (OUTPATIENT)
Dept: SURGERY | Facility: HOSPITAL | Age: 59
DRG: 707 | End: 2019-11-15
Payer: COMMERCIAL

## 2019-11-15 ENCOUNTER — HOSPITAL ENCOUNTER (INPATIENT)
Facility: HOSPITAL | Age: 59
LOS: 1 days | Discharge: HOME | DRG: 707 | End: 2019-11-16
Attending: UROLOGY | Admitting: UROLOGY
Payer: COMMERCIAL

## 2019-11-15 DIAGNOSIS — C61 PROSTATE CANCER (HCC): ICD-10-CM

## 2019-11-15 PROCEDURE — 8E0W4CZ ROBOTIC ASSISTED PROCEDURE OF TRUNK REGION, PERCUTANEOUS ENDOSCOPIC APPROACH: ICD-10-PCS | Performed by: UROLOGY

## 2019-11-15 PROCEDURE — 99253 IP/OBS CNSLTJ NEW/EST LOW 45: CPT | Performed by: INTERNAL MEDICINE

## 2019-11-15 PROCEDURE — 0VT04ZZ RESECTION OF PROSTATE, PERCUTANEOUS ENDOSCOPIC APPROACH: ICD-10-PCS | Performed by: UROLOGY

## 2019-11-15 PROCEDURE — 07BC4ZX EXCISION OF PELVIS LYMPHATIC, PERCUTANEOUS ENDOSCOPIC APPROACH, DIAGNOSTIC: ICD-10-PCS | Performed by: UROLOGY

## 2019-11-15 RX ORDER — LOSARTAN POTASSIUM 100 MG/1
100 TABLET ORAL DAILY
COMMUNITY
End: 2020-01-07

## 2019-11-15 RX ORDER — ONDANSETRON 2 MG/ML
4 INJECTION INTRAMUSCULAR; INTRAVENOUS EVERY 6 HOURS PRN
Status: DISCONTINUED | OUTPATIENT
Start: 2019-11-15 | End: 2019-11-16

## 2019-11-15 RX ORDER — LABETALOL HYDROCHLORIDE 5 MG/ML
INJECTION, SOLUTION INTRAVENOUS
Status: COMPLETED
Start: 2019-11-15 | End: 2019-11-15

## 2019-11-15 RX ORDER — DOCUSATE SODIUM 100 MG/1
100 CAPSULE, LIQUID FILLED ORAL 2 TIMES DAILY
Status: DISCONTINUED | OUTPATIENT
Start: 2019-11-15 | End: 2019-11-16

## 2019-11-15 RX ORDER — HYDROMORPHONE HYDROCHLORIDE 1 MG/ML
0.4 INJECTION, SOLUTION INTRAMUSCULAR; INTRAVENOUS; SUBCUTANEOUS EVERY 5 MIN PRN
Status: DISCONTINUED | OUTPATIENT
Start: 2019-11-15 | End: 2019-11-15 | Stop reason: HOSPADM

## 2019-11-15 RX ORDER — HYDROCODONE BITARTRATE AND ACETAMINOPHEN 5; 325 MG/1; MG/1
2 TABLET ORAL AS NEEDED
Status: DISCONTINUED | OUTPATIENT
Start: 2019-11-15 | End: 2019-11-15 | Stop reason: HOSPADM

## 2019-11-15 RX ORDER — LIDOCAINE HYDROCHLORIDE ANHYDROUS AND DEXTROSE MONOHYDRATE .8; 5 G/100ML; G/100ML
INJECTION, SOLUTION INTRAVENOUS CONTINUOUS PRN
Status: DISCONTINUED | OUTPATIENT
Start: 2019-11-15 | End: 2019-11-15 | Stop reason: SURG

## 2019-11-15 RX ORDER — PRAVASTATIN SODIUM 20 MG
40 TABLET ORAL NIGHTLY
Status: DISCONTINUED | OUTPATIENT
Start: 2019-11-15 | End: 2019-11-16

## 2019-11-15 RX ORDER — SODIUM CHLORIDE 9 MG/ML
INJECTION, SOLUTION INTRAVENOUS CONTINUOUS
Status: DISCONTINUED | OUTPATIENT
Start: 2019-11-15 | End: 2019-11-16

## 2019-11-15 RX ORDER — ENOXAPARIN SODIUM 100 MG/ML
40 INJECTION SUBCUTANEOUS DAILY
Status: DISCONTINUED | OUTPATIENT
Start: 2019-11-15 | End: 2019-11-16

## 2019-11-15 RX ORDER — DEXTROSE MONOHYDRATE 25 G/50ML
50 INJECTION, SOLUTION INTRAVENOUS
Status: DISCONTINUED | OUTPATIENT
Start: 2019-11-15 | End: 2019-11-16

## 2019-11-15 RX ORDER — ACETAMINOPHEN 10 MG/ML
INJECTION, SOLUTION INTRAVENOUS AS NEEDED
Status: DISCONTINUED | OUTPATIENT
Start: 2019-11-15 | End: 2019-11-15 | Stop reason: SURG

## 2019-11-15 RX ORDER — HYDROCODONE BITARTRATE AND ACETAMINOPHEN 5; 325 MG/1; MG/1
1 TABLET ORAL EVERY 4 HOURS PRN
Status: DISCONTINUED | OUTPATIENT
Start: 2019-11-15 | End: 2019-11-16

## 2019-11-15 RX ORDER — CYCLOBENZAPRINE HCL 10 MG
10 TABLET ORAL NIGHTLY
Status: DISCONTINUED | OUTPATIENT
Start: 2019-11-15 | End: 2019-11-16

## 2019-11-15 RX ORDER — ACETAMINOPHEN 500 MG
1000 TABLET ORAL ONCE
Status: DISCONTINUED | OUTPATIENT
Start: 2019-11-15 | End: 2019-11-15 | Stop reason: HOSPADM

## 2019-11-15 RX ORDER — LABETALOL HYDROCHLORIDE 5 MG/ML
5 INJECTION, SOLUTION INTRAVENOUS EVERY 5 MIN PRN
Status: DISCONTINUED | OUTPATIENT
Start: 2019-11-15 | End: 2019-11-15 | Stop reason: HOSPADM

## 2019-11-15 RX ORDER — LIDOCAINE HYDROCHLORIDE 10 MG/ML
INJECTION, SOLUTION EPIDURAL; INFILTRATION; INTRACAUDAL; PERINEURAL AS NEEDED
Status: DISCONTINUED | OUTPATIENT
Start: 2019-11-15 | End: 2019-11-15 | Stop reason: SURG

## 2019-11-15 RX ORDER — KETOROLAC TROMETHAMINE 30 MG/ML
INJECTION, SOLUTION INTRAMUSCULAR; INTRAVENOUS AS NEEDED
Status: DISCONTINUED | OUTPATIENT
Start: 2019-11-15 | End: 2019-11-15 | Stop reason: SURG

## 2019-11-15 RX ORDER — CLONAZEPAM 1 MG/1
1 TABLET ORAL 2 TIMES DAILY PRN
Status: DISCONTINUED | OUTPATIENT
Start: 2019-11-15 | End: 2019-11-16

## 2019-11-15 RX ORDER — DEXTROSE MONOHYDRATE 25 G/50ML
50 INJECTION, SOLUTION INTRAVENOUS
Status: DISCONTINUED | OUTPATIENT
Start: 2019-11-15 | End: 2019-11-15 | Stop reason: HOSPADM

## 2019-11-15 RX ORDER — DIPHENHYDRAMINE HCL 25 MG
25 CAPSULE ORAL NIGHTLY PRN
Status: DISCONTINUED | OUTPATIENT
Start: 2019-11-15 | End: 2019-11-16

## 2019-11-15 RX ORDER — FUROSEMIDE 20 MG/1
20 TABLET ORAL DAILY
Status: DISCONTINUED | OUTPATIENT
Start: 2019-11-15 | End: 2019-11-16

## 2019-11-15 RX ORDER — ROCURONIUM BROMIDE 10 MG/ML
INJECTION, SOLUTION INTRAVENOUS AS NEEDED
Status: DISCONTINUED | OUTPATIENT
Start: 2019-11-15 | End: 2019-11-15 | Stop reason: SURG

## 2019-11-15 RX ORDER — HEPARIN SODIUM 5000 [USP'U]/ML
5000 INJECTION, SOLUTION INTRAVENOUS; SUBCUTANEOUS ONCE
Status: COMPLETED | OUTPATIENT
Start: 2019-11-15 | End: 2019-11-15

## 2019-11-15 RX ORDER — NALOXONE HYDROCHLORIDE 0.4 MG/ML
80 INJECTION, SOLUTION INTRAMUSCULAR; INTRAVENOUS; SUBCUTANEOUS AS NEEDED
Status: DISCONTINUED | OUTPATIENT
Start: 2019-11-15 | End: 2019-11-15 | Stop reason: HOSPADM

## 2019-11-15 RX ORDER — CLONAZEPAM 1 MG/1
1 TABLET ORAL 2 TIMES DAILY PRN
COMMUNITY
End: 2019-12-16

## 2019-11-15 RX ORDER — MIDAZOLAM HYDROCHLORIDE 1 MG/ML
1 INJECTION INTRAMUSCULAR; INTRAVENOUS EVERY 5 MIN PRN
Status: DISCONTINUED | OUTPATIENT
Start: 2019-11-15 | End: 2019-11-15 | Stop reason: HOSPADM

## 2019-11-15 RX ORDER — MORPHINE SULFATE 4 MG/ML
4 INJECTION, SOLUTION INTRAMUSCULAR; INTRAVENOUS EVERY 2 HOUR PRN
Status: DISCONTINUED | OUTPATIENT
Start: 2019-11-15 | End: 2019-11-16

## 2019-11-15 RX ORDER — HYDROCODONE BITARTRATE AND ACETAMINOPHEN 5; 325 MG/1; MG/1
2 TABLET ORAL EVERY 4 HOURS PRN
Status: DISCONTINUED | OUTPATIENT
Start: 2019-11-15 | End: 2019-11-16

## 2019-11-15 RX ORDER — CARVEDILOL 12.5 MG/1
25 TABLET ORAL 2 TIMES DAILY WITH MEALS
Status: DISCONTINUED | OUTPATIENT
Start: 2019-11-15 | End: 2019-11-16

## 2019-11-15 RX ORDER — DEXAMETHASONE SODIUM PHOSPHATE 4 MG/ML
VIAL (ML) INJECTION AS NEEDED
Status: DISCONTINUED | OUTPATIENT
Start: 2019-11-15 | End: 2019-11-15 | Stop reason: SURG

## 2019-11-15 RX ORDER — HYDROCODONE BITARTRATE AND ACETAMINOPHEN 5; 325 MG/1; MG/1
1 TABLET ORAL AS NEEDED
Status: DISCONTINUED | OUTPATIENT
Start: 2019-11-15 | End: 2019-11-15 | Stop reason: HOSPADM

## 2019-11-15 RX ORDER — ONDANSETRON 2 MG/ML
INJECTION INTRAMUSCULAR; INTRAVENOUS AS NEEDED
Status: DISCONTINUED | OUTPATIENT
Start: 2019-11-15 | End: 2019-11-15 | Stop reason: SURG

## 2019-11-15 RX ORDER — DIPHENHYDRAMINE HYDROCHLORIDE 50 MG/ML
12.5 INJECTION INTRAMUSCULAR; INTRAVENOUS NIGHTLY PRN
Status: DISCONTINUED | OUTPATIENT
Start: 2019-11-15 | End: 2019-11-16

## 2019-11-15 RX ORDER — MEPERIDINE HYDROCHLORIDE 25 MG/ML
12.5 INJECTION INTRAMUSCULAR; INTRAVENOUS; SUBCUTANEOUS AS NEEDED
Status: DISCONTINUED | OUTPATIENT
Start: 2019-11-15 | End: 2019-11-15 | Stop reason: HOSPADM

## 2019-11-15 RX ORDER — PHENTERMINE HYDROCHLORIDE 30 MG/1
30 CAPSULE ORAL EVERY MORNING
Status: DISCONTINUED | OUTPATIENT
Start: 2019-11-15 | End: 2019-11-15

## 2019-11-15 RX ORDER — ACETAMINOPHEN 325 MG/1
650 TABLET ORAL EVERY 4 HOURS PRN
Status: DISCONTINUED | OUTPATIENT
Start: 2019-11-15 | End: 2019-11-16

## 2019-11-15 RX ORDER — ONDANSETRON 2 MG/ML
4 INJECTION INTRAMUSCULAR; INTRAVENOUS AS NEEDED
Status: DISCONTINUED | OUTPATIENT
Start: 2019-11-15 | End: 2019-11-15 | Stop reason: HOSPADM

## 2019-11-15 RX ORDER — CARVEDILOL 25 MG/1
25 TABLET ORAL 2 TIMES DAILY WITH MEALS
COMMUNITY
End: 2019-12-12

## 2019-11-15 RX ORDER — LATANOPROST 50 UG/ML
1 SOLUTION/ DROPS OPHTHALMIC NIGHTLY
Status: DISCONTINUED | OUTPATIENT
Start: 2019-11-15 | End: 2019-11-16

## 2019-11-15 RX ORDER — AMLODIPINE BESYLATE 5 MG/1
5 TABLET ORAL DAILY
Status: DISCONTINUED | OUTPATIENT
Start: 2019-11-15 | End: 2019-11-16

## 2019-11-15 RX ORDER — ACETAMINOPHEN 500 MG
1000 TABLET ORAL ONCE AS NEEDED
Status: DISCONTINUED | OUTPATIENT
Start: 2019-11-15 | End: 2019-11-15 | Stop reason: HOSPADM

## 2019-11-15 RX ORDER — LOSARTAN POTASSIUM 100 MG/1
100 TABLET ORAL DAILY
Status: DISCONTINUED | OUTPATIENT
Start: 2019-11-15 | End: 2019-11-16

## 2019-11-15 RX ORDER — MORPHINE SULFATE 4 MG/ML
6 INJECTION, SOLUTION INTRAMUSCULAR; INTRAVENOUS EVERY 2 HOUR PRN
Status: DISCONTINUED | OUTPATIENT
Start: 2019-11-15 | End: 2019-11-16

## 2019-11-15 RX ORDER — METOPROLOL TARTRATE 5 MG/5ML
INJECTION INTRAVENOUS AS NEEDED
Status: DISCONTINUED | OUTPATIENT
Start: 2019-11-15 | End: 2019-11-15 | Stop reason: SURG

## 2019-11-15 RX ORDER — BUPIVACAINE HYDROCHLORIDE 2.5 MG/ML
INJECTION, SOLUTION EPIDURAL; INFILTRATION; INTRACAUDAL AS NEEDED
Status: DISCONTINUED | OUTPATIENT
Start: 2019-11-15 | End: 2019-11-15 | Stop reason: HOSPADM

## 2019-11-15 RX ORDER — FAMOTIDINE 20 MG/1
20 TABLET ORAL 2 TIMES DAILY
Status: DISCONTINUED | OUTPATIENT
Start: 2019-11-15 | End: 2019-11-16

## 2019-11-15 RX ORDER — ONDANSETRON 4 MG/1
4 TABLET, FILM COATED ORAL EVERY 6 HOURS PRN
Status: DISCONTINUED | OUTPATIENT
Start: 2019-11-15 | End: 2019-11-16

## 2019-11-15 RX ORDER — PHENTERMINE HYDROCHLORIDE 30 MG/1
30 CAPSULE ORAL EVERY MORNING
COMMUNITY
End: 2020-03-18

## 2019-11-15 RX ORDER — MORPHINE SULFATE 4 MG/ML
2 INJECTION, SOLUTION INTRAMUSCULAR; INTRAVENOUS EVERY 2 HOUR PRN
Status: DISCONTINUED | OUTPATIENT
Start: 2019-11-15 | End: 2019-11-16

## 2019-11-15 RX ORDER — MIDAZOLAM HYDROCHLORIDE 1 MG/ML
INJECTION INTRAMUSCULAR; INTRAVENOUS
Status: COMPLETED
Start: 2019-11-15 | End: 2019-11-15

## 2019-11-15 RX ORDER — AMLODIPINE BESYLATE 5 MG/1
5 TABLET ORAL DAILY
COMMUNITY
End: 2019-12-12

## 2019-11-15 RX ORDER — METOCLOPRAMIDE HYDROCHLORIDE 5 MG/ML
10 INJECTION INTRAMUSCULAR; INTRAVENOUS AS NEEDED
Status: DISCONTINUED | OUTPATIENT
Start: 2019-11-15 | End: 2019-11-15 | Stop reason: HOSPADM

## 2019-11-15 RX ORDER — SODIUM CHLORIDE, SODIUM LACTATE, POTASSIUM CHLORIDE, CALCIUM CHLORIDE 600; 310; 30; 20 MG/100ML; MG/100ML; MG/100ML; MG/100ML
INJECTION, SOLUTION INTRAVENOUS CONTINUOUS
Status: DISCONTINUED | OUTPATIENT
Start: 2019-11-15 | End: 2019-11-15 | Stop reason: HOSPADM

## 2019-11-15 RX ADMIN — ACETAMINOPHEN 1000 MG: 10 INJECTION, SOLUTION INTRAVENOUS at 08:00:00

## 2019-11-15 RX ADMIN — ROCURONIUM BROMIDE 10 MG: 10 INJECTION, SOLUTION INTRAVENOUS at 10:14:00

## 2019-11-15 RX ADMIN — ROCURONIUM BROMIDE 10 MG: 10 INJECTION, SOLUTION INTRAVENOUS at 11:00:00

## 2019-11-15 RX ADMIN — ROCURONIUM BROMIDE 50 MG: 10 INJECTION, SOLUTION INTRAVENOUS at 07:38:00

## 2019-11-15 RX ADMIN — LIDOCAINE HYDROCHLORIDE ANHYDROUS AND DEXTROSE MONOHYDRATE 19 ML/HR: .8; 5 INJECTION, SOLUTION INTRAVENOUS at 08:20:00

## 2019-11-15 RX ADMIN — SODIUM CHLORIDE, SODIUM LACTATE, POTASSIUM CHLORIDE, CALCIUM CHLORIDE: 600; 310; 30; 20 INJECTION, SOLUTION INTRAVENOUS at 10:20:00

## 2019-11-15 RX ADMIN — LIDOCAINE HYDROCHLORIDE ANHYDROUS AND DEXTROSE MONOHYDRATE 18 ML/HR: .8; 5 INJECTION, SOLUTION INTRAVENOUS at 07:47:00

## 2019-11-15 RX ADMIN — ONDANSETRON 4 MG: 2 INJECTION INTRAMUSCULAR; INTRAVENOUS at 08:01:00

## 2019-11-15 RX ADMIN — METOPROLOL TARTRATE 2.5 MG: 5 INJECTION INTRAVENOUS at 08:07:00

## 2019-11-15 RX ADMIN — LIDOCAINE HYDROCHLORIDE ANHYDROUS AND DEXTROSE MONOHYDRATE 18 ML/HR: .8; 5 INJECTION, SOLUTION INTRAVENOUS at 08:03:00

## 2019-11-15 RX ADMIN — SODIUM CHLORIDE, SODIUM LACTATE, POTASSIUM CHLORIDE, CALCIUM CHLORIDE: 600; 310; 30; 20 INJECTION, SOLUTION INTRAVENOUS at 11:44:00

## 2019-11-15 RX ADMIN — KETOROLAC TROMETHAMINE 30 MG: 30 INJECTION, SOLUTION INTRAMUSCULAR; INTRAVENOUS at 11:24:00

## 2019-11-15 RX ADMIN — ROCURONIUM BROMIDE 20 MG: 10 INJECTION, SOLUTION INTRAVENOUS at 09:25:00

## 2019-11-15 RX ADMIN — DEXAMETHASONE SODIUM PHOSPHATE 8 MG: 4 MG/ML VIAL (ML) INJECTION at 08:01:00

## 2019-11-15 RX ADMIN — LIDOCAINE HYDROCHLORIDE 160 MG: 10 INJECTION, SOLUTION EPIDURAL; INFILTRATION; INTRACAUDAL; PERINEURAL at 07:38:00

## 2019-11-15 NOTE — OPERATIVE REPORT
[facility] Urology       Operative Note    Robin Kennedy Patient Status:  Surgery Admit - Inpt    1960 MRN [id number]   [facility] POST ANESTHESIA CARE UNIT Attending Karol Pollack MD   Hosp Day # 0 PCP Aleena Gutiérrez MD     SURGEON: taken to the operating room. A time-out was performed where the patient and procedure were identified in the presence of Nursing, Anesthesia, and surgical staff. After the placement of lines and monitors, general anesthesia was induced.   Prophylactic ant ligaments were incised at their insertion into symphysis pubis. A backbleeding stitch of 0 Vicryl was then placed distally to the prostatic apex through the dorsal venous complex.   The anterior bladder neck was then divided at the prostatovesical junction apex.  The urethra was then divided with the cold scissors. This freed the specimen completely and was placed in an EndoCatch bag for removal at the end of the procedure.   The pelvis was then thoroughly irrigated with saline and hemostasis within the pelv

## 2019-11-15 NOTE — ANESTHESIA POSTPROCEDURE EVALUATION
[address] Patient Status:  Surgery Admit - Inpt   Age/Gender 64year old male MRN [id number]   [facility] SURGERY Attending Shaylee Driscoll, [facility] Se Day # 0 PCP Paulette Rosales MD       Anesthesia Post-op Note    Proce

## 2019-11-15 NOTE — CONSULTS
[facility]  [place] Patient Status:  Inpatient    1960 MRN [id number]   [facility] 3NW-A Attending Coco Calero MD   Hosp Day # 0 PCP Rob Steven MD     Reason for consult: med management, s/p prostat Right 8/23/2018    Performed by Estrada Matthew MD at [facility] MAIN OR       Social History:  reports that he has never smoked. He has never used smokeless tobacco. He reports that he does not drink alcohol or use drugs.     Family History:   Family History   P 1  MetFORMIN HCl (GLUCOPHAGE) 500 MG Oral Tab, Take 500 mg by mouth 2 (two) times daily with meals. , Disp: , Rfl:   [DISCONTINUED] AMLODIPINE BESYLATE 5 MG Oral Tab, TAKE 1 TABLET BY MOUTH DAILY, Disp: 90 tablet, Rfl: 1  [DISCONTINUED] carvedilol 25 MG Ora input(s): LYM#, MONO#, BASOS#, EOSIN#    Recent Labs   Lab 11/15/19  0713      K 3.6      CO2 29.0       No results for input(s): PTP, INR in the last 168 hours. No results for input(s): TROP, CK in the last 168 hours.     Imaging: Imaging da

## 2019-11-15 NOTE — H&P
Pre-op Diagnosis: Prostate [facility] Jimmy Sal    The above referenced H&P by Harsh Gonzalez from 10/22/19 was reviewed by Jennifer Sheriff MD on 11/15/2019, the patient was examined and no significant changes have occurred in the patient's condition since t

## 2019-11-15 NOTE — OR NURSING
Dr. Fred Ashraf made aware patient drank 12oz of gatorade at 0440 and ate 2 popsicles at 0500 due to blood sugar of 80. No new orders.

## 2019-11-15 NOTE — ANESTHESIA PREPROCEDURE EVALUATION
PRE-OP EVALUATION    Patient Name: Orquidea Hammond    Pre-op Diagnosis: Prostate cancer ([facility] Jameel Gongora    Procedure(s):  XI ROBOTIC ASSISTED LAPAROSCOPIC RADICAL PROSTATECTOMY, PELVIC LYMPH NODE DISSECTION    Surgeon(s) and Role:     * Faisal Hester MD - P Take 1 tablet (40 mg total) by mouth daily. , Disp: 90 tablet, Rfl: 1  INVOKANA 100 MG Oral Tab, Take 300 mg by mouth daily. , Disp: 30 tablet, Rfl: 3  latanoprost 0.005 % Ophthalmic Solution, Place 1 drop into both eyes nightly., Disp: 7.5 mL, Rfl: 3  VICTO right hip Generalized anxiety disorder  Atrial fibrillation (HCC) Type 2 diabetes mellitus without complication, with long-term current use of insulin (HCC)  Essential hypertension Mixed hyperlipidemia  Prostate cancer ([facility] Morbid obesity with BMI of 45. 0 opening: >3 FB  TM distance: > 6 cm  Neck ROM: full Cardiovascular    Cardiovascular exam normal.         Dental    No notable dental history.          Pulmonary    Pulmonary exam normal.                 Other findings            ASA: 3   Plan: general  NPO

## 2019-11-15 NOTE — PLAN OF CARE
A&O x4. Denies CP, KUMAR, nausea, or calf pain at present. Lungs clear and diminished bilaterally. Saturating at 98% on 2L O2. Afib on tele. Abdomen soft, rounded, tender. Laps x6 with skin glue, c/d/I. Cavazos with serosanguineous drainage.  Pt reports 2/10 pa reduce risk of injury  - Provide assistive devices as appropriate  - Consider OT/PT consult to assist with strengthening/mobility  - Encourage toileting schedule  Outcome: Progressing     Problem: CARDIOVASCULAR - ADULT  Goal: Absence of cardiac arrhythmia labs and rhythm and assess patient for signs and symptoms of electrolyte imbalances  - Administer electrolyte replacement as ordered  - Monitor response to electrolyte replacements, including rhythm and repeat lab results as appropriate  - Fluid restrictio

## 2019-11-16 VITALS
RESPIRATION RATE: 20 BRPM | HEART RATE: 80 BPM | OXYGEN SATURATION: 96 % | WEIGHT: 297.81 LBS | SYSTOLIC BLOOD PRESSURE: 98 MMHG | TEMPERATURE: 99 F | DIASTOLIC BLOOD PRESSURE: 72 MMHG | HEIGHT: 71 IN | BODY MASS INDEX: 41.69 KG/M2

## 2019-11-16 PROCEDURE — 99232 SBSQ HOSP IP/OBS MODERATE 35: CPT | Performed by: HOSPITALIST

## 2019-11-16 RX ORDER — POLYETHYLENE GLYCOL 3350 17 G/17G
17 POWDER, FOR SOLUTION ORAL DAILY
Qty: 510 G | Refills: 0 | Status: SHIPPED | OUTPATIENT
Start: 2019-11-16 | End: 2019-12-16

## 2019-11-16 RX ORDER — POLYETHYLENE GLYCOL 3350 17 G/17G
17 POWDER, FOR SOLUTION ORAL DAILY
Status: DISCONTINUED | OUTPATIENT
Start: 2019-11-16 | End: 2019-11-16

## 2019-11-16 NOTE — RESPIRATORY THERAPY NOTE
Pt seen for Respiratory Care Evaluation for SAM. Pt states that he has been diagnosed with sleep apnea, but doesn't wear cpap at home. Pt on SAM protocol and will continue to be monitored.

## 2019-11-16 NOTE — PROGRESS NOTES
[facility]  Progress Note     Hankrupali Warren Patient Status:  Inpatient    1960 MRN [id number]   [facility] 3NW-A Attending Ryley Mcdonough MD   Hosp Day # 1 PCP José Luis Hope MD     Chief Complaint: med mgmt    S: Patient fee ASSESSMENT / PLAN:     1. Prostate Cancer s/p prostatectomy on 11/15/19  1. Continue pain control  2. Per urology  3. Cavazos in place, monitor hematuria   2. HTN  1. Continue home meds  3. DM2  1. Cont. ISS  4. SAM  5. Morbid Obesity BMI 41  1.  Hold o

## 2019-11-16 NOTE — PLAN OF CARE
Pt is A&O x4. VSS and afebrile. A-fib on tele. O2 sats WNLon room air, lung sounds clear but diminished bilaterally. C/o pain between 4 and 7 on scale from 0 to 10 alternating between abdomen and right hip, offering pain meds as needed per [facility].  Abdomen is on patient safety including physical limitations  - Instruct pt to call for assistance with activity based on assessment  - Modify environment to reduce risk of injury  - Provide assistive devices as appropriate  - Consider OT/PT consult to assist with str Problem: METABOLIC/FLUID AND ELECTROLYTES - ADULT  Goal: Electrolytes maintained within normal limits  Description  INTERVENTIONS:  - Monitor labs and rhythm and assess patient for signs and symptoms of electrolyte imbalances  - Administer electrolyte re

## 2019-11-16 NOTE — PROGRESS NOTES
[facility]  Urology Progress Note    Milka Davis Patient Status:  Inpatient    1960 MRN [id number]   [facility] 3NW-A Attending Vandana Mccauley MD   Hosp Day # 1 PCP Raul Portillo MD     Subjective: POD # 1 Robotic-assisted la 11/25 for edward removal.       Rogerio Cirri  11/16/2019  7:43 AM

## 2019-11-16 NOTE — PLAN OF CARE
Patient assessed and ready for DC home  All instructions given to patient and wife; both verbalize understanding of DC plan  All questions answered to patients level of satisfaction  PIV removed and intact  Cavazos teaching provided.  Patient able to empty an in respiratory status  - Assess for changes in mentation and behavior  - Position to facilitate oxygenation and minimize respiratory effort  - Oxygen supplementation based on oxygen saturation or ABGs  - Provide Smoking Cessation handout, if applicable  -

## 2019-11-18 ENCOUNTER — PATIENT OUTREACH (OUTPATIENT)
Dept: CASE MANAGEMENT | Age: 59
End: 2019-11-18

## 2019-11-18 DIAGNOSIS — Z02.9 ENCOUNTERS FOR UNSPECIFIED ADMINISTRATIVE PURPOSE: ICD-10-CM

## 2019-11-18 DIAGNOSIS — C61 PROSTATE CANCER (HCC): ICD-10-CM

## 2019-11-18 PROCEDURE — 1111F DSCHRG MED/CURRENT MED MERGE: CPT

## 2019-11-18 NOTE — PROGRESS NOTES
A [employer] message was sent to the patient for outreach to complete TCM. It was requested the patient call [facility] back at, [phone].

## 2019-11-19 NOTE — PROGRESS NOTES
Initial Post Discharge Follow Up   Discharge Date: 11/16/19  Contact Date: 11/18/2019    Consent Verification:  Assessment Completed With: Patient  HIPAA Verified?   Yes    Discharge Dx:    Prostate Cancer s/p prostatectomy  HTN  DM2    Was TCC ordered: instructions reviewed with you? yes  • How well were your discharge instructions explained to you?   o On a scale of 1-5   1- Very Poor and 5- Very well   o Very well  • Do you have any questions about your discharge instructions?   Yes; the patient is stil • furosemide 20 MG Oral Tab Take 1 tablet (20 mg total) by mouth daily. 90 tablet 1   • Sildenafil Citrate 100 MG Oral Tab Take 1-1.5 tablets (100-150 mg total) by mouth daily as needed for Erectile Dysfunction.  24 tablet 5   • LIDOCAINE 5 % Sunday [employer].     Referrals/orders at D/C:  Home Health/Services ordered at D/C? No  DME ordered at D/C? Yes   What? Incentive spirometer and edward  Have you received your (DME)? yes; the patient reports edward care was explained in detail upon discharge.  [address]  [address] [facility], [address] [place] [address] [address]  [address] [facility], [facility] [address] [address] [phone]    [facility]  [facility] AT [facility] [address] [facility] discussed. Any changes or updates to medications and or orders sent to PCP.

## 2019-11-21 DIAGNOSIS — M16.11 PRIMARY OSTEOARTHRITIS OF RIGHT HIP: ICD-10-CM

## 2019-11-21 RX ORDER — HYDROCODONE BITARTRATE AND ACETAMINOPHEN 10; 325 MG/1; MG/1
1 TABLET ORAL EVERY 8 HOURS PRN
Qty: 90 TABLET | Refills: 0 | Status: SHIPPED | OUTPATIENT
Start: 2019-11-21 | End: 2019-12-20

## 2019-11-21 NOTE — TELEPHONE ENCOUNTER
Last time medication was refilled 10/2019   Quantity  and number of refills 90 with 0 refills    Last office visit 11/2019   Next office visit 11/2019

## 2019-11-25 ENCOUNTER — OFFICE VISIT (OUTPATIENT)
Dept: INTERNAL MEDICINE CLINIC | Facility: CLINIC | Age: 59
End: 2019-11-25

## 2019-11-25 VITALS
DIASTOLIC BLOOD PRESSURE: 80 MMHG | TEMPERATURE: 99 F | BODY MASS INDEX: 42.28 KG/M2 | HEART RATE: 84 BPM | WEIGHT: 302 LBS | RESPIRATION RATE: 16 BRPM | SYSTOLIC BLOOD PRESSURE: 120 MMHG | HEIGHT: 71 IN

## 2019-11-25 DIAGNOSIS — C61 PROSTATE CANCER (HCC): Primary | ICD-10-CM

## 2019-11-25 PROCEDURE — 99495 TRANSJ CARE MGMT MOD F2F 14D: CPT | Performed by: PHYSICIAN ASSISTANT

## 2019-11-25 PROCEDURE — 1111F DSCHRG MED/CURRENT MED MERGE: CPT | Performed by: PHYSICIAN ASSISTANT

## 2019-11-25 NOTE — PROGRESS NOTES
HPI:    Milka Davis is a 64year old male here today for hospital follow up.    He was discharged from Inpatient hospital, [facility] to Home   Admission Date: 11/15/19   Discharge Date: 11/16/19  Hospital Discharge Diagnoses (since 10/26/2019)   P 25 mg by mouth 2 (two) times daily with meals. clonazePAM 1 MG Oral Tab, Take 1 mg by mouth 2 (two) times daily as needed for Anxiety. losartan 100 MG Oral Tab, Take 100 mg by mouth daily.   Phentermine HCl 30 MG Oral Cap, Take 30 mg by mouth every mornin apnea Silke  PSG 8-13-13 [address] [place] [address] 12-30-13), and Visual impairment.     He  has a past surgical history that includes hip replacement surgery (Left); colonoscopy; other surgical history; other surgical history (12/2018); and laparoscopy, surgical prostatectomy, without murmur  GI: good BS's, no masses, HSM or tenderness. Laparoscopic incisions healing well without drainage, bleeding, tenderness or erythema.   MUSCULOSKELETAL: back is not tender, gait stable, no joint swelling  EXTREMITIES: no cyanosis, clubbing or

## 2019-11-30 ENCOUNTER — TELEPHONE (OUTPATIENT)
Dept: SURGERY | Facility: HOSPITAL | Age: 59
End: 2019-11-30

## 2019-11-30 NOTE — TELEPHONE ENCOUNTER
Prostate cancer. Dr. Segundo Helm RALP 11/15/19. PT2N1. Cavazos removed 11/25/19. Hematuria with a few small clots. Apixaban for A-Fib. ICC called. U/A with pos nitrite. Impr: Hematuria related to RALP and apixaban. Plan; Hold apixaban for 1 week.  5 days o

## 2019-12-01 DIAGNOSIS — M54.16 LUMBAR BACK PAIN WITH RADICULOPATHY AFFECTING LEFT LOWER EXTREMITY: Primary | ICD-10-CM

## 2019-12-02 RX ORDER — CYCLOBENZAPRINE HCL 10 MG
TABLET ORAL
Qty: 30 TABLET | Refills: 0 | Status: SHIPPED | OUTPATIENT
Start: 2019-12-02 | End: 2019-12-24

## 2019-12-03 ENCOUNTER — TELEPHONE (OUTPATIENT)
Dept: INTERNAL MEDICINE CLINIC | Facility: CLINIC | Age: 59
End: 2019-12-03

## 2019-12-03 NOTE — TELEPHONE ENCOUNTER
[facility], [place]    Script Clarification  Cyclobenzaprine 30 day supply    Fax in triage.

## 2019-12-03 NOTE — TELEPHONE ENCOUNTER
Spoke with pharmacist they needed dx codes for flexeril, nocro and clonazepam.  Also wanted to ensure pt is not taking clonazepam and flexeril together. Dx codes provided and pt has been educated on not taking flexeril and clonazepam together.  She [facility]

## 2019-12-09 DIAGNOSIS — F41.1 GENERALIZED ANXIETY DISORDER: ICD-10-CM

## 2019-12-10 ENCOUNTER — TELEPHONE (OUTPATIENT)
Dept: INTERNAL MEDICINE CLINIC | Facility: CLINIC | Age: 59
End: 2019-12-10

## 2019-12-10 RX ORDER — CLONAZEPAM 1 MG/1
TABLET ORAL
Qty: 60 TABLET | Refills: 2 | Status: SHIPPED | OUTPATIENT
Start: 2019-12-10 | End: 2020-01-04

## 2019-12-10 NOTE — TELEPHONE ENCOUNTER
Working on the Clonazepam order. The pharmacy wanted to verify he is also on norco and they want to confirm he is ok to be on both due to possible drug interaction. Please call to confirm.    Thank you

## 2019-12-10 NOTE — TELEPHONE ENCOUNTER
Last time medication was refilled 04/2019   Quantity  and number of refills 60 with 2 refills    Last office visit 11/2019   Next office visit 01/2020

## 2019-12-12 DIAGNOSIS — I10 BENIGN ESSENTIAL HTN: Primary | ICD-10-CM

## 2019-12-12 RX ORDER — AMLODIPINE BESYLATE 5 MG/1
5 TABLET ORAL DAILY
Qty: 90 TABLET | Refills: 1 | Status: SHIPPED | OUTPATIENT
Start: 2019-12-12 | End: 2020-01-07

## 2019-12-12 RX ORDER — CARVEDILOL 25 MG/1
25 TABLET ORAL 2 TIMES DAILY WITH MEALS
Qty: 180 TABLET | Refills: 1 | Status: SHIPPED | OUTPATIENT
Start: 2019-12-12 | End: 2020-01-07

## 2019-12-12 NOTE — TELEPHONE ENCOUNTER
Fax request from [facility] for a refill on Carvedilol 25 mg qty 180  and Amlodipine Besylate 5 mg qty 90. Faxes in triage.

## 2019-12-16 DIAGNOSIS — M16.11 PRIMARY OSTEOARTHRITIS OF RIGHT HIP: ICD-10-CM

## 2019-12-16 RX ORDER — HYDROCODONE BITARTRATE AND ACETAMINOPHEN 10; 325 MG/1; MG/1
1 TABLET ORAL EVERY 8 HOURS PRN
Qty: 90 TABLET | Refills: 0 | OUTPATIENT
Start: 2019-12-16

## 2019-12-16 NOTE — TELEPHONE ENCOUNTER
Last time medication was refilled 11/2019     Quantity  and number of refills 90 with 0 refills    Last office visit 11/2019   Next office visit 01/2020

## 2019-12-20 DIAGNOSIS — M16.11 PRIMARY OSTEOARTHRITIS OF RIGHT HIP: ICD-10-CM

## 2019-12-20 RX ORDER — HYDROCODONE BITARTRATE AND ACETAMINOPHEN 10; 325 MG/1; MG/1
1 TABLET ORAL EVERY 8 HOURS PRN
Qty: 90 TABLET | Refills: 0 | Status: SHIPPED | OUTPATIENT
Start: 2019-12-20 | End: 2020-01-07

## 2019-12-24 DIAGNOSIS — M54.16 LUMBAR BACK PAIN WITH RADICULOPATHY AFFECTING LEFT LOWER EXTREMITY: ICD-10-CM

## 2019-12-26 RX ORDER — CYCLOBENZAPRINE HCL 10 MG
10 TABLET ORAL NIGHTLY
Qty: 30 TABLET | Refills: 0 | Status: SHIPPED | OUTPATIENT
Start: 2019-12-26 | End: 2020-01-14

## 2019-12-27 DIAGNOSIS — R06.02 SOB (SHORTNESS OF BREATH): ICD-10-CM

## 2019-12-27 DIAGNOSIS — E78.2 MIXED HYPERLIPIDEMIA: ICD-10-CM

## 2019-12-27 DIAGNOSIS — E11.9 TYPE 2 DIABETES MELLITUS WITHOUT COMPLICATION, WITH LONG-TERM CURRENT USE OF INSULIN (HCC): ICD-10-CM

## 2019-12-27 DIAGNOSIS — R60.0 BILATERAL LOWER EXTREMITY EDEMA: ICD-10-CM

## 2019-12-27 DIAGNOSIS — Z79.4 TYPE 2 DIABETES MELLITUS WITHOUT COMPLICATION, WITH LONG-TERM CURRENT USE OF INSULIN (HCC): ICD-10-CM

## 2019-12-27 RX ORDER — PRAVASTATIN SODIUM 40 MG
40 TABLET ORAL DAILY
Qty: 90 TABLET | Refills: 1 | Status: SHIPPED | OUTPATIENT
Start: 2019-12-27 | End: 2020-01-07

## 2019-12-27 RX ORDER — FUROSEMIDE 20 MG/1
20 TABLET ORAL DAILY
Qty: 90 TABLET | Refills: 1 | Status: SHIPPED | OUTPATIENT
Start: 2019-12-27 | End: 2020-01-07

## 2019-12-27 NOTE — TELEPHONE ENCOUNTER
Fax request from [address] [place] [address] for a refill on Pravastatin 40 mg and Furosemide 20 mg tabs qty 90. Faxes placed in triage.

## 2019-12-31 NOTE — ANESTHESIA PROCEDURE NOTES
Physical Therapy Daily Treatment Note     Name: Esme Hernandezer  Clinic Number: [id number]    Therapy Diagnosis:   Encounter Diagnosis   Name Primary?    Disorder of muscle      Physician: Joselyn Quijano MD    Visit Date: 12/31/2019    Physician Orders: PT Eval and Treat   Medical Diagnosis: Pelvic Pain in Pregnancy  Evaluation Date: 10/22/2019  Authorization Period Expiration: 12/31/19  Plan of Care Certification Period: 1/22/19  Visit #/Visits authorized: 7/ 20     Time In: 7 AM  Time Out: 8 AM  Total Billable Time: 60 minutes    Precautions: Standard    Subjective     Pt reports: she had been doing well. She had been feeling great but then began having pain after vacuuming the entire house on Saturday. When she finished she began to have nerve pain down her leg on following. The pain is in her posterior R thigh and her leg was going numb on/off.  She was compliant with home exercise program.  Response to previous treatment: improvement in pain  Functional change: less pain    Pain: 5/10  Location: bilateral groin/low back and R leg      Objective     Esme participated in therapeutic exercises to develop strength, endurance, ROM and core stabilization for 00 minutes including:    Iso Hip add 30% effort x 10 reps  Iso hip abd 30% effort x 10 reps  Seated piriformis stretch 3 x 30s  Seated hamstring stretch 3 x 30s  Cat/cow 5 x 10s ea way  Pelvic tilts x 10  TrA activation 10 x 5s + BKFO YTB  Seated swiss ball pelvic tilt x 10  TrA YTB pulldown x 10   Bridges x 10  Diaphragmatic breathing 5 minutes    Esme received the following manual therapy techniques: Joint mobilizations, Myofacial release and Soft tissue Mobilization were applied for 50 minutes, including:  Correction for R ilial ant rot  Gentle UPAs L3-5 grade I-II for pain relief  R sacral rotation correction  STM B gluteals and sacral scrubbing  Abdominal massage  Round ligament release B and groin release  B piriformis release  R psoas  Arterial Line  Performed by: Tye Arrington MD  Authorized by: Tye Arrington MD     General Information and Staff    Procedure Start:  11/15/2019 7:40 AM  Procedure End:  11/15/2019 7:45 AM  Anesthesiologist:  Tye Arrington MD  Performed By: release    Esme participated in dynamic functional therapeutic activities to improve functional performance for 10 minutes. Including:   Pregnancy ergonomic handout issue and review    Home Exercises Provided and Patient Education Provided     Education provided:   - see above    Written Home Exercises Provided: Patient instructed to cont prior HEP.  Exercises were reviewed and Esme was able to demonstrate them prior to the end of the session.  Esme demonstrated good  understanding of the education provided.     See EMR under Patient Instructions for exercises provided 11/12/2019.    Assessment     Manual therapy emphasized today as patient was reporting increased back pain with signs and symptoms consistent with sciatic nerve irritation. Pelvic alignment corrected and soft tissue mobilization performed throughout the low back and gluteals to help with increased muscle tension. R sided musculature appeared more tender and tight as compared to the L. Ergonomics reviewed with patient in order to help her modify daily tasks to protect her low back from injury. Pt verbalized understanding of all techniques. Pt encouraged to perform piriformis stretching, TrA bracing, sciatic nerve glides, and iso hip abd/add at home to help manage symptoms.    Esme is progressing well towards her goals.   Pt prognosis is Good.     Pt will continue to benefit from skilled outpatient physical therapy to address the deficits listed in the problem list box on initial evaluation, provide pt/family education and to maximize pt's level of independence in the home and community environment.     Pt's spiritual, cultural and educational needs considered and pt agreeable to plan of care and goals.     Anticipated barriers to physical therapy: work schedule       Short Term Goals: 6 weeks      Pt to demonstrate proper diaphragmatic breathing to help with shortness of breath, pelvic excursion and calming the nervous system. Met  12/13/19  Pt to demonstrate proper body mechanics with lifting, bending and squatting to decrease strain on lumbopelvic structures during pregnancy. Progressing  Pt to report being able to complete a half day at work without significant increase in pain to demonstrate a return towards prior level of function. Met 12/13/19  Pt to report a decrease in pelvic pressure and fullness to no more than 50% of the time to demonstrate improving pelvic support of pelvic structures. Met 12/13/19  Pt will be able to perform a sit to stand with good technique and pain at 2/10 or less in order to improve functional mobility. Met 12/13/19     Long Term Goals: 12 weeks   Pt to be discharged with home exercise plan to manage pelvic pain until delivery.  Pt will be able to roll over in bed with good technique and pelvic pain at 2/10 or less in order to improve mobility.  Pt will be trained and compliant with postural strategies in sitting and standing to improve alignment and decrease pain and muscle fatigue  Pt to report being able to complete a full day at work without significant increase in pain to demonstrate a return towards prior level of function.  Pt to increase core strength by 1 grade to improve lumbopelvic stability needed to decrease pain with ADLs.  Pt to report a decrease in pelvic pressure and fullness to no more than 25% of the time to demonstrate improving pelvic support of pelvic structures.       Plan     Cont to promote gentle core stability, improved SI alignment, and issue functional mobility handouts (LIFTING MECHANICS)    Ashley Holstein, LILLY

## 2020-01-04 DIAGNOSIS — F41.1 GENERALIZED ANXIETY DISORDER: ICD-10-CM

## 2020-01-06 RX ORDER — CLONAZEPAM 1 MG/1
1 TABLET ORAL 2 TIMES DAILY PRN
Qty: 60 TABLET | Refills: 2 | Status: SHIPPED | OUTPATIENT
Start: 2020-01-06 | End: 2020-01-07

## 2020-01-07 ENCOUNTER — TELEPHONE (OUTPATIENT)
Dept: INTERNAL MEDICINE CLINIC | Facility: CLINIC | Age: 60
End: 2020-01-07

## 2020-01-07 ENCOUNTER — OFFICE VISIT (OUTPATIENT)
Dept: INTERNAL MEDICINE CLINIC | Facility: CLINIC | Age: 60
End: 2020-01-07
Payer: COMMERCIAL

## 2020-01-07 VITALS
SYSTOLIC BLOOD PRESSURE: 130 MMHG | WEIGHT: 286 LBS | TEMPERATURE: 98 F | RESPIRATION RATE: 14 BRPM | HEART RATE: 70 BPM | BODY MASS INDEX: 40.04 KG/M2 | DIASTOLIC BLOOD PRESSURE: 70 MMHG | HEIGHT: 71 IN

## 2020-01-07 DIAGNOSIS — I10 BENIGN ESSENTIAL HTN: ICD-10-CM

## 2020-01-07 DIAGNOSIS — Z13.0 SCREENING, IRON DEFICIENCY ANEMIA: ICD-10-CM

## 2020-01-07 DIAGNOSIS — Z13.220 LIPID SCREENING: ICD-10-CM

## 2020-01-07 DIAGNOSIS — Z79.4 TYPE 2 DIABETES MELLITUS WITHOUT COMPLICATION, WITH LONG-TERM CURRENT USE OF INSULIN (HCC): ICD-10-CM

## 2020-01-07 DIAGNOSIS — Z00.00 ANNUAL PHYSICAL EXAM: Primary | ICD-10-CM

## 2020-01-07 DIAGNOSIS — M16.11 PRIMARY OSTEOARTHRITIS OF RIGHT HIP: ICD-10-CM

## 2020-01-07 DIAGNOSIS — E11.9 TYPE 2 DIABETES MELLITUS WITHOUT COMPLICATION, WITH LONG-TERM CURRENT USE OF INSULIN (HCC): ICD-10-CM

## 2020-01-07 DIAGNOSIS — E78.2 MIXED HYPERLIPIDEMIA: ICD-10-CM

## 2020-01-07 DIAGNOSIS — F41.1 GENERALIZED ANXIETY DISORDER: ICD-10-CM

## 2020-01-07 DIAGNOSIS — Z00.00 LABORATORY EXAMINATION ORDERED AS PART OF A ROUTINE GENERAL MEDICAL EXAMINATION: ICD-10-CM

## 2020-01-07 PROCEDURE — 99396 PREV VISIT EST AGE 40-64: CPT | Performed by: INTERNAL MEDICINE

## 2020-01-07 RX ORDER — CLONAZEPAM 1 MG/1
1 TABLET ORAL 2 TIMES DAILY PRN
Qty: 60 TABLET | Refills: 2 | Status: SHIPPED | OUTPATIENT
Start: 2020-01-07 | End: 2020-04-27

## 2020-01-07 RX ORDER — LIRAGLUTIDE 6 MG/ML
1.8 INJECTION SUBCUTANEOUS DAILY
Qty: 3 PEN | Refills: 1 | Status: SHIPPED | OUTPATIENT
Start: 2020-01-07 | End: 2020-02-03

## 2020-01-07 RX ORDER — CARVEDILOL 25 MG/1
25 TABLET ORAL 2 TIMES DAILY WITH MEALS
Qty: 180 TABLET | Refills: 1 | Status: SHIPPED | OUTPATIENT
Start: 2020-01-07 | End: 2020-06-12

## 2020-01-07 RX ORDER — CANAGLIFLOZIN 300 MG/1
300 TABLET, FILM COATED ORAL DAILY
Qty: 90 TABLET | Refills: 1 | Status: SHIPPED | OUTPATIENT
Start: 2020-01-07 | End: 2021-01-05

## 2020-01-07 RX ORDER — HYDROCODONE BITARTRATE AND ACETAMINOPHEN 10; 325 MG/1; MG/1
1 TABLET ORAL EVERY 8 HOURS PRN
Qty: 90 TABLET | Refills: 0 | Status: SHIPPED | OUTPATIENT
Start: 2020-01-07 | End: 2020-02-10

## 2020-01-07 RX ORDER — FUROSEMIDE 20 MG/1
20 TABLET ORAL DAILY
Qty: 90 TABLET | Refills: 1 | Status: SHIPPED | OUTPATIENT
Start: 2020-01-07 | End: 2020-06-12

## 2020-01-07 RX ORDER — LOSARTAN POTASSIUM 100 MG/1
100 TABLET ORAL DAILY
Qty: 90 TABLET | Refills: 1 | Status: SHIPPED | OUTPATIENT
Start: 2020-01-07 | End: 2020-06-12

## 2020-01-07 RX ORDER — PRAVASTATIN SODIUM 40 MG
40 TABLET ORAL DAILY
Qty: 90 TABLET | Refills: 1 | Status: SHIPPED | OUTPATIENT
Start: 2020-01-07 | End: 2020-08-09

## 2020-01-07 RX ORDER — AMLODIPINE BESYLATE 5 MG/1
5 TABLET ORAL DAILY
Qty: 90 TABLET | Refills: 1 | Status: ON HOLD | OUTPATIENT
Start: 2020-01-07 | End: 2020-03-04

## 2020-01-07 NOTE — PATIENT INSTRUCTIONS
Diabetes: Activity Tips    Being more active can help you manage your diabetes. The tips on this sheet can help you get the most from your exercise. They can also help you stay safe.   Staying active  It’s important for adults to spend less time sitting a You may be told to plan your exercise for 1 to 2 hours after a meal. In most cases, you don’t need to eat while being active. Test your blood sugar before exercising if you take insulin or medicine that can cause low blood sugar.  And carry a fast-acting cruz

## 2020-01-07 NOTE — PROGRESS NOTES
HPI:    Patient ID: Gil Luo is a 64year old male. HPI  Gil Luo is a 64year old male who presents for a complete physical exam.   HPI:   Pt complains of continued hip pain.  Ortho requesting pt to be under 290 lbs prior to consideration for AST 15 01/12/2019    AST 18 05/09/2018     Lab Results   Component Value Date    ALT 17 10/22/2019    ALT 18 01/12/2019    ALT 21 05/09/2018     Lab Results   Component Value Date    PSA 0.071 01/02/2020    PSA 12.17 (H) 09/19/2019    PSA 11.40 (H) 05/08/2 food. 30 tablet 3   • LIDOCAINE 5 % External Patch PLACE 1 PATCH ONTO THE SKIN DAILY.  (Patient taking differently: Place 1 patch onto the skin as needed.  ) 30 patch 0   • Insulin Syringe-Needle U-100 (BD INSULIN SYRINGE ULTRAFINE) 31G X 5/16\" 1 ML Does n PROSTATECTOMY/ORICAL N/A 11/15/2019    Performed by Dick Cadena MD at [facility] MAIN OR      Family History   Problem Relation Age of Onset   • Cancer Father 59        lung   • Other (Other) Mother 67        aneurism   • Diabetes Sister       Social Histor the appearance is normal.  Bilateral monofilament/sensation of both feet is normal.  Pulsation pedal pulse exam of both lower legs/feet is normal as well.       ASSESSMENT AND PLAN:   Grazyna Santo is a 64year old male who presents for a complete physical 2 (two) times daily as needed for Anxiety. 60 tablet 2   • HYDROcodone-acetaminophen (NORCO)  MG Oral Tab Take 1 tablet by mouth every 8 (eight) hours as needed for Pain.  90 tablet 0   • latanoprost 0.005 % Ophthalmic Solution Place 1 drop into both • carvedilol 25 MG Oral Tab 180 tablet 1     Sig: Take 1 tablet (25 mg total) by mouth 2 (two) times daily with meals. • amLODIPine Besylate 5 MG Oral Tab 90 tablet 1     Sig: Take 1 tablet (5 mg total) by mouth daily.    • losartan 100 MG Oral Tab 90 t

## 2020-01-08 RX ORDER — FUROSEMIDE 20 MG/1
TABLET ORAL
Qty: 90 TABLET | Refills: 3 | OUTPATIENT
Start: 2020-01-08

## 2020-01-10 ENCOUNTER — TELEPHONE (OUTPATIENT)
Dept: INTERNAL MEDICINE CLINIC | Facility: CLINIC | Age: 60
End: 2020-01-10

## 2020-01-10 NOTE — TELEPHONE ENCOUNTER
Just sent to retail. [employer] message sent to pt to see if still using [facility]. Refill denied as controlled substances refilled at local.  Pt aware.

## 2020-01-13 ENCOUNTER — TELEPHONE (OUTPATIENT)
Dept: INTERNAL MEDICINE CLINIC | Facility: CLINIC | Age: 60
End: 2020-01-13

## 2020-01-13 DIAGNOSIS — M16.11 PRIMARY OSTEOARTHRITIS OF RIGHT HIP: ICD-10-CM

## 2020-01-13 RX ORDER — HYDROCODONE BITARTRATE AND ACETAMINOPHEN 10; 325 MG/1; MG/1
1 TABLET ORAL EVERY 8 HOURS PRN
Qty: 90 TABLET | Refills: 0 | Status: CANCELLED | OUTPATIENT
Start: 2020-01-13

## 2020-01-13 NOTE — TELEPHONE ENCOUNTER
Irma Watters   Ph: [phone]  Fx: [phone]    Prior Auth for  HYDROcodone-acetaminophen     Fax in triage

## 2020-01-14 DIAGNOSIS — M54.16 LUMBAR BACK PAIN WITH RADICULOPATHY AFFECTING LEFT LOWER EXTREMITY: ICD-10-CM

## 2020-01-14 NOTE — TELEPHONE ENCOUNTER
[phone]    ID:  [id number]    PA completed via phone. Approved:  12/15/2019 - 1/13/2021  Case # [id number]    Pharmacy aware.

## 2020-01-15 RX ORDER — CYCLOBENZAPRINE HCL 10 MG
10 TABLET ORAL NIGHTLY
Qty: 30 TABLET | Refills: 0 | OUTPATIENT
Start: 2020-01-15

## 2020-01-15 RX ORDER — CYCLOBENZAPRINE HCL 10 MG
10 TABLET ORAL NIGHTLY
Qty: 30 TABLET | Refills: 0 | Status: SHIPPED | OUTPATIENT
Start: 2020-01-15 | End: 2020-02-10

## 2020-01-17 LAB — AMB EXT HGBA1C: 6.6 %

## 2020-01-20 ENCOUNTER — MED REC SCAN ONLY (OUTPATIENT)
Dept: INTERNAL MEDICINE CLINIC | Facility: CLINIC | Age: 60
End: 2020-01-20

## 2020-01-23 DIAGNOSIS — I10 BENIGN ESSENTIAL HTN: Primary | ICD-10-CM

## 2020-01-23 RX ORDER — FUROSEMIDE 20 MG/1
TABLET ORAL
Qty: 90 TABLET | Refills: 1 | OUTPATIENT
Start: 2020-01-23

## 2020-02-10 ENCOUNTER — HOSPITAL ENCOUNTER (OUTPATIENT)
Dept: PHYSICAL THERAPY | Facility: HOSPITAL | Age: 60
Discharge: HOME | End: 2020-02-10
Attending: ORTHOPAEDIC SURGERY
Payer: COMMERCIAL

## 2020-02-10 ENCOUNTER — APPOINTMENT (OUTPATIENT)
Dept: LAB | Facility: HOSPITAL | Age: 60
End: 2020-02-10
Attending: ORTHOPAEDIC SURGERY
Payer: COMMERCIAL

## 2020-02-10 DIAGNOSIS — C61 PROSTATE CANCER (HCC): ICD-10-CM

## 2020-02-10 DIAGNOSIS — M16.11 PRIMARY OSTEOARTHRITIS OF RIGHT HIP: ICD-10-CM

## 2020-02-10 DIAGNOSIS — M54.16 LUMBAR BACK PAIN WITH RADICULOPATHY AFFECTING LEFT LOWER EXTREMITY: ICD-10-CM

## 2020-02-10 LAB — PSA SERPL-MCNC: 0.06 NG/ML (ref ?–4)

## 2020-02-10 PROCEDURE — 84153 ASSAY OF PSA TOTAL: CPT

## 2020-02-10 PROCEDURE — 36415 COLL VENOUS BLD VENIPUNCTURE: CPT

## 2020-02-11 RX ORDER — HYDROCODONE BITARTRATE AND ACETAMINOPHEN 10; 325 MG/1; MG/1
1 TABLET ORAL EVERY 8 HOURS PRN
Qty: 90 TABLET | Refills: 0 | Status: ON HOLD | OUTPATIENT
Start: 2020-02-11 | End: 2020-03-02

## 2020-02-11 RX ORDER — CYCLOBENZAPRINE HCL 10 MG
10 TABLET ORAL NIGHTLY
Qty: 30 TABLET | Refills: 0 | Status: ON HOLD | OUTPATIENT
Start: 2020-02-11 | End: 2020-03-03

## 2020-02-17 ENCOUNTER — HOSPITAL ENCOUNTER (OUTPATIENT)
Dept: GENERAL RADIOLOGY | Age: 60
Discharge: HOME | End: 2020-02-17
Attending: PHYSICIAN ASSISTANT
Payer: COMMERCIAL

## 2020-02-17 ENCOUNTER — OFFICE VISIT (OUTPATIENT)
Dept: INTERNAL MEDICINE CLINIC | Facility: CLINIC | Age: 60
End: 2020-02-17
Payer: COMMERCIAL

## 2020-02-17 VITALS
SYSTOLIC BLOOD PRESSURE: 128 MMHG | HEIGHT: 71 IN | OXYGEN SATURATION: 98 % | RESPIRATION RATE: 16 BRPM | TEMPERATURE: 98 F | HEART RATE: 96 BPM | BODY MASS INDEX: 41.58 KG/M2 | WEIGHT: 297 LBS | DIASTOLIC BLOOD PRESSURE: 86 MMHG

## 2020-02-17 DIAGNOSIS — M16.11 PRIMARY OSTEOARTHRITIS OF RIGHT HIP: ICD-10-CM

## 2020-02-17 DIAGNOSIS — M25.551 ACUTE RIGHT HIP PAIN: ICD-10-CM

## 2020-02-17 DIAGNOSIS — I48.11 LONGSTANDING PERSISTENT ATRIAL FIBRILLATION (HCC): ICD-10-CM

## 2020-02-17 DIAGNOSIS — G47.33 OSA (OBSTRUCTIVE SLEEP APNEA): ICD-10-CM

## 2020-02-17 DIAGNOSIS — Z01.818 PREOP EXAMINATION: Primary | ICD-10-CM

## 2020-02-17 DIAGNOSIS — G89.29 CHRONIC PAIN OF RIGHT ANKLE: ICD-10-CM

## 2020-02-17 DIAGNOSIS — I10 BENIGN ESSENTIAL HTN: ICD-10-CM

## 2020-02-17 DIAGNOSIS — D57.3 SICKLE CELL TRAIT (HCC): ICD-10-CM

## 2020-02-17 DIAGNOSIS — M25.571 CHRONIC PAIN OF RIGHT ANKLE: ICD-10-CM

## 2020-02-17 PROCEDURE — 73502 X-RAY EXAM HIP UNI 2-3 VIEWS: CPT | Performed by: PHYSICIAN ASSISTANT

## 2020-02-17 PROCEDURE — 99214 OFFICE O/P EST MOD 30 MIN: CPT | Performed by: PHYSICIAN ASSISTANT

## 2020-02-17 NOTE — H&P
Shandra Shine is a 64year old year old male who presents for a pre-operative physical exam.  Patient is to have right total hip replacement, to be done by Dr. Chris León at [facility] on 03/02/2020.      HPI:    Patient electing to undergo hip replace Rfl: 1  •  insulin glargine (LANTUS SOLOSTAR) 100 UNIT/ML Subcutaneous Solution Pen-injector, Inject 50 Units into the skin every morning., Disp: 3 pen, Rfl: 3  •  INVOKANA 300 MG Oral Tab, Take 300 mg by mouth daily. , Disp: 90 tablet, Rfl: 1  •  metFORMIN father; Diabetes in his sister; Other (age of onset: 67) in his mother.   Social Hx: Social History    Tobacco Use      Smoking status: Never Smoker      Smokeless tobacco: Never Used    Alcohol use: No      Alcohol/week: 0.0 standard drinks    Drug use: No generalized tenderness, gait is antalgic. No deformity. Right ankle: no swelling, bony tenderness or deformity. FROM. EXTREMITIES: no cyanosis, clubbing or edema.  Bilateral barefoot skin diabetic exam is normal, visualized feet and the appearance is maurice and surgical risks.  He is therefore cleared with low cardiac risk.  He will not require further cardiovascular testing.     Marilu Cowart MD

## 2020-02-22 ENCOUNTER — APPOINTMENT (OUTPATIENT)
Dept: LAB | Facility: HOSPITAL | Age: 60
End: 2020-02-22
Attending: ORTHOPAEDIC SURGERY
Payer: COMMERCIAL

## 2020-02-22 DIAGNOSIS — Z13.220 LIPID SCREENING: ICD-10-CM

## 2020-02-22 DIAGNOSIS — M16.11 PRIMARY OSTEOARTHRITIS OF RIGHT HIP: ICD-10-CM

## 2020-02-22 DIAGNOSIS — Z79.4 TYPE 2 DIABETES MELLITUS WITHOUT COMPLICATION, WITH LONG-TERM CURRENT USE OF INSULIN (HCC): ICD-10-CM

## 2020-02-22 DIAGNOSIS — Z13.0 SCREENING, IRON DEFICIENCY ANEMIA: ICD-10-CM

## 2020-02-22 DIAGNOSIS — Z00.00 LABORATORY EXAMINATION ORDERED AS PART OF A ROUTINE GENERAL MEDICAL EXAMINATION: ICD-10-CM

## 2020-02-22 DIAGNOSIS — E11.9 TYPE 2 DIABETES MELLITUS WITHOUT COMPLICATION, WITH LONG-TERM CURRENT USE OF INSULIN (HCC): ICD-10-CM

## 2020-02-22 LAB
ALBUMIN SERPL-MCNC: 3.4 G/DL (ref 3.4–5)
ALBUMIN/GLOB SERPL: 0.9 {RATIO} (ref 1–2)
ALP LIVER SERPL-CCNC: 114 U/L (ref 45–117)
ALT SERPL-CCNC: 16 U/L (ref 16–61)
ANION GAP SERPL CALC-SCNC: 3 MMOL/L (ref 0–18)
ANTIBODY SCREEN: NEGATIVE
APTT PPP: 32.6 SECONDS (ref 25.4–36.1)
AST SERPL-CCNC: 13 U/L (ref 15–37)
ATRIAL RATE: 57 BPM
BASOPHILS # BLD AUTO: 0.04 X10(3) UL (ref 0–0.2)
BASOPHILS NFR BLD AUTO: 0.6 %
BILIRUB SERPL-MCNC: 1 MG/DL (ref 0.1–2)
BUN BLD-MCNC: 10 MG/DL (ref 7–18)
BUN/CREAT SERPL: 9.2 (ref 10–20)
CALCIUM BLD-MCNC: 8.6 MG/DL (ref 8.5–10.1)
CHLORIDE SERPL-SCNC: 111 MMOL/L (ref 98–112)
CHOLEST SMN-MCNC: 140 MG/DL (ref ?–200)
CO2 SERPL-SCNC: 26 MMOL/L (ref 21–32)
CREAT BLD-MCNC: 1.09 MG/DL (ref 0.7–1.3)
CREAT UR-SCNC: 76.5 MG/DL
DEPRECATED RDW RBC AUTO: 37.6 FL (ref 35.1–46.3)
EOSINOPHIL # BLD AUTO: 0.19 X10(3) UL (ref 0–0.7)
EOSINOPHIL NFR BLD AUTO: 3 %
ERYTHROCYTE [DISTWIDTH] IN BLOOD BY AUTOMATED COUNT: 13.8 % (ref 11–15)
EST. AVERAGE GLUCOSE BLD GHB EST-MCNC: 134 MG/DL (ref 68–126)
GLOBULIN PLAS-MCNC: 4 G/DL (ref 2.8–4.4)
GLUCOSE BLD-MCNC: 99 MG/DL (ref 70–99)
HBA1C MFR BLD HPLC: 6.3 % (ref ?–5.7)
HCT VFR BLD AUTO: 44.6 % (ref 39–53)
HDLC SERPL-MCNC: 52 MG/DL (ref 40–59)
HGB BLD-MCNC: 14.9 G/DL (ref 13–17.5)
IMM GRANULOCYTES # BLD AUTO: 0.01 X10(3) UL (ref 0–1)
IMM GRANULOCYTES NFR BLD: 0.2 %
INR BLD: 1.2 (ref 0.9–1.1)
LDLC SERPL CALC-MCNC: 78 MG/DL (ref ?–100)
LYMPHOCYTES # BLD AUTO: 2.11 X10(3) UL (ref 1–4)
LYMPHOCYTES NFR BLD AUTO: 33 %
M PROTEIN MFR SERPL ELPH: 7.4 G/DL (ref 6.4–8.2)
MCH RBC QN AUTO: 25.7 PG (ref 26–34)
MCHC RBC AUTO-ENTMCNC: 33.4 G/DL (ref 31–37)
MCV RBC AUTO: 76.9 FL (ref 80–100)
MICROALBUMIN UR-MCNC: 6.78 MG/DL
MICROALBUMIN/CREAT 24H UR-RTO: 88.6 UG/MG (ref ?–30)
MONOCYTES # BLD AUTO: 0.51 X10(3) UL (ref 0.1–1)
MONOCYTES NFR BLD AUTO: 8 %
NEUTROPHILS # BLD AUTO: 3.53 X10 (3) UL (ref 1.5–7.7)
NEUTROPHILS # BLD AUTO: 3.53 X10(3) UL (ref 1.5–7.7)
NEUTROPHILS NFR BLD AUTO: 55.2 %
NONHDLC SERPL-MCNC: 88 MG/DL (ref ?–130)
OSMOLALITY SERPL CALC.SUM OF ELEC: 289 MOSM/KG (ref 275–295)
PATIENT FASTING Y/N/NP: YES
PATIENT FASTING Y/N/NP: YES
PLATELET # BLD AUTO: 214 10(3)UL (ref 150–450)
POTASSIUM SERPL-SCNC: 3.8 MMOL/L (ref 3.5–5.1)
PSA SERPL DL<=0.01 NG/ML-MCNC: 15.8 SECONDS (ref 12.5–14.7)
Q-T INTERVAL: 354 MS
QRS DURATION: 82 MS
QTC CALCULATION (BEZET): 430 MS
R AXIS: 94 DEGREES
RBC # BLD AUTO: 5.8 X10(6)UL (ref 4.3–5.7)
RH BLOOD TYPE: POSITIVE
SODIUM SERPL-SCNC: 140 MMOL/L (ref 136–145)
T AXIS: -7 DEGREES
TRIGL SERPL-MCNC: 50 MG/DL (ref 30–149)
VENTRICULAR RATE: 89 BPM
VLDLC SERPL CALC-MCNC: 10 MG/DL (ref 0–30)
WBC # BLD AUTO: 6.4 X10(3) UL (ref 4–11)

## 2020-02-22 PROCEDURE — 87081 CULTURE SCREEN ONLY: CPT

## 2020-02-22 PROCEDURE — 85730 THROMBOPLASTIN TIME PARTIAL: CPT

## 2020-02-22 PROCEDURE — 93005 ELECTROCARDIOGRAM TRACING: CPT

## 2020-02-22 PROCEDURE — 93010 ELECTROCARDIOGRAM REPORT: CPT | Performed by: INTERNAL MEDICINE

## 2020-02-22 PROCEDURE — 86901 BLOOD TYPING SEROLOGIC RH(D): CPT

## 2020-02-22 PROCEDURE — 82043 UR ALBUMIN QUANTITATIVE: CPT

## 2020-02-22 PROCEDURE — 83036 HEMOGLOBIN GLYCOSYLATED A1C: CPT

## 2020-02-22 PROCEDURE — 80061 LIPID PANEL: CPT

## 2020-02-22 PROCEDURE — 86900 BLOOD TYPING SEROLOGIC ABO: CPT

## 2020-02-22 PROCEDURE — 86850 RBC ANTIBODY SCREEN: CPT

## 2020-02-22 PROCEDURE — 36415 COLL VENOUS BLD VENIPUNCTURE: CPT

## 2020-02-22 PROCEDURE — 82570 ASSAY OF URINE CREATININE: CPT

## 2020-02-22 PROCEDURE — 80053 COMPREHEN METABOLIC PANEL: CPT

## 2020-02-22 PROCEDURE — 85610 PROTHROMBIN TIME: CPT

## 2020-02-22 PROCEDURE — 85025 COMPLETE CBC W/AUTO DIFF WBC: CPT

## 2020-02-24 NOTE — PAT NURSING NOTE
Faxed abnormal Coagulation report to advise of elevated PT/INR,and abnormal nasal culture report to advise of positive MSSA results

## 2020-03-01 ENCOUNTER — ANESTHESIA EVENT (OUTPATIENT)
Dept: SURGERY | Facility: HOSPITAL | Age: 60
DRG: 470 | End: 2020-03-01
Payer: COMMERCIAL

## 2020-03-02 ENCOUNTER — APPOINTMENT (OUTPATIENT)
Dept: GENERAL RADIOLOGY | Facility: HOSPITAL | Age: 60
DRG: 470 | End: 2020-03-02
Attending: PHYSICIAN ASSISTANT
Payer: COMMERCIAL

## 2020-03-02 ENCOUNTER — ANESTHESIA (OUTPATIENT)
Dept: SURGERY | Facility: HOSPITAL | Age: 60
DRG: 470 | End: 2020-03-02
Payer: COMMERCIAL

## 2020-03-02 ENCOUNTER — HOSPITAL ENCOUNTER (INPATIENT)
Facility: HOSPITAL | Age: 60
LOS: 2 days | Discharge: HOME HEALTH | DRG: 470 | End: 2020-03-04
Attending: ORTHOPAEDIC SURGERY | Admitting: ORTHOPAEDIC SURGERY
Payer: COMMERCIAL

## 2020-03-02 DIAGNOSIS — M16.11 PRIMARY OSTEOARTHRITIS OF RIGHT HIP: Primary | ICD-10-CM

## 2020-03-02 DIAGNOSIS — M54.16 LUMBAR BACK PAIN WITH RADICULOPATHY AFFECTING LEFT LOWER EXTREMITY: ICD-10-CM

## 2020-03-02 LAB
GLUCOSE BLD-MCNC: 121 MG/DL (ref 70–99)
GLUCOSE BLD-MCNC: 141 MG/DL (ref 70–99)
INR BLD: 1.07 (ref 0.9–1.1)
PSA SERPL DL<=0.01 NG/ML-MCNC: 14.4 SECONDS (ref 12.5–14.7)

## 2020-03-02 PROCEDURE — 99254 IP/OBS CNSLTJ NEW/EST MOD 60: CPT | Performed by: HOSPITALIST

## 2020-03-02 PROCEDURE — 0SR904A REPLACEMENT OF RIGHT HIP JOINT WITH CERAMIC ON POLYETHYLENE SYNTHETIC SUBSTITUTE, UNCEMENTED, OPEN APPROACH: ICD-10-PCS | Performed by: ORTHOPAEDIC SURGERY

## 2020-03-02 PROCEDURE — 73501 X-RAY EXAM HIP UNI 1 VIEW: CPT | Performed by: PHYSICIAN ASSISTANT

## 2020-03-02 PROCEDURE — 3E0T3BZ INTRODUCTION OF ANESTHETIC AGENT INTO PERIPHERAL NERVES AND PLEXI, PERCUTANEOUS APPROACH: ICD-10-PCS | Performed by: ANESTHESIOLOGY

## 2020-03-02 DEVICE — BIOLOX DELTA CERAMIC FEMORAL HEAD +1.5 36MM DIA 12/14 TAPER
Type: IMPLANTABLE DEVICE | Site: HIP | Status: FUNCTIONAL
Brand: BIOLOX DELTA

## 2020-03-02 DEVICE — PINNACLE POROCOAT ACETABULAR SHELL SECTOR II 58MM OD
Type: IMPLANTABLE DEVICE | Site: HIP | Status: FUNCTIONAL
Brand: PINNACLE POROCOAT

## 2020-03-02 DEVICE — PINNACLE HIP SOLUTIONS ALTRX POLYETHYLENE ACETABULAR LINER NEUTRAL 36MM ID 58MM OD
Type: IMPLANTABLE DEVICE | Site: HIP | Status: FUNCTIONAL
Brand: PINNACLE ALTRX

## 2020-03-02 RX ORDER — HYDROMORPHONE HYDROCHLORIDE 1 MG/ML
0.8 INJECTION, SOLUTION INTRAMUSCULAR; INTRAVENOUS; SUBCUTANEOUS EVERY 2 HOUR PRN
Status: DISCONTINUED | OUTPATIENT
Start: 2020-03-02 | End: 2020-03-04

## 2020-03-02 RX ORDER — AMLODIPINE BESYLATE 5 MG/1
5 TABLET ORAL DAILY
Status: DISCONTINUED | OUTPATIENT
Start: 2020-03-02 | End: 2020-03-03

## 2020-03-02 RX ORDER — CEFAZOLIN SODIUM/WATER 2 G/20 ML
2 SYRINGE (ML) INTRAVENOUS EVERY 8 HOURS
Status: DISCONTINUED | OUTPATIENT
Start: 2020-03-02 | End: 2020-03-02 | Stop reason: DRUGHIGH

## 2020-03-02 RX ORDER — DEXTROSE MONOHYDRATE 25 G/50ML
50 INJECTION, SOLUTION INTRAVENOUS
Status: DISCONTINUED | OUTPATIENT
Start: 2020-03-02 | End: 2020-03-02 | Stop reason: HOSPADM

## 2020-03-02 RX ORDER — PROCHLORPERAZINE EDISYLATE 5 MG/ML
10 INJECTION INTRAMUSCULAR; INTRAVENOUS EVERY 6 HOURS PRN
Status: DISCONTINUED | OUTPATIENT
Start: 2020-03-02 | End: 2020-03-04

## 2020-03-02 RX ORDER — TRAMADOL HYDROCHLORIDE 50 MG/1
50 TABLET ORAL EVERY 6 HOURS PRN
Qty: 30 TABLET | Refills: 0 | Status: SHIPPED | OUTPATIENT
Start: 2020-03-02 | End: 2020-05-15

## 2020-03-02 RX ORDER — METOCLOPRAMIDE HYDROCHLORIDE 5 MG/ML
10 INJECTION INTRAMUSCULAR; INTRAVENOUS AS NEEDED
Status: DISCONTINUED | OUTPATIENT
Start: 2020-03-02 | End: 2020-03-02 | Stop reason: HOSPADM

## 2020-03-02 RX ORDER — OXYCODONE HYDROCHLORIDE 10 MG/1
10 TABLET ORAL EVERY 4 HOURS PRN
Status: DISCONTINUED | OUTPATIENT
Start: 2020-03-02 | End: 2020-03-04

## 2020-03-02 RX ORDER — POLYETHYLENE GLYCOL 3350 17 G/17G
17 POWDER, FOR SOLUTION ORAL DAILY PRN
Status: DISCONTINUED | OUTPATIENT
Start: 2020-03-02 | End: 2020-03-04

## 2020-03-02 RX ORDER — DEXAMETHASONE SODIUM PHOSPHATE 4 MG/ML
8 VIAL (ML) INJECTION AS NEEDED
Status: DISCONTINUED | OUTPATIENT
Start: 2020-03-02 | End: 2020-03-02 | Stop reason: HOSPADM

## 2020-03-02 RX ORDER — ROCURONIUM BROMIDE 10 MG/ML
INJECTION, SOLUTION INTRAVENOUS AS NEEDED
Status: DISCONTINUED | OUTPATIENT
Start: 2020-03-02 | End: 2020-03-02 | Stop reason: SURG

## 2020-03-02 RX ORDER — CLONAZEPAM 1 MG/1
1 TABLET ORAL 2 TIMES DAILY PRN
Status: DISCONTINUED | OUTPATIENT
Start: 2020-03-02 | End: 2020-03-04

## 2020-03-02 RX ORDER — MIDAZOLAM HYDROCHLORIDE 1 MG/ML
INJECTION INTRAMUSCULAR; INTRAVENOUS AS NEEDED
Status: DISCONTINUED | OUTPATIENT
Start: 2020-03-02 | End: 2020-03-02 | Stop reason: SURG

## 2020-03-02 RX ORDER — NALOXONE HYDROCHLORIDE 0.4 MG/ML
80 INJECTION, SOLUTION INTRAMUSCULAR; INTRAVENOUS; SUBCUTANEOUS AS NEEDED
Status: DISCONTINUED | OUTPATIENT
Start: 2020-03-02 | End: 2020-03-02 | Stop reason: HOSPADM

## 2020-03-02 RX ORDER — ACETAMINOPHEN 325 MG/1
TABLET ORAL
Status: COMPLETED
Start: 2020-03-02 | End: 2020-03-02

## 2020-03-02 RX ORDER — ATORVASTATIN CALCIUM 10 MG/1
10 TABLET, FILM COATED ORAL NIGHTLY
Status: DISCONTINUED | OUTPATIENT
Start: 2020-03-02 | End: 2020-03-04

## 2020-03-02 RX ORDER — MEPERIDINE HYDROCHLORIDE 25 MG/ML
12.5 INJECTION INTRAMUSCULAR; INTRAVENOUS; SUBCUTANEOUS AS NEEDED
Status: COMPLETED | OUTPATIENT
Start: 2020-03-02 | End: 2020-03-02

## 2020-03-02 RX ORDER — DOCUSATE SODIUM 100 MG/1
100 CAPSULE, LIQUID FILLED ORAL 2 TIMES DAILY
Status: DISCONTINUED | OUTPATIENT
Start: 2020-03-02 | End: 2020-03-04

## 2020-03-02 RX ORDER — CARVEDILOL 12.5 MG/1
25 TABLET ORAL 2 TIMES DAILY WITH MEALS
Status: DISCONTINUED | OUTPATIENT
Start: 2020-03-02 | End: 2020-03-04

## 2020-03-02 RX ORDER — DOCUSATE SODIUM 100 MG/1
100 CAPSULE, LIQUID FILLED ORAL 2 TIMES DAILY
Qty: 60 CAPSULE | Refills: 0 | Status: SHIPPED | OUTPATIENT
Start: 2020-03-02 | End: 2020-05-15

## 2020-03-02 RX ORDER — HYDROCODONE BITARTRATE AND ACETAMINOPHEN 5; 325 MG/1; MG/1
2 TABLET ORAL AS NEEDED
Status: DISCONTINUED | OUTPATIENT
Start: 2020-03-02 | End: 2020-03-02 | Stop reason: HOSPADM

## 2020-03-02 RX ORDER — KETAMINE HYDROCHLORIDE 50 MG/ML
INJECTION, SOLUTION, CONCENTRATE INTRAMUSCULAR; INTRAVENOUS AS NEEDED
Status: DISCONTINUED | OUTPATIENT
Start: 2020-03-02 | End: 2020-03-02 | Stop reason: SURG

## 2020-03-02 RX ORDER — PHENTERMINE HYDROCHLORIDE 30 MG/1
30 CAPSULE ORAL EVERY MORNING
Status: DISCONTINUED | OUTPATIENT
Start: 2020-03-03 | End: 2020-03-03

## 2020-03-02 RX ORDER — TIZANIDINE 4 MG/1
4 TABLET ORAL 3 TIMES DAILY PRN
Status: DISCONTINUED | OUTPATIENT
Start: 2020-03-02 | End: 2020-03-04

## 2020-03-02 RX ORDER — HYDROMORPHONE HYDROCHLORIDE 1 MG/ML
0.2 INJECTION, SOLUTION INTRAMUSCULAR; INTRAVENOUS; SUBCUTANEOUS EVERY 2 HOUR PRN
Status: DISCONTINUED | OUTPATIENT
Start: 2020-03-02 | End: 2020-03-04

## 2020-03-02 RX ORDER — HYDROMORPHONE HYDROCHLORIDE 1 MG/ML
0.4 INJECTION, SOLUTION INTRAMUSCULAR; INTRAVENOUS; SUBCUTANEOUS EVERY 5 MIN PRN
Status: DISCONTINUED | OUTPATIENT
Start: 2020-03-02 | End: 2020-03-02 | Stop reason: HOSPADM

## 2020-03-02 RX ORDER — SENNOSIDES 8.6 MG
17.2 TABLET ORAL NIGHTLY
Status: DISCONTINUED | OUTPATIENT
Start: 2020-03-02 | End: 2020-03-04

## 2020-03-02 RX ORDER — HYDROMORPHONE HYDROCHLORIDE 1 MG/ML
0.4 INJECTION, SOLUTION INTRAMUSCULAR; INTRAVENOUS; SUBCUTANEOUS EVERY 2 HOUR PRN
Status: DISCONTINUED | OUTPATIENT
Start: 2020-03-02 | End: 2020-03-04

## 2020-03-02 RX ORDER — ACETAMINOPHEN 325 MG/1
650 TABLET ORAL 4 TIMES DAILY
Status: DISCONTINUED | OUTPATIENT
Start: 2020-03-02 | End: 2020-03-03

## 2020-03-02 RX ORDER — ONDANSETRON 2 MG/ML
INJECTION INTRAMUSCULAR; INTRAVENOUS AS NEEDED
Status: DISCONTINUED | OUTPATIENT
Start: 2020-03-02 | End: 2020-03-02 | Stop reason: SURG

## 2020-03-02 RX ORDER — TEMAZEPAM 15 MG/1
15 CAPSULE ORAL NIGHTLY PRN
Status: DISCONTINUED | OUTPATIENT
Start: 2020-03-02 | End: 2020-03-04

## 2020-03-02 RX ORDER — ESMOLOL HYDROCHLORIDE 10 MG/ML
INJECTION INTRAVENOUS AS NEEDED
Status: DISCONTINUED | OUTPATIENT
Start: 2020-03-02 | End: 2020-03-02 | Stop reason: SURG

## 2020-03-02 RX ORDER — DIPHENHYDRAMINE HCL 25 MG
25 CAPSULE ORAL EVERY 4 HOURS PRN
Status: DISCONTINUED | OUTPATIENT
Start: 2020-03-02 | End: 2020-03-04

## 2020-03-02 RX ORDER — DROPERIDOL 2.5 MG/ML
INJECTION, SOLUTION INTRAMUSCULAR; INTRAVENOUS AS NEEDED
Status: DISCONTINUED | OUTPATIENT
Start: 2020-03-02 | End: 2020-03-02 | Stop reason: SURG

## 2020-03-02 RX ORDER — ACETAMINOPHEN 325 MG/1
650 TABLET ORAL ONCE
Status: COMPLETED | OUTPATIENT
Start: 2020-03-02 | End: 2020-03-02

## 2020-03-02 RX ORDER — OXYCODONE HYDROCHLORIDE 5 MG/1
5 TABLET ORAL EVERY 4 HOURS PRN
Status: DISCONTINUED | OUTPATIENT
Start: 2020-03-02 | End: 2020-03-04

## 2020-03-02 RX ORDER — CYCLOBENZAPRINE HCL 10 MG
10 TABLET ORAL NIGHTLY
Status: DISCONTINUED | OUTPATIENT
Start: 2020-03-02 | End: 2020-03-04

## 2020-03-02 RX ORDER — GLYCOPYRROLATE 0.2 MG/ML
INJECTION, SOLUTION INTRAMUSCULAR; INTRAVENOUS AS NEEDED
Status: DISCONTINUED | OUTPATIENT
Start: 2020-03-02 | End: 2020-03-02 | Stop reason: SURG

## 2020-03-02 RX ORDER — LOSARTAN POTASSIUM 100 MG/1
100 TABLET ORAL DAILY
Status: DISCONTINUED | OUTPATIENT
Start: 2020-03-02 | End: 2020-03-03

## 2020-03-02 RX ORDER — DIPHENHYDRAMINE HYDROCHLORIDE 50 MG/ML
12.5 INJECTION INTRAMUSCULAR; INTRAVENOUS EVERY 4 HOURS PRN
Status: DISCONTINUED | OUTPATIENT
Start: 2020-03-02 | End: 2020-03-04

## 2020-03-02 RX ORDER — SODIUM CHLORIDE 9 MG/ML
INJECTION, SOLUTION INTRAVENOUS CONTINUOUS
Status: DISCONTINUED | OUTPATIENT
Start: 2020-03-02 | End: 2020-03-04

## 2020-03-02 RX ORDER — MEPERIDINE HYDROCHLORIDE 25 MG/ML
INJECTION INTRAMUSCULAR; INTRAVENOUS; SUBCUTANEOUS
Status: COMPLETED
Start: 2020-03-02 | End: 2020-03-02

## 2020-03-02 RX ORDER — PHENYLEPHRINE HCL 10 MG/ML
VIAL (ML) INJECTION AS NEEDED
Status: DISCONTINUED | OUTPATIENT
Start: 2020-03-02 | End: 2020-03-02 | Stop reason: SURG

## 2020-03-02 RX ORDER — BISACODYL 10 MG
10 SUPPOSITORY, RECTAL RECTAL
Status: DISCONTINUED | OUTPATIENT
Start: 2020-03-02 | End: 2020-03-04

## 2020-03-02 RX ORDER — TRAMADOL HYDROCHLORIDE 50 MG/1
50 TABLET ORAL EVERY 6 HOURS
Status: DISCONTINUED | OUTPATIENT
Start: 2020-03-02 | End: 2020-03-04

## 2020-03-02 RX ORDER — DEXAMETHASONE SODIUM PHOSPHATE 4 MG/ML
VIAL (ML) INJECTION AS NEEDED
Status: DISCONTINUED | OUTPATIENT
Start: 2020-03-02 | End: 2020-03-02 | Stop reason: SURG

## 2020-03-02 RX ORDER — SODIUM CHLORIDE, SODIUM LACTATE, POTASSIUM CHLORIDE, CALCIUM CHLORIDE 600; 310; 30; 20 MG/100ML; MG/100ML; MG/100ML; MG/100ML
INJECTION, SOLUTION INTRAVENOUS CONTINUOUS
Status: DISCONTINUED | OUTPATIENT
Start: 2020-03-02 | End: 2020-03-04

## 2020-03-02 RX ORDER — HYDROCODONE BITARTRATE AND ACETAMINOPHEN 5; 325 MG/1; MG/1
1 TABLET ORAL AS NEEDED
Status: DISCONTINUED | OUTPATIENT
Start: 2020-03-02 | End: 2020-03-02 | Stop reason: HOSPADM

## 2020-03-02 RX ORDER — SODIUM CHLORIDE, SODIUM LACTATE, POTASSIUM CHLORIDE, CALCIUM CHLORIDE 600; 310; 30; 20 MG/100ML; MG/100ML; MG/100ML; MG/100ML
INJECTION, SOLUTION INTRAVENOUS CONTINUOUS
Status: DISCONTINUED | OUTPATIENT
Start: 2020-03-02 | End: 2020-03-02 | Stop reason: HOSPADM

## 2020-03-02 RX ORDER — DIPHENHYDRAMINE HYDROCHLORIDE 50 MG/ML
25 INJECTION INTRAMUSCULAR; INTRAVENOUS ONCE AS NEEDED
Status: ACTIVE | OUTPATIENT
Start: 2020-03-02 | End: 2020-03-02

## 2020-03-02 RX ORDER — ONDANSETRON 2 MG/ML
4 INJECTION INTRAMUSCULAR; INTRAVENOUS EVERY 4 HOURS PRN
Status: DISCONTINUED | OUTPATIENT
Start: 2020-03-02 | End: 2020-03-04

## 2020-03-02 RX ORDER — NEOSTIGMINE METHYLSULFATE 1 MG/ML
INJECTION INTRAVENOUS AS NEEDED
Status: DISCONTINUED | OUTPATIENT
Start: 2020-03-02 | End: 2020-03-02 | Stop reason: SURG

## 2020-03-02 RX ORDER — LABETALOL HYDROCHLORIDE 5 MG/ML
INJECTION, SOLUTION INTRAVENOUS AS NEEDED
Status: DISCONTINUED | OUTPATIENT
Start: 2020-03-02 | End: 2020-03-02 | Stop reason: SURG

## 2020-03-02 RX ORDER — HYDROCODONE BITARTRATE AND ACETAMINOPHEN 5; 325 MG/1; MG/1
1 TABLET ORAL EVERY 6 HOURS PRN
Qty: 30 TABLET | Refills: 0 | Status: SHIPPED | OUTPATIENT
Start: 2020-03-02 | End: 2020-03-04

## 2020-03-02 RX ORDER — LATANOPROST 50 UG/ML
1 SOLUTION/ DROPS OPHTHALMIC NIGHTLY
Status: DISCONTINUED | OUTPATIENT
Start: 2020-03-02 | End: 2020-03-03

## 2020-03-02 RX ORDER — ACETAMINOPHEN 500 MG
1000 TABLET ORAL ONCE
Status: DISCONTINUED | OUTPATIENT
Start: 2020-03-02 | End: 2020-03-02 | Stop reason: HOSPADM

## 2020-03-02 RX ORDER — HYDROMORPHONE HYDROCHLORIDE 1 MG/ML
INJECTION, SOLUTION INTRAMUSCULAR; INTRAVENOUS; SUBCUTANEOUS
Status: COMPLETED
Start: 2020-03-02 | End: 2020-03-02

## 2020-03-02 RX ORDER — MIDAZOLAM HYDROCHLORIDE 1 MG/ML
1 INJECTION INTRAMUSCULAR; INTRAVENOUS EVERY 5 MIN PRN
Status: DISCONTINUED | OUTPATIENT
Start: 2020-03-02 | End: 2020-03-02 | Stop reason: HOSPADM

## 2020-03-02 RX ORDER — OXYCODONE HYDROCHLORIDE 15 MG/1
15 TABLET ORAL EVERY 4 HOURS PRN
Status: DISCONTINUED | OUTPATIENT
Start: 2020-03-02 | End: 2020-03-04

## 2020-03-02 RX ORDER — SODIUM PHOSPHATE, DIBASIC AND SODIUM PHOSPHATE, MONOBASIC 7; 19 G/133ML; G/133ML
1 ENEMA RECTAL ONCE AS NEEDED
Status: DISCONTINUED | OUTPATIENT
Start: 2020-03-02 | End: 2020-03-04

## 2020-03-02 RX ORDER — ONDANSETRON 2 MG/ML
4 INJECTION INTRAMUSCULAR; INTRAVENOUS AS NEEDED
Status: DISCONTINUED | OUTPATIENT
Start: 2020-03-02 | End: 2020-03-02 | Stop reason: HOSPADM

## 2020-03-02 RX ORDER — METOCLOPRAMIDE HYDROCHLORIDE 5 MG/ML
INJECTION INTRAMUSCULAR; INTRAVENOUS AS NEEDED
Status: DISCONTINUED | OUTPATIENT
Start: 2020-03-02 | End: 2020-03-02 | Stop reason: SURG

## 2020-03-02 RX ORDER — MIDAZOLAM HYDROCHLORIDE 1 MG/ML
INJECTION INTRAMUSCULAR; INTRAVENOUS
Status: COMPLETED
Start: 2020-03-02 | End: 2020-03-02

## 2020-03-02 RX ORDER — METOCLOPRAMIDE HYDROCHLORIDE 5 MG/ML
10 INJECTION INTRAMUSCULAR; INTRAVENOUS EVERY 6 HOURS PRN
Status: DISCONTINUED | OUTPATIENT
Start: 2020-03-02 | End: 2020-03-04

## 2020-03-02 RX ORDER — ACETAMINOPHEN 500 MG
500 TABLET ORAL 4 TIMES DAILY
Qty: 120 TABLET | Refills: 0 | Status: SHIPPED | OUTPATIENT
Start: 2020-03-02 | End: 2020-08-12 | Stop reason: ALTCHOICE

## 2020-03-02 RX ORDER — METOPROLOL TARTRATE 5 MG/5ML
5 INJECTION INTRAVENOUS EVERY 4 HOURS PRN
Status: DISCONTINUED | OUTPATIENT
Start: 2020-03-02 | End: 2020-03-04

## 2020-03-02 RX ADMIN — PHENYLEPHRINE HCL 100 MCG: 10 MG/ML VIAL (ML) INJECTION at 15:12:00

## 2020-03-02 RX ADMIN — DROPERIDOL 0.62 MG: 2.5 INJECTION, SOLUTION INTRAMUSCULAR; INTRAVENOUS at 14:23:00

## 2020-03-02 RX ADMIN — ESMOLOL HYDROCHLORIDE 30 MG: 10 INJECTION INTRAVENOUS at 13:23:00

## 2020-03-02 RX ADMIN — SODIUM CHLORIDE, SODIUM LACTATE, POTASSIUM CHLORIDE, CALCIUM CHLORIDE: 600; 310; 30; 20 INJECTION, SOLUTION INTRAVENOUS at 14:20:00

## 2020-03-02 RX ADMIN — PHENYLEPHRINE HCL 100 MCG: 10 MG/ML VIAL (ML) INJECTION at 13:33:00

## 2020-03-02 RX ADMIN — ONDANSETRON 4 MG: 2 INJECTION INTRAMUSCULAR; INTRAVENOUS at 14:23:00

## 2020-03-02 RX ADMIN — METOCLOPRAMIDE HYDROCHLORIDE 20 MG: 5 INJECTION INTRAMUSCULAR; INTRAVENOUS at 14:24:00

## 2020-03-02 RX ADMIN — DEXAMETHASONE SODIUM PHOSPHATE 4 MG: 4 MG/ML VIAL (ML) INJECTION at 13:31:00

## 2020-03-02 RX ADMIN — KETAMINE HYDROCHLORIDE 25 MG: 50 INJECTION, SOLUTION, CONCENTRATE INTRAMUSCULAR; INTRAVENOUS at 13:19:00

## 2020-03-02 RX ADMIN — PHENYLEPHRINE HCL 100 MCG: 10 MG/ML VIAL (ML) INJECTION at 14:36:00

## 2020-03-02 RX ADMIN — MIDAZOLAM HYDROCHLORIDE 2 MG: 1 INJECTION INTRAMUSCULAR; INTRAVENOUS at 13:03:00

## 2020-03-02 RX ADMIN — MIDAZOLAM HYDROCHLORIDE 2 MG: 1 INJECTION INTRAMUSCULAR; INTRAVENOUS at 13:02:00

## 2020-03-02 RX ADMIN — NEOSTIGMINE METHYLSULFATE 4 MG: 1 INJECTION INTRAVENOUS at 14:47:00

## 2020-03-02 RX ADMIN — LABETALOL HYDROCHLORIDE 5 MG: 5 INJECTION, SOLUTION INTRAVENOUS at 14:15:00

## 2020-03-02 RX ADMIN — PHENYLEPHRINE HCL 100 MCG: 10 MG/ML VIAL (ML) INJECTION at 13:36:00

## 2020-03-02 RX ADMIN — LABETALOL HYDROCHLORIDE 5 MG: 5 INJECTION, SOLUTION INTRAVENOUS at 14:12:00

## 2020-03-02 RX ADMIN — SODIUM CHLORIDE, SODIUM LACTATE, POTASSIUM CHLORIDE, CALCIUM CHLORIDE: 600; 310; 30; 20 INJECTION, SOLUTION INTRAVENOUS at 15:28:00

## 2020-03-02 RX ADMIN — PHENYLEPHRINE HCL 100 MCG: 10 MG/ML VIAL (ML) INJECTION at 13:41:00

## 2020-03-02 RX ADMIN — LABETALOL HYDROCHLORIDE 10 MG: 5 INJECTION, SOLUTION INTRAVENOUS at 14:22:00

## 2020-03-02 RX ADMIN — PHENYLEPHRINE HCL 100 MCG: 10 MG/ML VIAL (ML) INJECTION at 13:45:00

## 2020-03-02 RX ADMIN — ROCURONIUM BROMIDE 30 MG: 10 INJECTION, SOLUTION INTRAVENOUS at 13:44:00

## 2020-03-02 RX ADMIN — GLYCOPYRROLATE 0.8 MG: 0.2 INJECTION, SOLUTION INTRAMUSCULAR; INTRAVENOUS at 14:47:00

## 2020-03-02 RX ADMIN — ROCURONIUM BROMIDE 50 MG: 10 INJECTION, SOLUTION INTRAVENOUS at 13:19:00

## 2020-03-02 NOTE — H&P
Karyn Rogers   2/17/2020 2:45 PM   Office Visit   MRN:  [id number]   Description: 64year old male Provider: Aramis Diaz PA-C Department: [facility] Sheet     Click to print Valerie Awan 853 for scanning •  Sildenafil Citrate 100 MG Oral Tab, Take 1 tablet (100 mg total) by mouth as needed for Erectile Dysfunction. , Disp: 30 tablet, Rfl: 3  •  furosemide 20 MG Oral Tab, Take 1 tablet (20 mg total) by mouth daily. , Disp: 90 tablet, Rfl: 1  •  Pravastatin So PMH:  has a past medical history of Anesthesia complication, Arrhythmia, Cancer ([facility] Deep vein thrombosis ([facility] Insomnia, Obesity, Other and unspecified hyperlipidemia, Type II or unspecified type diabetes mellitus without mention of complication, not s HEMATOLOGIC: denies hx of anemia, bleeding or clotting disorders. + sickle cell trait  ENDOCRINE: + diabetes, well controlled. A1C last month 6.6%. ALL/ASTHMA: denies hx of allergy or asthma. Denies hx of adverse reaction to anesthesia or analgesics.   EXA Atrial fibrillation ([facility]     Type 2 diabetes mellitus without complication, with long-term current use of insulin (HCC)     Benign essential HTN     Mixed hyperlipidemia     Prostate cancer ([facility]     Morbid obesity with BMI of 45.0-49.9, adult ([facility]

## 2020-03-02 NOTE — ANESTHESIA PREPROCEDURE EVALUATION
PRE-OP EVALUATION    Patient Name: Hoda Leiva    Pre-op Diagnosis: Primary osteoarthritis of right hip [M16.11]    Procedure(s):  RIGHT LATERAL TOTAL HIP REPLACEMENT    Surgeon(s) and Role:     Katerina Geiger MD - Primary    Pre-op vitals reviewed.   Nga Sal Rfl: 5  [DISCONTINUED] cyclobenzaprine 10 MG Oral Tab, Take 1 tablet (10 mg total) by mouth nightly., Disp: 30 tablet, Rfl: 0  Sildenafil Citrate 100 MG Oral Tab, Take 1 tablet (100 mg total) by mouth as needed for Erectile Dysfunction. , Disp: 30 tablet, R reviewed. Anesthetic Complications  (-) history of anesthetic complications         GI/Hepatic/Renal    Negative GI/hepatic/renal ROS. Cardiovascular    Negative cardiovascular ROS. ECG reviewed.   Exercise tolerance: good Performed by Rema Yuan MD at [facility] MAIN OR   • [address] Bilateral 9/12/2019    Performed by Susan Daigle MD at [facility] 18 INJECTION RIGHT OR LEFT Right 8/23/2018    Performed by Susan Daigle MD anesthesia, and pruritus. Distant but possible risks of infection, spinal hematoma, and neuropraxia were implied. Understanding was demonstrated of risks, and informed permission to proceed was documented in the signed consent for anesthesia.     The risk

## 2020-03-02 NOTE — ANESTHESIA PROCEDURE NOTES
Airway  Date/Time: 3/2/2020 1:21 PM  Urgency: elective      General Information and Staff    Patient location during procedure: OR  Anesthesiologist: Yolanda Zapata MD  Performed: anesthesiologist     Indications and Patient Condition  Indications for airway

## 2020-03-02 NOTE — ANESTHESIA PROCEDURE NOTES
Regional Block  Performed by: Luisa Francois MD  Authorized by: Luisa Francois MD       General Information and Staff    Start Time:  3/2/2020 1:25 PM  End Time:  3/2/2020 1:30 PM  Anesthesiologist:  Luisa Francois MD  Performed by:   Anesthesiologist  Patient Lo

## 2020-03-02 NOTE — PROGRESS NOTES
NURSING ADMISSION NOTE      Patient admitted via bed from PACU. Oriented to room. Safety precautions initiated. Bed in low position. Call light in reach.

## 2020-03-02 NOTE — ANESTHESIA POSTPROCEDURE EVALUATION
[facility] Patient Status:  Surgery Admit - Inpt   Age/Gender 64year old male MRN [id number]   [place] [facility] Attending Pascual West MD   [facility] Day # 0 PCP Breana Sanches MD       Anesthesia Post-op Note    Procedure

## 2020-03-02 NOTE — ANESTHESIA PROCEDURE NOTES
Spinal Block  Performed by: Palmira Harvey MD  Authorized by: Palmira Harvey MD       General Information and Staff    Start Time:  3/2/2020 1:02 PM  End Time:  3/2/2020 1:18 PM  Anesthesiologist:  Palmira Harvey MD  Performed by:   Anesthesiologist  Site identif

## 2020-03-02 NOTE — PROGRESS NOTES
[facility] Pharmacy Note: Antimicrobial Weight Based Dose Adjustment for: cefazolin (ANCEF)    Rodney Dudley is a 64year old male who has been prescribed cefazolin (ANCEF) 2 gm IV every 8 hours x 2 doses post-op.       Estimated CrCl 79.6 ml/minute based on 2/2

## 2020-03-02 NOTE — PROGRESS NOTES
Arnaldo Maillard   1/14/2020 1:50 PM   Office Visit   MRN:  [id number]   Description: 64year old male Provider: Ericka Friedman MD Department: [facility]   Scanning Cover Sheet     Click to print Valerie Circe 852 for scanning   Office Visi • insulin glargine (LANTUS SOLOSTAR) 100 UNIT/ML Subcutaneous Solution Pen-injector Inject 50 Units into the skin every morning. 3 pen 3   • INVOKANA 300 MG Oral Tab Take 300 mg by mouth daily.  90 tablet 1   • metFORMIN HCl 500 MG Oral Tab Take 1 tablet (5   AHI 65 SaO2 comfort 69 % BIPAP 23/19 [employer]   • Visual impairment       glasses      Past Surgical History:        Past Surgical History:   Procedure Laterality Date   • COLONOSCOPY       • HIP JOINT INJECTION Right 10/3/2019     Performed by Yolanda Boyer Extremities:  Low back exam is benign. right hip skin is intact. ROM is limited and rotation reproduces hip pain. This is soft. Negative tension sign. Strength and stability are intact. Both calves are NT. Motor/sensory exam are intact.   DP palpabl Plan for lateral approach right total hip replacement. Still nearly morbidly obese and has history of cardiac,VTE disease. Would consider him above average high risk patient. He understands this. Medical clearance.   Stop Xarelto 3 to 5 days prior to cruz

## 2020-03-02 NOTE — OPERATIVE REPORT
TOTAL HIP REPLACEMENT OPERATIVE REPORT    Gabriela Mcwilliams       [id number]     5/4/1960    DOS: 3/2/20  TOTAL HIP REPLACEMENT  PRE-OP DX:  RIGHT HIP PRIMARY OSTEOARTHRITIS  POST-OP DX:  RIGHT HIP PRIMARY OSTEOARTHRITIS  PROCEDURE: RIGHT  LATERAL TOTAL HIP R ABDUCTION AND 15 DEG ANTEVERSION. IT WAS A STABLE FIT. OSTEOPHYTE WAS REMOVED. TRIAL LINER WAS PLACED. FEMORAL EXPOSURE WAS OBTAINED WITH A TROCHANTERIC RETRACTOR AND MEDIAL NECK RETRACTOR. A STARTING AWL WAS USED TO FIND THE STARTING HOLE.   Romy Londono

## 2020-03-03 ENCOUNTER — APPOINTMENT (OUTPATIENT)
Dept: ULTRASOUND IMAGING | Facility: HOSPITAL | Age: 60
DRG: 470 | End: 2020-03-03
Attending: PHYSICIAN ASSISTANT
Payer: COMMERCIAL

## 2020-03-03 ENCOUNTER — APPOINTMENT (OUTPATIENT)
Dept: GENERAL RADIOLOGY | Facility: HOSPITAL | Age: 60
DRG: 470 | End: 2020-03-03
Attending: HOSPITALIST
Payer: COMMERCIAL

## 2020-03-03 LAB
ANION GAP SERPL CALC-SCNC: 6 MMOL/L (ref 0–18)
BILIRUB UR QL STRIP.AUTO: NEGATIVE
BUN BLD-MCNC: 14 MG/DL (ref 7–18)
BUN/CREAT SERPL: 11.9 (ref 10–20)
CALCIUM BLD-MCNC: 8.3 MG/DL (ref 8.5–10.1)
CHLORIDE SERPL-SCNC: 105 MMOL/L (ref 98–112)
CLARITY UR REFRACT.AUTO: CLEAR
CO2 SERPL-SCNC: 26 MMOL/L (ref 21–32)
COLOR UR AUTO: YELLOW
CREAT BLD-MCNC: 1.18 MG/DL (ref 0.7–1.3)
DEPRECATED RDW RBC AUTO: 38.3 FL (ref 35.1–46.3)
ERYTHROCYTE [DISTWIDTH] IN BLOOD BY AUTOMATED COUNT: 13.6 % (ref 11–15)
GLUCOSE BLD-MCNC: 128 MG/DL (ref 70–99)
GLUCOSE BLD-MCNC: 141 MG/DL (ref 70–99)
GLUCOSE BLD-MCNC: 142 MG/DL (ref 70–99)
GLUCOSE BLD-MCNC: 149 MG/DL (ref 70–99)
GLUCOSE BLD-MCNC: 218 MG/DL (ref 70–99)
GLUCOSE UR STRIP.AUTO-MCNC: >=500 MG/DL
HCT VFR BLD AUTO: 40.6 % (ref 39–53)
HGB BLD-MCNC: 13.2 G/DL (ref 13–17.5)
KETONES UR STRIP.AUTO-MCNC: NEGATIVE MG/DL
LEUKOCYTE ESTERASE UR QL STRIP.AUTO: NEGATIVE
MCH RBC QN AUTO: 25.2 PG (ref 26–34)
MCHC RBC AUTO-ENTMCNC: 32.5 G/DL (ref 31–37)
MCV RBC AUTO: 77.6 FL (ref 80–100)
NITRITE UR QL STRIP.AUTO: NEGATIVE
OSMOLALITY SERPL CALC.SUM OF ELEC: 287 MOSM/KG (ref 275–295)
PH UR STRIP.AUTO: 6 [PH] (ref 4.5–8)
PLATELET # BLD AUTO: 227 10(3)UL (ref 150–450)
POTASSIUM SERPL-SCNC: 4 MMOL/L (ref 3.5–5.1)
PROT UR STRIP.AUTO-MCNC: NEGATIVE MG/DL
RBC # BLD AUTO: 5.23 X10(6)UL (ref 4.3–5.7)
RBC UR QL AUTO: NEGATIVE
SODIUM SERPL-SCNC: 137 MMOL/L (ref 136–145)
SP GR UR STRIP.AUTO: 1.02 (ref 1–1.03)
UROBILINOGEN UR STRIP.AUTO-MCNC: <2 MG/DL
WBC # BLD AUTO: 12.8 X10(3) UL (ref 4–11)

## 2020-03-03 PROCEDURE — 71045 X-RAY EXAM CHEST 1 VIEW: CPT | Performed by: HOSPITALIST

## 2020-03-03 PROCEDURE — 99233 SBSQ HOSP IP/OBS HIGH 50: CPT | Performed by: HOSPITALIST

## 2020-03-03 PROCEDURE — 93971 EXTREMITY STUDY: CPT | Performed by: PHYSICIAN ASSISTANT

## 2020-03-03 RX ORDER — LOSARTAN POTASSIUM 100 MG/1
100 TABLET ORAL DAILY
Status: DISCONTINUED | OUTPATIENT
Start: 2020-03-04 | End: 2020-03-04

## 2020-03-03 RX ORDER — LOSARTAN POTASSIUM 50 MG/1
50 TABLET ORAL DAILY
Status: DISCONTINUED | OUTPATIENT
Start: 2020-03-03 | End: 2020-03-03

## 2020-03-03 RX ORDER — AMLODIPINE BESYLATE 5 MG/1
5 TABLET ORAL DAILY
Status: DISCONTINUED | OUTPATIENT
Start: 2020-03-03 | End: 2020-03-03

## 2020-03-03 RX ORDER — CYCLOBENZAPRINE HCL 10 MG
TABLET ORAL
Qty: 30 TABLET | Refills: 0 | Status: SHIPPED | OUTPATIENT
Start: 2020-03-03 | End: 2020-03-25

## 2020-03-03 RX ORDER — LOSARTAN POTASSIUM 50 MG/1
50 TABLET ORAL DAILY
Status: COMPLETED | OUTPATIENT
Start: 2020-03-03 | End: 2020-03-03

## 2020-03-03 RX ORDER — DILTIAZEM HYDROCHLORIDE 180 MG/1
180 CAPSULE, EXTENDED RELEASE ORAL DAILY
Status: DISCONTINUED | OUTPATIENT
Start: 2020-03-03 | End: 2020-03-04

## 2020-03-03 RX ORDER — DEXTROSE MONOHYDRATE 25 G/50ML
50 INJECTION, SOLUTION INTRAVENOUS
Status: DISCONTINUED | OUTPATIENT
Start: 2020-03-03 | End: 2020-03-04

## 2020-03-03 RX ORDER — ACETAMINOPHEN 325 MG/1
650 TABLET ORAL 4 TIMES DAILY
Status: DISCONTINUED | OUTPATIENT
Start: 2020-03-03 | End: 2020-03-04

## 2020-03-03 NOTE — HOME CARE LIAISON
MET WITH PTNT AND OFFERED CHOICE  OF AGENCIES. PTNT AGREEABLE TO [facility]. MET WITH PTNT TO DISCUSS HOME HEALTH SERVICES AND COVERAGE CRITERIA. PTNT AGREEABLE TO [facility]. PTNT GIVEN RESIDENTIAL BROCHURE.  RESIDENTIAL WITH PROVIDE SN/PT ON DISC

## 2020-03-03 NOTE — PHYSICAL THERAPY NOTE
PHYSICAL THERAPY HIP EVALUATION - INPATIENT     Room Number: 382/382-A  Evaluation Date: 3/3/2020  Type of Evaluation: Initial  Physician Order: PT Eval and Treat    Presenting Problem: s/p right lateral JORGE L 3/2/2020  Reason for Therapy: Mobility Dysfuncti    • OTHER SURGICAL HISTORY      vocal cord polyps removed   • OTHER SURGICAL HISTORY  11/15/2019    RALP Dr. Merline Machado    • ROBOT-ASSISTED LAPAROSCOPIC PROSTATECTOMY/ORICAL N/A 12/19/2018    Performed by Tricia Corona MD at [facility] MAIN OR   • SA O2 WALK                  AM-PAC '6-Clicks' INPATIENT SHORT FORM - BASIC MOBILITY  How much difficulty does the patient currently have. ..  -   Turning over in bed (including adjusting bedclothes, sheets and blankets)?: A Little   -   Sitting do assist recommendations. Exercise/Education Provided:  Bed mobility  Energy conservation  Functional activity tolerated  Gait training  Posture  Transfer training    Patient End of Session: Up in chair;Call light within reach; Needs met;RN aware of se will negotiate 4 stairs/one curb w/ assistive device and supervision   Goal #5    Patient verbalizes and/or demonstrates all precautions and safety concerns independently   Goal #6        Goal Comments: Goals established on 3/3/2020

## 2020-03-03 NOTE — OCCUPATIONAL THERAPY NOTE
OCCUPATIONAL THERAPY EVALUATION - INPATIENT     Room Number: 382/382-A  Evaluation Date: 3/3/2020  Type of Evaluation: Initial  Presenting Problem: s/p R JORGE L 3/2/20    Physician Order: IP Consult to Occupational Therapy  Reason for Therapy: ADL/IADL Dysfun PROSTATECTOMY, RETROPUBIC RADICAL, W/NERVE SPARING  11/15/2019       • OTHER SURGICAL HISTORY      vocal cord polyps removed   • OTHER SURGICAL HISTORY  11/15/2019    RALP Dr. Lakeisha Kaminski    • ROBOT-ASSISTED LAPAROSCOPIC PROSTATECTOMY/ORICAL N/ Fine Motor    WFL      ADDITIONAL TESTS                                    NEUROLOGICAL FINDINGS  Neurological Findings: None                ACTIVITY TOLERANCE                         O2 SATURATIONS                ACTIVITIES OF DAILY LIVING ASSESSMENT  A present    ASSESSMENT   Patient is a 64year old  male admitted 3/2/2020 for R JORGE L. Complete medical history and occupational profile noted above. Functional outcome measures completed include: AM-PAC \"6 Clicks\".  In this OT evaluation patient presents wi transfer Supine to Sit with supervision  Patient will transfer Sit to Supine with supervision  Patient will transfer to Toilet with supervision    ADDITIONAL GOALS   Patient will recall all hip precautions and incorporate into ADL tasks

## 2020-03-03 NOTE — PLAN OF CARE
Pt AOx4. R.A. C/o severe pain to right hip, requiring IV pain medication. Aquacel dressing, CDI. Gel ice in place. Tele, afib. HR elevated, cardiology following. Voiding freely, last BM yesterday. SCDs bilaterally, ankle pumps encouraged.  IS encourag

## 2020-03-03 NOTE — PLAN OF CARE
A&O x 4. On RA. Tele-A-Fib per hx. Right hip pain well controlled with Oxy PRN. Aquacel to right hip C/D/I. Ice therapy and abductor pillow in place.  Voiding without issue per urinal. SCD's/Xarelto to start in am. Reviewed POC, pain management, and fall pr

## 2020-03-03 NOTE — PROGRESS NOTES
[facility] home pain management form given and reviewed with patient. Questions solicited and answered. Patient verbalized understanding.

## 2020-03-03 NOTE — CONSULTS
[facility]  [address] Patient Status:  Inpatient    1960 MRN [id number]   [facility] 3SW-A Attending Andreina Mobley MD   Hosp Day # 0 PCP Bobby Centeno MD     Reason for consult: Medical management    Requested SURGICAL HISTORY  11/15/2019    RALP Dr. Nel Lee    • ROBOT-ASSISTED LAPAROSCOPIC PROSTATECTOMY/ORICAL N/A 12/19/2018    Performed by Rakesh Hankins MD at [facility] MAIN OR   • [facility] Bilateral 9/12/2019    Performed by Miladys Hung meals., Disp: 180 tablet, Rfl: 1  clonazePAM 1 MG Oral Tab, Take 1 tablet (1 mg total) by mouth 2 (two) times daily as needed for Anxiety. , Disp: 60 tablet, Rfl: 2  latanoprost 0.005 % Ophthalmic Solution, Place 1 drop into both eyes nightly., Disp: 7.5 mL Imaging data reviewed in Epic. ASSESSMENT / PLAN:     1. S/p right JORGE L, per surgery  1. On xarelto, pain control, PT  2. Chronic atrial fibrillation, rates mildly elevated, resume home meds, add PRN metoprolol, cards eval  3.  DMII, hyperglycemia marcos

## 2020-03-03 NOTE — PROGRESS NOTES
[facility]    Progress Note    Souleymane Height Patient Status:  Inpatient    1960 MRN [id number]   [facility] 3SW-A Attending Alanna Keating MD   Hosp Day # 1 PCP José Luis Hope MD       SUBJECTIVE:     POD #1  Procedure: Right Arbutus Jaclyn Continue present management  - STAT venous doppler right leg r/o DVT  - WBAT  - PT/OT  - DVT prophylaxis with xarelto  - Discharge plan:  Today with [address] [place], [place]  3/3/2020  Discussed with Dr. Delle Aschoff

## 2020-03-03 NOTE — PROGRESS NOTES
[facility]  Progress Note     Dale Beverly Patient Status:  Inpatient    1960 MRN [id number]   [facility] 3SW-A Attending Wendy Hutchinson MD   Hosp Day # 1 PCP Kai Guillen MD     Chief Complaint: R THR    S: Patient seen after 650 mg Oral QID   • Senna  17.2 mg Oral Nightly   • docusate sodium  100 mg Oral BID   • rivaroxaban  10 mg Oral Daily with food   • carvedilol  25 mg Oral BID with meals   • atorvastatin  10 mg Oral Nightly   • cyclobenzaprine  10 mg Oral Nightly   • insu

## 2020-03-03 NOTE — PLAN OF CARE
Pt AOx4. R.A. C/o moderate pain to right hip. Aquacel dressing, CDI. Gel ice in place. VS remain stable, voiding freely, last BM today. SCDs bilaterally, ankle pumps encouraged. IS encouraged. Plan for PT/OT tomorrow.   Pt updated on plan of care, all

## 2020-03-03 NOTE — PHYSICAL THERAPY NOTE
PHYSICAL THERAPY HIP TREATMENT NOTE - INPATIENT      Room Number: 382/382-A     Session: 1&2       Presenting Problem: s/p right lateral JORGE L 3/2/2020    Problem List  Active Problems:    Depression    SAM (obstructive sleep apnea)    Primary osteoarthritis ENDOSCOPY   • SACROILIAC JOINT INJECTION RIGHT OR LEFT Right 8/23/2018    Performed by Soraya Pulliam MD at [address]   • XI ROBOT-ASSISTED LAPAROSCOPIC PROSTATECTOMY/ORICAL N/A 11/15/2019    [facility]de [facility]. Pt required cues for proper body mechanics, technique and sequencing. Pt gait trained 175 ' c RW and CGA to supervision  c cues for WBAT, proper integration of RW and reciprocal gait pattern. Pt able to recall 3/3 hip precautions c min A.       PM assistance level: supervison      Goal #3     Patient is able to ambulate 150 feet with assistive device at assistance level: modified independent    Goal #4     Patient will negotiate 4 stairs/one curb w/ assistive device and supervision   Goal #5     Mohsen Bey

## 2020-03-03 NOTE — CM/SW NOTE
65 yo sp total hip replacement. Post op protocol orders for discharge planning. Preop Joint journey plan from [address] for pt to discharge home with [facility] home [facility].      HOME SITUATION  Type of Home: House  Home Layout: Two level  Lives With:

## 2020-03-03 NOTE — CONSULTS
[facility] Cardiology Consultation    Tom Ill Patient Status:  Inpatient    1960 MRN [id number]   [facility] 3SW-A Attending Luis Titus MD   Hosp Day # 1 PCP Rowdy Flannery MD     Reason for Consultation:  Atrial Fibrillation.        H at [facility] MAIN OR   • SACROILIAC JOINT INJECTION BILATERAL Bilateral 9/12/2019    Performed by Katina Hawk MD at [facility] 18 INJECTION RIGHT OR LEFT Right 8/23/2018    Performed by Katina Hawk MD at [facility] MAIN OR   • Darvin Sage 154/91    Temp: 98.6 °F (37 °C)  Pulse: 104  Resp: 27  BP: 154/91      General:  Appears comfortable  HEENT: No focal deficits. Neck: No JVD, carotids 2+ no bruits. Cardiac: Irregular S1S2. No S3, S4, rub, click. No murmur.   Lungs: Clear to auscultatio rather do what we can to correct his risk factors. 3. Hypertension . Elevated bp's    4. Diabetes    5. Dyslipidemia on statin    6. Obesity    7. SAM    8. Prostate CA, resected, 2019    Recommend:    1. Telemetry    2.  Will resume losartan at higher

## 2020-03-03 NOTE — PROGRESS NOTES
Pt HR elevated this morning, no change after scheduled morning meds. 's-140's, PRN IV lopressor given. 's-120's. Cardiology made aware, MD on way to evaluate patient.

## 2020-03-04 VITALS
HEART RATE: 85 BPM | DIASTOLIC BLOOD PRESSURE: 82 MMHG | OXYGEN SATURATION: 94 % | BODY MASS INDEX: 40.74 KG/M2 | TEMPERATURE: 99 F | RESPIRATION RATE: 18 BRPM | SYSTOLIC BLOOD PRESSURE: 122 MMHG | HEIGHT: 71 IN | WEIGHT: 291 LBS

## 2020-03-04 LAB
ANION GAP SERPL CALC-SCNC: 6 MMOL/L (ref 0–18)
BASOPHILS # BLD AUTO: 0.06 X10(3) UL (ref 0–0.2)
BASOPHILS NFR BLD AUTO: 0.5 %
BUN BLD-MCNC: 14 MG/DL (ref 7–18)
BUN/CREAT SERPL: 13 (ref 10–20)
CALCIUM BLD-MCNC: 8.5 MG/DL (ref 8.5–10.1)
CHLORIDE SERPL-SCNC: 105 MMOL/L (ref 98–112)
CO2 SERPL-SCNC: 25 MMOL/L (ref 21–32)
CREAT BLD-MCNC: 1.08 MG/DL (ref 0.7–1.3)
DEPRECATED RDW RBC AUTO: 38.2 FL (ref 35.1–46.3)
EOSINOPHIL # BLD AUTO: 0.28 X10(3) UL (ref 0–0.7)
EOSINOPHIL NFR BLD AUTO: 2.2 %
ERYTHROCYTE [DISTWIDTH] IN BLOOD BY AUTOMATED COUNT: 13.8 % (ref 11–15)
GLUCOSE BLD-MCNC: 132 MG/DL (ref 70–99)
GLUCOSE BLD-MCNC: 142 MG/DL (ref 70–99)
GLUCOSE BLD-MCNC: 165 MG/DL (ref 70–99)
HCT VFR BLD AUTO: 37.6 % (ref 39–53)
HGB BLD-MCNC: 12.8 G/DL (ref 13–17.5)
IMM GRANULOCYTES # BLD AUTO: 0.05 X10(3) UL (ref 0–1)
IMM GRANULOCYTES NFR BLD: 0.4 %
LYMPHOCYTES # BLD AUTO: 2.35 X10(3) UL (ref 1–4)
LYMPHOCYTES NFR BLD AUTO: 18.5 %
MCH RBC QN AUTO: 26 PG (ref 26–34)
MCHC RBC AUTO-ENTMCNC: 34 G/DL (ref 31–37)
MCV RBC AUTO: 76.3 FL (ref 80–100)
MONOCYTES # BLD AUTO: 1.57 X10(3) UL (ref 0.1–1)
MONOCYTES NFR BLD AUTO: 12.4 %
NEUTROPHILS # BLD AUTO: 8.38 X10 (3) UL (ref 1.5–7.7)
NEUTROPHILS # BLD AUTO: 8.38 X10(3) UL (ref 1.5–7.7)
NEUTROPHILS NFR BLD AUTO: 66 %
OSMOLALITY SERPL CALC.SUM OF ELEC: 284 MOSM/KG (ref 275–295)
PLATELET # BLD AUTO: 208 10(3)UL (ref 150–450)
POTASSIUM SERPL-SCNC: 3.7 MMOL/L (ref 3.5–5.1)
RBC # BLD AUTO: 4.93 X10(6)UL (ref 4.3–5.7)
SODIUM SERPL-SCNC: 136 MMOL/L (ref 136–145)
WBC # BLD AUTO: 12.7 X10(3) UL (ref 4–11)

## 2020-03-04 PROCEDURE — 99232 SBSQ HOSP IP/OBS MODERATE 35: CPT | Performed by: HOSPITALIST

## 2020-03-04 RX ORDER — HYDROCODONE BITARTRATE AND ACETAMINOPHEN 10; 325 MG/1; MG/1
1 TABLET ORAL EVERY 4 HOURS PRN
Status: DISCONTINUED | OUTPATIENT
Start: 2020-03-04 | End: 2020-03-04

## 2020-03-04 RX ORDER — HYDROCODONE BITARTRATE AND ACETAMINOPHEN 10; 325 MG/1; MG/1
1 TABLET ORAL EVERY 4 HOURS PRN
Qty: 30 TABLET | Refills: 0 | Status: SHIPPED | OUTPATIENT
Start: 2020-03-04 | End: 2020-05-15

## 2020-03-04 RX ORDER — HYDROCODONE BITARTRATE AND ACETAMINOPHEN 10; 325 MG/1; MG/1
2 TABLET ORAL EVERY 4 HOURS PRN
Status: DISCONTINUED | OUTPATIENT
Start: 2020-03-04 | End: 2020-03-04

## 2020-03-04 RX ORDER — ACETAMINOPHEN 325 MG/1
650 TABLET ORAL EVERY 4 HOURS PRN
Status: DISCONTINUED | OUTPATIENT
Start: 2020-03-04 | End: 2020-03-04

## 2020-03-04 RX ORDER — DILTIAZEM HYDROCHLORIDE 180 MG/1
180 CAPSULE, COATED, EXTENDED RELEASE ORAL DAILY
Qty: 30 CAPSULE | Refills: 0 | Status: SHIPPED | OUTPATIENT
Start: 2020-03-05 | End: 2020-03-23

## 2020-03-04 NOTE — PROGRESS NOTES
Orthopedic surgery progress note    Geoff Dorsey Patient Status:  Inpatient    1960 MRN [id number]   [facility] 3SW-A Attending Jaswinder Patricia MD   Hosp Day # 2 PCP Tammy Benitez MD       Subjective:  No major complaints.   No calf pain

## 2020-03-04 NOTE — PHYSICAL THERAPY NOTE
PHYSICAL THERAPY HIP TREATMENT NOTE - INPATIENT      Room Number: 382/382-A     Session: 3    Number of Visits to Meet Established Goals: 3    Presenting Problem: s/p right lateral JORGE L 3/2/2020    Problem List  Active Problems:    Depression    SAM (obstru Elder East MD at [facility] MAIN OR   • SACROILIAC JOINT INJECTION BILATERAL Bilateral 9/12/2019    Performed by Ana Cristina Veliz MD at [facility] 18 INJECTION RIGHT OR LEFT Right 8/23/2018    Performed by Ana Cristina Veliz MD at [facility] MAIN OR   • X def    Skilled Therapy Provided: AM: pt was wheeled to rehab gym and participated in seated and standing therex per JORGE L protocol. Pt required rest breaks between activities and min A for set up.   Pt gait trained evgeny DEJESUS and supervision, cues for increased R k with assistive device at assistance level: modified independent    Goal #4     Patient will negotiate 4 stairs/one curb w/ assistive device and supervision   Goal #5     Patient verbalizes and/or demonstrates all precautions and safety concerns independent

## 2020-03-04 NOTE — PROGRESS NOTES
[facility] Cardiology Progress Note        Kell Coronado Patient Status:  Inpatient    1960 MRN [id number]   [facility] 3SW-A Attending Lynsey Campos MD   Hosp Day # 2 PCP Lm Omalley MD     Subjective:   The pat 2+ no bruits. Cardiac: Irregular S1S2. No S3, S4, rub, click. No murmur. Lungs: Clear to auscultation and percussion. Abdomen: Soft, non-tender. Extremities: No LE edema. No clubbing or cyanosis. Neurologic: Alert and oriented, normal affect.   Ayleen Grandchild that the patient has no cardiac symptoms , I would hold off on performing any invasive studies for the time being and rather do what we can to correct his risk factors.     3. Hypertension , well-controlled      4. Diabetes     5.  Dyslipidemia on statin

## 2020-03-04 NOTE — PROGRESS NOTES
Reviewed  Updated stoplight home pain management method with patient and wife. Reviewed dressing changes and showering also. Questions answered. Both verbalized understanding.

## 2020-03-04 NOTE — OCCUPATIONAL THERAPY NOTE
OCCUPATIONAL THERAPY TREATMENT NOTE - INPATIENT     Room Number: 382/382-A  Session: 1   Number of Visits to Meet Established Goals: 2    Presenting Problem: s/p R JORGE L 3/2/20    History related to current admission: Patient is 64year old male admitted on vocal cord polyps removed   • OTHER SURGICAL HISTORY  11/15/2019    RALP Dr. Allyson Bullock    • ROBOT-ASSISTED LAPAROSCOPIC PROSTATECTOMY/ORICAL N/A 12/19/2018    Performed by Jennifer De Leon MD at [facility] MAIN OR   • SACROILIAC JOINT INJECTION BILATERAL Bilateral 9 incorporation into ADLs; patient required minimal cueing to follow throughout session; education on bed mobility, performed Mod I; education on LB dressing techniques/equipment, performed LB dressing to knees SBA without AE (socks deferred, does not wear a transfer Supine to Sit with supervision --> MET 3/4/20  Patient will transfer Sit to Supine with supervision --> MET 3/4/20  Patient will transfer to Toilet with supervision --> MET 3/4/20    ADDITIONAL GOALS   Patient will recall all hip precautions and i

## 2020-03-04 NOTE — PLAN OF CARE
Pt Aox4, on R. A.  VS are stable,  voiding freely. denies n/t. Aquacel to is cdi. Gel ice  in place. Pt c/o mild to moderate pain relieved by PO pain meds. Up with Pt, tolerated well. IS education complete. Ankle pumps encouraged.   Pt instructed to oral

## 2020-03-04 NOTE — PROGRESS NOTES
[facility]PITAL[facility]  Progress Note     Kell Coronado Patient Status:  Inpatient    1960 MRN [id number]   [facility] 3SW-A Attending Lynsey Campos MD   Hosp Day # 2 PCP Lm Omalley MD     Chief Complaint: R THR    S: no complaints    R Nightly   • docusate sodium  100 mg Oral BID   • rivaroxaban  10 mg Oral Daily with food   • carvedilol  25 mg Oral BID with meals   • atorvastatin  10 mg Oral Nightly   • cyclobenzaprine  10 mg Oral Nightly   • insulin detemir  40 Units Subcutaneous Daily

## 2020-03-04 NOTE — PROGRESS NOTES
Acute Pain Service    Post Op Day 2 Ortho Note    Assessed patient in bed. Patient states pain is much better today. Patient states Lourdes Antonio is working well to manage pain; denies itching/nausea/dizziness.  Will trial Norco 10/325 po prn pain today in preparat

## 2020-03-04 NOTE — PAYOR COMM NOTE
--------------  CONTINUED STAY REVIEW   3-3-20       [facility]  Subscriber #:  [id number]  Authorization Number: [id number]    Admit date: 3/2/20  Admit time: [address]    Admitting Physician: Terence Wyatt MD  Attending Physician:  Terence Wyatt MD  [facility] control  2. DOAC  3. Ortho following  2. Fever, CXR neg, doppler neg  1. Await UA  2. Follow-up BC  3. Atrial fibrillation on DOAC  4. Essential hypertension  1. Coreg  2. Lower Losartan to 50  3. Hold Norvasc  4. Telemetry  5. Monitor hemodynamics  5.  Dys Cathy Seth, RN      losartan Potassium (COZAAR) tab 50 mg     Date Action Dose Route User    3/3/2020 1756 Given 50 mg Oral Tito Veloz RN      oxyCODONE HCl (OXY-IR) cap/tab 15 mg     Date Action Dose Route User    3/4/2020 1010 Given 15 mg

## 2020-03-04 NOTE — PLAN OF CARE
A&O x 4. On RA. Tele-A-Fib per hx. Right hip pain well controlled with Oxy/Antony PRN. Aquacel to right hip C/D/I. Ice therapy and abductor pillow in place. Up with min assist and walker to bathroom, voiding without issue. SCD's/Xarelto.  Reviewed POC, pain m

## 2020-03-04 NOTE — CM/SW NOTE
03/03/20 1100   Discharge disposition   Expected discharge disposition Home-Health   Name of Facillity/Home Care/Hospice Residential   Discharge transportation Private car   DC 3/4/2020

## 2020-03-05 ENCOUNTER — PATIENT OUTREACH (OUTPATIENT)
Dept: CASE MANAGEMENT | Age: 60
End: 2020-03-05

## 2020-03-05 DIAGNOSIS — Z02.9 ENCOUNTERS FOR UNSPECIFIED ADMINISTRATIVE PURPOSE: ICD-10-CM

## 2020-03-05 DIAGNOSIS — G47.33 OSA (OBSTRUCTIVE SLEEP APNEA): ICD-10-CM

## 2020-03-05 DIAGNOSIS — M16.11 PRIMARY OSTEOARTHRITIS OF RIGHT HIP: ICD-10-CM

## 2020-03-05 DIAGNOSIS — E11.9 TYPE 2 DIABETES MELLITUS WITHOUT COMPLICATION, WITH LONG-TERM CURRENT USE OF INSULIN (HCC): ICD-10-CM

## 2020-03-05 DIAGNOSIS — Z79.4 TYPE 2 DIABETES MELLITUS WITHOUT COMPLICATION, WITH LONG-TERM CURRENT USE OF INSULIN (HCC): ICD-10-CM

## 2020-03-05 PROCEDURE — 1111F DSCHRG MED/CURRENT MED MERGE: CPT

## 2020-03-05 NOTE — DISCHARGE SUMMARY
Discharge Summary  Patient ID:  Gabriela Mcwilliams  [id number]  64year old  5/4/1960    Admit date: 3/2/2020    Discharge date and time: 3/4/20    Attending Physician: No att. providers found     Reason for admission: right hip primary OA    Discharge Diagnos CHANGED    HYDROcodone-acetaminophen  MG Oral Tab  Take 1 tablet by mouth every 4 (four) hours as needed for Pain., Normal, Disp-30 tablet, R-0Please disregard previous rx for 5 mg tabs    rivaroxaban (XARELTO) 10 MG Oral Tab  Take 1 tablet (10 mg to HCl 30 MG Oral Cap  Take 30 mg by mouth every morning., Historical    aspirin EC 81 MG Oral Tab EC  Take 1 tablet (81 mg total) by mouth daily. , Normal, Disp-90 tablet, R-1    CYCLOBENZAPRINE 10 MG Oral Tab  TAKE 1 TABLET(10 MG) BY MOUTH EVERY NIGHT, Desmond Fritz

## 2020-03-05 NOTE — PROGRESS NOTES
Initial Post Discharge Follow Up   Discharge Date: 3/4/20  Contact Date: 3/5/2020    Consent Verification:  Assessment Completed With: Patient  HIPAA Verified?   No    Discharge Dx:    Primary osteoarthritis right hip, S/P right lateral THR  HX: SAM, DM if he needs anything. • (NCM) Was patient given a different diet per AVS? no, patient is eating his regular diet.       Medications:   Current Outpatient Medications   Medication Sig Dispense Refill   • dilTIAZem HCl ER Coated Beads 180 MG Oral Capsule SR the skin every morning. 3 pen 3   • INVOKANA 300 MG Oral Tab Take 300 mg by mouth daily. 90 tablet 1   • metFORMIN HCl 500 MG Oral Tab Take 1 tablet (500 mg total) by mouth 2 (two) times daily with meals.  180 tablet 1   • clonazePAM 1 MG Oral Tab Take 1 ta than medications to help with your pain management?   yes  • Do you remember what this might have been?         (put X by all the patient mentions:           repositioning            sleep/relaxation kit              music           TV         I-pad with MD ROCK Bueno OPHTHALMOLOGY [facility])        Feb 11, 2021  9:40 AM CST Follow Up with Darling Lucas MD SP CARDIOLOGY ([facility])            [address] [place] [address] schedule allowed: NA    []  Advised patient to bring all medications and blood glucose meter/supplies if applicable.

## 2020-03-09 NOTE — PATIENT INSTRUCTIONS
Diabetes: Learning About Serving and Portion Sizes     A good rule of thumb: Devote half your plate to vegetables and green salad. Split the other half between protein and starchy carbohydrates. Fruit makes a good dessert. Servings and portions.  What placed service to order for interventional radiology per recommendation from Dr. Boggs     Called patient gave him an update    When you’re planning for a snack or a meal, keep servings in mind. If you don’t have measuring cups or a scale handy, there are ways to [facility] serving sizes, such as comparing your food to the size of your hand (see pictures above).   Managing portion si

## 2020-03-11 ENCOUNTER — OFFICE VISIT (OUTPATIENT)
Dept: INTERNAL MEDICINE CLINIC | Facility: CLINIC | Age: 60
End: 2020-03-11
Payer: COMMERCIAL

## 2020-03-11 VITALS
HEIGHT: 71 IN | DIASTOLIC BLOOD PRESSURE: 78 MMHG | TEMPERATURE: 98 F | RESPIRATION RATE: 16 BRPM | HEART RATE: 88 BPM | BODY MASS INDEX: 40.88 KG/M2 | OXYGEN SATURATION: 97 % | SYSTOLIC BLOOD PRESSURE: 118 MMHG | WEIGHT: 292 LBS

## 2020-03-11 DIAGNOSIS — M16.11 PRIMARY OSTEOARTHRITIS OF RIGHT HIP: Primary | ICD-10-CM

## 2020-03-11 DIAGNOSIS — Z96.641 STATUS POST RIGHT HIP REPLACEMENT: ICD-10-CM

## 2020-03-11 PROCEDURE — 1111F DSCHRG MED/CURRENT MED MERGE: CPT | Performed by: PHYSICIAN ASSISTANT

## 2020-03-11 PROCEDURE — 99495 TRANSJ CARE MGMT MOD F2F 14D: CPT | Performed by: PHYSICIAN ASSISTANT

## 2020-03-11 NOTE — PROGRESS NOTES
HPI:    Ashly Sage is a 64year old male here today for hospital follow up.    He was discharged from Inpatient hospital, [facility] to Home   Admission Date: 3/2/20   Discharge Date: 3/4/20  Hospital Discharge Diagnoses (since 2/10/2020)   Jae (four) times daily. docusate sodium 100 MG Oral Cap, Take 1 capsule (100 mg total) by mouth 2 (two) times daily. traMADol HCl 50 MG Oral Tab, Take 1 tablet (50 mg total) by mouth every 6 (six) hours as needed for Pain (MODERATE PAIN).   Dulaglutide (TRULI ([facility] Deep vein thrombosis ([facility] Insomnia, Obesity, Other and unspecified hyperlipidemia, Type II or unspecified type diabetes mellitus without mention of complication, not stated as uncontrolled, Unspecified essential hypertension, Unspecified sleep apn Incision on right lateral hip intact, steri strips in place, no bleeding or drainage, covered with clean bandage.  No surrounding erythema/induration  HEENT: atraumatic, normocephalic, ears and throat are clear  EYES: PERRLA, EOMI, conjunctiva are clear  NE

## 2020-03-13 ENCOUNTER — PATIENT MESSAGE (OUTPATIENT)
Dept: INTERNAL MEDICINE CLINIC | Facility: CLINIC | Age: 60
End: 2020-03-13

## 2020-03-13 DIAGNOSIS — E66.01 MORBID OBESITY WITH BMI OF 45.0-49.9, ADULT (HCC): Primary | ICD-10-CM

## 2020-03-17 NOTE — PROGRESS NOTES
POST-OP HIP EVALUATION:   Referring Physician: Dr. 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 Pt voiced understanding and performs HEP correctly without reported pain. Skilled Physical Therapy is medically necessary to address the above impairments and reach functional goals.      Precautions: gait quality with flexed hip/knee, reduction stance time R and step length L with decreased weight shift     Today’s Treatment and Response:   Pt education was provided on exam findings, treatment diagnosis, treatment plan, expectations, and prognosis.  Pt promote safety and decrease risk of falls on uneven surfaces such as grass  · Pt will perform TUG in <10 seconds without AD, demonstrating improved gait speed for improved participation in ADL such as community ambulation  · Pt will be able to squat to pic

## 2020-03-18 ENCOUNTER — OFFICE VISIT (OUTPATIENT)
Dept: PHYSICAL THERAPY | Facility: HOSPITAL | Age: 60
End: 2020-03-18
Attending: PHYSICIAN ASSISTANT
Payer: COMMERCIAL

## 2020-03-18 DIAGNOSIS — Z96.641 STATUS POST RIGHT HIP REPLACEMENT: ICD-10-CM

## 2020-03-18 PROCEDURE — 97162 PT EVAL MOD COMPLEX 30 MIN: CPT

## 2020-03-18 PROCEDURE — 97110 THERAPEUTIC EXERCISES: CPT

## 2020-03-18 NOTE — TELEPHONE ENCOUNTER
From: Chayito Carrington  Sent: 3/18/2020 10:29 AM CDT  To: [facility] Clinical Staff  Subject: RE:follow up    [id number] .  I was not sure how to respond to this email at the time. . To be honest i did not like working with the weight loss clinic as they were push

## 2020-03-19 ENCOUNTER — TELEPHONE (OUTPATIENT)
Dept: INTERNAL MEDICINE CLINIC | Facility: CLINIC | Age: 60
End: 2020-03-19

## 2020-03-19 RX ORDER — PHENTERMINE HYDROCHLORIDE 30 MG/1
30 CAPSULE ORAL EVERY MORNING
Qty: 30 CAPSULE | Refills: 2 | OUTPATIENT
Start: 2020-03-19 | End: 2020-07-28

## 2020-03-20 ENCOUNTER — OFFICE VISIT (OUTPATIENT)
Dept: PHYSICAL THERAPY | Facility: HOSPITAL | Age: 60
End: 2020-03-20
Attending: PHYSICIAN ASSISTANT
Payer: COMMERCIAL

## 2020-03-20 PROCEDURE — 97110 THERAPEUTIC EXERCISES: CPT

## 2020-03-20 PROCEDURE — 97140 MANUAL THERAPY 1/> REGIONS: CPT

## 2020-03-20 PROCEDURE — 97112 NEUROMUSCULAR REEDUCATION: CPT

## 2020-03-20 NOTE — PROGRESS NOTES
Dx: Status post right hip replacement (Q55.129)            Insurance (Authorized # of Visits):  12 per POC, PPO          Authorizing Physician: Dr. Navneet Keller  Next MD visit: none scheduled  Fall Risk: standard         Precautions: n/a             Surgical da ADL such as community ambulation  · Pt will be able to squat to  light objects around the house with no hip pain   · Pt will improve functional hip strength to report ability to ascend/descend 1 flight of stairs reciprocally without use of handrail

## 2020-03-23 ENCOUNTER — APPOINTMENT (OUTPATIENT)
Dept: PHYSICAL THERAPY | Facility: HOSPITAL | Age: 60
End: 2020-03-23
Attending: PHYSICIAN ASSISTANT
Payer: COMMERCIAL

## 2020-03-23 ENCOUNTER — OFFICE VISIT (OUTPATIENT)
Dept: PHYSICAL THERAPY | Age: 60
End: 2020-03-23
Attending: PHYSICIAN ASSISTANT
Payer: COMMERCIAL

## 2020-03-23 DIAGNOSIS — I10 ESSENTIAL HYPERTENSION: Primary | ICD-10-CM

## 2020-03-23 PROCEDURE — 97110 THERAPEUTIC EXERCISES: CPT

## 2020-03-23 PROCEDURE — 97112 NEUROMUSCULAR REEDUCATION: CPT

## 2020-03-23 PROCEDURE — 97140 MANUAL THERAPY 1/> REGIONS: CPT

## 2020-03-23 PROCEDURE — 97116 GAIT TRAINING THERAPY: CPT

## 2020-03-23 RX ORDER — DILTIAZEM HYDROCHLORIDE 180 MG/1
180 CAPSULE, COATED, EXTENDED RELEASE ORAL DAILY
Qty: 90 CAPSULE | Refills: 0 | Status: SHIPPED | OUTPATIENT
Start: 2020-03-23 | End: 2020-06-12

## 2020-03-23 NOTE — PROGRESS NOTES
Dx: Status post right hip replacement (R15.119)   3/2/20           Insurance (Authorized # of Visits):  PPO, 12 per POC           Authorizing Physician: Dr. Pham Duque  Next MD visit: none scheduled  Fall Risk: standard         Precautions: Ant hip:Hip/WB Pre neutral extension w/out difficulty. Continued cueing  to improve gait mechanics throughout session for improved hip flexion and heel to toe gait.      HEP   Continue : AP, heel raises, heel slides, Abd/add, LAQ, glut sets, mini squats   Posterior/lateral while discussing work set up and pain levels and verbal review of precautions  Standing with UE support:   R knee flex x15  - standing march x10   Placement of R LE on/off 6\" step  ( no step up)         NM:  STS raised mat height 24.5\" 2 x 8 with mirror

## 2020-03-23 NOTE — TELEPHONE ENCOUNTER
Fax request from [facility] for a refill on Diltiazem HCl ER (Cartia XT) CP qty 90. Fax placed in triage.

## 2020-03-23 NOTE — TELEPHONE ENCOUNTER
Requesting 90 Day supply  Last time medication was refilled 3/5/2020  Quantity and number of refills 30 w/ 0  Last OV 3/11/2020  Next OV 7/28/2020

## 2020-03-25 ENCOUNTER — APPOINTMENT (OUTPATIENT)
Dept: PHYSICAL THERAPY | Facility: HOSPITAL | Age: 60
End: 2020-03-25
Attending: PHYSICIAN ASSISTANT
Payer: COMMERCIAL

## 2020-03-25 ENCOUNTER — TELEPHONE (OUTPATIENT)
Dept: INTERNAL MEDICINE CLINIC | Facility: CLINIC | Age: 60
End: 2020-03-25

## 2020-03-25 DIAGNOSIS — M54.16 LUMBAR BACK PAIN WITH RADICULOPATHY AFFECTING LEFT LOWER EXTREMITY: ICD-10-CM

## 2020-03-25 RX ORDER — CYCLOBENZAPRINE HCL 10 MG
TABLET ORAL
Qty: 90 TABLET | Refills: 0 | Status: SHIPPED | OUTPATIENT
Start: 2020-03-25 | End: 2020-05-15

## 2020-03-25 NOTE — TELEPHONE ENCOUNTER
Fax request from [facility] for a refill on   CYCLOBENZAPRINE 10 MG Oral Tab 30 tablet     Fax in triage.

## 2020-03-26 ENCOUNTER — PATIENT MESSAGE (OUTPATIENT)
Dept: INTERNAL MEDICINE CLINIC | Facility: CLINIC | Age: 60
End: 2020-03-26

## 2020-03-26 DIAGNOSIS — M16.11 PRIMARY OSTEOARTHRITIS OF RIGHT HIP: ICD-10-CM

## 2020-03-26 RX ORDER — ACETAMINOPHEN AND CODEINE PHOSPHATE 300; 30 MG/1; MG/1
1 TABLET ORAL EVERY 8 HOURS PRN
Qty: 10 TABLET | Refills: 0 | Status: SHIPPED | OUTPATIENT
Start: 2020-03-26 | End: 2020-05-15

## 2020-03-26 NOTE — TELEPHONE ENCOUNTER
From: Geoff Dorsey  Sent: 3/26/2020 11:49 AM CDT  To: [facility] Clinical Staff  Subject: RE: Prescription Question    Sally on constitution   Thanks. General Danker I am 1000% better its just some nights after over doing it. Phew. . not sure why aneudy rushing the

## 2020-03-27 ENCOUNTER — APPOINTMENT (OUTPATIENT)
Dept: PHYSICAL THERAPY | Facility: HOSPITAL | Age: 60
End: 2020-03-27
Attending: PHYSICIAN ASSISTANT
Payer: COMMERCIAL

## 2020-03-27 ENCOUNTER — OFFICE VISIT (OUTPATIENT)
Dept: PHYSICAL THERAPY | Age: 60
End: 2020-03-27
Attending: PHYSICIAN ASSISTANT
Payer: COMMERCIAL

## 2020-03-27 DIAGNOSIS — Z79.4 TYPE 2 DIABETES MELLITUS WITHOUT COMPLICATION, WITH LONG-TERM CURRENT USE OF INSULIN (HCC): Primary | ICD-10-CM

## 2020-03-27 DIAGNOSIS — E11.9 TYPE 2 DIABETES MELLITUS WITHOUT COMPLICATION, WITH LONG-TERM CURRENT USE OF INSULIN (HCC): Primary | ICD-10-CM

## 2020-03-27 PROCEDURE — 97116 GAIT TRAINING THERAPY: CPT

## 2020-03-27 PROCEDURE — 97112 NEUROMUSCULAR REEDUCATION: CPT

## 2020-03-27 PROCEDURE — 97110 THERAPEUTIC EXERCISES: CPT

## 2020-03-27 PROCEDURE — 97140 MANUAL THERAPY 1/> REGIONS: CPT

## 2020-03-27 RX ORDER — LIRAGLUTIDE 6 MG/ML
1.8 INJECTION SUBCUTANEOUS DAILY
Qty: 3 PEN | Refills: 1 | Status: SHIPPED | OUTPATIENT
Start: 2020-03-27 | End: 2020-05-15

## 2020-03-27 NOTE — PROGRESS NOTES
Dx: Status post right hip replacement (M22.503)   3/2/20           Insurance (Authorized # of Visits):  PPO, 12 per POC           Authorizing Physician: Dr. Cassie Morejon MD visit: none scheduled  Fall Risk: standard         Precautions: Ant hip:Hip/WB Pre w/out difficulty. Continued cueing  to improve gait mechanics throughout session for improved hip flexion and heel to toe gait.      HEP   Continue : AP, heel raises, heel slides, Abd/add, LAQ, glut sets, mini squats   Posterior/lateral  weight shift onto 8    6 mins warm up while discussing work set up and pain levels and verbal review of precautions  Standing with UE support:   R knee flex x15  - standing march x10   Placement of R LE on/off 6\" step  ( no step up)    10 mins while discussing activity lev QS  -hook lying march x10 R/L ( mild discomfort R)     Ice x 15' supine with 1/2 FR under knee Ice 15 mins, no foam roll or pillow  deferred       Charges: Manual x 1; TE x 1; NM x 1 , gt      Total Timed Treatment: 55 min  Total Treatment Time: 55 min

## 2020-03-30 ENCOUNTER — OFFICE VISIT (OUTPATIENT)
Dept: PHYSICAL THERAPY | Facility: HOSPITAL | Age: 60
End: 2020-03-30
Attending: PHYSICIAN ASSISTANT
Payer: COMMERCIAL

## 2020-03-30 PROCEDURE — 97110 THERAPEUTIC EXERCISES: CPT

## 2020-03-30 PROCEDURE — 97112 NEUROMUSCULAR REEDUCATION: CPT

## 2020-03-30 PROCEDURE — 97140 MANUAL THERAPY 1/> REGIONS: CPT

## 2020-03-30 NOTE — PROGRESS NOTES
Dx: Status post right hip replacement (N34.989)   3/2/20           Insurance (Authorized # of Visits):  PPO, 12 per POC           Authorizing Physician: Dr. Jeff Morejon MD visit: thinks about 4 weeks out  Fall Risk: standard         Precautions: Ant hip: 10 x 2 x 2 daily    Goals: (To be met in 12 visits)   · Pt will have improved hip AROM Flex to 100 deg and ABD to 30 deg to be able to don/doff shoes and perform car transfers without difficulty  MET  · Pt will improve hip ABD and ER strength to 4/5 to inc step up)     TE:   Nu-step L4 seat with UES, subj hx, 6 min  -hook lying march x10 R/L ( mild discomfort R) x 2  -TKE ball into wall 10 x 10s  Hip abduction taps standing x 10 ea x 2 BUE support balance   NM:  STS raised mat height 24.5\" 2 x 8 with mirror SERENE larry, 6 mins     Ice x 15' supine with 1/2 FR under knee Ice 15 mins, no foam roll or pillow  deferred Ice x 15'     Charges: Manual x 1; TE x 1; NM x 1     Total Timed Treatment: 45 min  Total Treatment Time: 60 min

## 2020-03-31 ENCOUNTER — TELEPHONE (OUTPATIENT)
Dept: INTERNAL MEDICINE CLINIC | Facility: CLINIC | Age: 60
End: 2020-03-31

## 2020-03-31 NOTE — TELEPHONE ENCOUNTER
Spoke with pt Dr. Samm Mesa prescribed Trulicity and he is aware pt is taking Victoza and wants pt taking both medications. Fax completed and returned.

## 2020-04-01 ENCOUNTER — APPOINTMENT (OUTPATIENT)
Dept: PHYSICAL THERAPY | Facility: HOSPITAL | Age: 60
End: 2020-04-01
Attending: PHYSICIAN ASSISTANT
Payer: COMMERCIAL

## 2020-04-02 ENCOUNTER — OFFICE VISIT (OUTPATIENT)
Dept: PHYSICAL THERAPY | Facility: HOSPITAL | Age: 60
End: 2020-04-02
Attending: PHYSICIAN ASSISTANT
Payer: COMMERCIAL

## 2020-04-02 PROCEDURE — 97110 THERAPEUTIC EXERCISES: CPT

## 2020-04-02 PROCEDURE — 97112 NEUROMUSCULAR REEDUCATION: CPT

## 2020-04-02 PROCEDURE — 97140 MANUAL THERAPY 1/> REGIONS: CPT

## 2020-04-02 NOTE — PROGRESS NOTES
Dx: Status post right hip replacement (Z06.867)   3/2/20           Insurance (Authorized # of Visits):  PPO, 12 per POC           Authorizing Physician: Dr. Ashly Manrique (Surgeon: Dr. Jose D Melgar) Next MD visit: 04/14/2020  Fall Risk: standard         Precautions: Ant relative to hip 5s hold R x 10 x 2 x 2 daily  Lateral weight shift (feet directly below hip joints to maintain no cross midline precaution) 5s hold R x 10 x 2 x 2 daily    Goals: (To be met in 12 visits)   · Pt will have improved hip AROM Flex to 100 deg a SPC    -standing march R/L x10  Standing R KF x20 with UE support     Alternate placement of R/L Les on 6\" step with UE support ( no step up)     TE:   TE:   Nu-step L4 seat with UES, subj hx, 6 min  -hook lying march x10 R/L ( mild discomfort R) x 2  -TK training // bars working heel to toe, light UE usage with R stance     Manual:  PROM by PT focus flexion and within precautions grossly 10 IR, <5 ER for pain relief and to reduce guarding  Light STM TFL, proximal rectus femoris, sartorius with bolster unde

## 2020-04-03 ENCOUNTER — APPOINTMENT (OUTPATIENT)
Dept: PHYSICAL THERAPY | Facility: HOSPITAL | Age: 60
End: 2020-04-03
Attending: PHYSICIAN ASSISTANT
Payer: COMMERCIAL

## 2020-04-06 ENCOUNTER — TELEPHONE (OUTPATIENT)
Dept: INTERNAL MEDICINE CLINIC | Facility: CLINIC | Age: 60
End: 2020-04-06

## 2020-04-06 ENCOUNTER — OFFICE VISIT (OUTPATIENT)
Dept: PHYSICAL THERAPY | Age: 60
End: 2020-04-06
Attending: PHYSICIAN ASSISTANT
Payer: COMMERCIAL

## 2020-04-06 PROCEDURE — 97110 THERAPEUTIC EXERCISES: CPT

## 2020-04-06 PROCEDURE — 97112 NEUROMUSCULAR REEDUCATION: CPT

## 2020-04-06 NOTE — TELEPHONE ENCOUNTER
Fax request from [facility] for a refill on Clonazepam 1mg and Phentermine 30 mgs . Faxes in triage.

## 2020-04-06 NOTE — PROGRESS NOTES
Dx: Status post right hip replacement (J38.582)   3/2/20           Insurance (Authorized # of Visits):  PPO, 12 per POC           Authorizing Physician: Dr. Gilda Rubin (Surgeon: Dr. Junito Arevalo) Next MD visit: 04/14/2020  Fall Risk: standard         Precautions: Ant ABD and ER strength to 4/5 to increase ease with standing and walking In progress  · Pt will demonstrate improved SLS to >10 seconds JACKSON to promote safety and decrease risk of falls on uneven surfaces such as grass  In progress  · Pt will perform TUG in <1 TE:   Nu-step L4 seat with UES, subj hx, 5 min  -isometric abd into PT in sidelying pillow between thighs 5s x 8  2  -hip adduction stretch 3 x 25s, cues upright trunk  -TKE ball into wall 10 x 10s  Hip abduction taps standing x 10 ea x 2 BUE support waldo LUE support x 10, 2 sets    (cues for hip symmetry and posture)   Manual:  PROM by PT focus flexion and within precautions grossly 10 IR, <5 ER for pain relief and to reduce guarding  Light STM TFL, proximal rectus femoris, sartorius with bolster under kne

## 2020-04-08 ENCOUNTER — APPOINTMENT (OUTPATIENT)
Dept: PHYSICAL THERAPY | Facility: HOSPITAL | Age: 60
End: 2020-04-08
Attending: PHYSICIAN ASSISTANT
Payer: COMMERCIAL

## 2020-04-09 ENCOUNTER — OFFICE VISIT (OUTPATIENT)
Dept: PHYSICAL THERAPY | Age: 60
End: 2020-04-09
Attending: INTERNAL MEDICINE
Payer: COMMERCIAL

## 2020-04-09 ENCOUNTER — TELEPHONE (OUTPATIENT)
Dept: INTERNAL MEDICINE CLINIC | Facility: CLINIC | Age: 60
End: 2020-04-09

## 2020-04-09 ENCOUNTER — APPOINTMENT (OUTPATIENT)
Dept: PHYSICAL THERAPY | Facility: HOSPITAL | Age: 60
End: 2020-04-09
Attending: PHYSICIAN ASSISTANT
Payer: COMMERCIAL

## 2020-04-09 PROCEDURE — 97110 THERAPEUTIC EXERCISES: CPT

## 2020-04-09 PROCEDURE — 97112 NEUROMUSCULAR REEDUCATION: CPT

## 2020-04-09 NOTE — TELEPHONE ENCOUNTER
Fax request from [facility] for a refill on Phentermine HCl 30 MG Oral Cap for 3 months. Please see fax in triage.

## 2020-04-09 NOTE — PROGRESS NOTES
Dx: Status post right hip replacement (Q62.357)   3/2/20           Insurance (Authorized # of Visits):  PPO, 12 per POC           Authorizing Physician: Dr. Jordyn Florez (Surgeon: Dr. Delle Aschoff) Next MD visit: 04/14/2020  Fall Risk: standard         Precautions: Ant hip will be able to squat to  light objects around the house with no hip pain   · Pt will improve functional hip strength to report ability to ascend/descend 1 flight of stairs reciprocally without use of handrail (progressing)  · Patient will be able t flex  -hip ABD TE: 15 min    Nu-step L6 LE reciprocal AROM, cues for LE correct hip ADD/IR  5 min   NM:  STS raised mat height 24.5\" 2 x 8 with mirror for visual feedback to work on midline maintenance, work on timing hip and knee flexion, cues squeeze bu toe, light UE usage with R stance  NM: 30 min   In p bars:  4\" FSU LUE support x 10, 2 sets    (cues for hip symmetry and posture)  In p bars:  4\" FSU light B UE support x 10, 2 sets    (tactile cues at knee correct R hip IR/ADD)   Manual:  PROM by PT fo 10 2 sets each  -B UE support with L swings  -single UE with R swings      Charges:  TE x 1; NM x 2     Total Timed Treatment: 45 min  Total Treatment Time:  45 min

## 2020-04-10 ENCOUNTER — APPOINTMENT (OUTPATIENT)
Dept: PHYSICAL THERAPY | Facility: HOSPITAL | Age: 60
End: 2020-04-10
Attending: INTERNAL MEDICINE
Payer: COMMERCIAL

## 2020-04-10 ENCOUNTER — APPOINTMENT (OUTPATIENT)
Dept: PHYSICAL THERAPY | Facility: HOSPITAL | Age: 60
End: 2020-04-10
Attending: PHYSICIAN ASSISTANT
Payer: COMMERCIAL

## 2020-04-13 ENCOUNTER — OFFICE VISIT (OUTPATIENT)
Dept: PHYSICAL THERAPY | Age: 60
End: 2020-04-13
Attending: PHYSICIAN ASSISTANT
Payer: COMMERCIAL

## 2020-04-13 PROCEDURE — 97110 THERAPEUTIC EXERCISES: CPT

## 2020-04-13 PROCEDURE — 97112 NEUROMUSCULAR REEDUCATION: CPT

## 2020-04-13 NOTE — PROGRESS NOTES
Dx: Status post right hip replacement (N85.455)   3/2/20           Insurance (Authorized # of Visits):  PPO, 12 per POC           Authorizing Physician: Dr. Ian Lu (Surgeon: Dr. Sebas Swan) Next MD visit: 04/14/2020  Fall Risk: standard         Precautions: Ant with standing and walking In progress  · Pt will demonstrate improved SLS to >10 seconds JACKSON to promote safety and decrease risk of falls on uneven surfaces such as grass  In progress  · Pt will perform TUG in <10 seconds without AD, demonstrating improved standing x 10 ea x 2 BUE support balance TE: 15 min  Nu-step L5 seat with UES, subj hx, 5 min    Supine: hip PROM -IR/ER in 90/90 position  -hip flex  -hip ABD TE: 15 min    Nu-step L6 LE reciprocal AROM, cues for LE correct hip ADD/IR  5 min TherEx:  -Nu- leg slides into wall, light pressure ( hip flex/abdAROM) x 5 3 sets  (HEP)  -as above with R SLS and L slides, x 5 reps   -will defer for HEP 2-3 days then resume NMR:  - L SLS perform R lower leg slides into wall, light pressure (hip flex/abdAROM) x 5 rep symmetry x 20 reps            Ice 15 mins, no foam roll or pillow  deferred  Ice x 15' In p bars:  SLS with contra LE swings with heel/toe taps (cues for hip symmetry and posture)  X 10 2 sets each  -B UE support with L swings  -single UE with R swings

## 2020-04-15 ENCOUNTER — OFFICE VISIT (OUTPATIENT)
Dept: PHYSICAL THERAPY | Age: 60
End: 2020-04-15
Attending: PHYSICIAN ASSISTANT
Payer: COMMERCIAL

## 2020-04-15 PROCEDURE — 97112 NEUROMUSCULAR REEDUCATION: CPT

## 2020-04-15 PROCEDURE — 97110 THERAPEUTIC EXERCISES: CPT

## 2020-04-15 NOTE — PROGRESS NOTES
Dx: Status post right hip replacement (W40.390)   3/2/20           Insurance (Authorized # of Visits):  PPO, 12 per POC           Authorizing Physician: Dr. Brent Moreno (Surgeon: Dr. Dexter Carroll) Next MD visit: 04/14/2020  Fall Risk: standard         Precautions: Ant standing and walking In progress  · Pt will demonstrate improved SLS to >10 seconds JACKSON to promote safety and decrease risk of falls on uneven surfaces such as grass  In progress  · Pt will perform TUG in <10 seconds without AD, demonstrating improved gait standing x 10 ea x 2 BUE support balance TE: 15 min  Nu-step L5 seat with UES, subj hx, 5 min    Supine: hip PROM -IR/ER in 90/90 position  -hip flex  -hip ABD TE: 15 min    Nu-step L6 LE reciprocal AROM, cues for LE correct hip ADD/IR  5 min TherEx:  -Nu- side:  -R hip ER (clam) AROM in mid range x 10 2 sets        Prone:  -R knee flexion x 20  -R hip hip IR/ER with flexed knee x 15 On L side:  -R hip ER (clam) AROM in mid range, hold 5 seconds x 15  (add HEP)      NM: 30 min  Stand: R side to wall:  -L UE posture)      Stand: R side to wall:  -L UE with chair for support, maintain posture, perform R lower leg slides into wall, light pressure ( hip flex/abdAROM) x 5 3 sets  -attempt with R SLS and L slides, unable to maintain position, defer  4\" L and R LSU

## 2020-04-16 ENCOUNTER — APPOINTMENT (OUTPATIENT)
Dept: LAB | Facility: HOSPITAL | Age: 60
End: 2020-04-16
Attending: UROLOGY
Payer: COMMERCIAL

## 2020-04-16 ENCOUNTER — APPOINTMENT (OUTPATIENT)
Dept: PHYSICAL THERAPY | Age: 60
End: 2020-04-16
Attending: PHYSICIAN ASSISTANT
Payer: COMMERCIAL

## 2020-04-16 DIAGNOSIS — C61 PROSTATE CANCER (HCC): ICD-10-CM

## 2020-04-16 DIAGNOSIS — E66.01 MORBID OBESITY WITH BMI OF 45.0-49.9, ADULT (HCC): ICD-10-CM

## 2020-04-16 PROCEDURE — 36415 COLL VENOUS BLD VENIPUNCTURE: CPT

## 2020-04-16 PROCEDURE — 84153 ASSAY OF PSA TOTAL: CPT

## 2020-04-17 ENCOUNTER — APPOINTMENT (OUTPATIENT)
Dept: PHYSICAL THERAPY | Facility: HOSPITAL | Age: 60
End: 2020-04-17
Attending: PHYSICIAN ASSISTANT
Payer: COMMERCIAL

## 2020-04-17 RX ORDER — PHENTERMINE HYDROCHLORIDE 30 MG/1
CAPSULE ORAL
Qty: 30 CAPSULE | Refills: 0 | OUTPATIENT
Start: 2020-04-17

## 2020-04-20 ENCOUNTER — OFFICE VISIT (OUTPATIENT)
Dept: PHYSICAL THERAPY | Age: 60
End: 2020-04-20
Attending: PHYSICIAN ASSISTANT
Payer: COMMERCIAL

## 2020-04-20 PROCEDURE — 97112 NEUROMUSCULAR REEDUCATION: CPT

## 2020-04-20 PROCEDURE — 97110 THERAPEUTIC EXERCISES: CPT

## 2020-04-20 NOTE — PROGRESS NOTES
Dx: Status post right hip replacement (J61.525)   3/2/20           Insurance (Authorized # of Visits):  PPO, 12 per POC           Authorizing Physician: Dr. Aldana Speaker (Surgeon: Dr. Sergei Valles) Next MD visit: Needs to schedule  Fall Risk: standard         Precaution the house with no hip pain   · Pt will improve functional hip strength to report ability to ascend/descend 1 flight of stairs reciprocally without use of handrail (progressing)  · Patient will be able to complete STS without use of UEs. (progressing)  · Pt -SLS, verbal cues for form, 5x30 sec hold   NM:   4\" FSU LUE support x 9, x 10 (cues trunk lean/coordintation), mod to heavy UE support LUE  STS raised mat height 24\", taper 23\" symmetrical WB  STS 22\" 2 x 8, fatigues end reps, continued focus equal to wall:  -L UE with chair for support, maintain posture, perform R lower leg slides into wall, light pressure ( hip flex/abdAROM) x 5 3 sets  -attempt with R SLS and L slides, unable to maintain position, defer  4\" L and R LSU, B UE support x 10 each sameer

## 2020-04-22 ENCOUNTER — OFFICE VISIT (OUTPATIENT)
Dept: PHYSICAL THERAPY | Age: 60
End: 2020-04-22
Attending: PHYSICIAN ASSISTANT
Payer: COMMERCIAL

## 2020-04-22 PROCEDURE — 97110 THERAPEUTIC EXERCISES: CPT

## 2020-04-22 PROCEDURE — 97112 NEUROMUSCULAR REEDUCATION: CPT

## 2020-04-22 NOTE — PROGRESS NOTES
Dx: Status post right hip replacement (T14.537)   3/2/20           Insurance (Authorized # of Visits):  PPO, 12 per POC           Authorizing Physician: Dr. Jarred Parry (Surgeon: Dr. Jaclyn Mcclure) Next MD visit: Needs to schedule  Fall Risk: standard         Precaution <10 seconds without AD, demonstrating improved gait speed for improved participation in ADL such as community ambulation  · Pt will be able to squat to  light objects around the house with no hip pain Progressing 75%  · Pt will improve functional hi flex  -hip ABD TE: 15 min    Nu-step L6 LE reciprocal AROM, cues for LE correct hip ADD/IR  5 min TherEx:  -Nu-step L6 LE reciprocal AROM 5 min  - 4 inch FSU R+L [id number] no UE support  - 4 inch LSU R+L 2z10 reps no UE support   - Standing hip abduction an perform R lower leg slides into wall, light pressure ( hip flex/abdAROM) x 5 3 sets  (HEP)  -as above with R SLS and L slides, x 5 reps   -will defer for HEP 2-3 days then resume NMR:  - L SLS perform R lower leg slides into wall, light pressure (hip flex/ bars:  SLS with contra LE swings with heel/toe taps (cues for hip symmetry and posture)  X 10 2 sets each  -B UE support with L swings  -single UE with R swings          Charges:  TE x 1; NM x 1     Total Timed Treatment: 35 min  Total Treatment Time:  35

## 2020-04-23 ENCOUNTER — APPOINTMENT (OUTPATIENT)
Dept: PHYSICAL THERAPY | Age: 60
End: 2020-04-23
Attending: PHYSICIAN ASSISTANT
Payer: COMMERCIAL

## 2020-04-23 ENCOUNTER — APPOINTMENT (OUTPATIENT)
Dept: PHYSICAL THERAPY | Facility: HOSPITAL | Age: 60
End: 2020-04-23
Attending: PHYSICIAN ASSISTANT
Payer: COMMERCIAL

## 2020-04-27 DIAGNOSIS — F41.1 GENERALIZED ANXIETY DISORDER: ICD-10-CM

## 2020-04-27 RX ORDER — CLONAZEPAM 1 MG/1
TABLET ORAL
Qty: 60 TABLET | Refills: 0 | Status: SHIPPED | OUTPATIENT
Start: 2020-04-27 | End: 2020-05-18

## 2020-04-29 ENCOUNTER — OFFICE VISIT (OUTPATIENT)
Dept: PHYSICAL THERAPY | Age: 60
End: 2020-04-29
Attending: PHYSICIAN ASSISTANT
Payer: COMMERCIAL

## 2020-04-29 PROCEDURE — 97110 THERAPEUTIC EXERCISES: CPT

## 2020-04-29 PROCEDURE — 97112 NEUROMUSCULAR REEDUCATION: CPT

## 2020-05-06 ENCOUNTER — OFFICE VISIT (OUTPATIENT)
Dept: PHYSICAL THERAPY | Age: 60
End: 2020-05-06
Attending: PHYSICIAN ASSISTANT
Payer: COMMERCIAL

## 2020-05-06 PROCEDURE — 97112 NEUROMUSCULAR REEDUCATION: CPT

## 2020-05-06 PROCEDURE — 97110 THERAPEUTIC EXERCISES: CPT

## 2020-05-06 NOTE — PROGRESS NOTES
Dx: Status post right hip replacement (T95.571)   3/2/20           Insurance (Authorized # of Visits):  PPO, 18 per POC           Authorizing Physician: Dr. Grupo Black (Surgeon: Dr. Goodwin Current) Next MD visit: Needs to schedule  Fall Risk: standard         Precaution able to squat to  light objects around the house with no hip pain Progressing 75%  · Pt will improve functional hip strength to report ability to ascend/descend 1 flight of stairs reciprocally without use of handrail (progressing) MET 5/6/2020  · Pa R+L  YTB at knees 3 laps, YTB at ankles 3 laps TherEx:  -Nu-step L6 LE reciprocal AROM 5 min  -Step ups on 2 inch step and airex R+L 3x10 reps   -Lateral side stepping R+L RTB at knees 3 laps   - Mini lunges R+L 2x10 with UE support   -Pelvic tilt 10 reps NMR:  -Marching on airex, 2x60 sec  -Bilateral stance on green disc 3x30 second holds  -SL stance 30 seconds on L, 30 second on R with 3 UE touch down     In p bars:  4\" FSU light B UE support x 10, 2 sets    (tactile cues at knee correct R hip IR/ADD)

## 2020-05-13 ENCOUNTER — OFFICE VISIT (OUTPATIENT)
Dept: PHYSICAL THERAPY | Age: 60
End: 2020-05-13
Attending: PHYSICIAN ASSISTANT
Payer: COMMERCIAL

## 2020-05-13 PROCEDURE — 97112 NEUROMUSCULAR REEDUCATION: CPT

## 2020-05-13 PROCEDURE — 97110 THERAPEUTIC EXERCISES: CPT

## 2020-05-13 NOTE — PROGRESS NOTES
Dx: Status post right hip replacement (I38.590)   3/2/20           Insurance (Authorized # of Visits):  PPO, 18 per POC           Authorizing Physician: Dr. Joel Wilson (Surgeon: Dr. Sanam Cartagena) Next MD visit: Needs to schedule  Fall Risk: standard         Precaution will be able to squat to  light objects around the house with no hip pain Progressing 75%  · Pt will improve functional hip strength to report ability to ascend/descend 1 flight of stairs reciprocally without use of handrail (progressing) MET 5/6/20 march R+L 20 reps   -Bridges with march 10 reps (stopped due to decreased control the R)   -Bridges with calms red theraband 2x10 reps   -stair assessment  TherEx:  -Nu-step L6 LE reciprocal AROM 5 min  - Mini lunges R+L 2x10 with UE support   -Step up [employer]

## 2020-05-15 ENCOUNTER — OFFICE VISIT (OUTPATIENT)
Dept: INTERNAL MEDICINE CLINIC | Facility: CLINIC | Age: 60
End: 2020-05-15
Payer: COMMERCIAL

## 2020-05-15 VITALS
TEMPERATURE: 98 F | HEIGHT: 71 IN | RESPIRATION RATE: 16 BRPM | DIASTOLIC BLOOD PRESSURE: 80 MMHG | WEIGHT: 306 LBS | HEART RATE: 98 BPM | OXYGEN SATURATION: 96 % | SYSTOLIC BLOOD PRESSURE: 132 MMHG | BODY MASS INDEX: 42.84 KG/M2

## 2020-05-15 DIAGNOSIS — I87.2 VENOUS INSUFFICIENCY OF BOTH LOWER EXTREMITIES: Primary | ICD-10-CM

## 2020-05-15 PROCEDURE — 99214 OFFICE O/P EST MOD 30 MIN: CPT | Performed by: INTERNAL MEDICINE

## 2020-05-15 NOTE — PATIENT INSTRUCTIONS
Understanding Chronic Venous Insufficiency  Problems with the veins in the legs may lead to chronic venous insufficiency (CVI). CVI means that there is a long-term problem with the veins not being able to pump blood back to your heart.  When this happens, · If you must stand or sit in one place for a period of time, keep your blood moving by wiggling your toes, shifting your body position, and rising up on the balls of your feet.     Vance last reviewed this educational content on 10/1/2019  © [id number] T

## 2020-05-15 NOTE — PROGRESS NOTES
HPI:    Patient ID: Daysi Clay is a 64year old male. Swelling   This is a new problem. The current episode started 1 to 4 weeks ago. The problem occurs daily. The problem has been waxing and waning.  Pertinent negatives include no abdominal pain, a Beads 180 MG Oral Capsule SR 24 Hr Take 1 capsule (180 mg total) by mouth daily. 90 capsule 0   • Phentermine HCl 30 MG Oral Cap Take 1 capsule (30 mg total) by mouth every morning.  30 capsule 2   • acetaminophen 500 MG Oral Tab Take 1 tablet (500 mg total diaphoretic. Psychiatric: He has a normal mood and affect. His behavior is normal.   Nursing note and vitals reviewed.              ASSESSMENT/PLAN:   Venous insufficiency of both lower extremities  (primary encounter diagnosis)   - can increase lasix to

## 2020-05-18 DIAGNOSIS — E66.01 MORBID OBESITY WITH BMI OF 45.0-49.9, ADULT (HCC): ICD-10-CM

## 2020-05-18 DIAGNOSIS — F41.1 GENERALIZED ANXIETY DISORDER: ICD-10-CM

## 2020-05-18 RX ORDER — CLONAZEPAM 1 MG/1
1 TABLET ORAL 2 TIMES DAILY PRN
Qty: 60 TABLET | Refills: 0 | Status: SHIPPED | OUTPATIENT
Start: 2020-05-18 | End: 2020-06-19

## 2020-05-18 RX ORDER — PHENTERMINE HYDROCHLORIDE 30 MG/1
30 CAPSULE ORAL EVERY MORNING
Qty: 30 CAPSULE | Refills: 2 | OUTPATIENT
Start: 2020-05-18

## 2020-05-20 ENCOUNTER — OFFICE VISIT (OUTPATIENT)
Dept: PHYSICAL THERAPY | Age: 60
End: 2020-05-20
Attending: PHYSICIAN ASSISTANT
Payer: COMMERCIAL

## 2020-05-20 PROCEDURE — 97112 NEUROMUSCULAR REEDUCATION: CPT

## 2020-05-20 PROCEDURE — 97110 THERAPEUTIC EXERCISES: CPT

## 2020-05-20 NOTE — PROGRESS NOTES
Dx: Status post right hip replacement (X12.827)   3/2/20           Insurance (Authorized # of Visits):  PPO, 18 per POC           Authorizing Physician: Dr. Debbie Vance (Surgeon: Dr. Abbe Farias) Next MD visit: Needs to schedule  Fall Risk: standard         Precaution complete STS without use of UEs. · Pt will be independent and compliant with comprehensive HEP to maintain progress achieved in PT    Plan: Continue PT, reassess SLS. Progress hip strengthening and balance exercises.     Date: 4/15  TX#: 10/12 Date: 4/20 dune R+L 2x10 reps each   - Pelvic tilt and march R+L 20 reps   --Lambert Damian with march 2x10 reps    TherEx:  -Nu-step L6 LE reciprocal AROM 5 min  - Mini lunges R+L 2x10 with UE support   -Step up onto sand dune R+L 2x10 reps each   - Pelvic tilt and march R

## 2020-05-26 DIAGNOSIS — M54.16 LUMBAR BACK PAIN WITH RADICULOPATHY AFFECTING LEFT LOWER EXTREMITY: ICD-10-CM

## 2020-05-26 RX ORDER — CYCLOBENZAPRINE HCL 10 MG
TABLET ORAL
Qty: 30 TABLET | Refills: 0 | OUTPATIENT
Start: 2020-05-26

## 2020-05-27 ENCOUNTER — OFFICE VISIT (OUTPATIENT)
Dept: PHYSICAL THERAPY | Age: 60
End: 2020-05-27
Attending: PHYSICIAN ASSISTANT
Payer: COMMERCIAL

## 2020-05-27 PROCEDURE — 97112 NEUROMUSCULAR REEDUCATION: CPT

## 2020-05-27 PROCEDURE — 97530 THERAPEUTIC ACTIVITIES: CPT

## 2020-05-27 NOTE — PROGRESS NOTES
Dx: Status post right hip replacement (O48.853)   3/2/20           Insurance (Authorized # of Visits):  PPO, 18 per POC           Authorizing Physician: Dr. Navneet Keller (Surgeon: Dr. Radha Fair) Next MD visit: 6/2/2020  Fall Risk: standard         Precautions: Ant hi participation in ADL such as community ambulation  · Pt will be able to squat to  light objects around the house with no hip pain Progressing 75%  · Pt will improve functional hip strength to report ability to ascend/descend 1 flight of stairs recip laps, YTB at ankles 3 laps TherEx:  -Nu-step L6 LE reciprocal AROM 5 min  -Step ups on 2 inch step and airex R+L 3x10 reps   -Lateral side stepping R+L RTB at knees 3 laps   - Mini lunges R+L 2x10 with UE support   -Pelvic tilt 10 reps   - Pelvic tilt and seconds  -Bilateral stance on green disc 3x30 second holds NMR:  -Marching on airex, 2x60 sec  -Bilateral stance on green disc 3x30 second holds  -SL stance 30 seconds on L, 30 second on R with 3 UE touch down  NMR:  -Bilateral stance on green disc 3x30 se

## 2020-06-10 ENCOUNTER — OFFICE VISIT (OUTPATIENT)
Dept: PHYSICAL THERAPY | Age: 60
End: 2020-06-10
Attending: PHYSICIAN ASSISTANT
Payer: COMMERCIAL

## 2020-06-10 PROCEDURE — 97112 NEUROMUSCULAR REEDUCATION: CPT

## 2020-06-10 PROCEDURE — 97110 THERAPEUTIC EXERCISES: CPT

## 2020-06-10 NOTE — PROGRESS NOTES
Dx: Status post right hip replacement (Y95.721)   3/2/20           Insurance (Authorized # of Visits):  PPO, 20 per POC           Authorizing Physician: Dr. Jalyn Mohr (Surgeon: Dr. Micha Webster) Next MD visit: 6/2/2020  Fall Risk: standard         Precautions: Ant hi 13/18 Date: 5/6  TX#: 14/18 Date: 5/13  TX#: 15/18 Date: 5/20  TX#: 16/18 Date: 5/27  TX#: 17/18 Date: 6/10  TX#: 18/20   TherEx:  -Nu-step L6 LE reciprocal AROM 5 min  -Mini squats 3x10 reps, focusing on using gluteal muscles   -Stairs 2 flights up and do min  -Trunk flexion stretch while holding bars in sitting, 10x15 sec hold with hand R 5x10 sec hold; with hands L 5x10 sec hold  - Mini lunges R+L 2x10 with UE support   - Bridges with march 3x5 reps  -Posterior pelvic tilt with sequential marching, 2x10 r

## 2020-06-12 DIAGNOSIS — I10 ESSENTIAL HYPERTENSION: ICD-10-CM

## 2020-06-12 DIAGNOSIS — I10 BENIGN ESSENTIAL HTN: ICD-10-CM

## 2020-06-12 DIAGNOSIS — E11.9 TYPE 2 DIABETES MELLITUS WITHOUT COMPLICATION, WITH LONG-TERM CURRENT USE OF INSULIN (HCC): ICD-10-CM

## 2020-06-12 DIAGNOSIS — M54.16 LUMBAR BACK PAIN WITH RADICULOPATHY AFFECTING LEFT LOWER EXTREMITY: ICD-10-CM

## 2020-06-12 DIAGNOSIS — Z79.4 TYPE 2 DIABETES MELLITUS WITHOUT COMPLICATION, WITH LONG-TERM CURRENT USE OF INSULIN (HCC): ICD-10-CM

## 2020-06-12 RX ORDER — CARVEDILOL 25 MG/1
TABLET ORAL
Qty: 180 TABLET | Refills: 3 | Status: ON HOLD | OUTPATIENT
Start: 2020-06-12 | End: 2021-01-26

## 2020-06-12 RX ORDER — LIRAGLUTIDE 6 MG/ML
INJECTION SUBCUTANEOUS
Qty: 9 ML | Refills: 11 | OUTPATIENT
Start: 2020-06-12

## 2020-06-12 RX ORDER — CYCLOBENZAPRINE HCL 10 MG
TABLET ORAL
Qty: 90 TABLET | Refills: 3 | OUTPATIENT
Start: 2020-06-12

## 2020-06-12 RX ORDER — FUROSEMIDE 20 MG/1
TABLET ORAL
Qty: 90 TABLET | Refills: 3 | Status: ON HOLD | OUTPATIENT
Start: 2020-06-12 | End: 2021-01-26

## 2020-06-12 RX ORDER — DILTIAZEM HYDROCHLORIDE 180 MG/1
CAPSULE, COATED, EXTENDED RELEASE ORAL
Qty: 90 CAPSULE | Refills: 3 | Status: ON HOLD | OUTPATIENT
Start: 2020-06-12 | End: 2021-01-26

## 2020-06-12 RX ORDER — LOSARTAN POTASSIUM 100 MG/1
TABLET ORAL
Qty: 90 TABLET | Refills: 3 | Status: ON HOLD | OUTPATIENT
Start: 2020-06-12 | End: 2021-01-26

## 2020-06-12 NOTE — TELEPHONE ENCOUNTER
LMOVM to [facility] to verify Trulicity or Victoza     Spoke with patient and he confirmed that he was no longer on Victoza as he is on Trulicity. Understanding was expressed.     Carvedilol  Last time medication was refilled 1/7/2020  Quantity and number of refil

## 2020-06-19 DIAGNOSIS — F41.1 GENERALIZED ANXIETY DISORDER: ICD-10-CM

## 2020-06-19 RX ORDER — CLONAZEPAM 1 MG/1
1 TABLET ORAL 2 TIMES DAILY PRN
Qty: 60 TABLET | Refills: 0 | Status: SHIPPED | OUTPATIENT
Start: 2020-06-19 | End: 2020-07-17

## 2020-06-24 ENCOUNTER — APPOINTMENT (OUTPATIENT)
Dept: PHYSICAL THERAPY | Age: 60
End: 2020-06-24
Attending: PHYSICIAN ASSISTANT
Payer: COMMERCIAL

## 2020-07-09 ENCOUNTER — APPOINTMENT (OUTPATIENT)
Dept: LAB | Facility: HOSPITAL | Age: 60
End: 2020-07-09
Attending: UROLOGY
Payer: COMMERCIAL

## 2020-07-09 DIAGNOSIS — C61 PROSTATE CANCER (HCC): ICD-10-CM

## 2020-07-09 LAB — PSA SERPL-MCNC: 0.05 NG/ML (ref ?–4)

## 2020-07-09 PROCEDURE — 84153 ASSAY OF PSA TOTAL: CPT

## 2020-07-09 PROCEDURE — 36415 COLL VENOUS BLD VENIPUNCTURE: CPT

## 2020-07-17 DIAGNOSIS — M54.16 LUMBAR BACK PAIN WITH RADICULOPATHY AFFECTING LEFT LOWER EXTREMITY: ICD-10-CM

## 2020-07-17 DIAGNOSIS — F41.1 GENERALIZED ANXIETY DISORDER: ICD-10-CM

## 2020-07-17 RX ORDER — CYCLOBENZAPRINE HCL 10 MG
TABLET ORAL
Qty: 30 TABLET | Refills: 0 | OUTPATIENT
Start: 2020-07-17

## 2020-07-17 RX ORDER — CLONAZEPAM 1 MG/1
TABLET ORAL
Qty: 60 TABLET | Refills: 0 | Status: SHIPPED | OUTPATIENT
Start: 2020-07-17 | End: 2020-08-13

## 2020-07-28 ENCOUNTER — OFFICE VISIT (OUTPATIENT)
Dept: INTERNAL MEDICINE CLINIC | Facility: CLINIC | Age: 60
End: 2020-07-28
Payer: COMMERCIAL

## 2020-07-28 VITALS
SYSTOLIC BLOOD PRESSURE: 126 MMHG | OXYGEN SATURATION: 97 % | WEIGHT: 302 LBS | TEMPERATURE: 99 F | HEART RATE: 84 BPM | HEIGHT: 71 IN | DIASTOLIC BLOOD PRESSURE: 80 MMHG | BODY MASS INDEX: 42.28 KG/M2 | RESPIRATION RATE: 16 BRPM

## 2020-07-28 DIAGNOSIS — E11.9 TYPE 2 DIABETES MELLITUS WITHOUT COMPLICATION, WITH LONG-TERM CURRENT USE OF INSULIN (HCC): Primary | ICD-10-CM

## 2020-07-28 DIAGNOSIS — G89.29 CHRONIC BILATERAL LOW BACK PAIN WITHOUT SCIATICA: ICD-10-CM

## 2020-07-28 DIAGNOSIS — E66.01 MORBID OBESITY WITH BMI OF 45.0-49.9, ADULT (HCC): ICD-10-CM

## 2020-07-28 DIAGNOSIS — E78.2 MIXED HYPERLIPIDEMIA: ICD-10-CM

## 2020-07-28 DIAGNOSIS — M54.50 CHRONIC BILATERAL LOW BACK PAIN WITHOUT SCIATICA: ICD-10-CM

## 2020-07-28 DIAGNOSIS — I10 ESSENTIAL HYPERTENSION: ICD-10-CM

## 2020-07-28 DIAGNOSIS — Z79.4 TYPE 2 DIABETES MELLITUS WITHOUT COMPLICATION, WITH LONG-TERM CURRENT USE OF INSULIN (HCC): Primary | ICD-10-CM

## 2020-07-28 PROCEDURE — 3074F SYST BP LT 130 MM HG: CPT | Performed by: INTERNAL MEDICINE

## 2020-07-28 PROCEDURE — 3079F DIAST BP 80-89 MM HG: CPT | Performed by: INTERNAL MEDICINE

## 2020-07-28 PROCEDURE — 99214 OFFICE O/P EST MOD 30 MIN: CPT | Performed by: INTERNAL MEDICINE

## 2020-07-28 PROCEDURE — 3008F BODY MASS INDEX DOCD: CPT | Performed by: INTERNAL MEDICINE

## 2020-07-28 RX ORDER — PHENTERMINE HYDROCHLORIDE 30 MG/1
30 CAPSULE ORAL EVERY MORNING
Qty: 30 CAPSULE | Refills: 2 | Status: SHIPPED | OUTPATIENT
Start: 2020-07-28 | End: 2020-07-30

## 2020-07-28 NOTE — PATIENT INSTRUCTIONS
Diabetes: Activity Tips    Being more active can help you manage your diabetes. The tips on this sheet can help you get the most from your exercise. They can also help you stay safe.    Staying active   It’s important for adults to spend less time sitting You may be told to plan your exercise for 1 to 2 hours after a meal. In most cases, you don’t need to eat while being active. Test your blood sugar before exercising if you take insulin or medicine that can cause low blood sugar.  And carry a fast-acting cruz

## 2020-07-28 NOTE — PROGRESS NOTES
HPI:    Patient ID: Zeynep Vargas is a 64year old male. HTN     Diabetes       HPI:   Zeynep Vargas is a 64year old male who presents for recheck of his diabetes, htn and hyperlipidemia. Patient’s FBS have been at goal. No hypoglycemia.   Pt has be capsule 2   • CLONAZEPAM 1 MG Oral Tab TAKE 1 TABLET(1 MG) BY MOUTH TWICE DAILY AS NEEDED 60 tablet 0   • DILTIAZEM HCL ER COATED BEADS 180 MG Oral Capsule SR 24 Hr TAKE 1 CAPSULE DAILY 90 capsule 3   • METFORMIN  MG Oral Tab TAKE 1 TABLET TWICE A D uncontrolled    • Unspecified essential hypertension    • Unspecified sleep apnea Edward PSG 8-13-13 TX 12-30-13    AHI 65 SaO2 comfort 69 % BIPAP 23/19 [facility]   • Visual impairment     glasses      Past Surgical History:   Procedure Laterality Date   • COL headaches    EXAM:   /80   Pulse 84   Temp 98.8 °F (37.1 °C) (Temporal)   Resp 16   Ht 71\"   Wt (!) 302 lb (137 kg)   SpO2 97%   BMI 42.12 kg/m²   GENERAL: well developed, well nourished,in no apparent distress  SKIN: no rashes,no suspicious lesions Take by mouth daily. • Sildenafil Citrate 100 MG Oral Tab Take 1 tablet (100 mg total) by mouth as needed for Erectile Dysfunction.  30 tablet 3   • LATANOPROST 0.005 % Ophthalmic Solution INSTILL 1 DROP INTO BOTH EYES NIGHTLY 1 Bottle 0   • acetaminoph

## 2020-07-29 ENCOUNTER — PATIENT MESSAGE (OUTPATIENT)
Dept: INTERNAL MEDICINE CLINIC | Facility: CLINIC | Age: 60
End: 2020-07-29

## 2020-07-29 DIAGNOSIS — E66.01 MORBID OBESITY WITH BMI OF 45.0-49.9, ADULT (HCC): ICD-10-CM

## 2020-07-29 RX ORDER — PHENTERMINE HYDROCHLORIDE 30 MG/1
30 CAPSULE ORAL EVERY MORNING
Qty: 30 CAPSULE | Refills: 2 | Status: CANCELLED | OUTPATIENT
Start: 2020-07-29

## 2020-07-29 NOTE — TELEPHONE ENCOUNTER
From: Fitz Munoz  To: Eleno Vogt MD  Sent: 7/29/2020 12:43 PM CDT  Subject: Prescription Question    Good day. .  My latest prescription was sent to the wrong pharmacy. Sarah Sims it needs to go to [facility]. .   Thanks  Codey Nelson

## 2020-07-29 NOTE — TELEPHONE ENCOUNTER
Medication approved and placed today for phentermine. Needs to be replaced to alternate pharmacy. original rx called and canceled.

## 2020-07-30 RX ORDER — PHENTERMINE HYDROCHLORIDE 30 MG/1
30 CAPSULE ORAL EVERY MORNING
Qty: 30 CAPSULE | Refills: 2 | Status: SHIPPED | OUTPATIENT
Start: 2020-07-30 | End: 2020-10-27

## 2020-08-09 DIAGNOSIS — E78.2 MIXED HYPERLIPIDEMIA: ICD-10-CM

## 2020-08-09 DIAGNOSIS — Z79.4 TYPE 2 DIABETES MELLITUS WITHOUT COMPLICATION, WITH LONG-TERM CURRENT USE OF INSULIN (HCC): ICD-10-CM

## 2020-08-09 DIAGNOSIS — E11.9 TYPE 2 DIABETES MELLITUS WITHOUT COMPLICATION, WITH LONG-TERM CURRENT USE OF INSULIN (HCC): ICD-10-CM

## 2020-08-09 RX ORDER — PRAVASTATIN SODIUM 40 MG
TABLET ORAL
Qty: 90 TABLET | Refills: 3 | Status: ON HOLD | OUTPATIENT
Start: 2020-08-09 | End: 2021-01-26

## 2020-08-12 DIAGNOSIS — F41.1 GENERALIZED ANXIETY DISORDER: ICD-10-CM

## 2020-08-13 RX ORDER — CLONAZEPAM 1 MG/1
TABLET ORAL
Qty: 60 TABLET | Refills: 0 | Status: SHIPPED | OUTPATIENT
Start: 2020-08-13 | End: 2020-08-21

## 2020-08-24 ENCOUNTER — LAB ENCOUNTER (OUTPATIENT)
Dept: LAB | Facility: HOSPITAL | Age: 60
End: 2020-08-24
Attending: INTERNAL MEDICINE
Payer: COMMERCIAL

## 2020-08-24 DIAGNOSIS — E11.9 TYPE 2 DIABETES MELLITUS WITHOUT COMPLICATION, WITH LONG-TERM CURRENT USE OF INSULIN (HCC): ICD-10-CM

## 2020-08-24 DIAGNOSIS — I10 ESSENTIAL HYPERTENSION: ICD-10-CM

## 2020-08-24 DIAGNOSIS — Z79.4 TYPE 2 DIABETES MELLITUS WITHOUT COMPLICATION, WITH LONG-TERM CURRENT USE OF INSULIN (HCC): ICD-10-CM

## 2020-08-24 DIAGNOSIS — E78.2 MIXED HYPERLIPIDEMIA: ICD-10-CM

## 2020-08-24 LAB
ALBUMIN SERPL-MCNC: 3.7 G/DL (ref 3.4–5)
ALBUMIN/GLOB SERPL: 1 {RATIO} (ref 1–2)
ALP LIVER SERPL-CCNC: 133 U/L (ref 45–117)
ALT SERPL-CCNC: 14 U/L (ref 16–61)
ANION GAP SERPL CALC-SCNC: 2 MMOL/L (ref 0–18)
AST SERPL-CCNC: 11 U/L (ref 15–37)
BASOPHILS # BLD AUTO: 0.05 X10(3) UL (ref 0–0.2)
BASOPHILS NFR BLD AUTO: 0.8 %
BILIRUB SERPL-MCNC: 1.4 MG/DL (ref 0.1–2)
BUN BLD-MCNC: 12 MG/DL (ref 7–18)
BUN/CREAT SERPL: 8.8 (ref 10–20)
CALCIUM BLD-MCNC: 8.9 MG/DL (ref 8.5–10.1)
CHLORIDE SERPL-SCNC: 106 MMOL/L (ref 98–112)
CHOLEST SMN-MCNC: 147 MG/DL (ref ?–200)
CO2 SERPL-SCNC: 28 MMOL/L (ref 21–32)
CREAT BLD-MCNC: 1.36 MG/DL (ref 0.7–1.3)
CREAT UR-SCNC: 29.4 MG/DL
DEPRECATED RDW RBC AUTO: 40.4 FL (ref 35.1–46.3)
EOSINOPHIL # BLD AUTO: 0.15 X10(3) UL (ref 0–0.7)
EOSINOPHIL NFR BLD AUTO: 2.3 %
ERYTHROCYTE [DISTWIDTH] IN BLOOD BY AUTOMATED COUNT: 15.7 % (ref 11–15)
GLOBULIN PLAS-MCNC: 3.7 G/DL (ref 2.8–4.4)
GLUCOSE BLD-MCNC: 140 MG/DL (ref 70–99)
HCT VFR BLD AUTO: 44.5 % (ref 39–53)
HDLC SERPL-MCNC: 48 MG/DL (ref 40–59)
HGB BLD-MCNC: 14.5 G/DL (ref 13–17.5)
IMM GRANULOCYTES # BLD AUTO: 0.01 X10(3) UL (ref 0–1)
IMM GRANULOCYTES NFR BLD: 0.2 %
LDLC SERPL CALC-MCNC: 85 MG/DL (ref ?–100)
LYMPHOCYTES # BLD AUTO: 2.35 X10(3) UL (ref 1–4)
LYMPHOCYTES NFR BLD AUTO: 36 %
M PROTEIN MFR SERPL ELPH: 7.4 G/DL (ref 6.4–8.2)
MCH RBC QN AUTO: 24.1 PG (ref 26–34)
MCHC RBC AUTO-ENTMCNC: 32.6 G/DL (ref 31–37)
MCV RBC AUTO: 74 FL (ref 80–100)
MICROALBUMIN UR-MCNC: 1.05 MG/DL
MICROALBUMIN/CREAT 24H UR-RTO: 35.7 UG/MG (ref ?–30)
MONOCYTES # BLD AUTO: 0.55 X10(3) UL (ref 0.1–1)
MONOCYTES NFR BLD AUTO: 8.4 %
NEUTROPHILS # BLD AUTO: 3.42 X10 (3) UL (ref 1.5–7.7)
NEUTROPHILS # BLD AUTO: 3.42 X10(3) UL (ref 1.5–7.7)
NEUTROPHILS NFR BLD AUTO: 52.3 %
NONHDLC SERPL-MCNC: 99 MG/DL (ref ?–130)
OSMOLALITY SERPL CALC.SUM OF ELEC: 284 MOSM/KG (ref 275–295)
PATIENT FASTING Y/N/NP: YES
PATIENT FASTING Y/N/NP: YES
PLATELET # BLD AUTO: 225 10(3)UL (ref 150–450)
POTASSIUM SERPL-SCNC: 4 MMOL/L (ref 3.5–5.1)
RBC # BLD AUTO: 6.01 X10(6)UL (ref 4.3–5.7)
SODIUM SERPL-SCNC: 136 MMOL/L (ref 136–145)
TRIGL SERPL-MCNC: 72 MG/DL (ref 30–149)
VLDLC SERPL CALC-MCNC: 14 MG/DL (ref 0–30)
WBC # BLD AUTO: 6.5 X10(3) UL (ref 4–11)

## 2020-08-24 PROCEDURE — 82570 ASSAY OF URINE CREATININE: CPT

## 2020-08-24 PROCEDURE — 80061 LIPID PANEL: CPT

## 2020-08-24 PROCEDURE — 80053 COMPREHEN METABOLIC PANEL: CPT

## 2020-08-24 PROCEDURE — 36415 COLL VENOUS BLD VENIPUNCTURE: CPT

## 2020-08-24 PROCEDURE — 85025 COMPLETE CBC W/AUTO DIFF WBC: CPT

## 2020-08-24 PROCEDURE — 82043 UR ALBUMIN QUANTITATIVE: CPT

## 2020-09-16 ENCOUNTER — PATIENT MESSAGE (OUTPATIENT)
Dept: INTERNAL MEDICINE CLINIC | Facility: CLINIC | Age: 60
End: 2020-09-16

## 2020-09-16 DIAGNOSIS — Z01.20 ENCOUNTER FOR DENTAL EXAMINATION AND CLEANING WITHOUT ABNORMAL FINDINGS: Primary | ICD-10-CM

## 2020-09-16 RX ORDER — AMOXICILLIN 500 MG/1
CAPSULE ORAL
Qty: 4 CAPSULE | Refills: 0 | Status: SHIPPED | OUTPATIENT
Start: 2020-09-16 | End: 2020-10-19 | Stop reason: ALTCHOICE

## 2020-09-16 NOTE — TELEPHONE ENCOUNTER
From: Mike Nielson  Sent: 9/16/2020 2:50 PM CDT  To: [facility] Clinical Staff  Subject: RE: Prescription Question    I hip replacements so i have had antibiotics in the past. I will have a cleaning and drilling. Heidi Hassan     ----- Message -----  From: Konrad Beck

## 2020-09-16 NOTE — TELEPHONE ENCOUNTER
per emili pt can use amoxicillin 2g prior to dental work    Pt verbalizes  understanding of new medication and plan of care, advised to follow up if needed.

## 2020-09-21 ENCOUNTER — TELEPHONE (OUTPATIENT)
Dept: INTERNAL MEDICINE CLINIC | Facility: CLINIC | Age: 60
End: 2020-09-21

## 2020-09-21 NOTE — TELEPHONE ENCOUNTER
Medication discontinued from med list.  Verified with pt he is no longer taking. Fax returned with denial for refill.

## 2020-09-21 NOTE — TELEPHONE ENCOUNTER
Fax from [facility] delivery for refill of Clonazepam 1mg tab - form in triage.

## 2020-09-22 ENCOUNTER — NURSE ONLY (OUTPATIENT)
Dept: INTERNAL MEDICINE CLINIC | Facility: CLINIC | Age: 60
End: 2020-09-22
Payer: COMMERCIAL

## 2020-09-22 DIAGNOSIS — Z23 NEED FOR VACCINATION: Primary | ICD-10-CM

## 2020-09-22 PROCEDURE — 90471 IMMUNIZATION ADMIN: CPT | Performed by: INTERNAL MEDICINE

## 2020-09-22 PROCEDURE — 90686 IIV4 VACC NO PRSV 0.5 ML IM: CPT | Performed by: INTERNAL MEDICINE

## 2020-10-16 ENCOUNTER — LAB ENCOUNTER (OUTPATIENT)
Dept: LAB | Facility: HOSPITAL | Age: 60
End: 2020-10-16
Attending: UROLOGY
Payer: COMMERCIAL

## 2020-10-16 DIAGNOSIS — C61 PROSTATE CANCER (HCC): ICD-10-CM

## 2020-10-16 DIAGNOSIS — Z85.46 H/O PROSTATE CANCER: ICD-10-CM

## 2020-10-16 PROCEDURE — 87086 URINE CULTURE/COLONY COUNT: CPT

## 2020-10-16 PROCEDURE — 81003 URINALYSIS AUTO W/O SCOPE: CPT | Performed by: PHYSICIAN ASSISTANT

## 2020-10-16 PROCEDURE — 84153 ASSAY OF PSA TOTAL: CPT

## 2020-10-16 PROCEDURE — 36415 COLL VENOUS BLD VENIPUNCTURE: CPT

## 2020-10-19 ENCOUNTER — OFFICE VISIT (OUTPATIENT)
Dept: INTERNAL MEDICINE CLINIC | Facility: CLINIC | Age: 60
End: 2020-10-19
Payer: COMMERCIAL

## 2020-10-19 VITALS
DIASTOLIC BLOOD PRESSURE: 84 MMHG | SYSTOLIC BLOOD PRESSURE: 122 MMHG | HEART RATE: 78 BPM | RESPIRATION RATE: 16 BRPM | WEIGHT: 298 LBS | BODY MASS INDEX: 41.72 KG/M2 | TEMPERATURE: 98 F | HEIGHT: 71 IN

## 2020-10-19 DIAGNOSIS — K43.9 VENTRAL HERNIA WITHOUT OBSTRUCTION OR GANGRENE: ICD-10-CM

## 2020-10-19 DIAGNOSIS — R10.84 GENERALIZED ABDOMINAL PAIN: Primary | ICD-10-CM

## 2020-10-19 PROCEDURE — 99214 OFFICE O/P EST MOD 30 MIN: CPT | Performed by: INTERNAL MEDICINE

## 2020-10-19 PROCEDURE — 3008F BODY MASS INDEX DOCD: CPT | Performed by: INTERNAL MEDICINE

## 2020-10-19 PROCEDURE — 3079F DIAST BP 80-89 MM HG: CPT | Performed by: INTERNAL MEDICINE

## 2020-10-19 PROCEDURE — 3074F SYST BP LT 130 MM HG: CPT | Performed by: INTERNAL MEDICINE

## 2020-10-19 NOTE — PROGRESS NOTES
HPI:    Patient ID: Tom Ill is a 64year old male. Abdominal Pain  This is a new problem. The current episode started 1 to 4 weeks ago. The onset quality is sudden. The problem occurs constantly. The problem has been unchanged.  The pain is locat MG Oral Capsule SR 24 Hr TAKE 1 CAPSULE DAILY 90 capsule 3   • METFORMIN  MG Oral Tab TAKE 1 TABLET TWICE A DAY WITH MEALS 180 tablet 3   • FUROSEMIDE 20 MG Oral Tab TAKE 1 TABLET DAILY 90 tablet 3   • LOSARTAN 100 MG Oral Tab TAKE 1 TABLET DAILY 90 diaphoretic. Nursing note and vitals reviewed.              ASSESSMENT/PLAN:   Generalized abdominal pain  (primary encounter diagnosis)  Ventral hernia without obstruction or gangrene  Check  US abd and refer to gen surgery for eval  No orders of the def

## 2020-10-19 NOTE — PATIENT INSTRUCTIONS
What Is a Hernia? A hernia is when an organ or tissue pushes through a weak area in the belly (abdominal) wall. This weak area may be there at birth. Or it may be caused by abdominal strain over time.  If not treated, a hernia can get worse with time and A hernia will not heal on its own. Surgery is needed to fix the weak spot in the belly wall. If not treated, a hernia can get larger. It can also cause serious health problems. Some hernias can be watched.  They can then be fixed if they grow bigger or star

## 2020-10-27 ENCOUNTER — LAB ENCOUNTER (OUTPATIENT)
Dept: LAB | Age: 60
End: 2020-10-27
Attending: INTERNAL MEDICINE
Payer: COMMERCIAL

## 2020-10-27 DIAGNOSIS — E66.01 MORBID OBESITY WITH BMI OF 45.0-49.9, ADULT (HCC): ICD-10-CM

## 2020-10-27 DIAGNOSIS — E11.65 TYPE II DIABETES MELLITUS, UNCONTROLLED (HCC): Primary | ICD-10-CM

## 2020-10-27 PROCEDURE — 36415 COLL VENOUS BLD VENIPUNCTURE: CPT

## 2020-10-27 PROCEDURE — 83036 HEMOGLOBIN GLYCOSYLATED A1C: CPT

## 2020-10-27 RX ORDER — PHENTERMINE HYDROCHLORIDE 30 MG/1
30 CAPSULE ORAL EVERY MORNING
Qty: 30 CAPSULE | Refills: 2 | Status: SHIPPED | OUTPATIENT
Start: 2020-10-27 | End: 2021-03-03

## 2020-11-04 ENCOUNTER — PATIENT MESSAGE (OUTPATIENT)
Dept: PAIN CLINIC | Facility: CLINIC | Age: 60
End: 2020-11-04

## 2020-11-04 ENCOUNTER — HOSPITAL ENCOUNTER (OUTPATIENT)
Dept: ULTRASOUND IMAGING | Age: 60
Discharge: HOME | End: 2020-11-04
Attending: INTERNAL MEDICINE
Payer: COMMERCIAL

## 2020-11-04 ENCOUNTER — TELEPHONE (OUTPATIENT)
Dept: INTERNAL MEDICINE CLINIC | Facility: CLINIC | Age: 60
End: 2020-11-04

## 2020-11-04 DIAGNOSIS — R10.84 GENERALIZED ABDOMINAL PAIN: ICD-10-CM

## 2020-11-04 DIAGNOSIS — K83.9 BILE DUCT ABNORMALITY: Primary | ICD-10-CM

## 2020-11-04 PROCEDURE — 76700 US EXAM ABDOM COMPLETE: CPT | Performed by: INTERNAL MEDICINE

## 2020-11-04 NOTE — TELEPHONE ENCOUNTER
From: Souleymane Vu  To: Rajeev Pierre MD  Sent: 11/4/2020 11:32 AM CST  Subject: Other    I would like to schedule a new injection on my back.

## 2020-11-05 ENCOUNTER — TELEPHONE (OUTPATIENT)
Dept: INTERNAL MEDICINE CLINIC | Facility: CLINIC | Age: 60
End: 2020-11-05

## 2020-11-05 NOTE — TELEPHONE ENCOUNTER
Irlanda Purdy from [facility] called stated patient's order is not coming through. This is for MRCP? They cannot pull up the order nor profile. Please fax to [phone], or call Irlanda Purdy. Patient is scheduled for this afternoon.

## 2020-11-05 NOTE — TELEPHONE ENCOUNTER
Spoke with Julian Lou from [address] and informed her that per Dr. Herber Sandoval: hold on the MRCP as he recommends follow up with GI for further evaluation of testing. Understanding was expressed.

## 2020-11-12 ENCOUNTER — OFFICE VISIT (OUTPATIENT)
Dept: SURGERY | Facility: CLINIC | Age: 60
End: 2020-11-12
Payer: COMMERCIAL

## 2020-11-12 VITALS
SYSTOLIC BLOOD PRESSURE: 125 MMHG | DIASTOLIC BLOOD PRESSURE: 83 MMHG | HEART RATE: 81 BPM | BODY MASS INDEX: 41.72 KG/M2 | WEIGHT: 298 LBS | TEMPERATURE: 97 F | HEIGHT: 71 IN

## 2020-11-12 DIAGNOSIS — K43.9 VENTRAL HERNIA WITHOUT OBSTRUCTION OR GANGRENE: Primary | ICD-10-CM

## 2020-11-12 DIAGNOSIS — I48.19 PERSISTENT ATRIAL FIBRILLATION (HCC): ICD-10-CM

## 2020-11-12 PROCEDURE — 3079F DIAST BP 80-89 MM HG: CPT | Performed by: SURGERY

## 2020-11-12 PROCEDURE — 3008F BODY MASS INDEX DOCD: CPT | Performed by: SURGERY

## 2020-11-12 PROCEDURE — 3074F SYST BP LT 130 MM HG: CPT | Performed by: SURGERY

## 2020-11-12 PROCEDURE — 99072 ADDL SUPL MATRL&STAF TM PHE: CPT | Performed by: SURGERY

## 2020-11-12 PROCEDURE — 99244 OFF/OP CNSLTJ NEW/EST MOD 40: CPT | Performed by: SURGERY

## 2020-11-12 NOTE — H&P
New Patient Visit Note       Active Problems      1. Ventral hernia without obstruction or gangrene    2. Persistent atrial fibrillation [facility])        Chief Complaint   Patient presents with:  Hernia: NP REF WINTER HERNIA .  Pt states noticed the hernia a keegan [facility])     left   • History of hip replacement     March 2020, 11/2010   • Insomnia    • OA (osteoarthritis)    • Obesity    • Other and unspecified hyperlipidemia    • Type II or unspecified type diabetes mellitus without mention of complication, not state Associated Brother      Social History    Socioeconomic History      Marital status:       Spouse name: Not on file      Number of children: Not on file      Years of education: Not on file      Highest education level: Not on file    Tobacco Use Tuesday, Wednesday, Thursday.  Resume 20 mg on Friday, Disp: 3 tablet, Rfl: 0  •  Dulaglutide (TRULICITY) 1.5 JR/8.1EE Subcutaneous Solution Pen-injector, Inject 1.5 mg into the skin once a week.  , Disp: , Rfl:   •  INVOKANA 300 MG Oral Tab, Take 300 mg by Neck supple. No JVD present. No tracheal deviation present. Cardiovascular: Normal rate and regular rhythm. Pulmonary/Chest: Effort normal and breath sounds normal. No stridor. No respiratory distress. Abdominal: Soft.  Bowel sounds are normal. He exh will ask the patient's cardiologist, Dr. Cameron Corrales, for management recommendations. ·   · The risks, benefits, and alternatives to the procedure were explained to the patient.   The risks explained include, but are not limited to, bleeding, infection, p

## 2020-11-13 ENCOUNTER — LAB ENCOUNTER (OUTPATIENT)
Dept: LAB | Age: 60
End: 2020-11-13
Attending: INTERNAL MEDICINE
Payer: COMMERCIAL

## 2020-11-13 DIAGNOSIS — K83.8 COMMON BILE DUCT DILATION: ICD-10-CM

## 2020-11-13 DIAGNOSIS — R93.89 ABNORMAL ULTRASOUND: ICD-10-CM

## 2020-11-13 PROCEDURE — 80053 COMPREHEN METABOLIC PANEL: CPT

## 2020-11-24 RX ORDER — AMLODIPINE BESYLATE 10 MG/1
10 TABLET ORAL DAILY
COMMUNITY
End: 2020-12-07

## 2020-11-25 ENCOUNTER — TELEPHONE (OUTPATIENT)
Dept: SURGERY | Facility: CLINIC | Age: 60
End: 2020-11-25

## 2020-11-25 PROBLEM — R93.3 ABNORMAL FINDING ON GI TRACT IMAGING: Status: ACTIVE | Noted: 2020-11-25

## 2020-11-25 NOTE — TELEPHONE ENCOUNTER
LMTCB    Contacted pt cardiologist and they state he needs to be seen before procedure with PBP on 12/7/2020 at [facility] for robotic ventral hernia. Pt needs to be assessed in office before proceeding with procedure.      Called pt to notify of needing appt with

## 2020-11-28 ENCOUNTER — APPOINTMENT (OUTPATIENT)
Dept: LAB | Facility: HOSPITAL | Age: 60
End: 2020-11-28
Attending: INTERNAL MEDICINE
Payer: COMMERCIAL

## 2020-11-28 DIAGNOSIS — R93.3 ABNORMAL FINDING ON GI TRACT IMAGING: ICD-10-CM

## 2020-11-30 ENCOUNTER — TELEPHONE (OUTPATIENT)
Dept: SURGERY | Facility: CLINIC | Age: 60
End: 2020-11-30

## 2020-11-30 DIAGNOSIS — K43.9 VENTRAL HERNIA WITHOUT OBSTRUCTION OR GANGRENE: Primary | ICD-10-CM

## 2020-12-01 ENCOUNTER — APPOINTMENT (OUTPATIENT)
Dept: LAB | Facility: HOSPITAL | Age: 60
End: 2020-12-01
Attending: INTERNAL MEDICINE
Payer: COMMERCIAL

## 2020-12-01 DIAGNOSIS — R93.3 ABNORMAL FINDING ON GI TRACT IMAGING: ICD-10-CM

## 2020-12-01 RX ORDER — SODIUM CHLORIDE, SODIUM LACTATE, POTASSIUM CHLORIDE, CALCIUM CHLORIDE 600; 310; 30; 20 MG/100ML; MG/100ML; MG/100ML; MG/100ML
INJECTION, SOLUTION INTRAVENOUS CONTINUOUS
Status: CANCELLED | OUTPATIENT
Start: 2020-12-01

## 2020-12-03 ENCOUNTER — ANESTHESIA (OUTPATIENT)
Dept: ENDOSCOPY | Facility: HOSPITAL | Age: 60
End: 2020-12-03
Payer: COMMERCIAL

## 2020-12-03 ENCOUNTER — ANESTHESIA EVENT (OUTPATIENT)
Dept: ENDOSCOPY | Facility: HOSPITAL | Age: 60
End: 2020-12-03
Payer: COMMERCIAL

## 2020-12-03 ENCOUNTER — HOSPITAL ENCOUNTER (OUTPATIENT)
Facility: HOSPITAL | Age: 60
Setting detail: HOSPITAL OUTPATIENT SURGERY
Discharge: HOME | End: 2020-12-03
Attending: INTERNAL MEDICINE | Admitting: INTERNAL MEDICINE
Payer: COMMERCIAL

## 2020-12-03 VITALS
SYSTOLIC BLOOD PRESSURE: 132 MMHG | RESPIRATION RATE: 19 BRPM | DIASTOLIC BLOOD PRESSURE: 85 MMHG | BODY MASS INDEX: 42 KG/M2 | OXYGEN SATURATION: 100 % | HEIGHT: 71 IN | WEIGHT: 300 LBS | HEART RATE: 78 BPM | TEMPERATURE: 99 F

## 2020-12-03 DIAGNOSIS — R93.3 ABNORMAL FINDING ON GI TRACT IMAGING: Primary | ICD-10-CM

## 2020-12-03 LAB — GLUCOSE BLD-MCNC: 198 MG/DL (ref 70–99)

## 2020-12-03 PROCEDURE — 0DB78ZX EXCISION OF STOMACH, PYLORUS, VIA NATURAL OR ARTIFICIAL OPENING ENDOSCOPIC, DIAGNOSTIC: ICD-10-PCS | Performed by: INTERNAL MEDICINE

## 2020-12-03 PROCEDURE — 88342 IMHCHEM/IMCYTCHM 1ST ANTB: CPT | Performed by: INTERNAL MEDICINE

## 2020-12-03 PROCEDURE — 88172 CYTP DX EVAL FNA 1ST EA SITE: CPT | Performed by: INTERNAL MEDICINE

## 2020-12-03 PROCEDURE — 0DB98ZX EXCISION OF DUODENUM, VIA NATURAL OR ARTIFICIAL OPENING ENDOSCOPIC, DIAGNOSTIC: ICD-10-PCS | Performed by: INTERNAL MEDICINE

## 2020-12-03 PROCEDURE — 88305 TISSUE EXAM BY PATHOLOGIST: CPT | Performed by: INTERNAL MEDICINE

## 2020-12-03 PROCEDURE — 88321 CONSLTJ&REPRT SLD PREP ELSWR: CPT | Performed by: INTERNAL MEDICINE

## 2020-12-03 PROCEDURE — 88341 IMHCHEM/IMCYTCHM EA ADD ANTB: CPT | Performed by: INTERNAL MEDICINE

## 2020-12-03 PROCEDURE — 0DD68ZX EXTRACTION OF STOMACH, VIA NATURAL OR ARTIFICIAL OPENING ENDOSCOPIC, DIAGNOSTIC: ICD-10-PCS | Performed by: INTERNAL MEDICINE

## 2020-12-03 PROCEDURE — 88173 CYTOPATH EVAL FNA REPORT: CPT | Performed by: INTERNAL MEDICINE

## 2020-12-03 PROCEDURE — 82962 GLUCOSE BLOOD TEST: CPT

## 2020-12-03 RX ORDER — SODIUM CHLORIDE, SODIUM LACTATE, POTASSIUM CHLORIDE, CALCIUM CHLORIDE 600; 310; 30; 20 MG/100ML; MG/100ML; MG/100ML; MG/100ML
INJECTION, SOLUTION INTRAVENOUS CONTINUOUS
Status: DISCONTINUED | OUTPATIENT
Start: 2020-12-03 | End: 2020-12-03

## 2020-12-03 RX ORDER — DEXTROSE MONOHYDRATE 25 G/50ML
50 INJECTION, SOLUTION INTRAVENOUS
Status: DISCONTINUED | OUTPATIENT
Start: 2020-12-03 | End: 2020-12-03

## 2020-12-03 RX ORDER — NALOXONE HYDROCHLORIDE 0.4 MG/ML
80 INJECTION, SOLUTION INTRAMUSCULAR; INTRAVENOUS; SUBCUTANEOUS AS NEEDED
Status: DISCONTINUED | OUTPATIENT
Start: 2020-12-03 | End: 2020-12-03

## 2020-12-03 RX ORDER — LIDOCAINE HYDROCHLORIDE 10 MG/ML
INJECTION, SOLUTION EPIDURAL; INFILTRATION; INTRACAUDAL; PERINEURAL AS NEEDED
Status: DISCONTINUED | OUTPATIENT
Start: 2020-12-03 | End: 2020-12-03 | Stop reason: SURG

## 2020-12-03 RX ADMIN — SODIUM CHLORIDE, SODIUM LACTATE, POTASSIUM CHLORIDE, CALCIUM CHLORIDE: 600; 310; 30; 20 INJECTION, SOLUTION INTRAVENOUS at 12:19:00

## 2020-12-03 RX ADMIN — LIDOCAINE HYDROCHLORIDE 100 MG: 10 INJECTION, SOLUTION EPIDURAL; INFILTRATION; INTRACAUDAL; PERINEURAL at 11:46:00

## 2020-12-03 NOTE — OPERATIVE REPORT
[address] Patient Status:  Hospital Outpatient Surgery    1960 MRN [id number]   [facility] ENDOSCOPY Attending Kenan Casiano MD   Date 12/3/2020 PCP Paulette Rosales MD     CC: 10 cm circumscri MAIN OR   • LAPAROSCOPY, SURGICAL PROSTATECTOMY, RETROPUBIC RADICAL, W/NERVE SPARING  11/15/2019       • OTHER SURGICAL HISTORY      vocal cord polyps removed   • OTHER SURGICAL HISTORY  11/15/2019    RALP Dr. Douglas Recinos    • ROBOT-ASSISTED LA lactated ringers infusion, , Intravenous, Continuous  •  Atropine Sulfate 0.1 MG/ML injection 0.5 mg, 0.5 mg, Intravenous, PRN  •  Naloxone HCl (NARCAN) 0.4 MG/ML injection 80 mcg, 80 mcg, Intravenous, PRN    Facility-Administered Medications Ordered in Ot

## 2020-12-03 NOTE — ANESTHESIA POSTPROCEDURE EVALUATION
[address] [facility] Patient Status:  Hospital Outpatient Surgery   Age/Gender 64year old male MRN [id number]   Location [facility]. Attending Yris Subramanian MD   Hosp Day # 0 PCP Ailyn Russell MD       Anesthesia Post-op No

## 2020-12-03 NOTE — OPERATIVE REPORT
Gabriela Mcwilliams Patient Status:  Hospital Outpatient Surgery    1960 MRN [id number]   [facility] ENDOSCOPY Attending Gurdeep Sethi MD   Date 12/3/2020 PCP Marybelle Mortimer, MD     PREOPERATIVE DIAGNOSIS/INDICATION: 10 cm circumscribe limited visualization of the pancreas and left adrenal gland due to a large heterogeneous 10 cm mass with hypoechoic necrotic areas and hyperechoic foci, the mass does not appear to arise from gastric wall  THERAPEUTICS: FNB, 19 G needle, 5 passes, multipl

## 2020-12-03 NOTE — ANESTHESIA PREPROCEDURE EVALUATION
PRE-OP EVALUATION    Patient Name: Dale Beverly    Pre-op Diagnosis: Abnormal finding on GI tract imaging [R93.3]    Procedure(s):  ESOPHAGOGASTRODUODENOSCOPY (EGD), ENDOSCOPIC ULTRASOUND (EUS)      Surgeon(s) and Role:     Sindhu Sanon MD - Dottie Pastor Laterality Date   • COLONOSCOPY     • HIP JOINT INJECTION Right 10/3/2019    Performed by Bart Hedrick MD at [address] Right 2/19/2019    Performed by Bart Hedrick MD at [address] Right 5/29/2 guidelines. Patient has taken beta blockers in last 24 hours. Post-procedure pain management plan discussed with surgeon and patient.       Plan/risks discussed with: patient                Present on Admission:  **None**

## 2020-12-04 NOTE — H&P
[address] Patient Status:  Hospital Outpatient Surgery    1960 MRN [id number]   [facility] ENDOSCOPY Attending No att. providers found   Date 2020 PCP Tammy Benitez MD     CC: 10 cm [facility] MAIN OR   • LAPAROSCOPY, SURGICAL PROSTATECTOMY, RETROPUBIC RADICAL, W/NERVE SPARING  11/15/2019       • OTHER SURGICAL HISTORY      vocal cord polyps removed   • OTHER SURGICAL HISTORY  11/15/2019    RALP Dr. Merle Bell    • ROBOT-ASSISTED LA hypertension     Mixed hyperlipidemia     [facility]     Morbid obesity with BMI of 45.0-49.9, adult ([facility]     Encounter for therapeutic drug monitoring     Glaucoma suspect of both eyes     Ventral hernia without obstruction or gangrene     Abn

## 2020-12-07 NOTE — TELEPHONE ENCOUNTER
Per Dr. Bibiana Soto  Diazepam 5 mg tablets 30 minutes before the procedure and 1 5 mg tablet at the time of procedure. Needs  cannot drive while on this medication.      Patient informed Via My chart and order routed to Dr. Bibiana Soto for approval.

## 2020-12-07 NOTE — TELEPHONE ENCOUNTER
From: Tom Ill  To: Rowdy Flannery MD  Sent: 12/6/2020 10:00 PM CST  Subject: Prescription Question    Dr Bolivar Hill. .  I have to do a MRI on Wednesday the last time there was too much movement.  Can i have a oral sedation to calm me down during the procedu

## 2020-12-08 DIAGNOSIS — Z79.4 TYPE 2 DIABETES MELLITUS WITHOUT COMPLICATION, WITH LONG-TERM CURRENT USE OF INSULIN (HCC): ICD-10-CM

## 2020-12-08 DIAGNOSIS — E11.9 TYPE 2 DIABETES MELLITUS WITHOUT COMPLICATION, WITH LONG-TERM CURRENT USE OF INSULIN (HCC): ICD-10-CM

## 2020-12-09 ENCOUNTER — HOSPITAL ENCOUNTER (OUTPATIENT)
Dept: MRI IMAGING | Facility: HOSPITAL | Age: 60
Discharge: HOME | End: 2020-12-09
Attending: INTERNAL MEDICINE
Payer: COMMERCIAL

## 2020-12-09 DIAGNOSIS — R93.89 ABNORMAL ULTRASOUND: ICD-10-CM

## 2020-12-09 DIAGNOSIS — K83.8 COMMON BILE DUCT DILATION: ICD-10-CM

## 2020-12-09 PROCEDURE — 74181 MRI ABDOMEN W/O CONTRAST: CPT | Performed by: INTERNAL MEDICINE

## 2020-12-09 PROCEDURE — 76376 3D RENDER W/INTRP POSTPROCES: CPT | Performed by: INTERNAL MEDICINE

## 2020-12-09 RX ORDER — INSULIN GLARGINE 100 [IU]/ML
INJECTION, SOLUTION SUBCUTANEOUS
Qty: 45 ML | Refills: 3 | Status: SHIPPED | OUTPATIENT
Start: 2020-12-09 | End: 2021-03-02 | Stop reason: ALTCHOICE

## 2020-12-21 ENCOUNTER — OFFICE VISIT (OUTPATIENT)
Dept: SURGERY | Facility: CLINIC | Age: 60
End: 2020-12-21
Payer: COMMERCIAL

## 2020-12-21 VITALS
HEART RATE: 83 BPM | OXYGEN SATURATION: 95 % | BODY MASS INDEX: 42 KG/M2 | DIASTOLIC BLOOD PRESSURE: 95 MMHG | WEIGHT: 301.81 LBS | SYSTOLIC BLOOD PRESSURE: 146 MMHG | TEMPERATURE: 97 F | RESPIRATION RATE: 16 BRPM

## 2020-12-21 DIAGNOSIS — Z01.818 PRE-OP TESTING: ICD-10-CM

## 2020-12-21 DIAGNOSIS — D44.7 PARAGANGLIOMA (HCC): Primary | ICD-10-CM

## 2020-12-21 PROCEDURE — 36415 COLL VENOUS BLD VENIPUNCTURE: CPT | Performed by: SURGERY

## 2020-12-21 PROCEDURE — 80053 COMPREHEN METABOLIC PANEL: CPT | Performed by: SURGERY

## 2020-12-21 PROCEDURE — 83835 ASSAY OF METANEPHRINES: CPT | Performed by: PHYSICIAN ASSISTANT

## 2020-12-21 PROCEDURE — 36415 COLL VENOUS BLD VENIPUNCTURE: CPT | Performed by: PHYSICIAN ASSISTANT

## 2020-12-21 PROCEDURE — 3080F DIAST BP >= 90 MM HG: CPT | Performed by: SURGERY

## 2020-12-21 PROCEDURE — 3077F SYST BP >= 140 MM HG: CPT | Performed by: SURGERY

## 2020-12-21 PROCEDURE — 85025 COMPLETE CBC W/AUTO DIFF WBC: CPT | Performed by: SURGERY

## 2020-12-21 PROCEDURE — 99245 OFF/OP CONSLTJ NEW/EST HI 55: CPT | Performed by: SURGERY

## 2020-12-21 NOTE — CONSULTS
[address] [facility] Surgical Oncology        Patient Name:  Karyn Rogers   YOB: 1960   Gender:  Male   Appt Date:  12/21/2020   Provider:  Richy Montalvo MD     PATIENT PROVIDERS  Referring Provider: Tati Muñoz MD    Primary Care Provid The patient initially presented to his PCP 10/19/2020 for complaints of sudden dull abdominal pain and periumbilical fullness. US abdomen 11/4/2020 was relatively normal except for mild prominence of the common bile duct.  The patient subsequently followed •  diazepam (VALIUM) 5 MG Oral Tab, Take 1( 5 mg ) tablet 30 minutes prior to the procedure and 1( 5 mg) tablet at the time of the procedure., Disp: 2 tablet, Rfl: 0  •  Rivaroxaban (XARELTO) 20 MG Oral Tab, Take 1 tablet (20 mg total) by mouth daily with •  aspirin EC 81 MG Oral Tab EC, Take 1 tablet (81 mg total) by mouth daily. , Disp: 90 tablet, Rfl: 1     Allergies Reviewed:  No Known Allergies     History:  Reviewed:  Past Medical History:   Diagnosis Date   • Abdominal pain    • Anxiety    • Arrhythmi · RESPIRATORY: denies shortness of breath, wheezing or cough   · CARDIOVASCULAR: denies chest pain, SOB, edema,orthopnea, no palpitations   · GI: denies nausea, vomiting, constipation, diarrhea; no rectal bleeding  · GENITAL/: no blood in urine  · MUSCUL BILIARY:  No visible dilatation or filling defect. Normal MRCP. PANCREAS:  No lesion, fluid collection, ductal dilatation, or atrophy. SPLEEN:  No enlargement or focal lesion. KIDNEYS:  No significant mass or obstruction.   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 Findings were discussed the patient at length. His wife was present. I reviewed MRI with. I discussed the entity of pheochromocytoma with them.   Patient understand that treatment

## 2020-12-28 ENCOUNTER — TELEPHONE (OUTPATIENT)
Dept: SURGERY | Facility: CLINIC | Age: 60
End: 2020-12-28

## 2020-12-28 NOTE — TELEPHONE ENCOUNTER
Called and pt still has not heard from endo regarding appointment. I touched base with endo office and asked them to reach out to him even though they were working on getting him an earlier appointment so the patient knew what was happening.   Informed pat

## 2020-12-31 ENCOUNTER — TELEPHONE (OUTPATIENT)
Dept: SURGERY | Facility: CLINIC | Age: 60
End: 2020-12-31

## 2020-12-31 ENCOUNTER — SOCIAL WORK SERVICES (OUTPATIENT)
Dept: HEMATOLOGY/ONCOLOGY | Facility: HOSPITAL | Age: 60
End: 2020-12-31

## 2021-01-04 DIAGNOSIS — D44.7 PARAGANGLIOMA (HCC): Primary | ICD-10-CM

## 2021-01-05 ENCOUNTER — PATIENT MESSAGE (OUTPATIENT)
Dept: INTERNAL MEDICINE CLINIC | Facility: CLINIC | Age: 61
End: 2021-01-05

## 2021-01-05 RX ORDER — CANAGLIFLOZIN 300 MG/1
300 TABLET, FILM COATED ORAL DAILY
Qty: 90 TABLET | Refills: 1 | Status: SHIPPED | OUTPATIENT
Start: 2021-01-05

## 2021-01-05 NOTE — TELEPHONE ENCOUNTER
Tried to complete Prior Authorization Via Cover My Meds. Invokana 300 mg tablets is currently covered by patient's plan. Refill initiated.      Last time medication was refilled 1/7/2020  Quantity and number of refills 90 w/ 1   Last OV 10/19/2020

## 2021-01-05 NOTE — TELEPHONE ENCOUNTER
From: Karyn Rogers  To: Raul Portillo MD  Sent: 1/5/2021 11:22 AM CST  Subject: Prescription Question    Good day to you. Guadalupe Riedel Why was my - INVOKANA 300 MG Oral Tab prescription written to be picked up.   I would like it sent to [facility]

## 2021-01-08 PROBLEM — E11.69 DYSLIPIDEMIA ASSOCIATED WITH TYPE 2 DIABETES MELLITUS  (HCC): Status: ACTIVE | Noted: 2021-01-08

## 2021-01-08 PROBLEM — E11.69 DYSLIPIDEMIA ASSOCIATED WITH TYPE 2 DIABETES MELLITUS: Status: ACTIVE | Noted: 2021-01-08

## 2021-01-08 PROBLEM — E78.5 DYSLIPIDEMIA ASSOCIATED WITH TYPE 2 DIABETES MELLITUS  (HCC): Status: ACTIVE | Noted: 2021-01-08

## 2021-01-08 PROBLEM — E78.5 DYSLIPIDEMIA ASSOCIATED WITH TYPE 2 DIABETES MELLITUS (HCC): Status: ACTIVE | Noted: 2021-01-08

## 2021-01-08 PROBLEM — E78.5 DYSLIPIDEMIA ASSOCIATED WITH TYPE 2 DIABETES MELLITUS: Status: ACTIVE | Noted: 2021-01-08

## 2021-01-08 PROBLEM — E11.69 DYSLIPIDEMIA ASSOCIATED WITH TYPE 2 DIABETES MELLITUS (HCC): Status: ACTIVE | Noted: 2021-01-08

## 2021-01-13 RX ORDER — METRONIDAZOLE 500 MG/100ML
500 INJECTION, SOLUTION INTRAVENOUS ONCE
Status: CANCELLED | OUTPATIENT
Start: 2021-01-13 | End: 2021-01-13

## 2021-01-13 RX ORDER — GABAPENTIN 300 MG/1
300 CAPSULE ORAL ONCE
Status: CANCELLED | OUTPATIENT
Start: 2021-01-13 | End: 2021-01-13

## 2021-01-13 RX ORDER — SODIUM CHLORIDE, SODIUM LACTATE, POTASSIUM CHLORIDE, CALCIUM CHLORIDE 600; 310; 30; 20 MG/100ML; MG/100ML; MG/100ML; MG/100ML
INJECTION, SOLUTION INTRAVENOUS CONTINUOUS
Status: CANCELLED | OUTPATIENT
Start: 2021-01-13

## 2021-01-13 RX ORDER — ACETAMINOPHEN 500 MG
1000 TABLET ORAL ONCE
Status: CANCELLED | OUTPATIENT
Start: 2021-01-13 | End: 2021-01-13

## 2021-01-13 RX ORDER — HEPARIN SODIUM 5000 [USP'U]/ML
5000 INJECTION, SOLUTION INTRAVENOUS; SUBCUTANEOUS ONCE
Status: CANCELLED | OUTPATIENT
Start: 2021-01-13 | End: 2021-01-13

## 2021-01-13 RX ORDER — DEXTROSE MONOHYDRATE 25 G/50ML
50 INJECTION, SOLUTION INTRAVENOUS
Status: DISCONTINUED | OUTPATIENT
Start: 2021-01-13 | End: 2021-01-13

## 2021-01-13 RX ORDER — SODIUM CHLORIDE 9 MG/ML
INJECTION, SOLUTION INTRAVENOUS CONTINUOUS
Status: CANCELLED | OUTPATIENT
Start: 2021-01-13

## 2021-01-23 ENCOUNTER — LAB ENCOUNTER (OUTPATIENT)
Dept: LAB | Facility: HOSPITAL | Age: 61
End: 2021-01-23
Attending: SURGERY
Payer: COMMERCIAL

## 2021-01-23 ENCOUNTER — LABORATORY ENCOUNTER (OUTPATIENT)
Dept: LAB | Facility: HOSPITAL | Age: 61
End: 2021-01-23
Attending: SURGERY
Payer: COMMERCIAL

## 2021-01-23 DIAGNOSIS — D44.7 PARAGANGLIOMA (HCC): ICD-10-CM

## 2021-01-23 LAB
ANTIBODY SCREEN: NEGATIVE
CHLORIDE SERPL-SCNC: 107 MMOL/L (ref 98–112)
CO2 SERPL-SCNC: 29 MMOL/L (ref 21–32)
POTASSIUM SERPL-SCNC: 3.9 MMOL/L (ref 3.5–5.1)
RH BLOOD TYPE: POSITIVE
SODIUM SERPL-SCNC: 139 MMOL/L (ref 136–145)

## 2021-01-23 PROCEDURE — 86901 BLOOD TYPING SEROLOGIC RH(D): CPT

## 2021-01-23 PROCEDURE — 86900 BLOOD TYPING SEROLOGIC ABO: CPT

## 2021-01-23 PROCEDURE — 36415 COLL VENOUS BLD VENIPUNCTURE: CPT

## 2021-01-23 PROCEDURE — 80051 ELECTROLYTE PANEL: CPT

## 2021-01-23 PROCEDURE — 86850 RBC ANTIBODY SCREEN: CPT

## 2021-01-24 LAB — SARS-COV-2 RNA RESP QL NAA+PROBE: NOT DETECTED

## 2021-01-25 ENCOUNTER — ANESTHESIA EVENT (OUTPATIENT)
Dept: SURGERY | Facility: HOSPITAL | Age: 61
DRG: 614 | End: 2021-01-25
Payer: COMMERCIAL

## 2021-01-26 ENCOUNTER — ANESTHESIA (OUTPATIENT)
Dept: SURGERY | Facility: HOSPITAL | Age: 61
DRG: 614 | End: 2021-01-26
Payer: COMMERCIAL

## 2021-01-26 ENCOUNTER — HOSPITAL ENCOUNTER (INPATIENT)
Facility: HOSPITAL | Age: 61
LOS: 3 days | Discharge: HOME HEALTH | DRG: 614 | End: 2021-01-29
Attending: SURGERY | Admitting: SURGERY
Payer: COMMERCIAL

## 2021-01-26 DIAGNOSIS — D44.7 PARAGANGLIOMA (HCC): Primary | ICD-10-CM

## 2021-01-26 LAB
GLUCOSE BLD-MCNC: 108 MG/DL (ref 70–99)
GLUCOSE BLD-MCNC: 156 MG/DL (ref 70–99)
GLUCOSE BLD-MCNC: 186 MG/DL (ref 70–99)
GLUCOSE BLD-MCNC: 188 MG/DL (ref 70–99)
GLUCOSE BLD-MCNC: 189 MG/DL (ref 70–99)
GLUCOSE BLD-MCNC: 237 MG/DL (ref 70–99)
GLUCOSE BLD-MCNC: 280 MG/DL (ref 70–99)
ISTAT BLOOD GAS BASE EXCESS: -1 MMOL/L (ref ?–30)
ISTAT BLOOD GAS BASE EXCESS: 1 MMOL/L (ref ?–30)
ISTAT BLOOD GAS BASE EXCESS: 2 MMOL/L (ref ?–30)
ISTAT BLOOD GAS HCO3: 24.8 MEQ/L (ref 22–26)
ISTAT BLOOD GAS HCO3: 26.5 MEQ/L (ref 22–26)
ISTAT BLOOD GAS HCO3: 26.8 MEQ/L (ref 22–26)
ISTAT BLOOD GAS O2 SATURATION: 99 % (ref 92–100)
ISTAT BLOOD GAS PCO2: 43.9 MMHG (ref 35–45)
ISTAT BLOOD GAS PCO2: 45.8 MMHG (ref 35–45)
ISTAT BLOOD GAS PCO2: 47.1 MMHG (ref 35–45)
ISTAT BLOOD GAS PH: 7.33 (ref 7.35–7.45)
ISTAT BLOOD GAS PH: 7.37 (ref 7.35–7.45)
ISTAT BLOOD GAS PH: 7.39 (ref 7.35–7.45)
ISTAT BLOOD GAS PO2: 121 MMHG (ref 80–105)
ISTAT BLOOD GAS PO2: 157 MMHG (ref 80–105)
ISTAT BLOOD GAS PO2: 177 MMHG (ref 80–105)
ISTAT BLOOD GAS TCO2: 26 MMOL/L (ref 22–32)
ISTAT BLOOD GAS TCO2: 28 MMOL/L (ref 22–32)
ISTAT BLOOD GAS TCO2: 28 MMOL/L (ref 22–32)
ISTAT HEMATOCRIT: 38 %
ISTAT HEMATOCRIT: 39 %
ISTAT HEMATOCRIT: 40 %
ISTAT IONIZED CALCIUM: 1.11 MMOL/L (ref 1.12–1.32)
ISTAT IONIZED CALCIUM: 1.15 MMOL/L (ref 1.12–1.32)
ISTAT IONIZED CALCIUM: 1.17 MMOL/L (ref 1.12–1.32)
ISTAT POTASSIUM: 3.6 MMOL/L (ref 3.6–5.1)
ISTAT POTASSIUM: 3.8 MMOL/L (ref 3.6–5.1)
ISTAT POTASSIUM: 4.2 MMOL/L (ref 3.6–5.1)
ISTAT SODIUM: 141 MMOL/L (ref 136–145)
ISTAT SODIUM: 142 MMOL/L (ref 136–145)
ISTAT SODIUM: 143 MMOL/L (ref 136–145)

## 2021-01-26 PROCEDURE — 99233 SBSQ HOSP IP/OBS HIGH 50: CPT | Performed by: HOSPITALIST

## 2021-01-26 PROCEDURE — 0GT20ZZ RESECTION OF LEFT ADRENAL GLAND, OPEN APPROACH: ICD-10-PCS | Performed by: SURGERY

## 2021-01-26 PROCEDURE — 3008F BODY MASS INDEX DOCD: CPT | Performed by: HOSPITALIST

## 2021-01-26 PROCEDURE — P9045 ALBUMIN (HUMAN), 5%, 250 ML: HCPCS | Performed by: ANESTHESIOLOGY

## 2021-01-26 PROCEDURE — 0WBH0ZZ EXCISION OF RETROPERITONEUM, OPEN APPROACH: ICD-10-PCS | Performed by: SURGERY

## 2021-01-26 PROCEDURE — 76942 ECHO GUIDE FOR BIOPSY: CPT | Performed by: ANESTHESIOLOGY

## 2021-01-26 PROCEDURE — 0WQF0ZZ REPAIR ABDOMINAL WALL, OPEN APPROACH: ICD-10-PCS | Performed by: SURGERY

## 2021-01-26 PROCEDURE — 3080F DIAST BP >= 90 MM HG: CPT | Performed by: HOSPITALIST

## 2021-01-26 PROCEDURE — 3075F SYST BP GE 130 - 139MM HG: CPT | Performed by: HOSPITALIST

## 2021-01-26 DEVICE — KIT TISS CLOS TISSEEL 4ML: Type: IMPLANTABLE DEVICE | Site: ABDOMEN | Status: FUNCTIONAL

## 2021-01-26 RX ORDER — PHENOXYBENZAMINE HYDROCHLORIDE 10 MG/1
10 CAPSULE ORAL 2 TIMES DAILY
Status: DISCONTINUED | OUTPATIENT
Start: 2021-01-26 | End: 2021-01-27

## 2021-01-26 RX ORDER — CARVEDILOL 12.5 MG/1
25 TABLET ORAL 2 TIMES DAILY WITH MEALS
Status: DISCONTINUED | OUTPATIENT
Start: 2021-01-27 | End: 2021-01-29

## 2021-01-26 RX ORDER — KETOROLAC TROMETHAMINE 15 MG/ML
15 INJECTION, SOLUTION INTRAMUSCULAR; INTRAVENOUS EVERY 8 HOURS PRN
Status: DISCONTINUED | OUTPATIENT
Start: 2021-01-26 | End: 2021-01-27

## 2021-01-26 RX ORDER — ROPIVACAINE HYDROCHLORIDE 5 MG/ML
INJECTION, SOLUTION EPIDURAL; INFILTRATION; PERINEURAL AS NEEDED
Status: DISCONTINUED | OUTPATIENT
Start: 2021-01-26 | End: 2021-01-26 | Stop reason: SURG

## 2021-01-26 RX ORDER — ACETAMINOPHEN 10 MG/ML
INJECTION, SOLUTION INTRAVENOUS
Status: COMPLETED
Start: 2021-01-26 | End: 2021-01-26

## 2021-01-26 RX ORDER — LOSARTAN POTASSIUM 100 MG/1
100 TABLET ORAL DAILY
Status: ON HOLD | COMMUNITY
End: 2021-01-29

## 2021-01-26 RX ORDER — HYDROMORPHONE HYDROCHLORIDE 1 MG/ML
0.4 INJECTION, SOLUTION INTRAMUSCULAR; INTRAVENOUS; SUBCUTANEOUS EVERY 5 MIN PRN
Status: ACTIVE | OUTPATIENT
Start: 2021-01-26 | End: 2021-01-26

## 2021-01-26 RX ORDER — ACETAMINOPHEN 500 MG
1000 TABLET ORAL ONCE AS NEEDED
Status: DISCONTINUED | OUTPATIENT
Start: 2021-01-26 | End: 2021-01-26 | Stop reason: HOSPADM

## 2021-01-26 RX ORDER — HYDROCODONE BITARTRATE AND ACETAMINOPHEN 5; 325 MG/1; MG/1
1 TABLET ORAL AS NEEDED
Status: DISCONTINUED | OUTPATIENT
Start: 2021-01-26 | End: 2021-01-26 | Stop reason: HOSPADM

## 2021-01-26 RX ORDER — ACETAMINOPHEN 500 MG
1000 TABLET ORAL ONCE
Status: COMPLETED | OUTPATIENT
Start: 2021-01-26 | End: 2021-01-26

## 2021-01-26 RX ORDER — SODIUM CHLORIDE, SODIUM LACTATE, POTASSIUM CHLORIDE, CALCIUM CHLORIDE 600; 310; 30; 20 MG/100ML; MG/100ML; MG/100ML; MG/100ML
INJECTION, SOLUTION INTRAVENOUS CONTINUOUS
Status: DISCONTINUED | OUTPATIENT
Start: 2021-01-26 | End: 2021-01-27

## 2021-01-26 RX ORDER — LATANOPROST 50 UG/ML
1 SOLUTION/ DROPS OPHTHALMIC NIGHTLY
COMMUNITY
End: 2021-06-03

## 2021-01-26 RX ORDER — FAMOTIDINE 10 MG/ML
20 INJECTION, SOLUTION INTRAVENOUS 2 TIMES DAILY
Status: DISCONTINUED | OUTPATIENT
Start: 2021-01-26 | End: 2021-01-29

## 2021-01-26 RX ORDER — SODIUM CHLORIDE 9 MG/ML
INJECTION, SOLUTION INTRAVENOUS CONTINUOUS
Status: DISCONTINUED | OUTPATIENT
Start: 2021-01-26 | End: 2021-01-26 | Stop reason: HOSPADM

## 2021-01-26 RX ORDER — METOCLOPRAMIDE HYDROCHLORIDE 5 MG/ML
10 INJECTION INTRAMUSCULAR; INTRAVENOUS AS NEEDED
Status: ACTIVE | OUTPATIENT
Start: 2021-01-26 | End: 2021-01-26

## 2021-01-26 RX ORDER — SODIUM CHLORIDE 9 MG/ML
INJECTION, SOLUTION INTRAVENOUS CONTINUOUS PRN
Status: DISCONTINUED | OUTPATIENT
Start: 2021-01-26 | End: 2021-01-26 | Stop reason: SURG

## 2021-01-26 RX ORDER — SODIUM CHLORIDE, SODIUM LACTATE, POTASSIUM CHLORIDE, CALCIUM CHLORIDE 600; 310; 30; 20 MG/100ML; MG/100ML; MG/100ML; MG/100ML
INJECTION, SOLUTION INTRAVENOUS CONTINUOUS
Status: DISCONTINUED | OUTPATIENT
Start: 2021-01-26 | End: 2021-01-28

## 2021-01-26 RX ORDER — FAMOTIDINE 20 MG/1
20 TABLET ORAL 2 TIMES DAILY
Status: DISCONTINUED | OUTPATIENT
Start: 2021-01-26 | End: 2021-01-29

## 2021-01-26 RX ORDER — HYDROMORPHONE HYDROCHLORIDE 1 MG/ML
INJECTION, SOLUTION INTRAMUSCULAR; INTRAVENOUS; SUBCUTANEOUS
Status: COMPLETED
Start: 2021-01-26 | End: 2021-01-26

## 2021-01-26 RX ORDER — DILTIAZEM HYDROCHLORIDE 180 MG/1
180 CAPSULE, EXTENDED RELEASE ORAL DAILY
Status: DISCONTINUED | OUTPATIENT
Start: 2021-01-27 | End: 2021-01-29

## 2021-01-26 RX ORDER — DEXTROSE MONOHYDRATE 25 G/50ML
50 INJECTION, SOLUTION INTRAVENOUS
Status: DISCONTINUED | OUTPATIENT
Start: 2021-01-26 | End: 2021-01-26 | Stop reason: HOSPADM

## 2021-01-26 RX ORDER — ATORVASTATIN CALCIUM 10 MG/1
10 TABLET, FILM COATED ORAL NIGHTLY
Status: DISCONTINUED | OUTPATIENT
Start: 2021-01-26 | End: 2021-01-29

## 2021-01-26 RX ORDER — ROCURONIUM BROMIDE 10 MG/ML
INJECTION, SOLUTION INTRAVENOUS AS NEEDED
Status: DISCONTINUED | OUTPATIENT
Start: 2021-01-26 | End: 2021-01-26 | Stop reason: SURG

## 2021-01-26 RX ORDER — INSULIN ASPART 100 [IU]/ML
INJECTION, SOLUTION INTRAVENOUS; SUBCUTANEOUS ONCE
Status: COMPLETED | OUTPATIENT
Start: 2021-01-26 | End: 2021-01-26

## 2021-01-26 RX ORDER — ACETAMINOPHEN 10 MG/ML
1000 INJECTION, SOLUTION INTRAVENOUS EVERY 6 HOURS
Status: DISCONTINUED | OUTPATIENT
Start: 2021-01-26 | End: 2021-01-27

## 2021-01-26 RX ORDER — DEXTROSE MONOHYDRATE 25 G/50ML
50 INJECTION, SOLUTION INTRAVENOUS
Status: DISCONTINUED | OUTPATIENT
Start: 2021-01-26 | End: 2021-01-29

## 2021-01-26 RX ORDER — NALOXONE HYDROCHLORIDE 0.4 MG/ML
80 INJECTION, SOLUTION INTRAMUSCULAR; INTRAVENOUS; SUBCUTANEOUS AS NEEDED
Status: ACTIVE | OUTPATIENT
Start: 2021-01-26 | End: 2021-01-26

## 2021-01-26 RX ORDER — ACETAMINOPHEN 500 MG
TABLET ORAL
Status: COMPLETED
Start: 2021-01-26 | End: 2021-01-26

## 2021-01-26 RX ORDER — LOSARTAN POTASSIUM 100 MG/1
100 TABLET ORAL DAILY
Status: DISCONTINUED | OUTPATIENT
Start: 2021-01-27 | End: 2021-01-27

## 2021-01-26 RX ORDER — LATANOPROST 50 UG/ML
1 SOLUTION/ DROPS OPHTHALMIC NIGHTLY
Status: DISCONTINUED | OUTPATIENT
Start: 2021-01-26 | End: 2021-01-29

## 2021-01-26 RX ORDER — ENOXAPARIN SODIUM 100 MG/ML
40 INJECTION SUBCUTANEOUS DAILY
Status: DISCONTINUED | OUTPATIENT
Start: 2021-01-27 | End: 2021-01-28

## 2021-01-26 RX ORDER — MIDAZOLAM HYDROCHLORIDE 1 MG/ML
INJECTION INTRAMUSCULAR; INTRAVENOUS AS NEEDED
Status: DISCONTINUED | OUTPATIENT
Start: 2021-01-26 | End: 2021-01-26 | Stop reason: SURG

## 2021-01-26 RX ORDER — FUROSEMIDE 20 MG/1
20 TABLET ORAL DAILY
Status: ON HOLD | COMMUNITY
End: 2021-01-29

## 2021-01-26 RX ORDER — HEPARIN SODIUM 5000 [USP'U]/ML
5000 INJECTION, SOLUTION INTRAVENOUS; SUBCUTANEOUS ONCE
Status: COMPLETED | OUTPATIENT
Start: 2021-01-26 | End: 2021-01-26

## 2021-01-26 RX ORDER — ONDANSETRON 2 MG/ML
4 INJECTION INTRAMUSCULAR; INTRAVENOUS AS NEEDED
Status: ACTIVE | OUTPATIENT
Start: 2021-01-26 | End: 2021-01-26

## 2021-01-26 RX ORDER — ALBUMIN, HUMAN INJ 5% 5 %
SOLUTION INTRAVENOUS CONTINUOUS PRN
Status: DISCONTINUED | OUTPATIENT
Start: 2021-01-26 | End: 2021-01-26 | Stop reason: SURG

## 2021-01-26 RX ORDER — DILTIAZEM HYDROCHLORIDE EXTENDED-RELEASE TABLETS 180 MG/1
180 TABLET, EXTENDED RELEASE ORAL DAILY
COMMUNITY
End: 2021-07-09

## 2021-01-26 RX ORDER — TRAZODONE HYDROCHLORIDE 100 MG/1
150 TABLET ORAL NIGHTLY
COMMUNITY

## 2021-01-26 RX ORDER — LIDOCAINE HYDROCHLORIDE 10 MG/ML
INJECTION, SOLUTION EPIDURAL; INFILTRATION; INTRACAUDAL; PERINEURAL AS NEEDED
Status: DISCONTINUED | OUTPATIENT
Start: 2021-01-26 | End: 2021-01-26 | Stop reason: SURG

## 2021-01-26 RX ORDER — HYDROCODONE BITARTRATE AND ACETAMINOPHEN 5; 325 MG/1; MG/1
2 TABLET ORAL AS NEEDED
Status: DISCONTINUED | OUTPATIENT
Start: 2021-01-26 | End: 2021-01-26 | Stop reason: HOSPADM

## 2021-01-26 RX ORDER — AMLODIPINE BESYLATE 10 MG/1
10 TABLET ORAL DAILY
Status: DISCONTINUED | OUTPATIENT
Start: 2021-01-27 | End: 2021-01-27

## 2021-01-26 RX ORDER — SERTRALINE HYDROCHLORIDE 100 MG/1
100 TABLET, FILM COATED ORAL DAILY
COMMUNITY

## 2021-01-26 RX ORDER — MIDAZOLAM HYDROCHLORIDE 1 MG/ML
1 INJECTION INTRAMUSCULAR; INTRAVENOUS EVERY 5 MIN PRN
Status: ACTIVE | OUTPATIENT
Start: 2021-01-26 | End: 2021-01-26

## 2021-01-26 RX ORDER — PRAVASTATIN SODIUM 40 MG
40 TABLET ORAL NIGHTLY
COMMUNITY
End: 2021-07-19

## 2021-01-26 RX ORDER — PHENYLEPHRINE HCL 10 MG/ML
VIAL (ML) INJECTION AS NEEDED
Status: DISCONTINUED | OUTPATIENT
Start: 2021-01-26 | End: 2021-01-26 | Stop reason: SURG

## 2021-01-26 RX ORDER — CARVEDILOL 25 MG/1
25 TABLET ORAL 2 TIMES DAILY WITH MEALS
COMMUNITY
End: 2021-06-07

## 2021-01-26 RX ORDER — TRAZODONE HYDROCHLORIDE 100 MG/1
200 TABLET ORAL NIGHTLY
Status: DISCONTINUED | OUTPATIENT
Start: 2021-01-26 | End: 2021-01-29

## 2021-01-26 RX ORDER — CALCIUM CHLORIDE 100 MG/ML
INJECTION INTRAVENOUS; INTRAVENTRICULAR AS NEEDED
Status: DISCONTINUED | OUTPATIENT
Start: 2021-01-26 | End: 2021-01-26 | Stop reason: SURG

## 2021-01-26 RX ORDER — INSULIN ASPART 100 [IU]/ML
INJECTION, SOLUTION INTRAVENOUS; SUBCUTANEOUS
Status: COMPLETED
Start: 2021-01-26 | End: 2021-01-26

## 2021-01-26 RX ORDER — DEXAMETHASONE SODIUM PHOSPHATE 4 MG/ML
VIAL (ML) INJECTION AS NEEDED
Status: DISCONTINUED | OUTPATIENT
Start: 2021-01-26 | End: 2021-01-26 | Stop reason: SURG

## 2021-01-26 RX ORDER — ONDANSETRON 2 MG/ML
INJECTION INTRAMUSCULAR; INTRAVENOUS AS NEEDED
Status: DISCONTINUED | OUTPATIENT
Start: 2021-01-26 | End: 2021-01-26 | Stop reason: SURG

## 2021-01-26 RX ORDER — ONDANSETRON 2 MG/ML
4 INJECTION INTRAMUSCULAR; INTRAVENOUS EVERY 6 HOURS PRN
Status: DISCONTINUED | OUTPATIENT
Start: 2021-01-26 | End: 2021-01-29

## 2021-01-26 RX ORDER — SERTRALINE HYDROCHLORIDE 100 MG/1
200 TABLET, FILM COATED ORAL DAILY
Status: DISCONTINUED | OUTPATIENT
Start: 2021-01-27 | End: 2021-01-29

## 2021-01-26 RX ADMIN — PHENYLEPHRINE HCL 100 MCG: 10 MG/ML VIAL (ML) INJECTION at 07:46:00

## 2021-01-26 RX ADMIN — SODIUM CHLORIDE, SODIUM LACTATE, POTASSIUM CHLORIDE, CALCIUM CHLORIDE: 600; 310; 30; 20 INJECTION, SOLUTION INTRAVENOUS at 12:00:00

## 2021-01-26 RX ADMIN — ROCURONIUM BROMIDE 100 MG: 10 INJECTION, SOLUTION INTRAVENOUS at 07:39:00

## 2021-01-26 RX ADMIN — SODIUM CHLORIDE: 9 INJECTION, SOLUTION INTRAVENOUS at 11:15:00

## 2021-01-26 RX ADMIN — PHENYLEPHRINE HCL 50 MCG: 10 MG/ML VIAL (ML) INJECTION at 07:43:00

## 2021-01-26 RX ADMIN — MIDAZOLAM HYDROCHLORIDE 2 MG: 1 INJECTION INTRAMUSCULAR; INTRAVENOUS at 07:38:00

## 2021-01-26 RX ADMIN — SODIUM CHLORIDE: 9 INJECTION, SOLUTION INTRAVENOUS at 10:00:00

## 2021-01-26 RX ADMIN — ALBUMIN, HUMAN INJ 5%: 5 SOLUTION INTRAVENOUS at 11:36:00

## 2021-01-26 RX ADMIN — ROCURONIUM BROMIDE 40 MG: 10 INJECTION, SOLUTION INTRAVENOUS at 10:58:00

## 2021-01-26 RX ADMIN — ROPIVACAINE HYDROCHLORIDE 40 ML: 5 INJECTION, SOLUTION EPIDURAL; INFILTRATION; PERINEURAL at 12:18:00

## 2021-01-26 RX ADMIN — ALBUMIN, HUMAN INJ 5%: 5 SOLUTION INTRAVENOUS at 11:30:00

## 2021-01-26 RX ADMIN — SODIUM CHLORIDE: 9 INJECTION, SOLUTION INTRAVENOUS at 07:45:00

## 2021-01-26 RX ADMIN — SODIUM CHLORIDE: 9 INJECTION, SOLUTION INTRAVENOUS at 12:37:00

## 2021-01-26 RX ADMIN — ONDANSETRON 8 MG: 2 INJECTION INTRAMUSCULAR; INTRAVENOUS at 12:00:00

## 2021-01-26 RX ADMIN — DEXAMETHASONE SODIUM PHOSPHATE 4 MG: 4 MG/ML VIAL (ML) INJECTION at 08:15:00

## 2021-01-26 RX ADMIN — SODIUM CHLORIDE, SODIUM LACTATE, POTASSIUM CHLORIDE, CALCIUM CHLORIDE: 600; 310; 30; 20 INJECTION, SOLUTION INTRAVENOUS at 10:05:00

## 2021-01-26 RX ADMIN — LIDOCAINE HYDROCHLORIDE 50 MG: 10 INJECTION, SOLUTION EPIDURAL; INFILTRATION; INTRACAUDAL; PERINEURAL at 07:39:00

## 2021-01-26 RX ADMIN — SODIUM CHLORIDE: 9 INJECTION, SOLUTION INTRAVENOUS at 12:00:00

## 2021-01-26 RX ADMIN — CALCIUM CHLORIDE 0.5 G: 100 INJECTION INTRAVENOUS; INTRAVENTRICULAR at 11:23:00

## 2021-01-26 RX ADMIN — PHENYLEPHRINE HCL 100 MCG: 10 MG/ML VIAL (ML) INJECTION at 10:49:00

## 2021-01-26 NOTE — OPERATIVE REPORT
[address]    PATIENT'S NAME: Bruce Mercer ROLAN   ATTENDING PHYSICIAN: Jessica Titus. Leia Camargo MD   OPERATING PHYSICIAN: Jessica Titus.  Leia Camargo MD   PATIENT ACCOUNT#:   [id number]    LOCATION:  [address] [place] [address]  MEDICAL RECORD #:   [id number]       DATE OF BIRTH:  2 throughout the procedure. Venous tributaries were clipped and divided. The pancreas was identified and preserved. The splenic flexure had been mobilized inferiorly. The retroperitoneal space overlying Gerota fascia was entered.   We used clipping or a H

## 2021-01-26 NOTE — ANESTHESIA PROCEDURE NOTES
Peripheral IV  Inserted by: Bandar Sanders MD    Placement  Needle size: 16 G  Laterality: right  Location: forearm  Site prep: alcohol  Technique: anatomical landmarks  Attempts: 1

## 2021-01-26 NOTE — ANESTHESIA PROCEDURE NOTES
Airway  Date/Time: 1/26/2021 7:41 AM  Urgency: elective    Airway not difficult    General Information and Staff    Patient location during procedure: OR  Anesthesiologist: Yifan Sanders MD  Performed: anesthesiologist     Indications and Patient Co

## 2021-01-26 NOTE — BRIEF OP NOTE
Pre-Operative Diagnosis: Paraganglioma ([facility] [address] [D44.7]     Post-Operative Diagnosis: Paraganglioma 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     Surgeon(s) and Ro

## 2021-01-26 NOTE — ANESTHESIA PREPROCEDURE EVALUATION
PRE-OP EVALUATION    Patient Name: Geoff Dorsey    Pre-op Diagnosis: Paraganglioma ([facility] [address] [D44.7]    Procedure(s):  Resection of intra abdominal pheochromocytoma    Surgeon(s) and Role:     * Олег Barraza MD - Primary     * Meagan Alan MD - Assisti total) by mouth every morning., Disp: 30 capsule, Rfl: 2, Past Month at Unknown time    •  traZODone HCl 100 MG Oral Tab, Take 1 tablet (100 mg total) by mouth nightly.  (Patient taking differently: Take 200 mg by mouth nightly.  ), Disp: 30 tablet, Rfl: 0, allergies. Anesthesia Evaluation    Patient summary reviewed. Anesthetic Complications           GI/Hepatic/Renal                                 Cardiovascular      ECG reviewed.           (+) obesity  (+) hypertension   (+) hyperlipidemia Social History    Tobacco Use      Smoking status: Never Smoker      Smokeless tobacco: Never Used    Alcohol use: No      Alcohol/week: 0.0 standard drinks      Drug use: No     Available pre-op labs reviewed.   Lab Results   Component Value Date    WB

## 2021-01-26 NOTE — ANESTHESIA POSTPROCEDURE EVALUATION
[address] [facility] Patient Status:  Inpatient   Age/Gender 64year old male MRN [id number]   [facility] SURGERY Attending Jose Medina MD   [facility] Day # 0 PCP Kai Guillen MD       Anesthesia Post-op Note    Procedure(s):  Res

## 2021-01-26 NOTE — ANESTHESIA PROCEDURE NOTES
Regional Block  Performed by: Adele Grier MD  Authorized by: Adele Grier MD       General Information and Staff    Start Time:   Anesthesiologist: Adele Grier MD  Patient Location:  OR    Block Placement: Post Induction  Site [place] Limb

## 2021-01-26 NOTE — ANESTHESIA PROCEDURE NOTES
Peripheral IV  Inserted by: Tony Sanders MD    Placement  Needle size: 18 G  Laterality: right  Location: hand  Site prep: alcohol  Technique: anatomical landmarks  Attempts: 1

## 2021-01-26 NOTE — H&P
Patient Name:  Dale Beverly   YOB: 1960   Gender:  Male   Appt Date:  1/26/2021   Provider:  Austyn Valencia MD      PATIENT PROVIDERS  Referring Provider: Jose R Panchal MD     Primary Care Gricel Marks MD   Address: 2 The patient initially presented to his PCP 10/19/2020 for complaints of sudden dull abdominal pain and periumbilical fullness. US abdomen 11/4/2020 was relatively normal except for mild prominence of the common bile duct.  The patient subsequently followed •  Rivaroxaban (XARELTO) 20 MG Oral Tab, Take 1 tablet (20 mg total) by mouth daily with food. , Disp: 90 tablet, Rfl: 3    •  Phentermine HCl 30 MG Oral Cap, Take 1 capsule (30 mg total) by mouth every morning., Disp: 30 capsule, Rfl: 2    •  traZODone HCl Past Medical History:   Diagnosis Date   • Abdominal pain     • Anxiety     • Arrhythmia       A-FIB DX 2 YRS AGO   • Arthritis     • Back pain     • Cancer ([facility] [address] 11/2019     prostate   • Deep vein thrombosis (HCC)       left   • Fatigue     • Heartburn   · GI: denies nausea, vomiting, constipation, diarrhea; no rectal bleeding  · GENITAL/: no blood in urine  · MUSCULOSKELETAL: no joint complaints, + mild back pain  · NEURO: no tingling, numbness, weakness  · ENDOCRINE: denies weight loss/gain  · PSYCH: n SPLEEN:  No enlargement or focal lesion.    KIDNEYS:  No significant mass or obstruction. Isis Graft is a 1.6 cm benign cyst midpole right kidney and a smaller 0.5 cm benign cyst upper pole right kidney.   ADRENALS:  No mass or enlargement.    AORTA/VASCULAR:   There has been no significant change since the patient was seen as documented in EPIC. He was started on alpha blockade under the direction of endocrinology. Surgery revisted.    To proceed as planned.       Electronically Signed by:   Farhad Rodriguez PA-C

## 2021-01-26 NOTE — ANESTHESIA PROCEDURE NOTES
Arterial Line  Performed by: Morena Camargo MD  Authorized by: Morena Camargo MD     General Information and Staff    Procedure Start:  1/26/2021 7:44 AM  Procedure End:  1/26/2021 7:52 AM  Anesthesiologist:  Morena Camargo MD  Performe

## 2021-01-27 LAB
ALBUMIN SERPL-MCNC: 3 G/DL (ref 3.4–5)
ALBUMIN/GLOB SERPL: 1 {RATIO} (ref 1–2)
ALP LIVER SERPL-CCNC: 72 U/L
ALT SERPL-CCNC: 29 U/L
ANION GAP SERPL CALC-SCNC: 3 MMOL/L (ref 0–18)
AST SERPL-CCNC: 50 U/L (ref 15–37)
BILIRUB SERPL-MCNC: 0.8 MG/DL (ref 0.1–2)
BUN BLD-MCNC: 21 MG/DL (ref 7–18)
BUN/CREAT SERPL: 13.6 (ref 10–20)
CALCIUM BLD-MCNC: 8 MG/DL (ref 8.5–10.1)
CHLORIDE SERPL-SCNC: 108 MMOL/L (ref 98–112)
CO2 SERPL-SCNC: 27 MMOL/L (ref 21–32)
CREAT BLD-MCNC: 1.54 MG/DL
DEPRECATED HBV CORE AB SER IA-ACNC: 48.9 NG/ML
DEPRECATED RDW RBC AUTO: 44.4 FL (ref 35.1–46.3)
ERYTHROCYTE [DISTWIDTH] IN BLOOD BY AUTOMATED COUNT: 15.3 % (ref 11–15)
EST. AVERAGE GLUCOSE BLD GHB EST-MCNC: 157 MG/DL (ref 68–126)
GLOBULIN PLAS-MCNC: 3 G/DL (ref 2.8–4.4)
GLUCOSE BLD-MCNC: 155 MG/DL (ref 70–99)
GLUCOSE BLD-MCNC: 171 MG/DL (ref 70–99)
GLUCOSE BLD-MCNC: 177 MG/DL (ref 70–99)
GLUCOSE BLD-MCNC: 186 MG/DL (ref 70–99)
GLUCOSE BLD-MCNC: 200 MG/DL (ref 70–99)
HAV IGM SER QL: 2 MG/DL (ref 1.6–2.6)
HBA1C MFR BLD HPLC: 7.1 % (ref ?–5.7)
HCT VFR BLD AUTO: 31.9 %
HGB BLD-MCNC: 10.4 G/DL
IRON SATURATION: 14 %
IRON SERPL-MCNC: 37 UG/DL
M PROTEIN MFR SERPL ELPH: 6 G/DL (ref 6.4–8.2)
MCH RBC QN AUTO: 25.9 PG (ref 26–34)
MCHC RBC AUTO-ENTMCNC: 32.6 G/DL (ref 31–37)
MCV RBC AUTO: 79.4 FL
OSMOLALITY SERPL CALC.SUM OF ELEC: 295 MOSM/KG (ref 275–295)
PHOSPHATE SERPL-MCNC: 4.1 MG/DL (ref 2.5–4.9)
PLATELET # BLD AUTO: 188 10(3)UL (ref 150–450)
POTASSIUM SERPL-SCNC: 4.2 MMOL/L (ref 3.5–5.1)
RBC # BLD AUTO: 4.02 X10(6)UL
SODIUM SERPL-SCNC: 138 MMOL/L (ref 136–145)
TOTAL IRON BINDING CAPACITY: 267 UG/DL (ref 240–450)
TRANSFERRIN SERPL-MCNC: 179 MG/DL (ref 200–360)
WBC # BLD AUTO: 11.2 X10(3) UL (ref 4–11)

## 2021-01-27 PROCEDURE — 3051F HG A1C>EQUAL 7.0%<8.0%: CPT | Performed by: PHYSICIAN ASSISTANT

## 2021-01-27 PROCEDURE — 99232 SBSQ HOSP IP/OBS MODERATE 35: CPT | Performed by: INTERNAL MEDICINE

## 2021-01-27 RX ORDER — ACETAMINOPHEN 500 MG
1000 TABLET ORAL EVERY 6 HOURS
Status: DISCONTINUED | OUTPATIENT
Start: 2021-01-27 | End: 2021-01-29

## 2021-01-27 RX ORDER — OXYCODONE HYDROCHLORIDE 5 MG/1
5 TABLET ORAL EVERY 4 HOURS PRN
Status: DISCONTINUED | OUTPATIENT
Start: 2021-01-27 | End: 2021-01-29

## 2021-01-27 NOTE — CM/SW NOTE
Pt is a 65 yo male admitted for paraganglioma. Pt had surgery with Dr Ananth Juarez on 1/26. Pt lives with his wife Hayden Edwards. PT saw pt and is recommending HHPT. Referral sent to Rupa at [facility] - she will see pt to ask him about HH. SW following.

## 2021-01-27 NOTE — PROGRESS NOTES
[facility]  Progress Note     Kell Coronado Patient Status:  Inpatient    1960 MRN [id number]   [facility] 7NE-A Attending Florence Scott MD   Hosp Day # 1 PCP Lm Omalley MD     Chief Complaint: Post op pain    S: Patient s Subcutaneous Daily   • famoTIDine  20 mg Oral BID    Or   • famoTIDine  20 mg Intravenous BID   • acetaminophen  1,000 mg Intravenous Q6H   • amLODIPine Besylate  10 mg Oral Daily   • carvedilol  25 mg Oral BID with meals   • dilTIAZem  180 mg Oral Daily DO

## 2021-01-27 NOTE — PROGRESS NOTES
POD #1 s/p resection of left abdominal pheochromocytoma with left adrenalectomy    Afebrile and VSS  Denies N/V, + flatus  Tolerating clear liquids  Excellent urine output    /90 (BP Location: Left arm)   Pulse (!) 132   Temp 98.9 °F (37.2 °C) (Oral) I have personally seen and examined the patient and reviewed all relevant labs and reports. I agree with her physical exam and the data listed in the report.      I agree with the above listed assessment and plan and have modified the report to reflect my o

## 2021-01-27 NOTE — HOME CARE LIAISON
Received this patient from Pat Banks. [facility] unable to staff this patient, so re-referred to [address] who accepted patient.      [facility]  [facility], [facility]  Phone: [phone]  Fax: [phone]

## 2021-01-27 NOTE — CONSULTS
[facility]  [address] Patient Status:  Inpatient    1960 MRN [id number]   [facility] 7NE-A Attending Ti Simmons MD   Hosp Day # 0 PCP Helen Sharma MD     Reason for consult: medical managment    Requeste 12/3/2020    Performed by Stephanie Edwards MD at [address] [facility] ENDOSCOPY   • ESOPHAGOGASTRODUODENOSCOPY (EGD) N/A 12/3/2020    Performed by Stephanie Edwards MD at [address] [facility] Right 10/3/2019    Performed by Caden Reyes MD at [facility]ated [facility] ENDO HCl ER Coated Beads 180 MG Oral Tablet 24 Hr, Take 180 mg by mouth daily. , Disp: , Rfl:     •  furosemide 20 MG Oral Tab, Take 20 mg by mouth daily. , Disp: , Rfl:     •  metFORMIN HCl 500 MG Oral Tab, Take 500 mg by mouth 2 (two) times daily with meals. , tablet (100 mg total) by mouth as needed for Erectile Dysfunction. , Disp: 30 tablet, Rfl: 3    •  Insulin Syringe-Needle U-100 (BD INSULIN SYRINGE ULTRAFINE) 31G X 5/16\" 1 ML Does not apply Misc, As directed, Disp: 90 each, Rfl: 5        Review of Systems medication until surgical oncology says okay to restart. Will continue alpha blocker. 2. Type II but diabetes-we will place on hyperglycemia protocol with long-acting insulin and correction factor insulin.   3. Hypertension-we will continue carvedilol, los

## 2021-01-27 NOTE — CM/SW NOTE
Angelina Bella from [facility] said they will not be able to accept pt - they will re-refer to another [facility] agency. [facility] written.

## 2021-01-27 NOTE — PLAN OF CARE
Arrived from PACU @ 1730  Patient alert, oriented x4  afib on tele.  1L O2  Cavazos in place draining clear, yellow urine  FAYE with bloody, serosanguinous output  Clear liquid diet  Pain controlled with dilaudid PCA  Dr. Alyssa العراقي aware of consult  IV fluids i

## 2021-01-27 NOTE — PLAN OF CARE
Assumed care at 0700  A&O x 4  Lungs clear. Taken down from 3L to 1L  HR a fib on tele. Tachy with activity  Oxy oral given, po tylenol. Pain controlled 3/10. Advanced to soft diet, tolerating well. Hypoactive bowel sounds, passing gas, no bm.  No belchin

## 2021-01-27 NOTE — PHYSICAL THERAPY NOTE
PHYSICAL THERAPY EVALUATION - INPATIENT     Room Number: 3779/9146-J  Evaluation Date: 1/27/2021  Type of Evaluation: Initial  Physician Order: PT Eval and Treat    Presenting Problem:  s/p resection of left abdominal pheochromocytoma with left adren History  Past Surgical History:   Procedure Laterality Date   • COLONOSCOPY     • ENDOSCOPIC ULTRASOUND (EUS) N/A 12/3/2020    Performed by Lesia Denton MD at [facility] ENDOSCOPY   • ESOPHAGOGASTRODUODENOSCOPY (EGD) N/A 12/3/2020    Performed by Faheem Arriaga okay\"    Patient self-stated goal is not stated.      OBJECTIVE  Precautions: Bed/chair alarm;Limb alert - right;Drain(s)  Fall Risk: Standard fall risk    WEIGHT BEARING RESTRICTION  Weight Bearing Restriction: None                PAIN ASSESSMENT  Rating: Limits  Stoop/Curb Assistance: Not tested  Comment : n/a    Skilled Therapy Provided: Per RN okbeth to work with pt. Pt received in supine and was agreeable to PT session. Pt educated on role of therapy, goals for the session, and discharge planning.  Pt perf Discharge Recommendations: Home with home health PT    PLAN  PT Treatment Plan: Bed mobility; Body mechanics; Endurance; Energy conservation;Patient education; Family education;Gait training;Strengthening;Stair training;Transfer training;Balance training  Reha

## 2021-01-27 NOTE — PLAN OF CARE
Assumed care at [address] [facility]. POD#1. AOx4. Dilaudid PCA, IV Tylenol, and Toradol for pain management. Midline incision dressing in place C/D/I; old drainage noted. Left FAYE in place and draining bloody drainage. Adequate urine output from Cavazos catheter.   Pt form as appropriate  - Assess patient's ability to be responsible for managing their own health  - Refer to Case Management Department for coordinating discharge planning if the patient needs post-hospital services based on physician/LIP order or complex n replacement as ordered  - Monitor response to electrolyte replacements, including rhythm and repeat lab results as appropriate  - Fluid restriction as ordered  - Instruct patient on fluid and nutrition restrictions as appropriate  Outcome: Progressing

## 2021-01-28 LAB
ANION GAP SERPL CALC-SCNC: 2 MMOL/L (ref 0–18)
BASOPHILS # BLD AUTO: 0.02 X10(3) UL (ref 0–0.2)
BASOPHILS NFR BLD AUTO: 0.2 %
BUN BLD-MCNC: 20 MG/DL (ref 7–18)
BUN/CREAT SERPL: 14.2 (ref 10–20)
CALCIUM BLD-MCNC: 8.2 MG/DL (ref 8.5–10.1)
CHLORIDE SERPL-SCNC: 109 MMOL/L (ref 98–112)
CO2 SERPL-SCNC: 28 MMOL/L (ref 21–32)
CREAT BLD-MCNC: 1.41 MG/DL
DEPRECATED RDW RBC AUTO: 43.8 FL (ref 35.1–46.3)
EOSINOPHIL # BLD AUTO: 0.45 X10(3) UL (ref 0–0.7)
EOSINOPHIL NFR BLD AUTO: 4.2 %
ERYTHROCYTE [DISTWIDTH] IN BLOOD BY AUTOMATED COUNT: 15.2 % (ref 11–15)
GLUCOSE BLD-MCNC: 137 MG/DL (ref 70–99)
GLUCOSE BLD-MCNC: 153 MG/DL (ref 70–99)
GLUCOSE BLD-MCNC: 180 MG/DL (ref 70–99)
GLUCOSE BLD-MCNC: 218 MG/DL (ref 70–99)
GLUCOSE BLD-MCNC: 96 MG/DL (ref 70–99)
HAV IGM SER QL: 2.3 MG/DL (ref 1.6–2.6)
HCT VFR BLD AUTO: 28 %
HGB BLD-MCNC: 9.3 G/DL
IMM GRANULOCYTES # BLD AUTO: 0.04 X10(3) UL (ref 0–1)
IMM GRANULOCYTES NFR BLD: 0.4 %
LYMPHOCYTES # BLD AUTO: 1.58 X10(3) UL (ref 1–4)
LYMPHOCYTES NFR BLD AUTO: 14.9 %
MCH RBC QN AUTO: 26.2 PG (ref 26–34)
MCHC RBC AUTO-ENTMCNC: 33.2 G/DL (ref 31–37)
MCV RBC AUTO: 78.9 FL
MONOCYTES # BLD AUTO: 0.91 X10(3) UL (ref 0.1–1)
MONOCYTES NFR BLD AUTO: 8.6 %
NEUTROPHILS # BLD AUTO: 7.6 X10 (3) UL (ref 1.5–7.7)
NEUTROPHILS # BLD AUTO: 7.6 X10(3) UL (ref 1.5–7.7)
NEUTROPHILS NFR BLD AUTO: 71.7 %
OSMOLALITY SERPL CALC.SUM OF ELEC: 290 MOSM/KG (ref 275–295)
PHOSPHATE SERPL-MCNC: 2.6 MG/DL (ref 2.5–4.9)
PLATELET # BLD AUTO: 157 10(3)UL (ref 150–450)
POTASSIUM SERPL-SCNC: 3.6 MMOL/L (ref 3.5–5.1)
RBC # BLD AUTO: 3.55 X10(6)UL
SODIUM SERPL-SCNC: 139 MMOL/L (ref 136–145)
WBC # BLD AUTO: 10.6 X10(3) UL (ref 4–11)

## 2021-01-28 PROCEDURE — 99232 SBSQ HOSP IP/OBS MODERATE 35: CPT | Performed by: INTERNAL MEDICINE

## 2021-01-28 RX ORDER — POTASSIUM CHLORIDE 20 MEQ/1
40 TABLET, EXTENDED RELEASE ORAL DAILY
Status: DISCONTINUED | OUTPATIENT
Start: 2021-01-28 | End: 2021-01-28

## 2021-01-28 RX ORDER — POTASSIUM CHLORIDE 20 MEQ/1
40 TABLET, EXTENDED RELEASE ORAL ONCE
Status: COMPLETED | OUTPATIENT
Start: 2021-01-28 | End: 2021-01-28

## 2021-01-28 NOTE — PROGRESS NOTES
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

## 2021-01-28 NOTE — PHYSICAL THERAPY NOTE
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 ..   -   Moving to and from a bed to a chair (including a wheelchair)?: None   -   Need to walk in hospital room?: None   -   Climbing 3-5 steps with a railing?: A Little       AM-PAC Score:  Raw Score: 22   Approx Degree of Impairment: 20.9 conservation;Patient education; Family education;Gait training;Strengthening;Stair training;Transfer training;Balance training  Rehab Potential : Good  Frequency (Obs): 3-5x/week    CURRENT GOALS     Goal #1 Patient is able to demonstrate supine - sit EOB @

## 2021-01-28 NOTE — PLAN OF CARE
Assumed care at 0700  Pt AxOx4, SR on tele, 1-2L NC  Incisional pain, c/o 5-6/10. Dilaudid PCA dc'ed. Controlled w/ oxy and scheduled tylenol. Tolerating soft diet.  (+) flatus, (+) belching, no BM or N/V  Incision C/D/I  FAYE w/ 40 ml brown/red output  Up t

## 2021-01-28 NOTE — PAYOR COMM NOTE
--------------  CONTINUED STAY REVIEW    Payor: [employer]  Subscriber #:  [id number]  Authorization Number: [id number]    Admit date: 1/26/21  Admit time: 0717    Admitting Physician: Sagar Rahman MD  Attending Physician:  Sagar Rahman MD  Primary Car User    1/27/2021 2159 Given 10 mg Oral Manuela Easton      carvedilol (COREG) tab 25 mg     Date Action Dose Route User    1/28/2021 0904 Given 25 mg Oral Kiana Sidhu RN      dilTIAZem (cardIZEM CD) 24 hr cap 180 mg     Date Action Dose Route User    1

## 2021-01-28 NOTE — PROGRESS NOTES
[facility]  Progress Note     Enrique Segovia Patient Status:  Inpatient    1960 MRN [id number]   [facility] 7NE-A Attending Bernice Nava MD   Hosp Day # 2 PCP Angel Berrios MD     Chief Complaint: Post op pain    S: No acute BILT  --  0.8  --    TP  --  6.0*  --      Estimated Creatinine Clearance: 60.8 mL/min (A) (based on SCr of 1.41 mg/dL (H)). No results for input(s): PTP, INR in the last 168 hours. No results for input(s): TROP, CK in the last 168 hours.      Serena Barnett surgery recs  At this point Mr. Ralph El is expected to be discharge to: Home    Plan of care discussed with patient, RN.     Cyndy Abarca,

## 2021-01-28 NOTE — PLAN OF CARE
Assumed care at 1. Dilaudid PCA in use. Patient also gets scheduled Tylenol and PRN Oxy. States pain is a 6/10 at most.  1-3L O2 per nasal cannula. Tolerating soft diet. Denies nausea/vomiting. Passing gas, no bowel movement yet.   Adequate urine outpu be responsible for managing their own health  - Refer to Case Management Department for coordinating discharge planning if the patient needs post-hospital services based on physician/LIP order or complex needs related to functional status, cognitive abilit electrolyte replacements, including rhythm and repeat lab results as appropriate  - Fluid restriction as ordered  - Instruct patient on fluid and nutrition restrictions as appropriate  Outcome: Progressing     Problem: SKIN/TISSUE INTEGRITY - ADULT  Goal:

## 2021-01-29 VITALS
BODY MASS INDEX: 44.05 KG/M2 | OXYGEN SATURATION: 93 % | TEMPERATURE: 99 F | WEIGHT: 314.63 LBS | DIASTOLIC BLOOD PRESSURE: 76 MMHG | RESPIRATION RATE: 21 BRPM | HEIGHT: 71 IN | SYSTOLIC BLOOD PRESSURE: 106 MMHG | HEART RATE: 77 BPM

## 2021-01-29 LAB
AMYLASE PRT-CCNC: 99 U/L
ANION GAP SERPL CALC-SCNC: 2 MMOL/L (ref 0–18)
BASOPHILS # BLD AUTO: 0.02 X10(3) UL (ref 0–0.2)
BASOPHILS NFR BLD AUTO: 0.2 %
BUN BLD-MCNC: 13 MG/DL (ref 7–18)
BUN/CREAT SERPL: 12.5 (ref 10–20)
CALCIUM BLD-MCNC: 8.1 MG/DL (ref 8.5–10.1)
CHLORIDE SERPL-SCNC: 111 MMOL/L (ref 98–112)
CO2 SERPL-SCNC: 27 MMOL/L (ref 21–32)
CREAT BLD-MCNC: 1.04 MG/DL
DEPRECATED RDW RBC AUTO: 45.6 FL (ref 35.1–46.3)
EOSINOPHIL # BLD AUTO: 0.43 X10(3) UL (ref 0–0.7)
EOSINOPHIL NFR BLD AUTO: 4.9 %
ERYTHROCYTE [DISTWIDTH] IN BLOOD BY AUTOMATED COUNT: 15.3 % (ref 11–15)
GLUCOSE BLD-MCNC: 139 MG/DL (ref 70–99)
GLUCOSE BLD-MCNC: 78 MG/DL (ref 70–99)
GLUCOSE BLD-MCNC: 94 MG/DL (ref 70–99)
HCT VFR BLD AUTO: 27.3 %
HGB BLD-MCNC: 8.5 G/DL
HGB BLD-MCNC: 9.3 G/DL
IMM GRANULOCYTES # BLD AUTO: 0.03 X10(3) UL (ref 0–1)
IMM GRANULOCYTES NFR BLD: 0.3 %
LYMPHOCYTES # BLD AUTO: 1.75 X10(3) UL (ref 1–4)
LYMPHOCYTES NFR BLD AUTO: 20.1 %
MCH RBC QN AUTO: 25.4 PG (ref 26–34)
MCHC RBC AUTO-ENTMCNC: 31.1 G/DL (ref 31–37)
MCV RBC AUTO: 81.7 FL
MONOCYTES # BLD AUTO: 0.8 X10(3) UL (ref 0.1–1)
MONOCYTES NFR BLD AUTO: 9.2 %
NEUTROPHILS # BLD AUTO: 5.66 X10 (3) UL (ref 1.5–7.7)
NEUTROPHILS # BLD AUTO: 5.66 X10(3) UL (ref 1.5–7.7)
NEUTROPHILS NFR BLD AUTO: 65.3 %
OSMOLALITY SERPL CALC.SUM OF ELEC: 289 MOSM/KG (ref 275–295)
PLATELET # BLD AUTO: 155 10(3)UL (ref 150–450)
POTASSIUM SERPL-SCNC: 3.7 MMOL/L (ref 3.5–5.1)
RBC # BLD AUTO: 3.34 X10(6)UL
SODIUM SERPL-SCNC: 140 MMOL/L (ref 136–145)
WBC # BLD AUTO: 8.7 X10(3) UL (ref 4–11)

## 2021-01-29 PROCEDURE — 99232 SBSQ HOSP IP/OBS MODERATE 35: CPT | Performed by: INTERNAL MEDICINE

## 2021-01-29 RX ORDER — POTASSIUM CHLORIDE 20 MEQ/1
40 TABLET, EXTENDED RELEASE ORAL ONCE
Status: COMPLETED | OUTPATIENT
Start: 2021-01-29 | End: 2021-01-29

## 2021-01-29 RX ORDER — OXYCODONE HYDROCHLORIDE 5 MG/1
5 TABLET ORAL EVERY 4 HOURS PRN
Qty: 40 TABLET | Refills: 0 | Status: SHIPPED | OUTPATIENT
Start: 2021-01-29 | End: 2021-02-04

## 2021-01-29 RX ORDER — MELATONIN
325
Qty: 30 TABLET | Refills: 1 | Status: SHIPPED | OUTPATIENT
Start: 2021-01-29 | End: 2021-03-01 | Stop reason: ALTCHOICE

## 2021-01-29 NOTE — PLAN OF CARE
Assumed care at 1. States pain is a 3/10 and well-controlled with scheduled Tylenol & PRN Oxy. Midline abd incision clean, dry. Dark serosanguineous output to L FAYE drain. Adequate urine output. (+) bowel movement this evening.     Problem: PAIN - planning if the patient needs post-hospital services based on physician/LIP order or complex needs related to functional status, cognitive ability or social support system  Outcome: Progressing     Problem: GASTROINTESTINAL - ADULT  Goal: Minimal or absenc Instruct patient on fluid and nutrition restrictions as appropriate  Outcome: Progressing     Problem: SKIN/TISSUE INTEGRITY - ADULT  Goal: Skin integrity remains intact  Description: INTERVENTIONS  - Assess and document risk factors for pressure ulcer dev

## 2021-01-29 NOTE — PROGRESS NOTES
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

## 2021-01-29 NOTE — CM/SW NOTE
Pt most likely ready for dc today. Spoke with Herlinda Sheehan from [address] [phone] to let her know pt should be dc'd home today.

## 2021-01-29 NOTE — PLAN OF CARE
Assumed care at 0700  A&O x 4. Very pleasant  Lungs clear. On RA. HR a fib on tele  Oxy given x1 for pain. Controlled 3/10.   Amylase sent for FAYE drain  Cleared for dc  Meds given per orders  Urine output adequate per surgical oncology protocol  Pt needs a

## 2021-01-29 NOTE — PAYOR COMM NOTE
--------------  CONTINUED STAY REVIEW    Payor: [employer] [facility]  Subscriber #:  [id number]  Authorization Number: [id number]    Admit date: 1/26/21  Admit time: 7530    Admitting Physician: Marcial Tomas MD  Attending Physician:  Marcial Tomas MD  Primary Car Musculoskeletal: Moves all extremities. Extremities: No edema.       Doing well on POD #3  Hgb continues to slowly drift down, Resumed Xarelto -> repeat this afternoon  Okay for low fiber diet, no carbonated beverage  Oxy prn and tyl scheduled for pain oxyCODONE HCl (OXY-IR) cap/tab 5 mg     Date Action Dose Route User    1/29/2021 0531 Given 5 mg Oral John Cameron    1/29/2021 0114 Given 5 mg Oral John Cameron    1/28/2021 2140 Given 5 mg Oral John Cameron    1/28/2021 1818 Given 5 mg Oral Mattya

## 2021-01-29 NOTE — PROGRESS NOTES
[facility]  Progress Note     Tj Los Patient Status:  Inpatient    1960 MRN [id number]   [facility] 7NE-A Attending Elida Cockayne, MD   Hosp Day # 3 PCP Grisel Silvestre MD     Chief Complaint: Post op pain    S: No acute --      Estimated Creatinine Clearance: 82.5 mL/min (based on SCr of 1.04 mg/dL). No results for input(s): PTP, INR in the last 168 hours. No results for input(s): TROP, CK in the last 168 hours. Imaging: Imaging data reviewed in Epic.     Moira Brito point Mr. Greg Estrella is expected to be discharge to: Home    Plan of care discussed with patient, RN.     Tish Sullivan DO

## 2021-01-30 LAB — BLOOD TYPE BARCODE: 5100

## 2021-01-30 NOTE — DISCHARGE SUMMARY
[facility]  Discharge Summary    Hoda Leiva Patient Status:  Inpatient    1960 MRN [id number]   [facility] 7NE-A Attending No att. providers found   Hosp Day # 3 PCP Kylee Rocha MD     Date of Admission: 2021    Date of medications    oxyCODONE HCl 5 MG Oral Tab  Take 1 tablet (5 mg total) by mouth every 4 (four) hours as needed., Normal, Disp-40 tablet, R-0    ferrous sulfate 325 (65 FE) MG Oral Tab EC  Take 1 tablet (325 mg total) by mouth daily with breakfast., Normal, needed for Erectile Dysfunction. , Normal, Disp-30 tablet, R-3    Insulin Syringe-Needle U-100 (BD INSULIN SYRINGE ULTRAFINE) 31G X 5/16\" 1 ML Does not apply Misc  As directed, Normal, Disp-90 each, R-5      STOP taking these medications    furosemide 20 M

## 2021-02-01 ENCOUNTER — TELEPHONE (OUTPATIENT)
Dept: SURGERY | Facility: CLINIC | Age: 61
End: 2021-02-01

## 2021-02-01 ENCOUNTER — PATIENT OUTREACH (OUTPATIENT)
Dept: CASE MANAGEMENT | Age: 61
End: 2021-02-01

## 2021-02-01 DIAGNOSIS — Z02.9 ENCOUNTERS FOR UNSPECIFIED ADMINISTRATIVE PURPOSE: ICD-10-CM

## 2021-02-01 DIAGNOSIS — D44.7 PARAGANGLIOMA (HCC): ICD-10-CM

## 2021-02-01 NOTE — PROGRESS NOTES
Initial Post Discharge Follow Up   Discharge Date: 1/29/21  Contact Date: 2/1/2021    Consent Verification:  Assessment Completed With: Patient  HIPAA Verified?   Yes    Discharge Dx:  Paraganglioma     Was TCC ordered: yes, confirmed TCC appt date and t Oral Tab Take 1 tablet (5 mg total) by mouth every 4 (four) hours as needed.  40 tablet 0   • ferrous sulfate 325 (65 FE) MG Oral Tab EC Take 1 tablet (325 mg total) by mouth daily with breakfast. 30 tablet 1   • carvedilol 25 MG Oral Tab Take 25 mg by mout medication was prescribed:    o Was the new medication’s purpose & side effects reviewed? yes  o Do you have any questions about your new medication?  No  • Did you  your discharge medications when you left the hospital? Yes  • May I go over your med Established Patient Office Visit with Zain Aleman MD Ophthalmology - [address] ([address])    For the safety of our patients, visitors and care team, we are asking patients not to bring anyone with them to their appo Summary, Discharge medications reviewed/discussed/and reconciled against outpatient medications with patient,  and orders reviewed and discussed. Any changes or updates to medications and or orders sent to PCP.      For patients with TCC appointments:     NADYA

## 2021-02-01 NOTE — TELEPHONE ENCOUNTER
Called to check on patient since hospital discharge. Pt intake and output is adequate. Pt having minimal pain and asked about taking less oxycodone.   I told him he could try taking every 8 hours instead of every 6 hours and see how he feels and to adjust

## 2021-02-01 NOTE — PAYOR COMM NOTE
--------------  DISCHARGE REVIEW    Payor: [employer]  Subscriber #:  [id number]  Authorization Number: [id number]    Admit date: 1/26/21  Admit time:  0518  Discharge Date: 1/29/2021  4:54 PM     Admitting Physician: Mackenzie Sadler MD  Attending Physician paraganglioma. Physical Exam: Refer to H&P. History of Present Illness: Patient is a 29-year-old man who presented for surgical management of left paraganglioma/pheochromocytoma.       Hospital Course: Patient underwent resection of intra-abdominal pa by mouth daily with food., Normal, Disp-90 tablet, R-3    Phentermine HCl 30 MG Oral Cap  Take 1 capsule (30 mg total) by mouth every morning., Normal, Disp-30 capsule, R-2    Dulaglutide (TRULICITY) 1.5 IQ/2.4FF Subcutaneous Solution Pen-injector  Inject

## 2021-02-02 ENCOUNTER — OFFICE VISIT (OUTPATIENT)
Dept: INTERNAL MEDICINE CLINIC | Facility: CLINIC | Age: 61
End: 2021-02-02
Payer: COMMERCIAL

## 2021-02-02 VITALS
DIASTOLIC BLOOD PRESSURE: 80 MMHG | TEMPERATURE: 99 F | WEIGHT: 314 LBS | SYSTOLIC BLOOD PRESSURE: 132 MMHG | HEIGHT: 71 IN | RESPIRATION RATE: 18 BRPM | HEART RATE: 84 BPM | OXYGEN SATURATION: 98 % | BODY MASS INDEX: 43.96 KG/M2

## 2021-02-02 DIAGNOSIS — I48.19 PERSISTENT ATRIAL FIBRILLATION (HCC): ICD-10-CM

## 2021-02-02 DIAGNOSIS — E11.65 UNCONTROLLED TYPE 2 DIABETES MELLITUS WITH HYPERGLYCEMIA (HCC): ICD-10-CM

## 2021-02-02 DIAGNOSIS — I10 ESSENTIAL HYPERTENSION: ICD-10-CM

## 2021-02-02 DIAGNOSIS — D44.7 PARAGANGLIOMA (HCC): Primary | ICD-10-CM

## 2021-02-02 PROCEDURE — 3075F SYST BP GE 130 - 139MM HG: CPT | Performed by: NURSE PRACTITIONER

## 2021-02-02 PROCEDURE — 3008F BODY MASS INDEX DOCD: CPT | Performed by: NURSE PRACTITIONER

## 2021-02-02 PROCEDURE — 99496 TRANSJ CARE MGMT HIGH F2F 7D: CPT | Performed by: NURSE PRACTITIONER

## 2021-02-02 PROCEDURE — 3079F DIAST BP 80-89 MM HG: CPT | Performed by: NURSE PRACTITIONER

## 2021-02-02 NOTE — PROGRESS NOTES
HPI:    Janae Kenny is a 64year old male here today for hospital follow up.    He was discharged from Inpatient hospital, [facility] to Home   Admission Date: 1/26/21   Discharge Date: 1/29/21  Hospital Discharge Diagnoses (since 1/3/2021)   Non demonstrate a 10 x 9.4 x 9.4 cm mass (craniocaudad, transverse, AP dimension) in the left upper quadrant of the abdomen encasing a loop of jejunum in the left upper quadrant which is dilated.  This mass displaces the cyst body of the stomach superiorly, dis total) by mouth every morning. •  Sildenafil Citrate 100 MG Oral Tab, Take 1 tablet (100 mg total) by mouth as needed for Erectile Dysfunction.     •  Dulaglutide (TRULICITY) 1.5 EZ/1.7SF Subcutaneous Solution Pen-injector, Inject 1.5 mg into the skin on EYES: denies blurred vision or double vision  HEENT: denies nasal congestion, sinus pain or ST  LUNGS: denies shortness of breath with exertion  CARDIOVASCULAR: denies chest pain on exertion or palpitations  GI: mild abdominal pain, denies heartburn, den Essential hypertension- improved, he is off losartan and amlodipine, blood pressure ranges 130/80's at home.      Persistent atrial fibrillation ([address]- rate controlled on diltiazem,  on xarelto no bleeding issues    type 2 diabetes mellitus with hypergl

## 2021-02-04 ENCOUNTER — TELEPHONE (OUTPATIENT)
Dept: SURGERY | Facility: CLINIC | Age: 61
End: 2021-02-04

## 2021-02-04 ENCOUNTER — OFFICE VISIT (OUTPATIENT)
Dept: SURGERY | Facility: CLINIC | Age: 61
End: 2021-02-04
Payer: COMMERCIAL

## 2021-02-04 VITALS
WEIGHT: 297.63 LBS | BODY MASS INDEX: 42 KG/M2 | TEMPERATURE: 97 F | SYSTOLIC BLOOD PRESSURE: 135 MMHG | DIASTOLIC BLOOD PRESSURE: 81 MMHG | HEART RATE: 91 BPM | RESPIRATION RATE: 16 BRPM | OXYGEN SATURATION: 96 %

## 2021-02-04 DIAGNOSIS — D44.7 PARAGANGLIOMA (HCC): ICD-10-CM

## 2021-02-04 DIAGNOSIS — Z51.89 VISIT FOR WOUND CHECK: Primary | ICD-10-CM

## 2021-02-04 PROCEDURE — 3079F DIAST BP 80-89 MM HG: CPT | Performed by: PHYSICIAN ASSISTANT

## 2021-02-04 PROCEDURE — 99024 POSTOP FOLLOW-UP VISIT: CPT | Performed by: PHYSICIAN ASSISTANT

## 2021-02-04 PROCEDURE — 3075F SYST BP GE 130 - 139MM HG: CPT | Performed by: PHYSICIAN ASSISTANT

## 2021-02-04 RX ORDER — OXYCODONE HYDROCHLORIDE 5 MG/1
5 TABLET ORAL EVERY 6 HOURS PRN
Qty: 30 TABLET | Refills: 0 | Status: SHIPPED | OUTPATIENT
Start: 2021-02-04 | End: 2021-02-18

## 2021-02-04 NOTE — TELEPHONE ENCOUNTER
Pt called and stated he had yellow pus come out of incisional site. Pt stated no swelling or fevers. Pt post op appointment rescheduled for today instead of tomorrow.

## 2021-02-05 NOTE — PROGRESS NOTES
[facility]        Patient Name:  Chayito Carrington   YOB: 1960   Gender:  Male   Appt Date:  2/5/2021   Provider:  JASON Mahmood     PATIENT PROVIDERS  Primary Care Sherry Estes MD   Address: [address] [place] [address] The patient initially presented to his PCP 10/19/2020 for complaints of sudden dull abdominal pain and periumbilical fullness. US abdomen 11/4/2020 was relatively normal except for mild prominence of the common bile duct.  The patient subsequently followed •  ferrous sulfate 325 (65 FE) MG Oral Tab EC, Take 1 tablet (325 mg total) by mouth daily with breakfast., Disp: 30 tablet, Rfl: 1  •  carvedilol 25 MG Oral Tab, Take 25 mg by mouth 2 (two) times daily with meals. , Disp: , Rfl:   •  dilTIAZem HCl ER Coate • Back pain    • Cancer ([facility] [address] [facility] 11/2019    prostate   • Deep vein thrombosis (HCC)     left   • Fatigue    • Heartburn    • High blood pressure    • High cholesterol    • History of depression    • History of hip replacement     March 2020, 11/2010   • Indig Musculoskeletal: Extremities: no edema. Skin: Inspection and palpation: no jaundice. Document Review:  Final Diagnosis:   A. Portion of liver, excision:  -Bile duct hamartoma.     B.   Soft tissue, umbilical region, herniorrhaphy:  -Membranous fibro

## 2021-02-11 ENCOUNTER — OFFICE VISIT (OUTPATIENT)
Dept: SURGERY | Facility: CLINIC | Age: 61
End: 2021-02-11
Payer: COMMERCIAL

## 2021-02-11 VITALS
RESPIRATION RATE: 16 BRPM | OXYGEN SATURATION: 97 % | BODY MASS INDEX: 40 KG/M2 | DIASTOLIC BLOOD PRESSURE: 77 MMHG | HEART RATE: 90 BPM | WEIGHT: 289.38 LBS | SYSTOLIC BLOOD PRESSURE: 118 MMHG | TEMPERATURE: 98 F

## 2021-02-11 DIAGNOSIS — D44.7 PARAGANGLIOMA (HCC): Primary | ICD-10-CM

## 2021-02-11 PROCEDURE — 99024 POSTOP FOLLOW-UP VISIT: CPT | Performed by: PHYSICIAN ASSISTANT

## 2021-02-11 PROCEDURE — 3074F SYST BP LT 130 MM HG: CPT | Performed by: PHYSICIAN ASSISTANT

## 2021-02-11 PROCEDURE — 3078F DIAST BP <80 MM HG: CPT | Performed by: PHYSICIAN ASSISTANT

## 2021-02-11 NOTE — PROGRESS NOTES
[facility] [facility] Surgical Oncology        Patient Name:  Ashly Sage   YOB: 1960   Gender:  Male   Appt Date:  2/11/2021   Provider:  JASON Smith     PATIENT PROVIDERS  Primary Care Deidre Randle MD   Address: [address] [address] [address] [place] [address] The patient initially presented to his PCP 10/19/2020 for complaints of sudden dull abdominal pain and periumbilical fullness. US abdomen 11/4/2020 was relatively normal except for mild prominence of the common bile duct.  The patient subsequently followed •  carvedilol 25 MG Oral Tab, Take 25 mg by mouth 2 (two) times daily with meals. , Disp: , Rfl:   •  dilTIAZem HCl ER Coated Beads 180 MG Oral Tablet 24 Hr, Take 180 mg by mouth daily. , Disp: , Rfl:   •  metFORMIN HCl 500 MG Oral Tab, Take 500 mg by mouth • High blood pressure    • High cholesterol    • History of depression    • History of hip replacement     March 2020, 11/2010   • Indigestion    • Insomnia    • Night sweats    • OA (osteoarthritis)    • Obesity    • Pain in joints    • Type II or unspeci Final Diagnosis:   A. Portion of liver, excision:  -Bile duct hamartoma.     B. Soft tissue, umbilical region, herniorrhaphy:  -Membranous fibrovascular tissue consistent with hernia sac.     C.   Soft tissue and left adrenal gland, left retroperitoneal/i -He has been following with his PCP in regards to blood pressure management. He has slowly been able to wean from previous BP medications.   -He will follow-up with our office in 1 year with CT c/a/p.  -Genetics referral given.   -Call sooner with manny canas

## 2021-02-18 DIAGNOSIS — D44.7 PARAGANGLIOMA (HCC): ICD-10-CM

## 2021-02-18 RX ORDER — OXYCODONE HYDROCHLORIDE 5 MG/1
5 TABLET ORAL EVERY 6 HOURS PRN
Qty: 30 TABLET | Refills: 0 | OUTPATIENT
Start: 2021-02-18

## 2021-02-18 RX ORDER — OXYCODONE HYDROCHLORIDE 5 MG/1
5 TABLET ORAL EVERY 6 HOURS PRN
Qty: 12 TABLET | Refills: 0 | Status: SHIPPED | OUTPATIENT
Start: 2021-02-18 | End: 2021-02-25 | Stop reason: ALTCHOICE

## 2021-02-25 ENCOUNTER — PATIENT MESSAGE (OUTPATIENT)
Dept: PAIN CLINIC | Facility: CLINIC | Age: 61
End: 2021-02-25

## 2021-02-25 ENCOUNTER — OFFICE VISIT (OUTPATIENT)
Dept: SURGERY | Facility: CLINIC | Age: 61
End: 2021-02-25
Payer: COMMERCIAL

## 2021-02-25 ENCOUNTER — OFFICE VISIT (OUTPATIENT)
Dept: PAIN CLINIC | Facility: CLINIC | Age: 61
End: 2021-02-25
Payer: COMMERCIAL

## 2021-02-25 VITALS
OXYGEN SATURATION: 97 % | SYSTOLIC BLOOD PRESSURE: 164 MMHG | BODY MASS INDEX: 42 KG/M2 | DIASTOLIC BLOOD PRESSURE: 96 MMHG | TEMPERATURE: 97 F | RESPIRATION RATE: 16 BRPM | WEIGHT: 298.63 LBS | HEART RATE: 91 BPM

## 2021-02-25 VITALS
RESPIRATION RATE: 16 BRPM | WEIGHT: 298 LBS | OXYGEN SATURATION: 98 % | DIASTOLIC BLOOD PRESSURE: 76 MMHG | BODY MASS INDEX: 42 KG/M2 | SYSTOLIC BLOOD PRESSURE: 128 MMHG | HEART RATE: 116 BPM

## 2021-02-25 DIAGNOSIS — Z51.89 VISIT FOR WOUND CHECK: Primary | ICD-10-CM

## 2021-02-25 DIAGNOSIS — M46.1 SACROILIITIS (HCC): Primary | ICD-10-CM

## 2021-02-25 PROCEDURE — 99215 OFFICE O/P EST HI 40 MIN: CPT | Performed by: ANESTHESIOLOGY

## 2021-02-25 PROCEDURE — 99024 POSTOP FOLLOW-UP VISIT: CPT | Performed by: PHYSICIAN ASSISTANT

## 2021-02-25 PROCEDURE — 3077F SYST BP >= 140 MM HG: CPT | Performed by: PHYSICIAN ASSISTANT

## 2021-02-25 PROCEDURE — 3074F SYST BP LT 130 MM HG: CPT | Performed by: ANESTHESIOLOGY

## 2021-02-25 PROCEDURE — 3078F DIAST BP <80 MM HG: CPT | Performed by: ANESTHESIOLOGY

## 2021-02-25 PROCEDURE — 3080F DIAST BP >= 90 MM HG: CPT | Performed by: PHYSICIAN ASSISTANT

## 2021-02-25 NOTE — PROGRESS NOTES
[address] [facility] Surgical Oncology        Patient Name:  Mike Nielson   YOB: 1960   Gender:  Male   Appt Date:  2/25/2021   Provider:  JASON Norton     PATIENT PROVIDERS  Primary Care rBo Garcia MD   Address: [address] [address] [address] [place] [address] The patient initially presented to his PCP 10/19/2020 for complaints of sudden dull abdominal pain and periumbilical fullness. US abdomen 11/4/2020 was relatively normal except for mild prominence of the common bile duct.  The patient subsequently followed •  carvedilol 25 MG Oral Tab, Take 25 mg by mouth 2 (two) times daily with meals. , Disp: , Rfl:   •  dilTIAZem HCl ER Coated Beads 180 MG Oral Tablet 24 Hr, Take 180 mg by mouth daily. , Disp: , Rfl:   •  metFORMIN HCl 500 MG Oral Tab, Take 500 mg by mouth • High blood pressure    • High cholesterol    • History of depression    • History of hip replacement     March 2020, 11/2010   • Indigestion    • Insomnia    • Night sweats    • OA (osteoarthritis)    • Obesity    • Pain in joints    • Type II or unspeci Musculoskeletal: Extremities: no edema. Skin: Inspection and palpation: no jaundice. Procedure(s): Wound superficial and healing nicely. Hypergranulation tissue present. Silver nitrate applied.      Assessment / Plan:  (Z51.89) Visit for wound check

## 2021-02-25 NOTE — PROGRESS NOTES
Patient presents in office today with reported pain in Low back radiating to the hips, ache    Current pain level reported = 3/10    Last reported dose of 2 days ago Oxycodone

## 2021-02-25 NOTE — TELEPHONE ENCOUNTER
From: Dipak Hill  To: Lawrence Camp MD  Sent: 2/25/2021 3:55 PM CST  Subject: Prescription Question    Good day to you. .  Did my prescription get sent to [facility]. They did not have it.   Clarice Phoenix

## 2021-02-26 ENCOUNTER — TELEPHONE (OUTPATIENT)
Dept: PAIN CLINIC | Facility: CLINIC | Age: 61
End: 2021-02-26

## 2021-02-26 RX ORDER — CYCLOBENZAPRINE HCL 10 MG
10 TABLET ORAL 3 TIMES DAILY PRN
Qty: 90 TABLET | Refills: 0 | Status: SHIPPED | OUTPATIENT
Start: 2021-02-26 | End: 2021-03-05

## 2021-02-26 NOTE — TELEPHONE ENCOUNTER
Patient recommended for procedure with Dr. Kenyon Fagan. Patient is currently taking Xarelto managed by Dr. iDana Fish.  Letter of medical clearance sent to Dr. Diana Fish requesting approval for patient to hold Xarelto for 3 days prior to recommended procedure

## 2021-02-26 NOTE — TELEPHONE ENCOUNTER
21  RE: Sandhya Wallace    : 1960    Dear Dr. Eulalia Smith    Your patient is being scheduled for a pain management procedure at [facility] within the next 4 weeks.     Procedure:  Sacroiliac Joint Injection  Physician: Joyce GONGORA

## 2021-02-26 NOTE — PROGRESS NOTES
Name: Cristal Montiel   : 1960   DOS: 2021        Pain Clinic Follow Up Visit:   Cristal Montiel is a 64year old male who presents for recheck of his chronic hip pain.   He is status post right hip joint, right sacroiliac joint injection and ri Oral Tab Take 1 tablet (20 mg total) by mouth daily with food. 90 tablet 3   • Phentermine HCl 30 MG Oral Cap Take 1 capsule (30 mg total) by mouth every morning.  30 capsule 2   • Sildenafil Citrate 100 MG Oral Tab Take 1 tablet (100 mg total) by mouth as home.    Orders:No orders of the defined types were placed in this encounter.       Medications filled today:  Requested Prescriptions      No prescriptions requested or ordered in this encounter       Radiology orders and consultations:None    The patient

## 2021-03-02 ENCOUNTER — OFFICE VISIT (OUTPATIENT)
Dept: INTERNAL MEDICINE CLINIC | Facility: CLINIC | Age: 61
End: 2021-03-02
Payer: COMMERCIAL

## 2021-03-02 VITALS
TEMPERATURE: 98 F | OXYGEN SATURATION: 97 % | RESPIRATION RATE: 16 BRPM | HEIGHT: 71 IN | WEIGHT: 301 LBS | HEART RATE: 80 BPM | DIASTOLIC BLOOD PRESSURE: 74 MMHG | SYSTOLIC BLOOD PRESSURE: 132 MMHG | BODY MASS INDEX: 42.14 KG/M2

## 2021-03-02 DIAGNOSIS — Z79.4 TYPE 2 DIABETES MELLITUS WITH MILD NONPROLIFERATIVE RETINOPATHY WITHOUT MACULAR EDEMA, WITH LONG-TERM CURRENT USE OF INSULIN, UNSPECIFIED LATERALITY (HCC): ICD-10-CM

## 2021-03-02 DIAGNOSIS — Z13.220 LIPID SCREENING: ICD-10-CM

## 2021-03-02 DIAGNOSIS — Z00.00 ANNUAL PHYSICAL EXAM: Primary | ICD-10-CM

## 2021-03-02 DIAGNOSIS — Z13.29 THYROID DISORDER SCREEN: ICD-10-CM

## 2021-03-02 DIAGNOSIS — Z00.00 LABORATORY EXAMINATION ORDERED AS PART OF A ROUTINE GENERAL MEDICAL EXAMINATION: ICD-10-CM

## 2021-03-02 DIAGNOSIS — Z13.0 SCREENING, IRON DEFICIENCY ANEMIA: ICD-10-CM

## 2021-03-02 DIAGNOSIS — Z13.89 SCREENING FOR GENITOURINARY CONDITION: ICD-10-CM

## 2021-03-02 DIAGNOSIS — E11.3299 TYPE 2 DIABETES MELLITUS WITH MILD NONPROLIFERATIVE RETINOPATHY WITHOUT MACULAR EDEMA, WITH LONG-TERM CURRENT USE OF INSULIN, UNSPECIFIED LATERALITY (HCC): ICD-10-CM

## 2021-03-02 PROBLEM — R93.3 ABNORMAL FINDING ON GI TRACT IMAGING: Status: RESOLVED | Noted: 2020-11-25 | Resolved: 2021-03-02

## 2021-03-02 PROBLEM — H40.003 GLAUCOMA SUSPECT OF BOTH EYES: Status: RESOLVED | Noted: 2018-02-15 | Resolved: 2021-03-02

## 2021-03-02 PROBLEM — K43.9 VENTRAL HERNIA WITHOUT OBSTRUCTION OR GANGRENE: Status: RESOLVED | Noted: 2020-11-12 | Resolved: 2021-03-02

## 2021-03-02 PROCEDURE — 3075F SYST BP GE 130 - 139MM HG: CPT | Performed by: INTERNAL MEDICINE

## 2021-03-02 PROCEDURE — 3078F DIAST BP <80 MM HG: CPT | Performed by: INTERNAL MEDICINE

## 2021-03-02 PROCEDURE — 3008F BODY MASS INDEX DOCD: CPT | Performed by: INTERNAL MEDICINE

## 2021-03-02 PROCEDURE — 99396 PREV VISIT EST AGE 40-64: CPT | Performed by: INTERNAL MEDICINE

## 2021-03-02 NOTE — PATIENT INSTRUCTIONS
Diet: Diabetes  Food is an important tool that you can use to control diabetes and stay healthy. Eating well-balanced meals in the correct amounts will help you control your blood glucose levels and prevent low blood sugar reactions.  It will also help yo · Talk with your healthcare provider if you drink alcohol. Alcohol can have unpredictable effects on blood glucose. It's also high in empty calories and can raise a type of blood fat called triglycerides. Drink water or calorie-free diet drinks instead.   ·

## 2021-03-02 NOTE — PROGRESS NOTES
HPI:    Patient ID: Fitz Munoz is a 64year old male. HPI  Fitz Munoz is a 64year old male who presents for a complete physical exam.   HPI:   Pt complains of nothing today. Has seen onc surgery PA for non healing proximal surgical wound.  Pt Value Date    AST 50 (H) 01/27/2021    AST 14 (L) 12/21/2020    AST 14 (L) 11/13/2020     Lab Results   Component Value Date    ALT 29 01/27/2021    ALT 23 12/21/2020    ALT 17 11/13/2020     Lab Results   Component Value Date    PSA 0.085 02/25/2021    [facility]2019    prostate   • Deep vein thrombosis (HCC)     left   • Fatigue    • Heartburn    • High blood pressure    • High cholesterol    • History of depression    • History of hip replacement     March 2020, 11/2010   • Indigestion    • Insomnia 9/12/2019    Performed by Eddie Burr MD at [facility] 18 INJECTION RIGHT OR LEFT Right 8/23/2018    Performed by Eddie Brur MD at [facility]   • XI ROBOT-ASSISTED LAPAROSCOPIC PROSTATECTOMY/ORICAL N/A 11/15/2019    Perform or tenderness, proximal surgical wound unhealed.  Clear drainage  : deferred  RECTAL: deferred  MUSCULOSKELETAL: back is not tender  EXTREMITIES: no cyanosis, clubbing or edema  NEURO: Oriented times three,motor and sensory are grossly intact, Bilateral b 100 MG Oral Tab Take 200 mg by mouth nightly. • latanoprost 0.005 % Ophthalmic Solution Place 1 drop into both eyes nightly. • INVOKANA 300 MG Oral Tab Take 300 mg by mouth daily.  90 tablet 1   • Rivaroxaban (XARELTO) 20 MG Oral Tab Take 1 tablet (

## 2021-03-03 ENCOUNTER — TELEPHONE (OUTPATIENT)
Dept: NEUROLOGY | Facility: CLINIC | Age: 61
End: 2021-03-03

## 2021-03-03 DIAGNOSIS — M46.1 SACROILIITIS (HCC): Primary | ICD-10-CM

## 2021-03-03 DIAGNOSIS — E66.01 MORBID OBESITY WITH BMI OF 45.0-49.9, ADULT (HCC): ICD-10-CM

## 2021-03-03 NOTE — TELEPHONE ENCOUNTER
Question Answer Comment   Anesthesia Type Sedation    Provider Pradeep Lomeli    Location Lab    Procedure SI Joint    Implants No    Medical clearance requested (will send to [facility]) Yes General total   Patient has Medicare coverage?  No           Associated

## 2021-03-03 NOTE — TELEPHONE ENCOUNTER
Last time medication was refilled 10/27/20  Quantity and # of refills 30 capsule w/ 2 refill  Last OV 03/2/21  Next OV next physical due 03/2022

## 2021-03-03 NOTE — TELEPHONE ENCOUNTER
bilateral sacroiliac joint injection CPT CODE: 83169 x2  Dx: M46.1- APPROVED  Availity online for authorization of approval for above. Authorization is not required.    Transaction ID: [id number] Transaction Date: Mar 03 1:38 pm Customer ID: [id number]  Will in

## 2021-03-04 RX ORDER — PHENTERMINE HYDROCHLORIDE 30 MG/1
30 CAPSULE ORAL EVERY MORNING
Qty: 30 CAPSULE | Refills: 2 | Status: SHIPPED | OUTPATIENT
Start: 2021-03-04 | End: 2021-08-14

## 2021-03-04 NOTE — TELEPHONE ENCOUNTER
Patient advised of insurance approval to proceed with injections and is agreeable to scheduling. Patient scheduled for procedure, pre-procedure instructions reviewed. Patient prefers conscious sedation.  Reviewed sedation instructions including need to be f Do not eat or drink anything (including water) 8 hours prior to your procedure. ? If you take morning blood pressure medication or oral diabetic medication, please take with a small sip of water. ?  You are required to have a responsible adult drive you ? Plavix (Clopidogrel)                             7 days      NSAIDs: 24 hours   Meloxicam -- Cervical procedures require 3 days    Ibuprofen (Motrin, Advil, Vicoprofen), Naproxen (Naprosyn, Aleve), Piroxcam (Feldene), Meloxicam (Mobic)--(Cervical procedu

## 2021-03-05 ENCOUNTER — LAB ENCOUNTER (OUTPATIENT)
Dept: LAB | Facility: HOSPITAL | Age: 61
End: 2021-03-05
Attending: ANESTHESIOLOGY
Payer: COMMERCIAL

## 2021-03-05 ENCOUNTER — LAB ENCOUNTER (OUTPATIENT)
Dept: LAB | Facility: HOSPITAL | Age: 61
End: 2021-03-05
Attending: INTERNAL MEDICINE
Payer: COMMERCIAL

## 2021-03-05 DIAGNOSIS — E11.3299 TYPE 2 DIABETES MELLITUS WITH MILD NONPROLIFERATIVE RETINOPATHY WITHOUT MACULAR EDEMA, WITH LONG-TERM CURRENT USE OF INSULIN, UNSPECIFIED LATERALITY (HCC): ICD-10-CM

## 2021-03-05 DIAGNOSIS — Z13.0 SCREENING, IRON DEFICIENCY ANEMIA: ICD-10-CM

## 2021-03-05 DIAGNOSIS — D44.7 PARAGANGLIOMA (HCC): ICD-10-CM

## 2021-03-05 DIAGNOSIS — Z13.89 SCREENING FOR GENITOURINARY CONDITION: ICD-10-CM

## 2021-03-05 DIAGNOSIS — Z79.4 TYPE 2 DIABETES MELLITUS WITH MILD NONPROLIFERATIVE RETINOPATHY WITHOUT MACULAR EDEMA, WITH LONG-TERM CURRENT USE OF INSULIN, UNSPECIFIED LATERALITY (HCC): ICD-10-CM

## 2021-03-05 DIAGNOSIS — I10 ESSENTIAL HYPERTENSION: ICD-10-CM

## 2021-03-05 DIAGNOSIS — E78.5 DYSLIPIDEMIA ASSOCIATED WITH TYPE 2 DIABETES MELLITUS (HCC): ICD-10-CM

## 2021-03-05 DIAGNOSIS — M46.1 SACROILIITIS (HCC): ICD-10-CM

## 2021-03-05 DIAGNOSIS — E11.69 DYSLIPIDEMIA ASSOCIATED WITH TYPE 2 DIABETES MELLITUS (HCC): ICD-10-CM

## 2021-03-05 DIAGNOSIS — Z00.00 LABORATORY EXAMINATION ORDERED AS PART OF A ROUTINE GENERAL MEDICAL EXAMINATION: ICD-10-CM

## 2021-03-05 LAB
ALBUMIN SERPL-MCNC: 3.3 G/DL (ref 3.4–5)
ALBUMIN/GLOB SERPL: 1 {RATIO} (ref 1–2)
ALP LIVER SERPL-CCNC: 114 U/L
ALT SERPL-CCNC: 13 U/L
ANION GAP SERPL CALC-SCNC: 3 MMOL/L (ref 0–18)
AST SERPL-CCNC: 8 U/L (ref 15–37)
BASOPHILS # BLD AUTO: 0.04 X10(3) UL (ref 0–0.2)
BASOPHILS NFR BLD AUTO: 0.6 %
BILIRUB SERPL-MCNC: 0.4 MG/DL (ref 0.1–2)
BUN BLD-MCNC: 10 MG/DL (ref 7–18)
BUN/CREAT SERPL: 8.3 (ref 10–20)
CALCIUM BLD-MCNC: 8.7 MG/DL (ref 8.5–10.1)
CHLORIDE SERPL-SCNC: 108 MMOL/L (ref 98–112)
CO2 SERPL-SCNC: 27 MMOL/L (ref 21–32)
CREAT BLD-MCNC: 1.2 MG/DL
CREAT UR-SCNC: 45.4 MG/DL
DEPRECATED RDW RBC AUTO: 39.4 FL (ref 35.1–46.3)
EOSINOPHIL # BLD AUTO: 0.27 X10(3) UL (ref 0–0.7)
EOSINOPHIL NFR BLD AUTO: 4.3 %
ERYTHROCYTE [DISTWIDTH] IN BLOOD BY AUTOMATED COUNT: 14.3 % (ref 11–15)
GLOBULIN PLAS-MCNC: 3.4 G/DL (ref 2.8–4.4)
GLUCOSE BLD-MCNC: 152 MG/DL (ref 70–99)
HCT VFR BLD AUTO: 34.6 %
HGB BLD-MCNC: 11.2 G/DL
IMM GRANULOCYTES # BLD AUTO: 0.01 X10(3) UL (ref 0–1)
IMM GRANULOCYTES NFR BLD: 0.2 %
LYMPHOCYTES # BLD AUTO: 2.02 X10(3) UL (ref 1–4)
LYMPHOCYTES NFR BLD AUTO: 32.2 %
M PROTEIN MFR SERPL ELPH: 6.7 G/DL (ref 6.4–8.2)
MCH RBC QN AUTO: 24.8 PG (ref 26–34)
MCHC RBC AUTO-ENTMCNC: 32.4 G/DL (ref 31–37)
MCV RBC AUTO: 76.7 FL
MICROALBUMIN UR-MCNC: 0.9 MG/DL
MICROALBUMIN/CREAT 24H UR-RTO: 19.8 UG/MG (ref ?–30)
MONOCYTES # BLD AUTO: 0.56 X10(3) UL (ref 0.1–1)
MONOCYTES NFR BLD AUTO: 8.9 %
NEUTROPHILS # BLD AUTO: 3.38 X10 (3) UL (ref 1.5–7.7)
NEUTROPHILS # BLD AUTO: 3.38 X10(3) UL (ref 1.5–7.7)
NEUTROPHILS NFR BLD AUTO: 53.8 %
OSMOLALITY SERPL CALC.SUM OF ELEC: 288 MOSM/KG (ref 275–295)
PATIENT FASTING Y/N/NP: NO
PLATELET # BLD AUTO: 214 10(3)UL (ref 150–450)
POTASSIUM SERPL-SCNC: 4.5 MMOL/L (ref 3.5–5.1)
RBC # BLD AUTO: 4.51 X10(6)UL
SODIUM SERPL-SCNC: 138 MMOL/L (ref 136–145)
TSI SER-ACNC: 1.49 MIU/ML (ref 0.36–3.74)
WBC # BLD AUTO: 6.3 X10(3) UL (ref 4–11)

## 2021-03-05 PROCEDURE — 82043 UR ALBUMIN QUANTITATIVE: CPT

## 2021-03-05 PROCEDURE — 3061F NEG MICROALBUMINURIA REV: CPT | Performed by: PHYSICIAN ASSISTANT

## 2021-03-05 PROCEDURE — 83835 ASSAY OF METANEPHRINES: CPT

## 2021-03-05 PROCEDURE — 82570 ASSAY OF URINE CREATININE: CPT

## 2021-03-05 PROCEDURE — 84443 ASSAY THYROID STIM HORMONE: CPT

## 2021-03-05 PROCEDURE — 85025 COMPLETE CBC W/AUTO DIFF WBC: CPT

## 2021-03-05 PROCEDURE — 80053 COMPREHEN METABOLIC PANEL: CPT

## 2021-03-05 PROCEDURE — 36415 COLL VENOUS BLD VENIPUNCTURE: CPT

## 2021-03-06 LAB — SARS-COV-2 RNA RESP QL NAA+PROBE: NOT DETECTED

## 2021-03-08 ENCOUNTER — APPOINTMENT (OUTPATIENT)
Dept: GENERAL RADIOLOGY | Facility: HOSPITAL | Age: 61
End: 2021-03-08
Attending: ANESTHESIOLOGY
Payer: COMMERCIAL

## 2021-03-08 ENCOUNTER — HOSPITAL ENCOUNTER (OUTPATIENT)
Facility: HOSPITAL | Age: 61
Setting detail: HOSPITAL OUTPATIENT SURGERY
Discharge: HOME | End: 2021-03-08
Attending: ANESTHESIOLOGY | Admitting: ANESTHESIOLOGY
Payer: COMMERCIAL

## 2021-03-08 VITALS
HEART RATE: 94 BPM | DIASTOLIC BLOOD PRESSURE: 104 MMHG | OXYGEN SATURATION: 95 % | SYSTOLIC BLOOD PRESSURE: 149 MMHG | TEMPERATURE: 98 F | RESPIRATION RATE: 18 BRPM

## 2021-03-08 DIAGNOSIS — M46.1 SACROILIITIS (HCC): ICD-10-CM

## 2021-03-08 LAB — GLUCOSE BLD-MCNC: 147 MG/DL (ref 70–99)

## 2021-03-08 PROCEDURE — 82962 GLUCOSE BLOOD TEST: CPT

## 2021-03-08 PROCEDURE — 3E0U33Z INTRODUCTION OF ANTI-INFLAMMATORY INTO JOINTS, PERCUTANEOUS APPROACH: ICD-10-PCS | Performed by: ANESTHESIOLOGY

## 2021-03-08 PROCEDURE — 3E0U3BZ INTRODUCTION OF ANESTHETIC AGENT INTO JOINTS, PERCUTANEOUS APPROACH: ICD-10-PCS | Performed by: ANESTHESIOLOGY

## 2021-03-08 PROCEDURE — 99152 MOD SED SAME PHYS/QHP 5/>YRS: CPT | Performed by: ANESTHESIOLOGY

## 2021-03-08 RX ORDER — MIDAZOLAM HYDROCHLORIDE 1 MG/ML
INJECTION INTRAMUSCULAR; INTRAVENOUS AS NEEDED
Status: DISCONTINUED | OUTPATIENT
Start: 2021-03-08 | End: 2021-03-08

## 2021-03-08 RX ORDER — METHYLPREDNISOLONE ACETATE 40 MG/ML
INJECTION, SUSPENSION INTRA-ARTICULAR; INTRALESIONAL; INTRAMUSCULAR; SOFT TISSUE AS NEEDED
Status: DISCONTINUED | OUTPATIENT
Start: 2021-03-08 | End: 2021-03-08

## 2021-03-08 RX ORDER — ONDANSETRON 2 MG/ML
4 INJECTION INTRAMUSCULAR; INTRAVENOUS ONCE AS NEEDED
Status: DISCONTINUED | OUTPATIENT
Start: 2021-03-08 | End: 2021-03-08

## 2021-03-08 RX ORDER — LIDOCAINE HYDROCHLORIDE 10 MG/ML
INJECTION, SOLUTION EPIDURAL; INFILTRATION; INTRACAUDAL; PERINEURAL AS NEEDED
Status: DISCONTINUED | OUTPATIENT
Start: 2021-03-08 | End: 2021-03-08

## 2021-03-08 RX ORDER — DEXTROSE MONOHYDRATE 25 G/50ML
50 INJECTION, SOLUTION INTRAVENOUS
Status: DISCONTINUED | OUTPATIENT
Start: 2021-03-08 | End: 2021-03-08

## 2021-03-08 RX ORDER — INSULIN ASPART 100 [IU]/ML
3 INJECTION, SOLUTION INTRAVENOUS; SUBCUTANEOUS ONCE
Status: DISCONTINUED | OUTPATIENT
Start: 2021-03-08 | End: 2021-03-08

## 2021-03-08 RX ORDER — SODIUM CHLORIDE, SODIUM LACTATE, POTASSIUM CHLORIDE, CALCIUM CHLORIDE 600; 310; 30; 20 MG/100ML; MG/100ML; MG/100ML; MG/100ML
100 INJECTION, SOLUTION INTRAVENOUS CONTINUOUS
Status: DISCONTINUED | OUTPATIENT
Start: 2021-03-08 | End: 2021-03-08

## 2021-03-08 RX ORDER — DIPHENHYDRAMINE HYDROCHLORIDE 50 MG/ML
50 INJECTION INTRAMUSCULAR; INTRAVENOUS ONCE AS NEEDED
Status: DISCONTINUED | OUTPATIENT
Start: 2021-03-08 | End: 2021-03-08

## 2021-03-08 NOTE — OPERATIVE REPORT
[facility]  Operative Report  3/8/2021     Souleymane Vu Patient Status:  Hospital Outpatient Surgery    1960 MRN [id number]   [facility] ENDOSCOPY Attending No att. providers found   Hosp Day # 0 PCP José Luis Hope MD     Indicat into the middle portion of the first sacroiliac joint. After the needle  positions were confirmed by AP and lateral views of fluoroscopy, 1 cc Omnipaque-240 was injected into the sacroiliac joint. There was a nice sacroiliac joint arthrogram revealed.  Afte

## 2021-03-08 NOTE — H&P
History & Physical Examination    Patient Name: Kell Coronado  MRN: [id number]  CSN: [id number]  YOB: 1960    Pre-Operative Diagnosis:  [facility] [M46.1]    Present Illness: 68-year-old male patient with chronic low back pain failed tablet, Rfl: 1, 3/4/2021      insulin aspart (NOVOLOG) 100 UNIT/ML vial 3 Units, 3 Units, Subcutaneous, Once  glucose (DEX4) oral liquid 15 g, 15 g, Oral, Q15 Min PRN   Or  Glucose-Vitamin C (DEX-4) chewable tab 4 tablet, 4 tablet, Oral, Q15 Min PRN   Or Sukhdeep Knowles MD at [address] Right 5/29/2018    Performed by Sukhdeep Knowles MD at [facility] MAIN OR   • HIP REPLACEMENT SURGERY Left 2010   • HIP REPLACEMENT SURGERY Right 03/03/2020   • HIP TOTAL REPLACEMENT Right 3/2/2020    P 30 days. Any changes noted above.     Isi Roberts MD

## 2021-03-09 ENCOUNTER — LAB ENCOUNTER (OUTPATIENT)
Dept: LAB | Age: 61
End: 2021-03-09
Attending: INTERNAL MEDICINE
Payer: COMMERCIAL

## 2021-03-09 DIAGNOSIS — Z13.220 LIPID SCREENING: ICD-10-CM

## 2021-03-09 DIAGNOSIS — Z00.00 LABORATORY EXAMINATION ORDERED AS PART OF A ROUTINE GENERAL MEDICAL EXAMINATION: ICD-10-CM

## 2021-03-09 LAB
CHOLEST SMN-MCNC: 188 MG/DL (ref ?–200)
EST. AVERAGE GLUCOSE BLD GHB EST-MCNC: 146 MG/DL (ref 68–126)
HBA1C MFR BLD HPLC: 6.7 % (ref ?–5.7)
HDLC SERPL-MCNC: 61 MG/DL (ref 40–59)
LDLC SERPL CALC-MCNC: 115 MG/DL (ref ?–100)
METANEPHRINE: 0.11 NMOL/L
NONHDLC SERPL-MCNC: 127 MG/DL (ref ?–130)
NORMETANEPHRINE: 0.69 NMOL/L
PATIENT FASTING Y/N/NP: YES
TRIGL SERPL-MCNC: 58 MG/DL (ref 30–149)
VLDLC SERPL CALC-MCNC: 12 MG/DL (ref 0–30)

## 2021-03-09 PROCEDURE — 83036 HEMOGLOBIN GLYCOSYLATED A1C: CPT | Performed by: INTERNAL MEDICINE

## 2021-03-09 PROCEDURE — 3044F HG A1C LEVEL LT 7.0%: CPT | Performed by: PHYSICIAN ASSISTANT

## 2021-03-09 PROCEDURE — 80061 LIPID PANEL: CPT | Performed by: INTERNAL MEDICINE

## 2021-03-09 PROCEDURE — 36415 COLL VENOUS BLD VENIPUNCTURE: CPT | Performed by: INTERNAL MEDICINE

## 2021-03-15 ENCOUNTER — GENETICS ENCOUNTER (OUTPATIENT)
Dept: GENETICS | Facility: HOSPITAL | Age: 61
End: 2021-03-15
Payer: COMMERCIAL

## 2021-03-15 ENCOUNTER — NURSE ONLY (OUTPATIENT)
Dept: HEMATOLOGY/ONCOLOGY | Facility: HOSPITAL | Age: 61
End: 2021-03-15
Payer: COMMERCIAL

## 2021-03-15 PROCEDURE — 36415 COLL VENOUS BLD VENIPUNCTURE: CPT

## 2021-03-15 PROCEDURE — 96040 HC GENETIC COUNSELING EA 30 MIN: CPT

## 2021-03-15 NOTE — PROGRESS NOTES
Patient Name: Natasha Hills  YOB: 1960  Date of Visit: 3/15/2021    Reason for visit: Mr. Joann Ordoñez was seen for the purposes of genetic counseling due to his recent diagnosis of a paraganglioma at age 64 and a personal history of regional prost defects of any kind. See scanned pedigree for full family history reported during the session. Mr. Adelia Kingston maternal and paternal ethnicity is . He is unaware of any Ashkenazi Amish heritage. Summary: Regarding Mr. Salazar's history o genes have been associated with paraganglioma and include SDHA (0.6-3%), SDHB (24-44% of chest, abdomen or pelvic PGL), SDHC (4-8%), SDHD (15% of chest, abdomen, pelvic PGL) and SDHAF2 (unknown incidence).   Other malignancies seen in individuals with mutat to a different type of mutation called a hereditary mutation, or germline mutation. These mutations are usually inherited from one or both of the person’s parents, and are present in nearly every cell of the body.  Because hereditary mutations are present i identified in an affected individual, this information can be used to screen other at-risk individuals in the family.   Individuals who are positive for the same mutation would be expected to have a much greater risk for developing hereditary cancer during please do not hesitate to contact my office if you have any questions or concerns, [phone]. Plan:   1. Blood was drawn and sent out for [facility]'s PGL/PCC panel, prostate cancer panel, and multi-cancer panel (90 genes; TAT: 2-3 weeks).    2. The Gene

## 2021-03-17 DIAGNOSIS — Z23 NEED FOR VACCINATION: ICD-10-CM

## 2021-03-19 ENCOUNTER — IMMUNIZATION (OUTPATIENT)
Dept: LAB | Age: 61
End: 2021-03-19
Attending: HOSPITALIST
Payer: COMMERCIAL

## 2021-03-19 DIAGNOSIS — Z23 NEED FOR VACCINATION: Primary | ICD-10-CM

## 2021-03-19 PROCEDURE — 0001A SARSCOV2 VAC 30MCG/0.3ML IM: CPT

## 2021-03-29 ENCOUNTER — TELEPHONE (OUTPATIENT)
Dept: INTERNAL MEDICINE CLINIC | Facility: CLINIC | Age: 61
End: 2021-03-29

## 2021-03-30 ENCOUNTER — GENETICS ENCOUNTER (OUTPATIENT)
Dept: HEMATOLOGY/ONCOLOGY | Facility: HOSPITAL | Age: 61
End: 2021-03-30

## 2021-03-30 NOTE — PROGRESS NOTES
Patient Name: Flower Porter  YOB: 1960    Referring Provider:  Molly Cheadle        Reason for Referral:  Mr. Gordon Hutson had genetic testing performed on 3/15/2021 because of his recent diagnosis of a paraganglioma at age 64 and a personal history cancers beginning 10 years younger than the earliest age at diagnosis if it would push screening to start at an earlier age than recommended for the general population. All medical management decisions should be made with a physician.      Mr. Trino Medina was fo

## 2021-04-07 RX ORDER — TIZANIDINE 4 MG/1
4 TABLET ORAL EVERY 8 HOURS PRN
Qty: 30 TABLET | Refills: 0 | Status: SHIPPED | OUTPATIENT
Start: 2021-04-07 | End: 2021-05-03

## 2021-04-07 NOTE — TELEPHONE ENCOUNTER
Medication:  tiZANidine HCl 4 MG Oral Tab      Date of last refill: 3/5/21    Last office visit:02/25/21   Due back to clinic per last office note:  NA  Date next office visit scheduled:  none    Last OV note recommendation:   ASSESSMENT AND PLAN:   Right

## 2021-04-09 ENCOUNTER — IMMUNIZATION (OUTPATIENT)
Dept: LAB | Age: 61
End: 2021-04-09
Attending: HOSPITALIST
Payer: COMMERCIAL

## 2021-04-09 DIAGNOSIS — Z23 NEED FOR VACCINATION: Primary | ICD-10-CM

## 2021-04-09 PROCEDURE — 0002A SARSCOV2 VAC 30MCG/0.3ML IM: CPT

## 2021-05-04 RX ORDER — TIZANIDINE 4 MG/1
4 TABLET ORAL EVERY 8 HOURS PRN
Qty: 30 TABLET | Refills: 0 | Status: SHIPPED | OUTPATIENT
Start: 2021-05-04 | End: 2021-05-31

## 2021-05-04 NOTE — TELEPHONE ENCOUNTER
Medication: tiZANidine HCl 4 MG Oral Tab       Date of last refill: 04/07/21    Last office visit: 02/25/21  Due back to clinic per last office note:  NA  Date next office visit scheduled:  none      Last OV note recommendation:   ASSESSMENT AND PLAN:   Ri

## 2021-05-06 RX ORDER — TIZANIDINE 4 MG/1
TABLET ORAL
Qty: 30 TABLET | Refills: 0 | OUTPATIENT
Start: 2021-05-06

## 2021-05-06 NOTE — TELEPHONE ENCOUNTER
tiZANidine HCl 4 MG Oral Tab 30 tablet 0 5/4/2021     Sig - Route: Take 1 tablet (4 mg total) by mouth every 8 (eight) hours as needed.  - Oral    Sent to pharmacy as: tiZANidine HCl 4 MG Oral Tablet (Roel Quinones)    E-Prescribing Status: Receipt confirmed b

## 2021-05-15 DIAGNOSIS — I10 ESSENTIAL HYPERTENSION: ICD-10-CM

## 2021-05-17 RX ORDER — DILTIAZEM HYDROCHLORIDE 180 MG/1
CAPSULE, COATED, EXTENDED RELEASE ORAL
Qty: 90 CAPSULE | Refills: 1 | Status: SHIPPED | OUTPATIENT
Start: 2021-05-17 | End: 2021-11-15

## 2021-06-01 RX ORDER — TIZANIDINE 4 MG/1
4 TABLET ORAL EVERY 8 HOURS PRN
Qty: 30 TABLET | Refills: 0 | Status: SHIPPED | OUTPATIENT
Start: 2021-06-01 | End: 2021-06-28

## 2021-06-01 NOTE — TELEPHONE ENCOUNTER
Medication: tiZANidine HCl 4 MG Oral Tab    Date of last refill: 05/04/21      Last office visit: 02/25/21  Due back to clinic per last office note:  na  Date next office visit scheduled:  none        Last OV note recommendation:    ASSESSMENT AND PLAN:

## 2021-06-07 DIAGNOSIS — I10 ESSENTIAL HYPERTENSION: ICD-10-CM

## 2021-06-07 RX ORDER — FUROSEMIDE 20 MG/1
TABLET ORAL
Qty: 90 TABLET | Refills: 3 | Status: SHIPPED | OUTPATIENT
Start: 2021-06-07

## 2021-06-07 RX ORDER — LOSARTAN POTASSIUM 100 MG/1
TABLET ORAL
Qty: 90 TABLET | Refills: 3 | Status: SHIPPED | OUTPATIENT
Start: 2021-06-07

## 2021-06-07 RX ORDER — CARVEDILOL 25 MG/1
TABLET ORAL
Qty: 180 TABLET | Refills: 3 | Status: SHIPPED | OUTPATIENT
Start: 2021-06-07

## 2021-06-28 DIAGNOSIS — M54.16 LUMBAR RADICULITIS: ICD-10-CM

## 2021-06-28 DIAGNOSIS — M46.1 SACROILIITIS (HCC): Primary | ICD-10-CM

## 2021-06-28 DIAGNOSIS — M47.817 SPONDYLOSIS OF LUMBOSACRAL REGION, UNSPECIFIED SPINAL OSTEOARTHRITIS COMPLICATION STATUS: ICD-10-CM

## 2021-07-01 NOTE — TELEPHONE ENCOUNTER
----- Message from Shital Shine MD sent at 11/4/2020  1:08 PM CST -----  Mild prominence of the common bile duct measuring 8 mm.   Recommend MRCP for eval
Per Dr. Fernández Patient hold on MRCP and refer to GI for consult and further recommendations on testing. [address] [place] [id number]  [phone]  Referral pending.
Spoke with patient and discussed recommendations, contact information provided, and understanding was expressed. Order placed.
English

## 2021-07-02 RX ORDER — TIZANIDINE 4 MG/1
4 TABLET ORAL EVERY 8 HOURS PRN
Qty: 30 TABLET | Refills: 0 | Status: SHIPPED | OUTPATIENT
Start: 2021-07-02 | End: 2021-07-22

## 2021-07-02 RX ORDER — TIZANIDINE 4 MG/1
4 TABLET ORAL EVERY 8 HOURS PRN
Qty: 30 TABLET | Refills: 0 | Status: SHIPPED | OUTPATIENT
Start: 2021-07-02 | End: 2021-07-09

## 2021-07-18 DIAGNOSIS — E78.2 MIXED HYPERLIPIDEMIA: Primary | ICD-10-CM

## 2021-07-19 RX ORDER — PRAVASTATIN SODIUM 40 MG
TABLET ORAL
Qty: 90 TABLET | Refills: 3 | Status: SHIPPED | OUTPATIENT
Start: 2021-07-19 | End: 2022-03-16

## 2021-07-20 ENCOUNTER — PATIENT MESSAGE (OUTPATIENT)
Dept: PAIN CLINIC | Facility: CLINIC | Age: 61
End: 2021-07-20

## 2021-07-20 ENCOUNTER — OFFICE VISIT (OUTPATIENT)
Dept: PAIN CLINIC | Facility: CLINIC | Age: 61
End: 2021-07-20
Payer: COMMERCIAL

## 2021-07-20 VITALS
WEIGHT: 301 LBS | DIASTOLIC BLOOD PRESSURE: 90 MMHG | SYSTOLIC BLOOD PRESSURE: 128 MMHG | HEIGHT: 71 IN | RESPIRATION RATE: 16 BRPM | HEART RATE: 74 BPM | OXYGEN SATURATION: 98 % | BODY MASS INDEX: 42.14 KG/M2

## 2021-07-20 DIAGNOSIS — M47.816 LUMBAR FACET ARTHROPATHY: Primary | ICD-10-CM

## 2021-07-20 PROCEDURE — 3008F BODY MASS INDEX DOCD: CPT | Performed by: ANESTHESIOLOGY

## 2021-07-20 PROCEDURE — 3074F SYST BP LT 130 MM HG: CPT | Performed by: ANESTHESIOLOGY

## 2021-07-20 PROCEDURE — 3080F DIAST BP >= 90 MM HG: CPT | Performed by: ANESTHESIOLOGY

## 2021-07-20 PROCEDURE — 99215 OFFICE O/P EST HI 40 MIN: CPT | Performed by: ANESTHESIOLOGY

## 2021-07-20 RX ORDER — INSULIN GLARGINE 100 [IU]/ML
60 INJECTION, SOLUTION SUBCUTANEOUS EVERY MORNING
COMMUNITY
Start: 2021-06-07 | End: 2021-12-05

## 2021-07-20 NOTE — PROGRESS NOTES
Patient presents in office today with reported pain in lower back     Current pain level reported = 3/10    Last reported dose of last week tizanidine      Narcotic Contract renewal n/a    Urine Drug screen n/a

## 2021-07-21 DIAGNOSIS — M54.16 LUMBAR RADICULITIS: ICD-10-CM

## 2021-07-21 DIAGNOSIS — M47.817 SPONDYLOSIS OF LUMBOSACRAL REGION, UNSPECIFIED SPINAL OSTEOARTHRITIS COMPLICATION STATUS: ICD-10-CM

## 2021-07-21 DIAGNOSIS — M46.1 SACROILIITIS (HCC): ICD-10-CM

## 2021-07-21 NOTE — TELEPHONE ENCOUNTER
Medication: tiZANidine HCl 4 MG Oral Tab    Date of last refill: 07/02/21      Last office visit: 02/25/21  Due back to clinic per last office note:  na  Date next office visit scheduled:  none      Last OV note recommendation:   ASSESSMENT AND PLAN:   Rig

## 2021-07-21 NOTE — TELEPHONE ENCOUNTER
Pt calling back to check the status on the 2 new medications discussed at [address] on 07/20. Informed pt medications have not been processed yet. Asked pt for the types of medications that were being prescribed and he stated he forgot the name. Please advise.

## 2021-07-21 NOTE — TELEPHONE ENCOUNTER
From: Tom Ill  To: Anabelle Torrez MD  Sent: 7/20/2021 10:39 PM CDT  Subject: Prescription Question    Good day. Dalila Londono prescribed new meds, did i need to wait for them?

## 2021-07-22 RX ORDER — TIZANIDINE 4 MG/1
TABLET ORAL
Qty: 30 TABLET | Refills: 0 | Status: SHIPPED | OUTPATIENT
Start: 2021-07-22 | End: 2021-07-22 | Stop reason: CLARIF

## 2021-07-22 RX ORDER — METAXALONE 800 MG/1
400 TABLET ORAL 3 TIMES DAILY
Qty: 90 TABLET | Refills: 0 | Status: SHIPPED | OUTPATIENT
Start: 2021-07-22 | End: 2021-10-04

## 2021-07-22 RX ORDER — CELECOXIB 200 MG/1
200 CAPSULE ORAL 2 TIMES DAILY
Qty: 60 CAPSULE | Refills: 0 | Status: SHIPPED | OUTPATIENT
Start: 2021-07-22 | End: 2021-08-16

## 2021-07-22 NOTE — TELEPHONE ENCOUNTER
Per Dr Manda Leigh - Tizanidine sent in error. Patient stated at [address] that Tizanidine does not help hence prescribed Metaxalone to try. Spoke with Ruby Palma, pharmacy tech and cancelled Rx for Tizanidine. Patient notified of update via [address] [place] [address] [address].

## 2021-07-29 NOTE — PROGRESS NOTES
Name: Candie Guajardo   : 1960   DOS: 2021        Pain Clinic Follow Up Visit:   Candie Guajardo is a 63year old male who presents for recheck of his chronic hip pain.   He is status post right hip joint, bilateral sacroiliac joint injection an 500 mg by mouth 2 (two) times daily with meals. • Sertraline HCl 100 MG Oral Tab Take 200 mg by mouth daily. • traZODone HCl 100 MG Oral Tab Take 200 mg by mouth nightly. • INVOKANA 300 MG Oral Tab Take 300 mg by mouth daily.  90 tablet 1   • benefits of the procedure were discussed with the patient. The patient wanted to proceed with the procedure. The patient underwent physical therapy and learn the stretching exercise. I also recommended him to continue doing stretching exercises at home.

## 2021-07-30 ENCOUNTER — TELEPHONE (OUTPATIENT)
Dept: PAIN CLINIC | Facility: CLINIC | Age: 61
End: 2021-07-30

## 2021-07-30 ENCOUNTER — TELEPHONE (OUTPATIENT)
Dept: NEUROLOGY | Facility: CLINIC | Age: 61
End: 2021-07-30

## 2021-07-30 DIAGNOSIS — M47.816 LUMBAR FACET ARTHROPATHY: Primary | ICD-10-CM

## 2021-07-30 NOTE — TELEPHONE ENCOUNTER
21  RE: Dipak Hill    : 1960    Dear Dr. Heidi Amezquita    Your patient is being scheduled for pain management procedures at [facility] that will take place over the next 8 weeks.     Procedures:  Right followed by Left L3-S1 Facet  Phy

## 2021-07-30 NOTE — TELEPHONE ENCOUNTER
Patient recommended for procedure with Dr. Jerris Cranker. Patient is currently taking Xarelto and ASA 81mg managed by Dr. Tay Villegas.  Letter of medical clearance sent to Dr. Tay Villegas requesting approval for patient to hold Xarelto for 3 days and ASA 81mg for

## 2021-07-30 NOTE — TELEPHONE ENCOUNTER
Question Answer Comment   Anesthesia Type Sedation    Provider [facility]    Location Lab    Procedure Facet    Laterality/Level Left diagnostic lumbar facet joint injection at L3-L4, L4-5 and L5-S1 level under fluoroscopy    Implants No    Medical clearance req

## 2021-07-30 NOTE — TELEPHONE ENCOUNTER
Prior authorization request completed for: Right followed by Left L3-S1 Facet     Authorization #No Prior Authorization/Nor Pre Determination is Required   Spoke with Jesusita Austin at [facility] [phone]  Reference #:[id number]   LP:69:04    [id number]

## 2021-07-30 NOTE — TELEPHONE ENCOUNTER
Question Answer Comment   Anesthesia Type Sedation    Provider Alyssa Atwood    Location Lab    Procedure Facet    Laterality/Level Right diagnostic lumbar facet joint injection at L3-L4, L4-5 and L5-S1 level    Medical clearance requested (will send to [facility]

## 2021-08-03 NOTE — TELEPHONE ENCOUNTER
Per PSR, RN from Dr. Alonzo Delgadillo office was calling to provide details for clearance. Advised PSR to let RN know our office can not take verbal over the phone orders and will need to either respond in Epic or fax our clearance letter back.  Requested PSR

## 2021-08-03 NOTE — TELEPHONE ENCOUNTER
Spoke with Kami Haro at Dr. [facility] office to advise the wrong meds were listed on the medical clearance as well as the procedures were not listed.  Kami File was asking multiple questions regarding the procedures and the hold/resume cycle, advised h

## 2021-08-03 NOTE — TELEPHONE ENCOUNTER
Received faxed letter of medical clearance from Dr. Cameron Corrales approving patient to hold \"Coumadin and ASA as requested by Dr. Orpha Babinski". Noted in chart patient is taking Xarelto, not Coumadin.      Contacted pt to confirm what A/C he is taking, stated

## 2021-08-04 NOTE — TELEPHONE ENCOUNTER
Received faxed letter of medical clearance from [facility] NP (Dr. Kt Flores) approving patient to [facility] and ASA as directed\".     Letter sent to scan

## 2021-08-04 NOTE — TELEPHONE ENCOUNTER
Patient advised of insurance approval and medical clearance to proceed with injections and is agreeable to scheduling. Patient scheduled for procedure, pre-procedure instructions reviewed. Patient prefers conscious sedation.  Reviewed sedation instructions Photo ID, List of Current Medications and Referral (if applicable) to your appointment. Check in at [facility] ([address] [facility] [place] [address]., [place], [place]) outpatient registration in the [employer].   • Please note-No prescriptions will be written by Pain within 48 hours of the scheduled date, your procedure will be cancelled and rescheduled to a later date. Please contact your insurance carrier to determine what your financial  responsibility will be for the procedure(s).     Cancellation/Rescheduling Solomon

## 2021-08-11 ENCOUNTER — HOSPITAL ENCOUNTER (OUTPATIENT)
Facility: HOSPITAL | Age: 61
Setting detail: HOSPITAL OUTPATIENT SURGERY
Discharge: HOME | End: 2021-08-11
Attending: ANESTHESIOLOGY | Admitting: ANESTHESIOLOGY
Payer: COMMERCIAL

## 2021-08-11 ENCOUNTER — APPOINTMENT (OUTPATIENT)
Dept: GENERAL RADIOLOGY | Facility: HOSPITAL | Age: 61
End: 2021-08-11
Attending: ANESTHESIOLOGY
Payer: COMMERCIAL

## 2021-08-11 VITALS
RESPIRATION RATE: 16 BRPM | DIASTOLIC BLOOD PRESSURE: 78 MMHG | SYSTOLIC BLOOD PRESSURE: 118 MMHG | TEMPERATURE: 99 F | HEART RATE: 64 BPM | OXYGEN SATURATION: 92 %

## 2021-08-11 DIAGNOSIS — M47.816 LUMBAR FACET ARTHROPATHY: ICD-10-CM

## 2021-08-11 LAB — GLUCOSE BLD-MCNC: 90 MG/DL (ref 70–99)

## 2021-08-11 PROCEDURE — 3E0U3BZ INTRODUCTION OF ANESTHETIC AGENT INTO JOINTS, PERCUTANEOUS APPROACH: ICD-10-PCS | Performed by: ANESTHESIOLOGY

## 2021-08-11 PROCEDURE — 82962 GLUCOSE BLOOD TEST: CPT

## 2021-08-11 PROCEDURE — 3E0U33Z INTRODUCTION OF ANTI-INFLAMMATORY INTO JOINTS, PERCUTANEOUS APPROACH: ICD-10-PCS | Performed by: ANESTHESIOLOGY

## 2021-08-11 PROCEDURE — 99152 MOD SED SAME PHYS/QHP 5/>YRS: CPT | Performed by: ANESTHESIOLOGY

## 2021-08-11 RX ORDER — DEXTROSE MONOHYDRATE 25 G/50ML
50 INJECTION, SOLUTION INTRAVENOUS
Status: DISCONTINUED | OUTPATIENT
Start: 2021-08-11 | End: 2021-08-11

## 2021-08-11 RX ORDER — INSULIN ASPART 100 [IU]/ML
3 INJECTION, SOLUTION INTRAVENOUS; SUBCUTANEOUS ONCE
Status: DISCONTINUED | OUTPATIENT
Start: 2021-08-11 | End: 2021-08-11

## 2021-08-11 RX ORDER — LIDOCAINE HYDROCHLORIDE 10 MG/ML
INJECTION, SOLUTION EPIDURAL; INFILTRATION; INTRACAUDAL; PERINEURAL
Status: DISCONTINUED | OUTPATIENT
Start: 2021-08-11 | End: 2021-08-11

## 2021-08-11 RX ORDER — SODIUM CHLORIDE, SODIUM LACTATE, POTASSIUM CHLORIDE, CALCIUM CHLORIDE 600; 310; 30; 20 MG/100ML; MG/100ML; MG/100ML; MG/100ML
100 INJECTION, SOLUTION INTRAVENOUS CONTINUOUS
Status: DISCONTINUED | OUTPATIENT
Start: 2021-08-11 | End: 2021-08-11

## 2021-08-11 RX ORDER — DIPHENHYDRAMINE HYDROCHLORIDE 50 MG/ML
50 INJECTION INTRAMUSCULAR; INTRAVENOUS ONCE AS NEEDED
Status: DISCONTINUED | OUTPATIENT
Start: 2021-08-11 | End: 2021-08-11

## 2021-08-11 RX ORDER — ONDANSETRON 2 MG/ML
4 INJECTION INTRAMUSCULAR; INTRAVENOUS ONCE AS NEEDED
Status: DISCONTINUED | OUTPATIENT
Start: 2021-08-11 | End: 2021-08-11

## 2021-08-11 RX ORDER — MIDAZOLAM HYDROCHLORIDE 1 MG/ML
INJECTION INTRAMUSCULAR; INTRAVENOUS
Status: DISCONTINUED | OUTPATIENT
Start: 2021-08-11 | End: 2021-08-11

## 2021-08-11 RX ORDER — METHYLPREDNISOLONE ACETATE 40 MG/ML
INJECTION, SUSPENSION INTRA-ARTICULAR; INTRALESIONAL; INTRAMUSCULAR; SOFT TISSUE
Status: DISCONTINUED | OUTPATIENT
Start: 2021-08-11 | End: 2021-08-11

## 2021-08-11 NOTE — H&P
History & Physical Examination    Patient Name: Truman Arciniega  MRN: [id number]  CSN: [id number]  YOB: 1960    Pre-Operative Diagnosis:  Lumbar facet arthropathy [M47.816]    Present Illness: 22-year-old male patient with chronic low damien • Suicide History Sister    • Alcohol and Other Disorders Associated Brother      Social History    Tobacco Use      Smoking status: Never Smoker      Smokeless tobacco: Never Used    Alcohol use: No      Alcohol/week: 0.0 standard drinks      SYSTEM Ch

## 2021-08-11 NOTE — OPERATIVE REPORT
[facility]  Operative Report  2021     Jayna Lane Patient Status:  Hospital Outpatient Surgery    1960 MRN [id number]   Location [address] [place] [address] 28 Attending Ermias Decker MD   [facility] Day # 0 PCP Rob Steven advanced into each corresponding facet joint at right L3-L4, L4-5 and L5-S1 level atraumatically under fluoroscopic guidance.   Following negative aspiration for CSF and blood, approximately 1 mL of 1% lidocaine with 20 mg of methylprednisolone was injected

## 2021-08-14 DIAGNOSIS — E66.01 MORBID OBESITY WITH BMI OF 45.0-49.9, ADULT (HCC): ICD-10-CM

## 2021-08-15 RX ORDER — PHENTERMINE HYDROCHLORIDE 30 MG/1
CAPSULE ORAL
Qty: 30 CAPSULE | Refills: 0 | Status: SHIPPED | OUTPATIENT
Start: 2021-08-15 | End: 2021-09-09

## 2021-08-16 DIAGNOSIS — M47.817 SPONDYLOSIS OF LUMBOSACRAL REGION, UNSPECIFIED SPINAL OSTEOARTHRITIS COMPLICATION STATUS: ICD-10-CM

## 2021-08-16 DIAGNOSIS — M46.1 SACROILIITIS (HCC): ICD-10-CM

## 2021-08-16 DIAGNOSIS — M47.816 LUMBAR FACET ARTHROPATHY: Primary | ICD-10-CM

## 2021-08-16 RX ORDER — CELECOXIB 200 MG/1
200 CAPSULE ORAL 2 TIMES DAILY
Qty: 180 CAPSULE | Refills: 0 | Status: SHIPPED | OUTPATIENT
Start: 2021-08-16 | End: 2021-12-13

## 2021-08-16 RX ORDER — CELECOXIB 200 MG/1
CAPSULE ORAL
Qty: 60 CAPSULE | Refills: 0 | OUTPATIENT
Start: 2021-08-16

## 2021-08-16 NOTE — TELEPHONE ENCOUNTER
Received a fax from [employer] requesting 90 day supply for celecoxib 200 MG Oral Cap with #3 refills (1 year supply).        Medication: celecoxib 200 MG Oral Cap    Date of last refill: 7/22/2021  [place]     L

## 2021-08-17 NOTE — TELEPHONE ENCOUNTER
Spoke with patient to advise the hospital policy has now changed due to the inc in [facility] cases and although he has been fully vaccinated he will need to complete a COVID test 48-72 hours prior to the procedure.  Advised pt on the need to self-isolate from t

## 2021-08-24 ENCOUNTER — OFFICE VISIT (OUTPATIENT)
Dept: ORTHOPEDICS CLINIC | Facility: CLINIC | Age: 61
End: 2021-08-24
Payer: COMMERCIAL

## 2021-08-24 VITALS — RESPIRATION RATE: 16 BRPM | HEIGHT: 71 IN | OXYGEN SATURATION: 99 % | BODY MASS INDEX: 42 KG/M2 | HEART RATE: 64 BPM

## 2021-08-24 DIAGNOSIS — M67.442 GANGLION CYST OF FLEXOR TENDON SHEATH OF FINGER OF LEFT HAND: Primary | ICD-10-CM

## 2021-08-24 PROCEDURE — 99204 OFFICE O/P NEW MOD 45 MIN: CPT | Performed by: ORTHOPAEDIC SURGERY

## 2021-08-24 NOTE — PROGRESS NOTES
OR BOOKING SHEET   Soft Tissue/Degenerative  Name: Sveta Spring  MRN: [id number]   : 1960  Diagnosis:  [x] Ganglion cyst of flexor tendon sheath of finger of left ring finger [M67.442]    Disposition:    [x] Outpatient  [] Overnight for SAM 3.5 mm Swivel Locks   []  3-0 Supramid Suture  []      Positioning:   []  Supine with arm table on OR Table  [x]  Supine with arm table on transport cart    Assistant request:   []  Yes  [x]  No    Anesthesiologist Request:   None

## 2021-08-24 NOTE — H&P (VIEW-ONLY)
Clinic Note [facility]     Assessment/Plan:  63year old male    1. Left ring finger tendon sheath cyst–consist of symptomatic and causing the patient discomfort when using his hands.   He is interested in having it surgically excised which I think i • Cancer ([facility] [address] 11/2019    prostate   • Deep vein thrombosis (HCC)     left   • Fatigue    • Heartburn    • High blood pressure    • High cholesterol    • History of depression    • History of hip replacement     March 2020, 11/2010   • Indigestion    • I MD. 7.5 mL 0   • DILTIAZEM HCL ER COATED BEADS 180 MG Oral Capsule SR 24 Hr TAKE 1 CAPSULE DAILY 90 capsule 1   • SILDENAFIL CITRATE 100 MG Oral Tab TAKE ONE TABLET BY MOUTH AS NEEDED FOR ERECTILE DYSFUNCTION 28 tablet 2   • Dulaglutide (TRULICITY) 1.5 MG/ Surgery  [facility]  [facility] [address] [email or web address]. [email or web address]  [email or web address]  t: [id number]  f: [phone]

## 2021-08-24 NOTE — H&P (VIEW-ONLY)
Clinic Note [facility]     Assessment/Plan:  63year old male    1. Left ring finger tendon sheath cyst–consist of symptomatic and causing the patient discomfort when using his hands.   He is interested in having it surgically excised which I think i • Cancer ([facility] [address] 11/2019    prostate   • Deep vein thrombosis (HCC)     left   • Fatigue    • Heartburn    • High blood pressure    • High cholesterol    • History of depression    • History of hip replacement     March 2020, 11/2010   • Indigestion    • I MD. 7.5 mL 0   • DILTIAZEM HCL ER COATED BEADS 180 MG Oral Capsule SR 24 Hr TAKE 1 CAPSULE DAILY 90 capsule 1   • SILDENAFIL CITRATE 100 MG Oral Tab TAKE ONE TABLET BY MOUTH AS NEEDED FOR ERECTILE DYSFUNCTION 28 tablet 2   • Dulaglutide (TRULICITY) 1.5 MG/ Surgery  [facility]  [facility] [address] [facility], [place]   [email or web address]  [email or web address]  t: [id number]  f: [phone]

## 2021-08-24 NOTE — H&P
Clinic Note [facility]     Assessment/Plan:  63year old male    1. Left ring finger tendon sheath cyst–consist of symptomatic and causing the patient discomfort when using his hands.   He is interested in having it surgically excised which I think i • Cancer ([facility] [address] 11/2019    prostate   • Deep vein thrombosis (HCC)     left   • Fatigue    • Heartburn    • High blood pressure    • High cholesterol    • History of depression    • History of hip replacement     March 2020, 11/2010   • Indigestion    • I MD. 7.5 mL 0   • DILTIAZEM HCL ER COATED BEADS 180 MG Oral Capsule SR 24 Hr TAKE 1 CAPSULE DAILY 90 capsule 1   • SILDENAFIL CITRATE 100 MG Oral Tab TAKE ONE TABLET BY MOUTH AS NEEDED FOR ERECTILE DYSFUNCTION 28 tablet 2   • Dulaglutide (TRULICITY) 1.5 MG/ Surgery  [facility]  [facility] [address], [address] [place], [place]   [email or web address]  [email or web address]  t: [id number]  f: [phone]

## 2021-08-25 ENCOUNTER — TELEPHONE (OUTPATIENT)
Dept: ORTHOPEDICS CLINIC | Facility: CLINIC | Age: 61
End: 2021-08-25

## 2021-08-25 DIAGNOSIS — M67.442 GANGLION OF FLEXOR TENDON SHEATH OF LEFT RING FINGER: Primary | ICD-10-CM

## 2021-08-25 NOTE — TELEPHONE ENCOUNTER
Surgeon: Dr. Riccardo Suarez     Date of Surgery: 9/22/21        Post Op Appt: 9/30/21     Facility: [facility]     IP vs OP:  OP     Surgical Assistant Obtained: N/A      Preadmission Testing Ordered: COVID 19 TESTING TO BE DONE @ [facility]      Pre-Op Clearance Requeste

## 2021-08-25 NOTE — TELEPHONE ENCOUNTER
Oseas Salazar's case has been scheduled/rescheduled at [phone] on 9/22/2021 at [facility]  Received:  Danny Goins RMA  Patient Name: Roberta Hernández    MRN: [id number]     EXCISION OF LEFT RING FINGER TEN

## 2021-08-25 NOTE — TELEPHONE ENCOUNTER
OR BOOKING SHEET   Soft Tissue/Degenerative  Name: Miles Stein  MRN: [id number]   : 1960  Diagnosis:  [x]? Ganglion cyst of flexor tendon sheath of finger of left ring finger [M67.442]     Disposition:    [x]? Outpatient  []?  Overnight for marcaine - 1:1 mix)  []? Lead Hand  [x]? Sherif Lay  []? Mini C-arm  []? Full C-arm  []? Arthrex 3.5 mm Swivel Locks   []? ?  3-0 Supramid Suture  []?       Positioning:   []? Supine with arm table on OR Table  [x]?   Supine with arm table on transpo

## 2021-08-27 ENCOUNTER — LAB ENCOUNTER (OUTPATIENT)
Dept: LAB | Age: 61
End: 2021-08-27
Attending: ANESTHESIOLOGY
Payer: COMMERCIAL

## 2021-08-27 DIAGNOSIS — M47.816 LUMBAR FACET ARTHROPATHY: ICD-10-CM

## 2021-08-28 LAB — SARS-COV-2 RNA RESP QL NAA+PROBE: NOT DETECTED

## 2021-09-01 NOTE — TELEPHONE ENCOUNTER
Patient advised of insurance approval to proceed with injections and is agreeable to scheduling. Patient scheduled for procedure, pre-procedure instructions reviewed. Patient prefers conscious sedation.  Reviewed sedation instructions including 8 hour fasti Current Medications and Referral (if applicable) to your appointment. Check in at [facility] ([address] [place]) outpatient registration in the [facility].   • Please note-No prescriptions will be written by Pain Clinic in [address] on t scheduled date, your procedure will be cancelled and rescheduled to a later date. Please contact your insurance carrier to determine what your financial  responsibility will be for the procedure(s).     Cancellation/Rescheduling Appointment:   In the even

## 2021-09-01 NOTE — TELEPHONE ENCOUNTER
Pt called stated he would like to r/s his injection from 8/30. Pt stated he did not get a call with check in time so he missed it.     Please advise

## 2021-09-08 DIAGNOSIS — E66.01 MORBID OBESITY WITH BMI OF 45.0-49.9, ADULT (HCC): ICD-10-CM

## 2021-09-09 RX ORDER — PHENTERMINE HYDROCHLORIDE 30 MG/1
CAPSULE ORAL
Qty: 30 CAPSULE | Refills: 0 | Status: SHIPPED | OUTPATIENT
Start: 2021-09-09 | End: 2021-10-21

## 2021-09-09 NOTE — TELEPHONE ENCOUNTER
Last time medication was refilled 8/15/21  Quantity and # of refills 30 cap w/ 0 refill  Last OV 3/2/21  Next OV physical due 3/2/22

## 2021-09-10 DIAGNOSIS — M54.16 LUMBAR RADICULITIS: ICD-10-CM

## 2021-09-10 DIAGNOSIS — M47.817 SPONDYLOSIS OF LUMBOSACRAL REGION, UNSPECIFIED SPINAL OSTEOARTHRITIS COMPLICATION STATUS: ICD-10-CM

## 2021-09-10 DIAGNOSIS — M46.1 SACROILIITIS (HCC): ICD-10-CM

## 2021-09-10 DIAGNOSIS — M47.816 LUMBAR FACET ARTHROPATHY: Primary | ICD-10-CM

## 2021-09-10 RX ORDER — TIZANIDINE 4 MG/1
TABLET ORAL
Qty: 30 TABLET | Refills: 0 | OUTPATIENT
Start: 2021-09-10

## 2021-09-14 PROBLEM — Z01.810 ENCOUNTER FOR PRE-OPERATIVE CARDIOVASCULAR CLEARANCE: Status: ACTIVE | Noted: 2017-10-17

## 2021-09-17 ENCOUNTER — LAB ENCOUNTER (OUTPATIENT)
Dept: LAB | Age: 61
End: 2021-09-17
Attending: ANESTHESIOLOGY
Payer: COMMERCIAL

## 2021-09-17 ENCOUNTER — MED REC SCAN ONLY (OUTPATIENT)
Dept: INTERNAL MEDICINE CLINIC | Facility: CLINIC | Age: 61
End: 2021-09-17

## 2021-09-17 DIAGNOSIS — M47.816 LUMBAR FACET ARTHROPATHY: ICD-10-CM

## 2021-09-18 LAB — SARS-COV-2 RNA RESP QL NAA+PROBE: NOT DETECTED

## 2021-09-20 ENCOUNTER — LAB ENCOUNTER (OUTPATIENT)
Dept: LAB | Facility: HOSPITAL | Age: 61
End: 2021-09-20
Attending: ORTHOPAEDIC SURGERY
Payer: COMMERCIAL

## 2021-09-20 ENCOUNTER — APPOINTMENT (OUTPATIENT)
Dept: GENERAL RADIOLOGY | Facility: HOSPITAL | Age: 61
End: 2021-09-20
Attending: ANESTHESIOLOGY
Payer: COMMERCIAL

## 2021-09-20 ENCOUNTER — HOSPITAL ENCOUNTER (OUTPATIENT)
Facility: HOSPITAL | Age: 61
Setting detail: HOSPITAL OUTPATIENT SURGERY
Discharge: HOME | End: 2021-09-20
Attending: ANESTHESIOLOGY | Admitting: ANESTHESIOLOGY
Payer: COMMERCIAL

## 2021-09-20 VITALS
SYSTOLIC BLOOD PRESSURE: 156 MMHG | OXYGEN SATURATION: 94 % | HEART RATE: 70 BPM | DIASTOLIC BLOOD PRESSURE: 96 MMHG | RESPIRATION RATE: 16 BRPM | TEMPERATURE: 97 F

## 2021-09-20 DIAGNOSIS — M47.816 LUMBAR FACET ARTHROPATHY: ICD-10-CM

## 2021-09-20 DIAGNOSIS — M67.40 GANGLION CYST OF FLEXOR TENDON SHEATH: ICD-10-CM

## 2021-09-20 LAB
ANION GAP SERPL CALC-SCNC: 3 MMOL/L (ref 0–18)
BUN BLD-MCNC: 9 MG/DL (ref 7–18)
CALCIUM BLD-MCNC: 8.6 MG/DL (ref 8.5–10.1)
CHLORIDE SERPL-SCNC: 108 MMOL/L (ref 98–112)
CO2 SERPL-SCNC: 28 MMOL/L (ref 21–32)
CREAT BLD-MCNC: 1.37 MG/DL
GLUCOSE BLD-MCNC: 151 MG/DL (ref 70–99)
GLUCOSE BLD-MCNC: 224 MG/DL (ref 70–99)
OSMOLALITY SERPL CALC.SUM OF ELEC: 294 MOSM/KG (ref 275–295)
POTASSIUM SERPL-SCNC: 4.2 MMOL/L (ref 3.5–5.1)
SODIUM SERPL-SCNC: 139 MMOL/L (ref 136–145)

## 2021-09-20 PROCEDURE — 3E0U3BZ INTRODUCTION OF ANESTHETIC AGENT INTO JOINTS, PERCUTANEOUS APPROACH: ICD-10-PCS | Performed by: ANESTHESIOLOGY

## 2021-09-20 PROCEDURE — 80048 BASIC METABOLIC PNL TOTAL CA: CPT

## 2021-09-20 PROCEDURE — 99152 MOD SED SAME PHYS/QHP 5/>YRS: CPT | Performed by: ANESTHESIOLOGY

## 2021-09-20 PROCEDURE — 36415 COLL VENOUS BLD VENIPUNCTURE: CPT

## 2021-09-20 PROCEDURE — 64493 INJ PARAVERT F JNT L/S 1 LEV: CPT | Performed by: ANESTHESIOLOGY

## 2021-09-20 PROCEDURE — 64494 INJ PARAVERT F JNT L/S 2 LEV: CPT | Performed by: ANESTHESIOLOGY

## 2021-09-20 PROCEDURE — 3E0U33Z INTRODUCTION OF ANTI-INFLAMMATORY INTO JOINTS, PERCUTANEOUS APPROACH: ICD-10-PCS | Performed by: ANESTHESIOLOGY

## 2021-09-20 PROCEDURE — 64495 INJ PARAVERT F JNT L/S 3 LEV: CPT | Performed by: ANESTHESIOLOGY

## 2021-09-20 RX ORDER — METHYLPREDNISOLONE ACETATE 40 MG/ML
INJECTION, SUSPENSION INTRA-ARTICULAR; INTRALESIONAL; INTRAMUSCULAR; SOFT TISSUE
Status: DISCONTINUED | OUTPATIENT
Start: 2021-09-20 | End: 2021-09-20

## 2021-09-20 RX ORDER — INSULIN ASPART 100 [IU]/ML
3 INJECTION, SOLUTION INTRAVENOUS; SUBCUTANEOUS ONCE
Status: DISCONTINUED | OUTPATIENT
Start: 2021-09-20 | End: 2021-09-20

## 2021-09-20 RX ORDER — LIDOCAINE HYDROCHLORIDE 10 MG/ML
INJECTION, SOLUTION EPIDURAL; INFILTRATION; INTRACAUDAL; PERINEURAL
Status: DISCONTINUED | OUTPATIENT
Start: 2021-09-20 | End: 2021-09-20

## 2021-09-20 RX ORDER — LOSARTAN POTASSIUM 100 MG/1
100 TABLET ORAL ONCE
Status: COMPLETED | OUTPATIENT
Start: 2021-09-20 | End: 2021-09-20

## 2021-09-20 RX ORDER — ONDANSETRON 2 MG/ML
4 INJECTION INTRAMUSCULAR; INTRAVENOUS ONCE AS NEEDED
Status: DISCONTINUED | OUTPATIENT
Start: 2021-09-20 | End: 2021-09-20

## 2021-09-20 RX ORDER — ONDANSETRON 2 MG/ML
4 INJECTION INTRAMUSCULAR; INTRAVENOUS ONCE AS NEEDED
Status: CANCELLED | OUTPATIENT
Start: 2021-09-20 | End: 2021-09-20

## 2021-09-20 RX ORDER — DEXTROSE MONOHYDRATE 25 G/50ML
50 INJECTION, SOLUTION INTRAVENOUS
Status: DISCONTINUED | OUTPATIENT
Start: 2021-09-20 | End: 2021-09-20

## 2021-09-20 RX ORDER — SODIUM CHLORIDE, SODIUM LACTATE, POTASSIUM CHLORIDE, CALCIUM CHLORIDE 600; 310; 30; 20 MG/100ML; MG/100ML; MG/100ML; MG/100ML
100 INJECTION, SOLUTION INTRAVENOUS CONTINUOUS
Status: DISCONTINUED | OUTPATIENT
Start: 2021-09-20 | End: 2021-09-20

## 2021-09-20 RX ORDER — DIPHENHYDRAMINE HYDROCHLORIDE 50 MG/ML
50 INJECTION INTRAMUSCULAR; INTRAVENOUS ONCE AS NEEDED
Status: CANCELLED | OUTPATIENT
Start: 2021-09-20 | End: 2021-09-20

## 2021-09-20 RX ORDER — MIDAZOLAM HYDROCHLORIDE 1 MG/ML
INJECTION INTRAMUSCULAR; INTRAVENOUS
Status: DISCONTINUED | OUTPATIENT
Start: 2021-09-20 | End: 2021-09-20

## 2021-09-20 NOTE — OPERATIVE REPORT
[facility]  Operative Report  2021     Sebas Jonelle Patient Status:  Hospital Outpatient Surgery    1960 MRN [id number]   Location [id number] [place] [address] Attending Estrada Matthew MD   [address] [facility] Day # 0 RUSH Sharma started after the patient was adequately sedated. Moderate intravenous sedation was given for 12 minutes. In the prone position, following sterile prep and drape of the lumbar region, the corresponding facet joints were identified under fluoroscopy.   Xander Klein

## 2021-09-20 NOTE — INTERVAL H&P NOTE
Pre-op Diagnosis: Lumbar facet arthropathy [M47.816]    The above referenced H&P was reviewed by Marylou Booker MD on 9/20/2021, the patient was examined and no significant changes have occurred in the patient's condition since the H&P was performed.   I

## 2021-09-21 RX ORDER — ACETAMINOPHEN 500 MG
1000 TABLET ORAL ONCE
Status: CANCELLED | OUTPATIENT
Start: 2021-09-21 | End: 2021-09-21

## 2021-09-21 RX ORDER — DEXTROSE MONOHYDRATE 25 G/50ML
50 INJECTION, SOLUTION INTRAVENOUS
Status: CANCELLED | OUTPATIENT
Start: 2021-09-21

## 2021-09-22 ENCOUNTER — ANESTHESIA (OUTPATIENT)
Dept: SURGERY | Facility: HOSPITAL | Age: 61
End: 2021-09-22
Payer: COMMERCIAL

## 2021-09-22 ENCOUNTER — ANESTHESIA EVENT (OUTPATIENT)
Dept: SURGERY | Facility: HOSPITAL | Age: 61
End: 2021-09-22
Payer: COMMERCIAL

## 2021-09-22 ENCOUNTER — HOSPITAL ENCOUNTER (OUTPATIENT)
Facility: HOSPITAL | Age: 61
Setting detail: HOSPITAL OUTPATIENT SURGERY
Discharge: HOME | End: 2021-09-22
Attending: ORTHOPAEDIC SURGERY | Admitting: ORTHOPAEDIC SURGERY
Payer: COMMERCIAL

## 2021-09-22 VITALS
WEIGHT: 315 LBS | TEMPERATURE: 98 F | DIASTOLIC BLOOD PRESSURE: 109 MMHG | HEIGHT: 71 IN | BODY MASS INDEX: 44.1 KG/M2 | HEART RATE: 75 BPM | SYSTOLIC BLOOD PRESSURE: 181 MMHG | OXYGEN SATURATION: 94 % | RESPIRATION RATE: 18 BRPM

## 2021-09-22 DIAGNOSIS — M67.40 GANGLION CYST OF FLEXOR TENDON SHEATH: Primary | ICD-10-CM

## 2021-09-22 DIAGNOSIS — M67.442 GANGLION OF FLEXOR TENDON SHEATH OF LEFT RING FINGER: ICD-10-CM

## 2021-09-22 LAB
GLUCOSE BLD-MCNC: 121 MG/DL (ref 70–99)
SARS-COV-2 RNA RESP QL NAA+PROBE: NOT DETECTED

## 2021-09-22 PROCEDURE — 26160 REMOVE TENDON SHEATH LESION: CPT | Performed by: ORTHOPAEDIC SURGERY

## 2021-09-22 PROCEDURE — 3E0T3BZ INTRODUCTION OF ANESTHETIC AGENT INTO PERIPHERAL NERVES AND PLEXI, PERCUTANEOUS APPROACH: ICD-10-PCS | Performed by: ORTHOPAEDIC SURGERY

## 2021-09-22 PROCEDURE — 0LB80ZZ EXCISION OF LEFT HAND TENDON, OPEN APPROACH: ICD-10-PCS | Performed by: ORTHOPAEDIC SURGERY

## 2021-09-22 RX ORDER — LABETALOL HYDROCHLORIDE 5 MG/ML
5 INJECTION, SOLUTION INTRAVENOUS ONCE
Status: COMPLETED | OUTPATIENT
Start: 2021-09-22 | End: 2021-09-22

## 2021-09-22 RX ORDER — ONDANSETRON 2 MG/ML
4 INJECTION INTRAMUSCULAR; INTRAVENOUS AS NEEDED
Status: DISCONTINUED | OUTPATIENT
Start: 2021-09-22 | End: 2021-09-22

## 2021-09-22 RX ORDER — ONDANSETRON 2 MG/ML
4 INJECTION INTRAMUSCULAR; INTRAVENOUS ONCE AS NEEDED
Status: DISCONTINUED | OUTPATIENT
Start: 2021-09-22 | End: 2021-09-22

## 2021-09-22 RX ORDER — SODIUM CHLORIDE, SODIUM LACTATE, POTASSIUM CHLORIDE, CALCIUM CHLORIDE 600; 310; 30; 20 MG/100ML; MG/100ML; MG/100ML; MG/100ML
INJECTION, SOLUTION INTRAVENOUS CONTINUOUS
Status: DISCONTINUED | OUTPATIENT
Start: 2021-09-22 | End: 2021-09-22

## 2021-09-22 RX ORDER — HYDROCODONE BITARTRATE AND ACETAMINOPHEN 5; 325 MG/1; MG/1
2 TABLET ORAL AS NEEDED
Status: DISCONTINUED | OUTPATIENT
Start: 2021-09-22 | End: 2021-09-22

## 2021-09-22 RX ORDER — NALOXONE HYDROCHLORIDE 0.4 MG/ML
80 INJECTION, SOLUTION INTRAMUSCULAR; INTRAVENOUS; SUBCUTANEOUS AS NEEDED
Status: DISCONTINUED | OUTPATIENT
Start: 2021-09-22 | End: 2021-09-22

## 2021-09-22 RX ORDER — LABETALOL HYDROCHLORIDE 5 MG/ML
INJECTION, SOLUTION INTRAVENOUS
Status: COMPLETED
Start: 2021-09-22 | End: 2021-09-22

## 2021-09-22 RX ORDER — HYDROCODONE BITARTRATE AND ACETAMINOPHEN 5; 325 MG/1; MG/1
1 TABLET ORAL AS NEEDED
Status: DISCONTINUED | OUTPATIENT
Start: 2021-09-22 | End: 2021-09-22

## 2021-09-22 RX ORDER — MIDAZOLAM HYDROCHLORIDE 1 MG/ML
INJECTION INTRAMUSCULAR; INTRAVENOUS AS NEEDED
Status: DISCONTINUED | OUTPATIENT
Start: 2021-09-22 | End: 2021-09-22 | Stop reason: SURG

## 2021-09-22 RX ORDER — LIDOCAINE HYDROCHLORIDE 5 MG/ML
INJECTION, SOLUTION INFILTRATION; INTRAVENOUS AS NEEDED
Status: DISCONTINUED | OUTPATIENT
Start: 2021-09-22 | End: 2021-09-22 | Stop reason: SURG

## 2021-09-22 RX ORDER — ACETAMINOPHEN 500 MG
1000 TABLET ORAL ONCE
COMMUNITY

## 2021-09-22 RX ORDER — DEXTROSE MONOHYDRATE 25 G/50ML
50 INJECTION, SOLUTION INTRAVENOUS
Status: DISCONTINUED | OUTPATIENT
Start: 2021-09-22 | End: 2021-09-22

## 2021-09-22 RX ORDER — DIPHENHYDRAMINE HYDROCHLORIDE 50 MG/ML
50 INJECTION INTRAMUSCULAR; INTRAVENOUS ONCE AS NEEDED
Status: DISCONTINUED | OUTPATIENT
Start: 2021-09-22 | End: 2021-09-22

## 2021-09-22 RX ORDER — HYDROMORPHONE HYDROCHLORIDE 1 MG/ML
0.4 INJECTION, SOLUTION INTRAMUSCULAR; INTRAVENOUS; SUBCUTANEOUS EVERY 5 MIN PRN
Status: DISCONTINUED | OUTPATIENT
Start: 2021-09-22 | End: 2021-09-22

## 2021-09-22 RX ADMIN — MIDAZOLAM HYDROCHLORIDE 2 MG: 1 INJECTION INTRAMUSCULAR; INTRAVENOUS at 16:42:00

## 2021-09-22 RX ADMIN — LIDOCAINE HYDROCHLORIDE 50 ML: 5 INJECTION, SOLUTION INFILTRATION; INTRAVENOUS at 16:43:00

## 2021-09-22 NOTE — ANESTHESIA PREPROCEDURE EVALUATION
PRE-OP EVALUATION    Patient Name: Dilcia Mejia    Admit Diagnosis: Ganglion of flexor tendon sheath of left ring finger [M67.442]    Pre-op Diagnosis: Ganglion of flexor tendon sheath of left ring finger [M67.442]    EXCISION OF LEFT RING FINGER Rfl: , 9/21/2021 at 0800  Sertraline HCl 100 MG Oral Tab, Take 100 mg by mouth daily. , Disp: , Rfl: , 9/21/2021 at 0900  traZODone HCl 100 MG Oral Tab, Take 150 mg by mouth nightly., Disp: , Rfl: , 9/21/2021 at 2100  celecoxib 200 MG Oral Cap, Take 1 capsu • HIP REPLACEMENT SURGERY Left 2010   • HIP REPLACEMENT SURGERY Right 03/03/2020   • LAPAROSCOPY, SURGICAL PROSTATECTOMY, RETROPUBIC RADICAL, W/NERVE SPARING  11/15/2019       • OTHER SURGICAL HISTORY      vocal cord polyps removed   • OTHER

## 2021-09-22 NOTE — ANESTHESIA POSTPROCEDURE EVALUATION
[facility] [address] Patient Status:  Hospital Outpatient Surgery   Age/Gender 63year old male MRN [id number]   [facility] SURGERY Attending Aruna Avitia MD   Hosp Day # 0 PCP Yo Bowman MD       Anesthesia Post-op No

## 2021-09-22 NOTE — OPERATIVE REPORT
Operative Note    Patient Name: Dilcia Mejia    Preoperative Diagnosis:     1. Flexor tendon sheath cyst of left ring finger [M67.442]    Postoperative Diagnosis:     1.  Same as preop    Surgeon(s) and Role:     Judith Batista MD - Primary     A hemostasis was noted. 0.5% Marcaine was injected into subcutaneous tissue     Closure:  4-0 nylon was used in a simple interrupted fashion to close the incision. Bacitracin, Adaptic, 4 x 4 gauze, and an Ace wrap was used to hold the dressing in place.

## 2021-09-22 NOTE — ANESTHESIA PROCEDURE NOTES
[facility]  Performed by: Fritz Neal MD  Authorized by: Fritz Neal MD       General Information and Staff    Start Time:  9/22/2021 4:40 PM  End Time:  9/22/2021 4:41 PM  Anesthesiologist:  Fritz Neal MD  Performed by:   Anesthesiologist

## 2021-09-26 NOTE — TELEPHONE ENCOUNTER
From: Rodney Dudley  To: Lou Dickinson MD  Sent: 3/18/2017 12:40 PM CDT  Subject: Other    I am in need of a cardiologist referral for my upcoming visit (M5) localizes pain

## 2021-09-28 ENCOUNTER — OFFICE VISIT (OUTPATIENT)
Dept: INTERNAL MEDICINE CLINIC | Facility: CLINIC | Age: 61
End: 2021-09-28
Payer: COMMERCIAL

## 2021-09-28 ENCOUNTER — PATIENT MESSAGE (OUTPATIENT)
Dept: PAIN CLINIC | Facility: CLINIC | Age: 61
End: 2021-09-28

## 2021-09-28 VITALS
RESPIRATION RATE: 20 BRPM | HEIGHT: 71 IN | WEIGHT: 315 LBS | HEART RATE: 82 BPM | TEMPERATURE: 99 F | OXYGEN SATURATION: 98 % | BODY MASS INDEX: 44.1 KG/M2 | DIASTOLIC BLOOD PRESSURE: 88 MMHG | SYSTOLIC BLOOD PRESSURE: 134 MMHG

## 2021-09-28 DIAGNOSIS — R10.9 RIGHT FLANK PAIN: Primary | ICD-10-CM

## 2021-09-28 LAB
APPEARANCE: CLEAR
BILIRUBIN: NEGATIVE
GLUCOSE (URINE DIPSTICK): NEGATIVE MG/DL
KETONES (URINE DIPSTICK): NEGATIVE MG/DL
LEUKOCYTES: NEGATIVE
MULTISTIX LOT#: ABNORMAL NUMERIC
NITRITE, URINE: NEGATIVE
OCCULT BLOOD: NEGATIVE
PH, URINE: 5.5 (ref 4.5–8)
PROTEIN (URINE DIPSTICK): 100 MG/DL
SPECIFIC GRAVITY: 1.02 (ref 1–1.03)
UROBILINOGEN,SEMI-QN: 1 MG/DL (ref 0–1.9)

## 2021-09-28 PROCEDURE — 90471 IMMUNIZATION ADMIN: CPT | Performed by: PHYSICIAN ASSISTANT

## 2021-09-28 PROCEDURE — 81003 URINALYSIS AUTO W/O SCOPE: CPT | Performed by: PHYSICIAN ASSISTANT

## 2021-09-28 PROCEDURE — 90686 IIV4 VACC NO PRSV 0.5 ML IM: CPT | Performed by: PHYSICIAN ASSISTANT

## 2021-09-28 PROCEDURE — 3075F SYST BP GE 130 - 139MM HG: CPT | Performed by: PHYSICIAN ASSISTANT

## 2021-09-28 PROCEDURE — 3079F DIAST BP 80-89 MM HG: CPT | Performed by: PHYSICIAN ASSISTANT

## 2021-09-28 PROCEDURE — 99214 OFFICE O/P EST MOD 30 MIN: CPT | Performed by: PHYSICIAN ASSISTANT

## 2021-09-28 PROCEDURE — 3008F BODY MASS INDEX DOCD: CPT | Performed by: PHYSICIAN ASSISTANT

## 2021-09-28 RX ORDER — DIAZEPAM 5 MG/1
TABLET ORAL
Qty: 2 TABLET | Refills: 0 | Status: SHIPPED | OUTPATIENT
Start: 2021-09-28 | End: 2021-12-09

## 2021-09-28 NOTE — TELEPHONE ENCOUNTER
Noted patient was prescribed Metaxalone 800 mg tabs 7/22/2021.      Per chart review patient has tried Cyclobenzaprine 8545-1322, Methocarbamol- 2018 and 2020 and Tizanidine last prescribed 7/22/2021 (patient stated it does not work for him)

## 2021-09-28 NOTE — TELEPHONE ENCOUNTER
From: Jorge Schofield  To: Theresa Curtis MD  Sent: 9/28/2021 4:16 PM CDT  Subject: Upper back pain    Hi Dr Rod Fischer. .  I just met with Dr. Ezequiel GOMEZ for upper back pain. Aniceto Ramsey this pain started last Thursday with me in bed all day yesterday.  She recommende

## 2021-09-28 NOTE — PATIENT INSTRUCTIONS
Contact Dr. Geroge Kehr office for muscle relaxer recommendation   Complete CT scan  [facility] to take diazepam 30 minutes before your scan; repeat at time of scan if needed.

## 2021-09-28 NOTE — PROGRESS NOTES
Subjective:   Patient ID: Humberto Grimes is a 63year old male. Patient presents with:  Back Pain: mid right side, 7/10, started last week     Back Pain  This is a new problem. The current episode started in the past 7 days.  The problem occurs cons MG Oral Capsule SR 24 Hr TAKE 1 CAPSULE DAILY 90 capsule 1   • SILDENAFIL CITRATE 100 MG Oral Tab TAKE ONE TABLET BY MOUTH AS NEEDED FOR ERECTILE DYSFUNCTION 28 tablet 2   • Dulaglutide (TRULICITY) 1.5 GW/0.1XO Subcutaneous Solution Pen-injector Inject 1.5 (primary encounter diagnosis)    R/o nephrolithiasis, check CT abd/pelvis   Diazepam given to take before scan due to claustrophobia  Pt expresses understanding and agrees to the plan.  F/u prn     Orders Placed This Encounter      Urine Dip, auto without M

## 2021-09-29 RX ORDER — METAXALONE 800 MG/1
800 TABLET ORAL 3 TIMES DAILY
Qty: 90 TABLET | Refills: 0 | Status: SHIPPED | OUTPATIENT
Start: 2021-09-29 | End: 2021-12-10

## 2021-09-29 NOTE — TELEPHONE ENCOUNTER
Jossy Bill MD  You 1 hour ago (10:17 AM)         Jeannette Pittman, I send a prescription for metaxalone.     Message text

## 2021-09-30 ENCOUNTER — OFFICE VISIT (OUTPATIENT)
Dept: ORTHOPEDICS CLINIC | Facility: CLINIC | Age: 61
End: 2021-09-30
Payer: COMMERCIAL

## 2021-09-30 VITALS — HEART RATE: 91 BPM | OXYGEN SATURATION: 98 %

## 2021-09-30 DIAGNOSIS — M67.442 GANGLION OF FLEXOR TENDON SHEATH OF LEFT RING FINGER: Primary | ICD-10-CM

## 2021-09-30 PROCEDURE — 99024 POSTOP FOLLOW-UP VISIT: CPT | Performed by: ORTHOPAEDIC SURGERY

## 2021-09-30 NOTE — PROGRESS NOTES
[facility] Visit    A/P: 63year old  male s/p left ring finger flexor tendon sheath cyst excision on 9/22/2021 progressing as expected.      Weight Bearing Restrictions: <1lb  Wound Care: may shower/pat dry    Next Visit: 1 week for suture re

## 2021-10-01 ENCOUNTER — HOSPITAL ENCOUNTER (OUTPATIENT)
Dept: CT IMAGING | Age: 61
Discharge: HOME | End: 2021-10-01
Attending: PHYSICIAN ASSISTANT
Payer: COMMERCIAL

## 2021-10-01 DIAGNOSIS — R10.9 RIGHT FLANK PAIN: ICD-10-CM

## 2021-10-01 PROCEDURE — 74176 CT ABD & PELVIS W/O CONTRAST: CPT | Performed by: PHYSICIAN ASSISTANT

## 2021-10-07 ENCOUNTER — OFFICE VISIT (OUTPATIENT)
Dept: ORTHOPEDICS CLINIC | Facility: CLINIC | Age: 61
End: 2021-10-07
Payer: COMMERCIAL

## 2021-10-07 DIAGNOSIS — M67.442 GANGLION OF FLEXOR TENDON SHEATH OF LEFT RING FINGER: Primary | ICD-10-CM

## 2021-10-07 PROCEDURE — 99024 POSTOP FOLLOW-UP VISIT: CPT | Performed by: ORTHOPAEDIC SURGERY

## 2021-10-07 NOTE — PROGRESS NOTES
[facility] Visit    A/P: 63year old  male s/p left ring finger flexor tendon sheath cyst excision on 9/22/2021 progressing as expected. Sutures removed today.     Weight Bearing Restrictions: no restrictions  Wound Care: no restrictions    N

## 2021-10-19 ENCOUNTER — HOSPITAL ENCOUNTER (EMERGENCY)
Facility: HOSPITAL | Age: 61
Discharge: LEFT BEFORE DISCHARGE | End: 2021-10-19
Attending: EMERGENCY MEDICINE
Payer: COMMERCIAL

## 2021-10-19 ENCOUNTER — HOSPITAL ENCOUNTER (OUTPATIENT)
Age: 61
Discharge: ANOTHER HOSPITAL | End: 2021-10-19
Payer: COMMERCIAL

## 2021-10-19 VITALS
WEIGHT: 304 LBS | TEMPERATURE: 97 F | HEART RATE: 68 BPM | HEIGHT: 71 IN | RESPIRATION RATE: 18 BRPM | BODY MASS INDEX: 42.56 KG/M2 | DIASTOLIC BLOOD PRESSURE: 90 MMHG | SYSTOLIC BLOOD PRESSURE: 134 MMHG | OXYGEN SATURATION: 97 %

## 2021-10-19 VITALS
WEIGHT: 304 LBS | TEMPERATURE: 98 F | OXYGEN SATURATION: 97 % | BODY MASS INDEX: 42 KG/M2 | RESPIRATION RATE: 17 BRPM | HEART RATE: 80 BPM | DIASTOLIC BLOOD PRESSURE: 80 MMHG | SYSTOLIC BLOOD PRESSURE: 133 MMHG

## 2021-10-19 DIAGNOSIS — R10.31 ABDOMINAL PAIN, RIGHT LOWER QUADRANT: Primary | ICD-10-CM

## 2021-10-19 DIAGNOSIS — R10.9 ABDOMINAL PAIN, RIGHT LATERAL: ICD-10-CM

## 2021-10-19 DIAGNOSIS — R10.9 ACUTE RIGHT FLANK PAIN: Primary | ICD-10-CM

## 2021-10-19 PROCEDURE — 81002 URINALYSIS NONAUTO W/O SCOPE: CPT | Performed by: PHYSICIAN ASSISTANT

## 2021-10-19 PROCEDURE — 99205 OFFICE O/P NEW HI 60 MIN: CPT | Performed by: PHYSICIAN ASSISTANT

## 2021-10-19 PROCEDURE — 96374 THER/PROPH/DIAG INJ IV PUSH: CPT

## 2021-10-19 PROCEDURE — 80053 COMPREHEN METABOLIC PANEL: CPT | Performed by: EMERGENCY MEDICINE

## 2021-10-19 PROCEDURE — 99284 EMERGENCY DEPT VISIT MOD MDM: CPT

## 2021-10-19 PROCEDURE — 83690 ASSAY OF LIPASE: CPT | Performed by: EMERGENCY MEDICINE

## 2021-10-19 PROCEDURE — 81003 URINALYSIS AUTO W/O SCOPE: CPT | Performed by: EMERGENCY MEDICINE

## 2021-10-19 PROCEDURE — 85025 COMPLETE CBC W/AUTO DIFF WBC: CPT | Performed by: EMERGENCY MEDICINE

## 2021-10-19 RX ORDER — KETOROLAC TROMETHAMINE 30 MG/ML
15 INJECTION, SOLUTION INTRAMUSCULAR; INTRAVENOUS ONCE
Status: COMPLETED | OUTPATIENT
Start: 2021-10-19 | End: 2021-10-19

## 2021-10-19 NOTE — ED INITIAL ASSESSMENT (HPI)
x2 days Pt here for intermittent right back pain with radiation to right abd/groin, slight nausea, slow stream of urination. Pain increases with movement. Denies: vomiting/diarrhea, issues with urination, fevers, hematuria or blood in stool.     14 day B

## 2021-10-20 NOTE — ED QUICK NOTES
PT's wife approached nurses' station asking if it was going to be a while for CT, and if so, can they leave and follow up with PCP. ERMD at bedside. PT would like to leave AMA and follow up with PCP. AMA form signed by PT and RN.

## 2021-10-20 NOTE — ED PROVIDER NOTES
Patient Seen in: [facility] [address] [place] [address]      History   Patient presents with:  Back Pain    Stated Complaint: Back Pain    Subjective:   HPI    60-year-old male with past medical history as noted below presents to the IC for evaluation of right-sided abdo History    Tobacco Use      Smoking status: Never Smoker      Smokeless tobacco: Never Used    Vaping Use      Vaping Use: Never used    Alcohol use: No      Alcohol/week: 0.0 standard drinks    Drug use:  No             Review of Systems    Positive for st He does have CVA tenderness, right upper quadrant tenderness and right lower quadrant tenderness on exam.  Differential diagnosis includes appendicitis, cholecystitis, renal calculi.   Urinalysis with 500 glucose, expected as patient is diabetic and is unco

## 2021-10-20 NOTE — ED INITIAL ASSESSMENT (HPI)
61YM c/c of back pain Pt state that he having thoracic R sided back pain that been coming and going for the last two weeks sent over form the urgent care

## 2021-10-20 NOTE — ED PROVIDER NOTES
Patient Seen in: [facility] Emergency Department      History   Patient presents with:  Back Pain    Stated Complaint: lower back pain from [facility]    Subjective:   HPI    Dharmesh Morgan is a pleasant 56-year-old male coming with complaints of back pain.   This SURGICAL HISTORY      vocal cord polyps removed   • OTHER SURGICAL HISTORY  11/15/2019    ALEX Wilcox Pal                 No pertinent social history.             Review of Systems    Positive for stated complaint: lower back pain from [facility]  Other system ---------                               -----------         ------                     CBC W/ DIFFERENTIAL[[id number]]          Abnormal            Final result                 Please view results for these tests on the individual orders.    R

## 2021-10-21 DIAGNOSIS — E66.01 MORBID OBESITY WITH BMI OF 45.0-49.9, ADULT (HCC): ICD-10-CM

## 2021-10-21 RX ORDER — PHENTERMINE HYDROCHLORIDE 30 MG/1
CAPSULE ORAL
Qty: 30 CAPSULE | Refills: 0 | Status: SHIPPED | OUTPATIENT
Start: 2021-10-21 | End: 2021-12-13

## 2021-11-15 DIAGNOSIS — I10 ESSENTIAL HYPERTENSION: ICD-10-CM

## 2021-11-15 RX ORDER — DILTIAZEM HYDROCHLORIDE 180 MG/1
CAPSULE, COATED, EXTENDED RELEASE ORAL
Qty: 90 CAPSULE | Refills: 3 | Status: SHIPPED | OUTPATIENT
Start: 2021-11-15

## 2021-12-02 ENCOUNTER — LAB ENCOUNTER (OUTPATIENT)
Dept: LAB | Age: 61
End: 2021-12-02
Attending: UROLOGY
Payer: COMMERCIAL

## 2021-12-02 DIAGNOSIS — E11.3299 TYPE 2 DIABETES MELLITUS WITH MILD NONPROLIFERATIVE RETINOPATHY WITHOUT MACULAR EDEMA, WITH LONG-TERM CURRENT USE OF INSULIN, UNSPECIFIED LATERALITY (HCC): ICD-10-CM

## 2021-12-02 DIAGNOSIS — R97.21 RISING PSA FOLLOWING TREATMENT FOR MALIGNANT NEOPLASM OF PROSTATE: ICD-10-CM

## 2021-12-02 DIAGNOSIS — D44.7 PARAGANGLIOMA (HCC): ICD-10-CM

## 2021-12-02 DIAGNOSIS — E78.5 DYSLIPIDEMIA ASSOCIATED WITH TYPE 2 DIABETES MELLITUS (HCC): ICD-10-CM

## 2021-12-02 DIAGNOSIS — C61 PROSTATE CANCER (HCC): ICD-10-CM

## 2021-12-02 DIAGNOSIS — E11.69 DYSLIPIDEMIA ASSOCIATED WITH TYPE 2 DIABETES MELLITUS (HCC): ICD-10-CM

## 2021-12-02 DIAGNOSIS — Z79.4 TYPE 2 DIABETES MELLITUS WITH MILD NONPROLIFERATIVE RETINOPATHY WITHOUT MACULAR EDEMA, WITH LONG-TERM CURRENT USE OF INSULIN, UNSPECIFIED LATERALITY (HCC): ICD-10-CM

## 2021-12-02 DIAGNOSIS — I10 ESSENTIAL HYPERTENSION: ICD-10-CM

## 2021-12-02 PROCEDURE — 83036 HEMOGLOBIN GLYCOSYLATED A1C: CPT

## 2021-12-02 PROCEDURE — 80061 LIPID PANEL: CPT

## 2021-12-02 PROCEDURE — 80053 COMPREHEN METABOLIC PANEL: CPT

## 2021-12-02 PROCEDURE — 84153 ASSAY OF PSA TOTAL: CPT

## 2021-12-02 PROCEDURE — 36415 COLL VENOUS BLD VENIPUNCTURE: CPT

## 2021-12-03 NOTE — PROGRESS NOTES
Telephone Information:  Mobile          [phone]  Patient informed of Dr. Stevenson Delarosa result note. Patient verbalized understanding and agrees with plan.

## 2021-12-05 RX ORDER — INSULIN GLARGINE 100 [IU]/ML
INJECTION, SOLUTION SUBCUTANEOUS
Qty: 45 ML | Refills: 3 | Status: SHIPPED | OUTPATIENT
Start: 2021-12-05

## 2021-12-08 NOTE — TELEPHONE ENCOUNTER
From: Jermain Hilton  Sent: 12/8/2021 2:17 PM CST  To: [facility] Clinical Staff  Subject: Upcoming CT Scan    Dec 14th. .    Thanks. Radhames Godinez

## 2021-12-10 ENCOUNTER — PATIENT MESSAGE (OUTPATIENT)
Dept: INTERNAL MEDICINE CLINIC | Facility: CLINIC | Age: 61
End: 2021-12-10

## 2021-12-10 ENCOUNTER — TELEPHONE (OUTPATIENT)
Dept: INTERNAL MEDICINE CLINIC | Facility: CLINIC | Age: 61
End: 2021-12-10

## 2021-12-10 DIAGNOSIS — M54.42 ACUTE LEFT-SIDED LOW BACK PAIN WITH BILATERAL SCIATICA: Primary | ICD-10-CM

## 2021-12-10 DIAGNOSIS — M54.41 ACUTE LEFT-SIDED LOW BACK PAIN WITH BILATERAL SCIATICA: Primary | ICD-10-CM

## 2021-12-10 DIAGNOSIS — M54.50 ACUTE LEFT-SIDED LOW BACK PAIN WITHOUT SCIATICA: Primary | ICD-10-CM

## 2021-12-10 RX ORDER — METHYLPREDNISOLONE 4 MG/1
TABLET ORAL
Qty: 1 EACH | Refills: 0 | Status: SHIPPED | OUTPATIENT
Start: 2021-12-10

## 2021-12-10 RX ORDER — HYDROCODONE BITARTRATE AND ACETAMINOPHEN 7.5; 325 MG/1; MG/1
1 TABLET ORAL EVERY 8 HOURS PRN
Qty: 9 TABLET | Refills: 0 | Status: SHIPPED | OUTPATIENT
Start: 2021-12-10

## 2021-12-10 RX ORDER — METAXALONE 800 MG/1
800 TABLET ORAL 3 TIMES DAILY PRN
Qty: 30 TABLET | Refills: 0 | Status: SHIPPED | OUTPATIENT
Start: 2021-12-10 | End: 2022-01-14

## 2021-12-10 NOTE — TELEPHONE ENCOUNTER
From: Trinidadian Senna  Sent: 12/10/2021 11:07 AM CST  To: [facility] Clinical Staff  Subject: Bed ridden with severe lower back pain    Can you prescribe a couple of pain kills to get over this pain

## 2021-12-10 NOTE — IMAGING NOTE
Call placed to pt regarding CTA [facility] on 12/14/21. Instructed to arrive at 1215. Instructed to drink plenty of fluids a day prior to procedure and day of procedure. May eat a light breakfast/lunch.  Advised to hold caffeine 12 hrs prior to procedur

## 2021-12-10 NOTE — TELEPHONE ENCOUNTER
Spoke with pt he reports severe lower back pain started yesterday. He possibly twisted the wrong way. Left sided is where the pain is located and it does not radiate. It is difficult for him to ambulate.  He denies the following symptoms: dysuria, hematuri

## 2021-12-13 DIAGNOSIS — M47.816 LUMBAR FACET ARTHROPATHY: ICD-10-CM

## 2021-12-13 DIAGNOSIS — E66.01 MORBID OBESITY WITH BMI OF 45.0-49.9, ADULT (HCC): ICD-10-CM

## 2021-12-13 RX ORDER — CELECOXIB 200 MG/1
CAPSULE ORAL
Qty: 180 CAPSULE | Refills: 3 | Status: SHIPPED | OUTPATIENT
Start: 2021-12-13

## 2021-12-13 RX ORDER — PHENTERMINE HYDROCHLORIDE 30 MG/1
CAPSULE ORAL
Qty: 30 CAPSULE | Refills: 0 | Status: SHIPPED | OUTPATIENT
Start: 2021-12-13

## 2021-12-13 NOTE — TELEPHONE ENCOUNTER
Last time medication was refilled 10/21/21  Quantity and # of refills 30 cap no refill  Last OV 9/28/21  Next OV 3/3/22

## 2021-12-13 NOTE — TELEPHONE ENCOUNTER
Medication: celecoxib 200 MG Oral Cap    Date of last refill: 08/16/21      Last office visit: 07/20/21  Due back to clinic per last office note:  na  Date next office visit scheduled:  na        Last OV note recommendation:     ASSESSMENT AND PLAN:   Wendy

## 2021-12-14 ENCOUNTER — HOSPITAL ENCOUNTER (OUTPATIENT)
Dept: CT IMAGING | Facility: HOSPITAL | Age: 61
Discharge: HOME | End: 2021-12-14
Attending: PHYSICIAN ASSISTANT
Payer: COMMERCIAL

## 2021-12-14 VITALS
OXYGEN SATURATION: 95 % | RESPIRATION RATE: 18 BRPM | HEART RATE: 60 BPM | DIASTOLIC BLOOD PRESSURE: 74 MMHG | SYSTOLIC BLOOD PRESSURE: 108 MMHG

## 2021-12-14 DIAGNOSIS — R94.39 ABNORMAL NUCLEAR STRESS TEST: ICD-10-CM

## 2021-12-14 DIAGNOSIS — R06.00 DOE (DYSPNEA ON EXERTION): ICD-10-CM

## 2021-12-14 PROCEDURE — 75574 CT ANGIO HRT W/3D IMAGE: CPT | Performed by: PHYSICIAN ASSISTANT

## 2021-12-14 RX ORDER — METOPROLOL TARTRATE 5 MG/5ML
INJECTION INTRAVENOUS
Status: COMPLETED
Start: 2021-12-14 | End: 2021-12-14

## 2021-12-14 RX ORDER — NITROGLYCERIN 0.4 MG/1
TABLET SUBLINGUAL
Status: COMPLETED
Start: 2021-12-14 | End: 2021-12-14

## 2021-12-14 RX ORDER — DILTIAZEM HYDROCHLORIDE 5 MG/ML
INJECTION INTRAVENOUS
Status: COMPLETED
Start: 2021-12-14 | End: 2021-12-14

## 2021-12-14 RX ADMIN — DILTIAZEM HYDROCHLORIDE 5 MG: 5 INJECTION INTRAVENOUS at 13:40:00

## 2021-12-14 RX ADMIN — NITROGLYCERIN 0.4 MG: 0.4 TABLET SUBLINGUAL at 14:37:00

## 2021-12-14 RX ADMIN — DILTIAZEM HYDROCHLORIDE 5 MG: 5 INJECTION INTRAVENOUS at 13:45:00

## 2021-12-14 RX ADMIN — METOPROLOL TARTRATE 5 MG: 5 INJECTION INTRAVENOUS at 13:25:00

## 2021-12-14 RX ADMIN — METOPROLOL TARTRATE 5 MG: 5 INJECTION INTRAVENOUS at 13:30:00

## 2021-12-14 NOTE — IMAGING NOTE
1250-Pt A&O x 4, procedure explained and questions answered. Pt pre-medicated with [id number] last night and 1 hr prior to arrival and Diazepam 5mg 30 min prior to arrival as directed by ordering physician.  Pt denies long acting nitrates in the past m

## 2022-01-13 ENCOUNTER — LAB ENCOUNTER (OUTPATIENT)
Dept: LAB | Facility: HOSPITAL | Age: 62
End: 2022-01-13
Attending: INTERNAL MEDICINE
Payer: COMMERCIAL

## 2022-01-13 DIAGNOSIS — Z20.822 CLOSE EXPOSURE TO COVID-19 VIRUS: ICD-10-CM

## 2022-01-14 DIAGNOSIS — M54.41 ACUTE LEFT-SIDED LOW BACK PAIN WITH BILATERAL SCIATICA: ICD-10-CM

## 2022-01-14 DIAGNOSIS — M54.42 ACUTE LEFT-SIDED LOW BACK PAIN WITH BILATERAL SCIATICA: ICD-10-CM

## 2022-01-14 RX ORDER — METAXALONE 800 MG/1
800 TABLET ORAL 3 TIMES DAILY PRN
Qty: 30 TABLET | Refills: 0 | Status: SHIPPED | OUTPATIENT
Start: 2022-01-14

## 2022-01-15 LAB — SARS-COV-2 RNA RESP QL NAA+PROBE: NOT DETECTED

## 2022-01-18 ENCOUNTER — TELEPHONE (OUTPATIENT)
Dept: INTERNAL MEDICINE CLINIC | Facility: CLINIC | Age: 62
End: 2022-01-18

## 2022-01-18 NOTE — TELEPHONE ENCOUNTER
[facility] medication interaction form faxed to office. Patient tolerating Metaxlone and Sertraline, presently and in past. Filling medications as written. Signed and faxed back to [facility].

## 2022-02-07 ENCOUNTER — PATIENT MESSAGE (OUTPATIENT)
Dept: INTERNAL MEDICINE CLINIC | Facility: CLINIC | Age: 62
End: 2022-02-07

## 2022-02-07 RX ORDER — DIAZEPAM 5 MG/1
TABLET ORAL
Qty: 1 TABLET | Refills: 0 | Status: SHIPPED | OUTPATIENT
Start: 2022-02-07

## 2022-02-07 NOTE — TELEPHONE ENCOUNTER
From: Harrison Lr  To: Cornelius Bedoya MD  Sent: 2/7/2022 8:37 AM CST  Subject: CT scan today    Good day. .  I have a scan @ 10:20. Can you send a prescription for Valium to [facility] to [facility] Around the corner from the testing facility.     Thanks  Je Ray

## 2022-02-07 NOTE — TELEPHONE ENCOUNTER
Per luca Rivera to send Valium 5mg for pt to take prior to scan. Pt sent [facility], called pt and spoke on the phone. Rx sent.

## 2022-02-16 ENCOUNTER — OFFICE VISIT (OUTPATIENT)
Dept: SURGERY | Facility: CLINIC | Age: 62
End: 2022-02-16
Payer: COMMERCIAL

## 2022-02-16 VITALS
OXYGEN SATURATION: 95 % | SYSTOLIC BLOOD PRESSURE: 129 MMHG | DIASTOLIC BLOOD PRESSURE: 80 MMHG | RESPIRATION RATE: 16 BRPM | HEART RATE: 85 BPM

## 2022-02-16 DIAGNOSIS — D44.7 PARAGANGLIOMA (HCC): Primary | ICD-10-CM

## 2022-02-16 PROCEDURE — 3074F SYST BP LT 130 MM HG: CPT | Performed by: SURGERY

## 2022-02-16 PROCEDURE — 99213 OFFICE O/P EST LOW 20 MIN: CPT | Performed by: SURGERY

## 2022-02-16 PROCEDURE — 3079F DIAST BP 80-89 MM HG: CPT | Performed by: SURGERY

## 2022-03-03 ENCOUNTER — OFFICE VISIT (OUTPATIENT)
Dept: INTERNAL MEDICINE CLINIC | Facility: CLINIC | Age: 62
End: 2022-03-03
Payer: COMMERCIAL

## 2022-03-03 VITALS
SYSTOLIC BLOOD PRESSURE: 130 MMHG | OXYGEN SATURATION: 99 % | WEIGHT: 303 LBS | DIASTOLIC BLOOD PRESSURE: 84 MMHG | HEIGHT: 71 IN | TEMPERATURE: 98 F | RESPIRATION RATE: 16 BRPM | BODY MASS INDEX: 42.42 KG/M2 | HEART RATE: 82 BPM

## 2022-03-03 DIAGNOSIS — Z13.220 LIPID SCREENING: ICD-10-CM

## 2022-03-03 DIAGNOSIS — Z13.29 THYROID DISORDER SCREEN: ICD-10-CM

## 2022-03-03 DIAGNOSIS — Z00.00 ANNUAL PHYSICAL EXAM: Primary | ICD-10-CM

## 2022-03-03 DIAGNOSIS — Z13.89 SCREENING FOR GENITOURINARY CONDITION: ICD-10-CM

## 2022-03-03 DIAGNOSIS — Z00.00 LABORATORY EXAMINATION ORDERED AS PART OF A ROUTINE GENERAL MEDICAL EXAMINATION: ICD-10-CM

## 2022-03-03 DIAGNOSIS — E11.65 UNCONTROLLED TYPE 2 DIABETES MELLITUS WITH HYPERGLYCEMIA (HCC): ICD-10-CM

## 2022-03-03 DIAGNOSIS — Z13.0 SCREENING, IRON DEFICIENCY ANEMIA: ICD-10-CM

## 2022-03-03 PROBLEM — F13.20 SEDATIVE, HYPNOTIC OR ANXIOLYTIC DEPENDENCE, UNCOMPLICATED (HCC): Status: ACTIVE | Noted: 2022-03-03

## 2022-03-03 PROBLEM — F33.0 MILD RECURRENT MAJOR DEPRESSION (HCC): Status: ACTIVE | Noted: 2022-03-03

## 2022-03-03 PROBLEM — F33.0 MILD RECURRENT MAJOR DEPRESSION: Status: ACTIVE | Noted: 2022-03-03

## 2022-03-03 PROCEDURE — 3008F BODY MASS INDEX DOCD: CPT | Performed by: INTERNAL MEDICINE

## 2022-03-03 PROCEDURE — 3079F DIAST BP 80-89 MM HG: CPT | Performed by: INTERNAL MEDICINE

## 2022-03-03 PROCEDURE — 99396 PREV VISIT EST AGE 40-64: CPT | Performed by: INTERNAL MEDICINE

## 2022-03-03 PROCEDURE — 3075F SYST BP GE 130 - 139MM HG: CPT | Performed by: INTERNAL MEDICINE

## 2022-03-04 ENCOUNTER — LABORATORY ENCOUNTER (OUTPATIENT)
Dept: LAB | Age: 62
End: 2022-03-04
Attending: INTERNAL MEDICINE
Payer: COMMERCIAL

## 2022-03-04 DIAGNOSIS — Z13.0 SCREENING, IRON DEFICIENCY ANEMIA: ICD-10-CM

## 2022-03-04 DIAGNOSIS — Z13.89 SCREENING FOR GENITOURINARY CONDITION: ICD-10-CM

## 2022-03-04 DIAGNOSIS — E11.65 UNCONTROLLED TYPE 2 DIABETES MELLITUS WITH HYPERGLYCEMIA (HCC): ICD-10-CM

## 2022-03-04 DIAGNOSIS — Z13.29 THYROID DISORDER SCREEN: ICD-10-CM

## 2022-03-04 DIAGNOSIS — Z00.00 LABORATORY EXAMINATION ORDERED AS PART OF A ROUTINE GENERAL MEDICAL EXAMINATION: ICD-10-CM

## 2022-03-04 DIAGNOSIS — Z13.220 LIPID SCREENING: ICD-10-CM

## 2022-03-04 LAB
ALBUMIN SERPL-MCNC: 3.6 G/DL (ref 3.4–5)
ALBUMIN/GLOB SERPL: 1.1 {RATIO} (ref 1–2)
ALP LIVER SERPL-CCNC: 118 U/L
ALT SERPL-CCNC: 24 U/L
ANION GAP SERPL CALC-SCNC: 8 MMOL/L (ref 0–18)
AST SERPL-CCNC: 20 U/L (ref 15–37)
BASOPHILS # BLD AUTO: 0.04 X10(3) UL (ref 0–0.2)
BILIRUB SERPL-MCNC: 0.6 MG/DL (ref 0.1–2)
BILIRUB UR QL STRIP.AUTO: NEGATIVE
BUN BLD-MCNC: 13 MG/DL (ref 7–18)
CALCIUM BLD-MCNC: 8.9 MG/DL (ref 8.5–10.1)
CHLORIDE SERPL-SCNC: 106 MMOL/L (ref 98–112)
CHOLEST SERPL-MCNC: 181 MG/DL (ref ?–200)
CLARITY UR REFRACT.AUTO: CLEAR
CO2 SERPL-SCNC: 25 MMOL/L (ref 21–32)
COLOR UR AUTO: YELLOW
CREAT BLD-MCNC: 1.2 MG/DL
CREAT UR-SCNC: 54.8 MG/DL
EOSINOPHIL # BLD AUTO: 0.22 X10(3) UL (ref 0–0.7)
EOSINOPHIL NFR BLD AUTO: 3.2 %
ERYTHROCYTE [DISTWIDTH] IN BLOOD BY AUTOMATED COUNT: 14.9 %
EST. AVERAGE GLUCOSE BLD GHB EST-MCNC: 157 MG/DL (ref 68–126)
FASTING PATIENT LIPID ANSWER: YES
FASTING STATUS PATIENT QL REPORTED: YES
GLOBULIN PLAS-MCNC: 3.4 G/DL (ref 2.8–4.4)
GLUCOSE BLD-MCNC: 138 MG/DL (ref 70–99)
GLUCOSE UR STRIP.AUTO-MCNC: >=500 MG/DL
HBA1C MFR BLD: 7.1 % (ref ?–5.7)
HCT VFR BLD AUTO: 45.8 %
HDLC SERPL-MCNC: 55 MG/DL (ref 40–59)
HGB BLD-MCNC: 15.3 G/DL
IMM GRANULOCYTES # BLD AUTO: 0.01 X10(3) UL (ref 0–1)
IMM GRANULOCYTES NFR BLD: 0.1 %
KETONES UR STRIP.AUTO-MCNC: NEGATIVE MG/DL
LDLC SERPL CALC-MCNC: 114 MG/DL (ref ?–100)
LEUKOCYTE ESTERASE UR QL STRIP.AUTO: NEGATIVE
LYMPHOCYTES # BLD AUTO: 1.96 X10(3) UL (ref 1–4)
MCH RBC QN AUTO: 26 PG (ref 26–34)
MCHC RBC AUTO-ENTMCNC: 33.4 G/DL (ref 31–37)
MCV RBC AUTO: 77.9 FL
MICROALBUMIN UR-MCNC: 6.75 MG/DL
MICROALBUMIN/CREAT 24H UR-RTO: 123.2 UG/MG (ref ?–30)
MONOCYTES # BLD AUTO: 0.62 X10(3) UL (ref 0.1–1)
MONOCYTES NFR BLD AUTO: 9.1 %
NEUTROPHILS # BLD AUTO: 4 X10 (3) UL (ref 1.5–7.7)
NEUTROPHILS # BLD AUTO: 4 X10(3) UL (ref 1.5–7.7)
NITRITE UR QL STRIP.AUTO: NEGATIVE
NONHDLC SERPL-MCNC: 126 MG/DL (ref ?–130)
OSMOLALITY SERPL CALC.SUM OF ELEC: 290 MOSM/KG (ref 275–295)
PH UR STRIP.AUTO: 6 [PH] (ref 5–8)
PLATELET # BLD AUTO: 214 10(3)UL (ref 150–450)
POTASSIUM SERPL-SCNC: 3.9 MMOL/L (ref 3.5–5.1)
PROT SERPL-MCNC: 7 G/DL (ref 6.4–8.2)
PROT UR STRIP.AUTO-MCNC: NEGATIVE MG/DL
RBC # BLD AUTO: 5.88 X10(6)UL
RBC UR QL AUTO: NEGATIVE
SODIUM SERPL-SCNC: 139 MMOL/L (ref 136–145)
SP GR UR STRIP.AUTO: 1.01 (ref 1–1.03)
TRIGL SERPL-MCNC: 63 MG/DL (ref 30–149)
TSI SER-ACNC: 1.99 MIU/ML (ref 0.36–3.74)
UROBILINOGEN UR STRIP.AUTO-MCNC: <2 MG/DL
VLDLC SERPL CALC-MCNC: 11 MG/DL (ref 0–30)
WBC # BLD AUTO: 6.9 X10(3) UL (ref 4–11)

## 2022-03-04 PROCEDURE — 3060F POS MICROALBUMINURIA REV: CPT | Performed by: INTERNAL MEDICINE

## 2022-03-04 PROCEDURE — 82570 ASSAY OF URINE CREATININE: CPT | Performed by: INTERNAL MEDICINE

## 2022-03-04 PROCEDURE — 82043 UR ALBUMIN QUANTITATIVE: CPT | Performed by: INTERNAL MEDICINE

## 2022-03-04 PROCEDURE — 80061 LIPID PANEL: CPT | Performed by: INTERNAL MEDICINE

## 2022-03-04 PROCEDURE — 81003 URINALYSIS AUTO W/O SCOPE: CPT | Performed by: INTERNAL MEDICINE

## 2022-03-04 PROCEDURE — 80050 GENERAL HEALTH PANEL: CPT | Performed by: INTERNAL MEDICINE

## 2022-03-04 PROCEDURE — 3061F NEG MICROALBUMINURIA REV: CPT | Performed by: INTERNAL MEDICINE

## 2022-03-04 PROCEDURE — 83036 HEMOGLOBIN GLYCOSYLATED A1C: CPT | Performed by: INTERNAL MEDICINE

## 2022-03-04 PROCEDURE — 3051F HG A1C>EQUAL 7.0%<8.0%: CPT | Performed by: INTERNAL MEDICINE

## 2022-03-16 ENCOUNTER — PATIENT MESSAGE (OUTPATIENT)
Dept: INTERNAL MEDICINE CLINIC | Facility: CLINIC | Age: 62
End: 2022-03-16

## 2022-03-16 ENCOUNTER — TELEPHONE (OUTPATIENT)
Dept: PAIN CLINIC | Facility: CLINIC | Age: 62
End: 2022-03-16

## 2022-03-16 DIAGNOSIS — M47.816 LUMBAR FACET ARTHROPATHY: ICD-10-CM

## 2022-03-17 ENCOUNTER — PATIENT MESSAGE (OUTPATIENT)
Dept: INTERNAL MEDICINE CLINIC | Facility: CLINIC | Age: 62
End: 2022-03-17

## 2022-03-17 RX ORDER — PHENTERMINE HYDROCHLORIDE 30 MG/1
30 CAPSULE ORAL EVERY MORNING
Qty: 30 CAPSULE | Refills: 0 | Status: SHIPPED | OUTPATIENT
Start: 2022-03-17

## 2022-03-17 RX ORDER — METAXALONE 800 MG/1
800 TABLET ORAL 3 TIMES DAILY PRN
Qty: 30 TABLET | Refills: 0 | Status: SHIPPED | OUTPATIENT
Start: 2022-03-17

## 2022-03-17 RX ORDER — DIAZEPAM 5 MG/1
TABLET ORAL
Qty: 1 TABLET | Refills: 0 | OUTPATIENT
Start: 2022-03-17

## 2022-03-17 RX ORDER — CANAGLIFLOZIN 300 MG/1
300 TABLET, FILM COATED ORAL DAILY
Qty: 90 TABLET | Refills: 1 | Status: SHIPPED | OUTPATIENT
Start: 2022-03-17 | End: 2022-03-24

## 2022-03-17 RX ORDER — METAXALONE 800 MG/1
TABLET ORAL
Qty: 30 TABLET | Refills: 35 | OUTPATIENT
Start: 2022-03-17

## 2022-03-17 RX ORDER — PRAVASTATIN SODIUM 40 MG
40 TABLET ORAL DAILY
Qty: 90 TABLET | Refills: 1 | Status: SHIPPED | OUTPATIENT
Start: 2022-03-17

## 2022-03-17 RX ORDER — FUROSEMIDE 20 MG/1
20 TABLET ORAL DAILY
Qty: 90 TABLET | Refills: 1 | Status: SHIPPED | OUTPATIENT
Start: 2022-03-17

## 2022-03-17 RX ORDER — CELECOXIB 200 MG/1
200 CAPSULE ORAL 2 TIMES DAILY
Qty: 180 CAPSULE | Refills: 0 | Status: CANCELLED | OUTPATIENT
Start: 2022-03-17

## 2022-03-17 RX ORDER — LOSARTAN POTASSIUM 100 MG/1
100 TABLET ORAL DAILY
Qty: 90 TABLET | Refills: 1 | Status: SHIPPED | OUTPATIENT
Start: 2022-03-17

## 2022-03-17 RX ORDER — PHENTERMINE HYDROCHLORIDE 30 MG/1
CAPSULE ORAL
Qty: 30 CAPSULE | Refills: 0 | OUTPATIENT
Start: 2022-03-17

## 2022-03-17 RX ORDER — CARVEDILOL 25 MG/1
25 TABLET ORAL 2 TIMES DAILY WITH MEALS
Qty: 180 TABLET | Refills: 1 | Status: SHIPPED | OUTPATIENT
Start: 2022-03-17

## 2022-03-17 RX ORDER — DILTIAZEM HYDROCHLORIDE 180 MG/1
180 CAPSULE, COATED, EXTENDED RELEASE ORAL DAILY
Qty: 90 CAPSULE | Refills: 1 | Status: SHIPPED | OUTPATIENT
Start: 2022-03-17

## 2022-03-17 NOTE — TELEPHONE ENCOUNTER
From: Estela Torres  Sent: 3/17/2022 3:08 PM CDT  To: [facility] Clinical Staff  Subject: Weight loss     Looking to see if there are new treatment options

## 2022-03-17 NOTE — TELEPHONE ENCOUNTER
He really should not be on NSAIDs and xarelto, as this has a known drug/drug interaction to increase risk of significant bleeding. I am denying this until he can discuss with treating provider. Have him make an appointment to discuss, should he so desire.

## 2022-03-17 NOTE — TELEPHONE ENCOUNTER
Last time medication was refilled 1/14/22  Quantity and # of refills 30 tab no refill  Last OV 3/3/22  Next OV 9/8/22

## 2022-03-17 NOTE — TELEPHONE ENCOUNTER
Is he on xarelto? If so, he should not be on NSAIDs. Can we call and ask him if he is taking xarelto still? Looks like it was last prescribed 11/9/21 for a one year supply.

## 2022-03-17 NOTE — TELEPHONE ENCOUNTER
From: Janneth Barbosa  To: Rhys Fraser MD  Sent: 3/16/2022 11:40 PM CDT  Subject: Prescription Request    Good day   Please order 90 days on all. .thanks  Krishna Armenta

## 2022-03-17 NOTE — TELEPHONE ENCOUNTER
From: Jojo Cherry  To: Christie Ramirez MD  Sent: 3/17/2022 2:53 PM CDT  Subject: Weight loss     Hi Again. .  Is there anything new for weight loss out there

## 2022-03-17 NOTE — TELEPHONE ENCOUNTER
Last time medication was refilled 12/13/21  Quantity and # of refills 30 cap no refill  Last OV 3/3/22  Next OV 9/8/22

## 2022-03-17 NOTE — TELEPHONE ENCOUNTER
Spoke with patient and he states he is still on the Xarelto. He also mentioned perhaps he could be prescribed a muscle relaxer?

## 2022-03-19 ENCOUNTER — TELEPHONE (OUTPATIENT)
Dept: INTERNAL MEDICINE CLINIC | Facility: CLINIC | Age: 62
End: 2022-03-19

## 2022-03-19 DIAGNOSIS — E11.65 UNCONTROLLED TYPE 2 DIABETES MELLITUS WITH HYPERGLYCEMIA (HCC): Primary | ICD-10-CM

## 2022-03-19 NOTE — TELEPHONE ENCOUNTER
Fax from [employer] requesting confirmation of new prescription INVOKANA 300 MG - fill out form and fax back to #[phone]. Form in triage.

## 2022-03-22 NOTE — TELEPHONE ENCOUNTER
D/W APRN C[place] who states if pt is obtaining relief and not experiencing bleeding issues, he may discuss with the provider managing Xarelto to see if it is ok to continue on both meds. Contacted pt to ask if he is experiencing relief with Celebrex. Pt states that he has not noticed relief of pain sx. Explained the contraindication between Xarelto and Celebrex and advised that we would prefer not to refill if he is not gaining any benefit from the Celebrex. Pt is in agreement with this. Advised that feedback would be requested of our advanced practice providers to see if an alternative medication could be suggested based upon OV notes. Pt VU and appreciation for call.

## 2022-03-24 ENCOUNTER — TELEPHONE (OUTPATIENT)
Dept: INTERNAL MEDICINE CLINIC | Facility: CLINIC | Age: 62
End: 2022-03-24

## 2022-03-24 NOTE — TELEPHONE ENCOUNTER
PA completed via Epic. Awaiting decision. Prior authorization approved Case ID: [id number]      Payer:  [address] [place] [address]  [id number] [facility]  [phone]    GXZHIR:[id number];[facility]:[id number]; Review Type:Prior Auth; Coverage Start Date:02/22/2022; Coverage End Date:03/24/2023;     Approval Details    Authorized from February 22, 2022 to March 24, 2023

## 2022-03-24 NOTE — TELEPHONE ENCOUNTER
Per Dr. Meghan Lezama, start pt on Farxiga 10 mg daily  Rx sent   Pt notified via [facility] [address]

## 2022-03-25 ENCOUNTER — TELEPHONE (OUTPATIENT)
Dept: INTERNAL MEDICINE CLINIC | Facility: CLINIC | Age: 62
End: 2022-03-25

## 2022-03-25 NOTE — TELEPHONE ENCOUNTER
Therapeutic Duplication:     Requested Invokana 300mg     Recently Filled: Farxiga 10mg     [facility] [phone], [phone]

## 2022-04-07 ENCOUNTER — PATIENT MESSAGE (OUTPATIENT)
Dept: INTERNAL MEDICINE CLINIC | Facility: CLINIC | Age: 62
End: 2022-04-07

## 2022-04-07 NOTE — TELEPHONE ENCOUNTER
From: Eladio Suggs  To: Wendy Zhu MD  Sent: 4/7/2022 9:49 AM CDT  Subject: Metformin    Good day. .  I am in need of a 90 day prescription to be sent to [place].

## 2022-04-07 NOTE — TELEPHONE ENCOUNTER
From: Rafael Boyer  To: Annie Dia MD  Sent: 4/7/2022 9:49 AM CDT  Subject: Metformin    Good day. .  I am in need of a 90 day prescription to be sent to [place].

## 2022-04-07 NOTE — TELEPHONE ENCOUNTER
From: Portia Leung  To: Jarett Christian MD  Sent: 3/17/2022 2:53 PM CDT  Subject: Weight loss     Hi Again. .  Is there anything new for weight loss out there

## 2022-05-02 ENCOUNTER — TELEPHONE (OUTPATIENT)
Dept: INTERNAL MEDICINE CLINIC | Facility: CLINIC | Age: 62
End: 2022-05-02

## 2022-05-02 RX ORDER — PHENTERMINE HYDROCHLORIDE 30 MG/1
CAPSULE ORAL
Qty: 30 CAPSULE | Refills: 0 | Status: SHIPPED | OUTPATIENT
Start: 2022-05-02

## 2022-05-02 NOTE — TELEPHONE ENCOUNTER
Last time medication was refilled 3/17/22  Quantity and # of refills 30 caps w/0 refills   Last OV  3/3/22  Next OV   Future Appointments   Date Time Provider Quincy Esteban   9/8/2022  3:00 PM Dereje Keller MD [facility] [address]

## 2022-05-02 NOTE — TELEPHONE ENCOUNTER
PA unable to be completed via Epic. Form received from [employer] and placed w/WINTER for review and signature. Will fax upon receiving signature.

## 2022-05-03 ENCOUNTER — OFFICE VISIT (OUTPATIENT)
Dept: PAIN CLINIC | Facility: CLINIC | Age: 62
End: 2022-05-03
Payer: COMMERCIAL

## 2022-05-03 ENCOUNTER — PATIENT MESSAGE (OUTPATIENT)
Dept: INTERNAL MEDICINE CLINIC | Facility: CLINIC | Age: 62
End: 2022-05-03

## 2022-05-03 VITALS
BODY MASS INDEX: 42 KG/M2 | WEIGHT: 304 LBS | HEART RATE: 82 BPM | SYSTOLIC BLOOD PRESSURE: 110 MMHG | DIASTOLIC BLOOD PRESSURE: 80 MMHG

## 2022-05-03 DIAGNOSIS — M46.1 SACROILIITIS (HCC): Primary | ICD-10-CM

## 2022-05-03 DIAGNOSIS — M47.816 LUMBAR FACET ARTHROPATHY: ICD-10-CM

## 2022-05-03 PROCEDURE — 3079F DIAST BP 80-89 MM HG: CPT | Performed by: ANESTHESIOLOGY

## 2022-05-03 PROCEDURE — 99212 OFFICE O/P EST SF 10 MIN: CPT | Performed by: ANESTHESIOLOGY

## 2022-05-03 PROCEDURE — 3074F SYST BP LT 130 MM HG: CPT | Performed by: ANESTHESIOLOGY

## 2022-05-03 RX ORDER — DULAGLUTIDE 4.5 MG/.5ML
INJECTION, SOLUTION SUBCUTANEOUS
COMMUNITY
Start: 2022-04-20

## 2022-05-03 RX ORDER — HYDROCODONE BITARTRATE AND ACETAMINOPHEN 7.5; 325 MG/1; MG/1
1 TABLET ORAL EVERY 8 HOURS PRN
Qty: 9 TABLET | Refills: 0 | Status: SHIPPED | OUTPATIENT
Start: 2022-05-03

## 2022-05-03 NOTE — TELEPHONE ENCOUNTER
From: Gladis Sheehan  To: Man Augustine MD  Sent: 5/3/2022 11:45 AM CDT  Subject: Back Pain    Good day. .  I am having severe back pain again. I just saw Dr. Yonatan Liang to schedule injections.  Can you send in an order for the same regimine as last time as well as a few norco tabs

## 2022-05-03 NOTE — TELEPHONE ENCOUNTER
Per Dr. Amarilis Hernandez OK to refill. Med rx sent to Dr. Amarilis Hernandez for approval. Patient notified.

## 2022-05-03 NOTE — PROGRESS NOTES
Patient presents in office today with reported pain in lower back all across the botto,    Current pain level reported = 8/10    Last reported dose of, has not taken anything for pain.  Currently using cbd gummies for pain      Narcotic Contract renewal     Urine Drug screen

## 2022-05-09 ENCOUNTER — TELEPHONE (OUTPATIENT)
Dept: NEUROLOGY | Facility: CLINIC | Age: 62
End: 2022-05-09

## 2022-05-09 NOTE — TELEPHONE ENCOUNTER
Prior authorization request completed for: Leftt lumbar facet injection L3-L4, L4-L5, L5-S1   Authorization # NO PRIOR AUTHORIZATION REQUIRED  Authorization dates: N/A  CPT codes approved: 15799 / 90567 /30981   Number of visits/dates of service approved: 1  Physician: Dr. Loreta Taylor   Location: [facility]  Call Ref#: [id number]   [address]  Patient can be scheduled.  Routed to Navigator

## 2022-05-09 NOTE — TELEPHONE ENCOUNTER
Prior authorization request completed for: Bilateral SI joint injection   Authorization # NO PRIOR AUTHORIZATION REQUIRED  Authorization dates: N/A  CPT codes approved: 22687  Number of visits/dates of service approved: 1  Physician: Dr. Garg Patient   Location: [facility] Stevens     Call Ref#: [id number]    Patient can be scheduled. Routed to Navigator.

## 2022-05-09 NOTE — TELEPHONE ENCOUNTER
Prior authorization request completed for: Right lumbar facet injection L3-L4, L4-L5, L5-S1   Authorization # NO PRIOR AUTHORIZATION REQUIRED  Authorization dates: N/A  CPT codes approved: 55500 / 59601 /33166   Number of visits/dates of service approved: 1  Physician: Dr. Mino Wheeler   Location: [facility]     Call Ref#: [id number]     Patient can be scheduled. Routed to Navigator.

## 2022-05-11 NOTE — TELEPHONE ENCOUNTER
Medical clearance faxed to Dr. Ángela Erickson to [phone], confirmation received. Sent via in basket also.

## 2022-05-11 NOTE — TELEPHONE ENCOUNTER
22  RE: Jarrett Friedman    : 1960    Dear Dr. Cecilia Lamb patient is being scheduled for a pain management procedure at [facility] within the next 4 weeks. Procedure:  Bilateral sacroiliac joint arthrogram and injection, Right diagnostic lumbar facet joint injection, Left diagnostic lumbar facet joint injection   Physician: Alexus Long- Anesthesiologist     Your patient is currently taking Xarelto. Alexus Long usually recommends the medication be held for 3 days prior to injection. Please verify patient is cleared to proceed with pain management procedures.       Clearance Approved   ____________    Clearance Denied       ____________      Comments: ______________________________________________________    Signature: ________________________________  Date: _________________

## 2022-05-17 DIAGNOSIS — E11.65 UNCONTROLLED TYPE 2 DIABETES MELLITUS WITH HYPERGLYCEMIA (HCC): ICD-10-CM

## 2022-05-17 NOTE — TELEPHONE ENCOUNTER
metFORMIN 500 MG Oral Tab #90      [address], [facility] [phone], [phone]    Fax in triage

## 2022-05-18 ENCOUNTER — PATIENT MESSAGE (OUTPATIENT)
Dept: INTERNAL MEDICINE CLINIC | Facility: CLINIC | Age: 62
End: 2022-05-18

## 2022-05-18 DIAGNOSIS — I48.21 PERMANENT ATRIAL FIBRILLATION (HCC): ICD-10-CM

## 2022-05-19 NOTE — TELEPHONE ENCOUNTER
From: Lis Xiong  To: Geraldine Kenny MD  Sent: 5/18/2022 6:22 PM CDT  Subject: amLODIPine 5 MG Tabs    [place]. .  I am in need of a refill   Thanks   [place]

## 2022-06-06 ENCOUNTER — HOSPITAL ENCOUNTER (OUTPATIENT)
Facility: HOSPITAL | Age: 62
Setting detail: HOSPITAL OUTPATIENT SURGERY
Discharge: HOME | End: 2022-06-06
Attending: ANESTHESIOLOGY | Admitting: ANESTHESIOLOGY
Payer: COMMERCIAL

## 2022-06-06 ENCOUNTER — APPOINTMENT (OUTPATIENT)
Dept: GENERAL RADIOLOGY | Facility: HOSPITAL | Age: 62
End: 2022-06-06
Attending: ANESTHESIOLOGY
Payer: COMMERCIAL

## 2022-06-06 VITALS
WEIGHT: 304 LBS | SYSTOLIC BLOOD PRESSURE: 111 MMHG | BODY MASS INDEX: 42.56 KG/M2 | OXYGEN SATURATION: 95 % | HEART RATE: 62 BPM | DIASTOLIC BLOOD PRESSURE: 77 MMHG | HEIGHT: 71 IN | RESPIRATION RATE: 18 BRPM | TEMPERATURE: 98 F

## 2022-06-06 DIAGNOSIS — M47.816 LUMBAR FACET ARTHROPATHY: ICD-10-CM

## 2022-06-06 LAB — GLUCOSE BLD-MCNC: 107 MG/DL (ref 70–99)

## 2022-06-06 PROCEDURE — 3E0T3BZ INTRODUCTION OF ANESTHETIC AGENT INTO PERIPHERAL NERVES AND PLEXI, PERCUTANEOUS APPROACH: ICD-10-PCS | Performed by: ANESTHESIOLOGY

## 2022-06-06 PROCEDURE — 27096 INJECT SACROILIAC JOINT: CPT | Performed by: ANESTHESIOLOGY

## 2022-06-06 PROCEDURE — 3E0T33Z INTRODUCTION OF ANTI-INFLAMMATORY INTO PERIPHERAL NERVES AND PLEXI, PERCUTANEOUS APPROACH: ICD-10-PCS | Performed by: ANESTHESIOLOGY

## 2022-06-06 RX ORDER — DEXTROSE MONOHYDRATE 25 G/50ML
50 INJECTION, SOLUTION INTRAVENOUS
Status: DISCONTINUED | OUTPATIENT
Start: 2022-06-06 | End: 2022-06-06

## 2022-06-06 RX ORDER — DIPHENHYDRAMINE HYDROCHLORIDE 50 MG/ML
50 INJECTION INTRAMUSCULAR; INTRAVENOUS ONCE AS NEEDED
Status: DISCONTINUED | OUTPATIENT
Start: 2022-06-06 | End: 2022-06-06

## 2022-06-06 RX ORDER — MIDAZOLAM HYDROCHLORIDE 1 MG/ML
INJECTION INTRAMUSCULAR; INTRAVENOUS
Status: DISCONTINUED | OUTPATIENT
Start: 2022-06-06 | End: 2022-06-06

## 2022-06-06 RX ORDER — SODIUM CHLORIDE, SODIUM LACTATE, POTASSIUM CHLORIDE, CALCIUM CHLORIDE 600; 310; 30; 20 MG/100ML; MG/100ML; MG/100ML; MG/100ML
100 INJECTION, SOLUTION INTRAVENOUS CONTINUOUS
Status: DISCONTINUED | OUTPATIENT
Start: 2022-06-06 | End: 2022-06-06

## 2022-06-06 RX ORDER — ONDANSETRON 2 MG/ML
4 INJECTION INTRAMUSCULAR; INTRAVENOUS ONCE AS NEEDED
Status: DISCONTINUED | OUTPATIENT
Start: 2022-06-06 | End: 2022-06-06

## 2022-06-06 RX ORDER — NICOTINE POLACRILEX 4 MG
15 LOZENGE BUCCAL
Status: DISCONTINUED | OUTPATIENT
Start: 2022-06-06 | End: 2022-06-06

## 2022-06-06 RX ORDER — INSULIN ASPART 100 [IU]/ML
3 INJECTION, SOLUTION INTRAVENOUS; SUBCUTANEOUS ONCE
Status: DISCONTINUED | OUTPATIENT
Start: 2022-06-06 | End: 2022-06-06

## 2022-06-06 RX ORDER — NICOTINE POLACRILEX 4 MG
30 LOZENGE BUCCAL
Status: DISCONTINUED | OUTPATIENT
Start: 2022-06-06 | End: 2022-06-06

## 2022-06-06 RX ORDER — LIDOCAINE HYDROCHLORIDE 10 MG/ML
INJECTION, SOLUTION EPIDURAL; INFILTRATION; INTRACAUDAL; PERINEURAL
Status: DISCONTINUED | OUTPATIENT
Start: 2022-06-06 | End: 2022-06-06

## 2022-06-06 RX ORDER — METHYLPREDNISOLONE ACETATE 40 MG/ML
INJECTION, SUSPENSION INTRA-ARTICULAR; INTRALESIONAL; INTRAMUSCULAR; SOFT TISSUE
Status: DISCONTINUED | OUTPATIENT
Start: 2022-06-06 | End: 2022-06-06

## 2022-06-06 NOTE — OPERATIVE REPORT
[facility]  Operative Report  2022     Milderd Mantle Patient Status:  Hospital Outpatient Surgery    1960 MRN [id number]   Location [address] [address] Attending Efrain Christina MD   Hosp Day # 0 PCP Vickie Antonio MD     Indication: Osiel Ryder is a 63year old male patient with chronic low back pain failed conservative treatment with medication and physical therapy was here for bilateral sacroiliac joint injection. Preoperative Diagnosis:  Lumbar facet arthropathy [M47.816]    Postoperative Diagnosis: Same as above. Procedure performed: Bilateral sacroiliac joint arthrogram and injection under fluoroscopy. Anesthesia: Local and IV Sedation. EBL: Less than 1 ml. Procedure Description:   After reviewing the patient's history and performing a focused physical examination, the diagnosis was confirmed and contraindications such as infection and coagulopathy were ruled out. Following review of potential side effects and complications, including but not necessarily limited to infection, allergic reaction, local tissue breakdown, nerve injury, and paresis, the patient indicated they understood and agreed to proceed. After obtaining the informed consent, the patient was brought to the procedure room and monitored. Per my order and under my supervision, the patient was sedated with intermittent intravenous doses of versed and fentanyl. The vital signs were monitored and recorded by an experienced RN. The procedure started after the patient was adequately sedated. The moderate intravenous conscious sedation was provided for 12 minutes. In the prone position, the patient's lumbar and sacral areas were prepped and draped in sterile fashion. The subcutaneous lidocaine was instilled into the superficial soft tissues for local anesthesia. Under fluoroscopic guidance, the anterior and posterior aspects of the sacroiliac joint were overlapped.  A 22-gauge, Quincke spinal needle was advanced into the middle portion of the first sacroiliac joint. After the needle  positions were confirmed by AP and lateral views of fluoroscopy, 1 cc Omnipaque-240 was injected into the sacroiliac joint. There was a nice sacroiliac joint arthrogram revealed. After that methylprednisolone 40 mg with 2 cc of 1% lidocaine was injected. The needle was flushed with 1 cc of 1% lidocaine. Another 22-gauge, Quincke spinal needle was advanced into the middle portion of the first sacroiliac joint. After the needle  positions were confirmed by AP and lateral views of fluoroscopy, 1 cc Omnipaque-240 was injected into the sacroiliac joint. There was a nice sacroiliac joint arthrogram revealed. After that methylprednisolone 40 mg with 2 cc of 1% lidocaine was injected. The needle was flushed with 1 cc of 1% lidocaine. The needles were removed with stylet in situ. The patient tolerated the procedure very well. There was no subjective or objective loss of motor strength. The patient was observed until discharge criteria met. Discharge instructions were given and patient was released to a responsible adult. Complications: None. Follow up: The patient was followed in the pain clinic as needed basis.       Prasanna Welch MD

## 2022-06-10 ENCOUNTER — PATIENT MESSAGE (OUTPATIENT)
Dept: INTERNAL MEDICINE CLINIC | Facility: CLINIC | Age: 62
End: 2022-06-10

## 2022-06-10 DIAGNOSIS — E66.01 MORBID OBESITY WITH BMI OF 45.0-49.9, ADULT (HCC): ICD-10-CM

## 2022-06-10 RX ORDER — PHENTERMINE HYDROCHLORIDE 30 MG/1
30 CAPSULE ORAL EVERY MORNING
Qty: 30 CAPSULE | Refills: 0 | Status: SHIPPED | OUTPATIENT
Start: 2022-06-10

## 2022-06-10 NOTE — TELEPHONE ENCOUNTER
From: Estela Torres  To: Hannah Claire MD  Sent: 6/10/2022 10:12 AM CDT  Subject: Weight loss meds. Emmanuel Andinos day to you. .  My insurance will not cover PHENTERMINE HCL 30 MG Oral Cap. Can we look into using Plenity. Jose Armando Zavala

## 2022-06-10 NOTE — TELEPHONE ENCOUNTER
Pt is requesting to send Rx to [facility] to use GoodRx coupon for his Phentermine  Rx pended for approval with requested pharmacy

## 2022-06-10 NOTE — TELEPHONE ENCOUNTER
From: Kelly Dean  Sent: 6/10/2022 11:03 AM CDT  To: [facility] Clinical Staff  Subject: Weight loss meds. .    Hi. .  Will you be sending a prescription to [address] [place] [address] in [facility]?   Yolanda Mistry

## 2022-06-15 DIAGNOSIS — E11.65 UNCONTROLLED TYPE 2 DIABETES MELLITUS WITH HYPERGLYCEMIA (HCC): ICD-10-CM

## 2022-06-15 RX ORDER — DAPAGLIFLOZIN 10 MG/1
TABLET, FILM COATED ORAL
Qty: 90 TABLET | Refills: 0 | Status: SHIPPED | OUTPATIENT
Start: 2022-06-15

## 2022-06-20 ENCOUNTER — APPOINTMENT (OUTPATIENT)
Dept: GENERAL RADIOLOGY | Facility: HOSPITAL | Age: 62
End: 2022-06-20
Attending: ANESTHESIOLOGY
Payer: COMMERCIAL

## 2022-06-20 ENCOUNTER — HOSPITAL ENCOUNTER (OUTPATIENT)
Facility: HOSPITAL | Age: 62
Setting detail: HOSPITAL OUTPATIENT SURGERY
Discharge: HOME | End: 2022-06-20
Attending: ANESTHESIOLOGY | Admitting: ANESTHESIOLOGY
Payer: COMMERCIAL

## 2022-06-20 VITALS
BODY MASS INDEX: 42.56 KG/M2 | OXYGEN SATURATION: 92 % | DIASTOLIC BLOOD PRESSURE: 63 MMHG | TEMPERATURE: 97 F | SYSTOLIC BLOOD PRESSURE: 130 MMHG | HEART RATE: 76 BPM | HEIGHT: 71 IN | RESPIRATION RATE: 16 BRPM | WEIGHT: 304 LBS

## 2022-06-20 DIAGNOSIS — M47.816 LUMBAR FACET ARTHROPATHY: ICD-10-CM

## 2022-06-20 LAB — GLUCOSE BLD-MCNC: 78 MG/DL (ref 70–99)

## 2022-06-20 PROCEDURE — 3E0T33Z INTRODUCTION OF ANTI-INFLAMMATORY INTO PERIPHERAL NERVES AND PLEXI, PERCUTANEOUS APPROACH: ICD-10-PCS | Performed by: ANESTHESIOLOGY

## 2022-06-20 PROCEDURE — 64493 INJ PARAVERT F JNT L/S 1 LEV: CPT | Performed by: ANESTHESIOLOGY

## 2022-06-20 PROCEDURE — 99152 MOD SED SAME PHYS/QHP 5/>YRS: CPT | Performed by: ANESTHESIOLOGY

## 2022-06-20 PROCEDURE — 3E0T3BZ INTRODUCTION OF ANESTHETIC AGENT INTO PERIPHERAL NERVES AND PLEXI, PERCUTANEOUS APPROACH: ICD-10-PCS | Performed by: ANESTHESIOLOGY

## 2022-06-20 PROCEDURE — 64495 INJ PARAVERT F JNT L/S 3 LEV: CPT | Performed by: ANESTHESIOLOGY

## 2022-06-20 PROCEDURE — BR161ZZ FLUOROSCOPY OF LUMBAR FACET JOINT(S) USING LOW OSMOLAR CONTRAST: ICD-10-PCS | Performed by: ANESTHESIOLOGY

## 2022-06-20 PROCEDURE — 64494 INJ PARAVERT F JNT L/S 2 LEV: CPT | Performed by: ANESTHESIOLOGY

## 2022-06-20 RX ORDER — LIDOCAINE HYDROCHLORIDE 10 MG/ML
INJECTION, SOLUTION EPIDURAL; INFILTRATION; INTRACAUDAL; PERINEURAL
Status: DISCONTINUED | OUTPATIENT
Start: 2022-06-20 | End: 2022-06-20

## 2022-06-20 RX ORDER — NICOTINE POLACRILEX 4 MG
15 LOZENGE BUCCAL
Status: DISCONTINUED | OUTPATIENT
Start: 2022-06-20 | End: 2022-06-20

## 2022-06-20 RX ORDER — MIDAZOLAM HYDROCHLORIDE 1 MG/ML
INJECTION INTRAMUSCULAR; INTRAVENOUS
Status: DISCONTINUED | OUTPATIENT
Start: 2022-06-20 | End: 2022-06-20

## 2022-06-20 RX ORDER — NICOTINE POLACRILEX 4 MG
30 LOZENGE BUCCAL
Status: DISCONTINUED | OUTPATIENT
Start: 2022-06-20 | End: 2022-06-20

## 2022-06-20 RX ORDER — DIPHENHYDRAMINE HYDROCHLORIDE 50 MG/ML
50 INJECTION INTRAMUSCULAR; INTRAVENOUS ONCE AS NEEDED
Status: DISCONTINUED | OUTPATIENT
Start: 2022-06-20 | End: 2022-06-20

## 2022-06-20 RX ORDER — DEXTROSE MONOHYDRATE 25 G/50ML
50 INJECTION, SOLUTION INTRAVENOUS
Status: DISCONTINUED | OUTPATIENT
Start: 2022-06-20 | End: 2022-06-20

## 2022-06-20 RX ORDER — METHYLPREDNISOLONE ACETATE 40 MG/ML
INJECTION, SUSPENSION INTRA-ARTICULAR; INTRALESIONAL; INTRAMUSCULAR; SOFT TISSUE
Status: DISCONTINUED | OUTPATIENT
Start: 2022-06-20 | End: 2022-06-20

## 2022-06-20 RX ORDER — INSULIN ASPART 100 [IU]/ML
3 INJECTION, SOLUTION INTRAVENOUS; SUBCUTANEOUS ONCE
Status: DISCONTINUED | OUTPATIENT
Start: 2022-06-20 | End: 2022-06-20

## 2022-06-20 RX ORDER — ONDANSETRON 2 MG/ML
4 INJECTION INTRAMUSCULAR; INTRAVENOUS ONCE AS NEEDED
Status: DISCONTINUED | OUTPATIENT
Start: 2022-06-20 | End: 2022-06-20

## 2022-06-20 RX ORDER — SODIUM CHLORIDE, SODIUM LACTATE, POTASSIUM CHLORIDE, CALCIUM CHLORIDE 600; 310; 30; 20 MG/100ML; MG/100ML; MG/100ML; MG/100ML
100 INJECTION, SOLUTION INTRAVENOUS CONTINUOUS
Status: DISCONTINUED | OUTPATIENT
Start: 2022-06-20 | End: 2022-06-20

## 2022-06-20 NOTE — OPERATIVE REPORT
[facility]  Operative Report  2022     Lis Xiong Patient Status:  Hospital Outpatient Surgery    1960 MRN [id number]   Location [address] [address] Attending Billy Hernandez MD   Hosp Day # 0 PCP Geraldine Kenny MD     Indication: Sherman Smith is a 63year old male patient with chronic low back pain failed conservative treatment with medication and physical therapy was here for right diagnostic lumbar facet joint injection. Preoperative Diagnosis:  Lumbar facet arthropathy [M47.816]    Postoperative Diagnosis: Same as above. Procedure performed: Right diagnostic lumbar facet joint injection at L3-L4, L4-L5 and L5-S1 level under fluoroscopy. Anesthesia: Local and IV Sedation. EBL: Less than 1 ml. Procedure Description:  After reviewing the patient's history and performing a focused physical examination, the diagnosis was confirmed and contraindications such as infection and coagulopathy were ruled out. Following review of allergies and review of potential side effects and complications, including but not necessarily limited to infection, allergic reaction, local tissue breakdown, nerve injury, and paresis, the patient indicated they understood and agreed to proceed. After obtaining the informed consent, the patient was brought to the procedure room and monitored. Per my order and under my supervision, the patient was sedated with intermittent intravenous doses of versed and fentanyl. The vital signs were monitored and recorded by an experienced RN. The procedure started after the patient was adequately sedated. Moderate intravenous sedation was given for 10 minutes. In the prone position, following sterile prep and drape of the lumbar region, the corresponding facet joints were identified under fluoroscopy. The skin was anesthetized via 25-gauge 1.5\" needle with approximately 2 mL of 1% lidocaine.   A 22-gauge 3.5\" Quincke spinal needle was introduced and advanced into each corresponding facet joint at right L3-L4, L4-5 and L5-S1 level atraumatically under fluoroscopic guidance. Following negative aspiration for CSF and blood, approximately 1 mL of 1% lidocaine with 20 mg of methylprednisolone was injected into each joint without complication. The needle was withdrawn with stylet in situ after being flushed with 0.5 mL lidocaine. The patient tolerated procedure very well. The patient was observed until discharge criteria met. Discharge instructions were given and patient was released to a responsible adult. Complications: None. Follow up: The patient was followed in the pain clinic as needed basis.     Dread Quezada MD

## 2022-06-24 ENCOUNTER — TELEPHONE (OUTPATIENT)
Dept: INTERNAL MEDICINE CLINIC | Facility: CLINIC | Age: 62
End: 2022-06-24

## 2022-06-24 NOTE — TELEPHONE ENCOUNTER
Requesting Dexcom G6 CGM samples    Fax in triage    [facility] [address] [place] [address] [address]oonam Guillen [address]  Ph: [phone] Option 2  Fax: [phone]

## 2022-07-06 ENCOUNTER — HOSPITAL ENCOUNTER (OUTPATIENT)
Facility: HOSPITAL | Age: 62
Setting detail: HOSPITAL OUTPATIENT SURGERY
Discharge: HOME | End: 2022-07-06
Attending: ANESTHESIOLOGY | Admitting: ANESTHESIOLOGY
Payer: COMMERCIAL

## 2022-07-06 ENCOUNTER — APPOINTMENT (OUTPATIENT)
Dept: GENERAL RADIOLOGY | Facility: HOSPITAL | Age: 62
End: 2022-07-06
Attending: ANESTHESIOLOGY
Payer: COMMERCIAL

## 2022-07-06 VITALS
HEART RATE: 68 BPM | SYSTOLIC BLOOD PRESSURE: 123 MMHG | TEMPERATURE: 98 F | OXYGEN SATURATION: 92 % | DIASTOLIC BLOOD PRESSURE: 95 MMHG | RESPIRATION RATE: 18 BRPM

## 2022-07-06 DIAGNOSIS — M47.816 LUMBAR FACET ARTHROPATHY: ICD-10-CM

## 2022-07-06 LAB — GLUCOSE BLD-MCNC: 103 MG/DL (ref 70–99)

## 2022-07-06 PROCEDURE — 64493 INJ PARAVERT F JNT L/S 1 LEV: CPT | Performed by: ANESTHESIOLOGY

## 2022-07-06 PROCEDURE — 64495 INJ PARAVERT F JNT L/S 3 LEV: CPT | Performed by: ANESTHESIOLOGY

## 2022-07-06 PROCEDURE — 3E0U33Z INTRODUCTION OF ANTI-INFLAMMATORY INTO JOINTS, PERCUTANEOUS APPROACH: ICD-10-PCS | Performed by: ANESTHESIOLOGY

## 2022-07-06 PROCEDURE — 3E0U3BZ INTRODUCTION OF ANESTHETIC AGENT INTO JOINTS, PERCUTANEOUS APPROACH: ICD-10-PCS | Performed by: ANESTHESIOLOGY

## 2022-07-06 PROCEDURE — 64494 INJ PARAVERT F JNT L/S 2 LEV: CPT | Performed by: ANESTHESIOLOGY

## 2022-07-06 PROCEDURE — 99152 MOD SED SAME PHYS/QHP 5/>YRS: CPT | Performed by: ANESTHESIOLOGY

## 2022-07-06 RX ORDER — NICOTINE POLACRILEX 4 MG
15 LOZENGE BUCCAL
Status: DISCONTINUED | OUTPATIENT
Start: 2022-07-06 | End: 2022-07-06

## 2022-07-06 RX ORDER — DEXTROSE MONOHYDRATE 25 G/50ML
50 INJECTION, SOLUTION INTRAVENOUS
Status: DISCONTINUED | OUTPATIENT
Start: 2022-07-06 | End: 2022-07-06

## 2022-07-06 RX ORDER — INSULIN ASPART 100 [IU]/ML
3 INJECTION, SOLUTION INTRAVENOUS; SUBCUTANEOUS ONCE
Status: DISCONTINUED | OUTPATIENT
Start: 2022-07-06 | End: 2022-07-06

## 2022-07-06 RX ORDER — ONDANSETRON 2 MG/ML
4 INJECTION INTRAMUSCULAR; INTRAVENOUS ONCE AS NEEDED
Status: DISCONTINUED | OUTPATIENT
Start: 2022-07-06 | End: 2022-07-06

## 2022-07-06 RX ORDER — METHYLPREDNISOLONE ACETATE 40 MG/ML
INJECTION, SUSPENSION INTRA-ARTICULAR; INTRALESIONAL; INTRAMUSCULAR; SOFT TISSUE
Status: DISCONTINUED | OUTPATIENT
Start: 2022-07-06 | End: 2022-07-06

## 2022-07-06 RX ORDER — MIDAZOLAM HYDROCHLORIDE 1 MG/ML
INJECTION INTRAMUSCULAR; INTRAVENOUS
Status: DISCONTINUED | OUTPATIENT
Start: 2022-07-06 | End: 2022-07-06

## 2022-07-06 RX ORDER — LIDOCAINE HYDROCHLORIDE 10 MG/ML
INJECTION, SOLUTION EPIDURAL; INFILTRATION; INTRACAUDAL; PERINEURAL
Status: DISCONTINUED | OUTPATIENT
Start: 2022-07-06 | End: 2022-07-06

## 2022-07-06 RX ORDER — NICOTINE POLACRILEX 4 MG
30 LOZENGE BUCCAL
Status: DISCONTINUED | OUTPATIENT
Start: 2022-07-06 | End: 2022-07-06

## 2022-07-06 RX ORDER — DIPHENHYDRAMINE HYDROCHLORIDE 50 MG/ML
50 INJECTION INTRAMUSCULAR; INTRAVENOUS ONCE AS NEEDED
Status: DISCONTINUED | OUTPATIENT
Start: 2022-07-06 | End: 2022-07-06

## 2022-07-06 RX ORDER — SODIUM CHLORIDE, SODIUM LACTATE, POTASSIUM CHLORIDE, CALCIUM CHLORIDE 600; 310; 30; 20 MG/100ML; MG/100ML; MG/100ML; MG/100ML
100 INJECTION, SOLUTION INTRAVENOUS CONTINUOUS
Status: DISCONTINUED | OUTPATIENT
Start: 2022-07-06 | End: 2022-07-06

## 2022-07-06 NOTE — OPERATIVE REPORT
[facility]  Operative Report  2022     Della Rivers Patient Status:  Hospital Outpatient Surgery    1960 MRN [id number]   Location [address] [address] Attending Dayana Guerra MD   Hosp Day # 0 PCP Cee León MD     Indication: Yeimy Loyd is a 63year old male patient with chronic low back pain failed conservative treatment with medication and physical therapy was here for left diagnostic lumbar facet joint injection. Preoperative Diagnosis:  Lumbar facet arthropathy [M47.816]    Postoperative Diagnosis: Same as above. Procedure performed: Left diagnostic lumbar facet joint injection at L3-L4, L4-5 and L5-S1 level under fluoroscopy. Anesthesia: Local and IV Sedation. EBL: Less than 1 ml. Procedure Description:  After reviewing the patient's history and performing a focused physical examination, the diagnosis was confirmed and contraindications such as infection and coagulopathy were ruled out. Following review of allergies and review of potential side effects and complications, including but not necessarily limited to infection, allergic reaction, local tissue breakdown, nerve injury, and paresis, the patient indicated they understood and agreed to proceed. After obtaining the informed consent, the patient was brought to the procedure room and monitored. Per my order and under my supervision, the patient was sedated with intermittent intravenous doses of versed and fentanyl. The vital signs were monitored and recorded by an experienced RN. The procedure started after the patient was adequately sedated. Moderate intravenous sedation was given for 14 minutes. In the prone position, following sterile prep and drape of the lumbar region, the corresponding facet joints were identified under fluoroscopy. The skin was anesthetized via 25-gauge 1.5\" needle with approximately 2 mL of 1% lidocaine.   A 22-gauge 3.5\" Quincke spinal needle was introduced and advanced into each corresponding facet joint at left L3-L4, L4-5 and L5-S1 level atraumatically under fluoroscopic guidance. Following negative aspiration for CSF and blood, approximately 1 mL of 1% lidocaine with 20 mg of methylprednisolone was injected into each joint without complication. The needle was withdrawn with stylet in situ after being flushed with 0.5 mL lidocaine. The patient tolerated procedure very well. The patient was observed until discharge criteria met. Discharge instructions were given and patient was released to a responsible adult. Complications: None. Follow up: The patient was followed in the pain clinic as needed basis.     Yesenia Vasquez MD

## 2022-07-13 ENCOUNTER — PATIENT MESSAGE (OUTPATIENT)
Dept: INTERNAL MEDICINE CLINIC | Facility: CLINIC | Age: 62
End: 2022-07-13

## 2022-07-13 DIAGNOSIS — M54.50 ACUTE LEFT-SIDED LOW BACK PAIN WITHOUT SCIATICA: ICD-10-CM

## 2022-07-13 DIAGNOSIS — M54.50 ACUTE LEFT-SIDED LOW BACK PAIN WITHOUT SCIATICA: Primary | ICD-10-CM

## 2022-07-13 RX ORDER — METAXALONE 800 MG/1
400 TABLET ORAL 3 TIMES DAILY PRN
Qty: 20 TABLET | Refills: 0 | Status: SHIPPED | OUTPATIENT
Start: 2022-07-13 | End: 2022-07-14

## 2022-07-13 NOTE — TELEPHONE ENCOUNTER
From: Kelly Dean  Sent: 7/13/2022 3:55 PM CDT  To: [facility] Clinical Staff  Subject: Back pain. .    Hello. .   Its a stabbing pain in my lower right. . currently its at an 8. When i get up it increases. Not sure what i did it started yesterday. I have been coughing hard from a summer cold. I have tried Motrin and naproxen and icy hot. .  My last injection was last Wednesday

## 2022-07-13 NOTE — TELEPHONE ENCOUNTER
From: Ryan Mosqueda  To: Lu Trevino MD  Sent: 7/13/2022 3:28 PM CDT  Subject: Back pain. Kade [place]. .   I reaching out for help with back pain. I am once again bed bound. Since my injections i have been doing well but may have pulled a muscle.     Spike Khan

## 2022-07-13 NOTE — TELEPHONE ENCOUNTER
Per Meredith Soto  we have sent him metaxalone before, ok to send 800mg tid prn #20    Rx sent, pt notified

## 2022-07-14 ENCOUNTER — OFFICE VISIT (OUTPATIENT)
Facility: LOCATION | Age: 62
End: 2022-07-14
Payer: COMMERCIAL

## 2022-07-14 ENCOUNTER — PATIENT MESSAGE (OUTPATIENT)
Dept: INTERNAL MEDICINE CLINIC | Facility: CLINIC | Age: 62
End: 2022-07-14

## 2022-07-14 VITALS
HEART RATE: 66 BPM | HEIGHT: 71 IN | BODY MASS INDEX: 41.44 KG/M2 | DIASTOLIC BLOOD PRESSURE: 88 MMHG | SYSTOLIC BLOOD PRESSURE: 120 MMHG | WEIGHT: 296 LBS | TEMPERATURE: 97 F

## 2022-07-14 DIAGNOSIS — D57.3 SICKLE CELL TRAIT (HCC): ICD-10-CM

## 2022-07-14 DIAGNOSIS — K43.6 VENTRAL HERNIA WITH OBSTRUCTION AND WITHOUT GANGRENE: Primary | ICD-10-CM

## 2022-07-14 DIAGNOSIS — M62.08 DIASTASIS RECTI: ICD-10-CM

## 2022-07-14 DIAGNOSIS — Z79.4 TYPE 2 DIABETES MELLITUS WITH MILD NONPROLIFERATIVE RETINOPATHY WITHOUT MACULAR EDEMA, WITH LONG-TERM CURRENT USE OF INSULIN, UNSPECIFIED LATERALITY (HCC): ICD-10-CM

## 2022-07-14 DIAGNOSIS — I48.19 PERSISTENT ATRIAL FIBRILLATION (HCC): ICD-10-CM

## 2022-07-14 DIAGNOSIS — I10 ESSENTIAL HYPERTENSION: ICD-10-CM

## 2022-07-14 DIAGNOSIS — E11.3299 TYPE 2 DIABETES MELLITUS WITH MILD NONPROLIFERATIVE RETINOPATHY WITHOUT MACULAR EDEMA, WITH LONG-TERM CURRENT USE OF INSULIN, UNSPECIFIED LATERALITY (HCC): ICD-10-CM

## 2022-07-14 DIAGNOSIS — C61 PROSTATE CANCER (HCC): ICD-10-CM

## 2022-07-14 DIAGNOSIS — D44.7 PARAGANGLIOMA (HCC): ICD-10-CM

## 2022-07-14 PROCEDURE — 3074F SYST BP LT 130 MM HG: CPT | Performed by: COLON & RECTAL SURGERY

## 2022-07-14 PROCEDURE — 3079F DIAST BP 80-89 MM HG: CPT | Performed by: COLON & RECTAL SURGERY

## 2022-07-14 PROCEDURE — 3008F BODY MASS INDEX DOCD: CPT | Performed by: COLON & RECTAL SURGERY

## 2022-07-14 PROCEDURE — 99245 OFF/OP CONSLTJ NEW/EST HI 55: CPT | Performed by: COLON & RECTAL SURGERY

## 2022-07-14 RX ORDER — NEOMYCIN SULFATE 500 MG/1
TABLET ORAL
Qty: 6 TABLET | Refills: 0 | Status: SHIPPED | OUTPATIENT
Start: 2022-07-14

## 2022-07-14 RX ORDER — METRONIDAZOLE 500 MG/1
TABLET ORAL
Qty: 3 TABLET | Refills: 0 | Status: SHIPPED | OUTPATIENT
Start: 2022-07-14

## 2022-07-14 RX ORDER — HYDROCODONE BITARTRATE AND ACETAMINOPHEN 7.5; 325 MG/1; MG/1
1 TABLET ORAL EVERY 8 HOURS PRN
Qty: 21 TABLET | Refills: 0 | Status: SHIPPED | OUTPATIENT
Start: 2022-07-14

## 2022-07-14 RX ORDER — POLYETHYLENE GLYCOL 3350, SODIUM CHLORIDE, SODIUM BICARBONATE, POTASSIUM CHLORIDE 420; 11.2; 5.72; 1.48 G/4L; G/4L; G/4L; G/4L
POWDER, FOR SOLUTION ORAL
Qty: 1 EACH | Refills: 0 | Status: SHIPPED | OUTPATIENT
Start: 2022-07-14

## 2022-07-15 PROBLEM — K43.6 VENTRAL HERNIA WITH OBSTRUCTION AND WITHOUT GANGRENE: Status: ACTIVE | Noted: 2020-11-12

## 2022-07-15 PROBLEM — M62.08 DIASTASIS RECTI: Status: ACTIVE | Noted: 2022-07-15

## 2022-07-15 NOTE — TELEPHONE ENCOUNTER
From: Joe Nelson  Sent: 7/14/2022 7:26 PM CDT  To: [facility] Clinical Staff  Subject: Back pain. Gail Harper in [place]

## 2022-07-18 ENCOUNTER — TELEPHONE (OUTPATIENT)
Facility: LOCATION | Age: 62
End: 2022-07-18

## 2022-07-21 ENCOUNTER — TELEPHONE (OUTPATIENT)
Facility: LOCATION | Age: 62
End: 2022-07-21

## 2022-07-21 DIAGNOSIS — K43.9 VENTRAL HERNIA WITHOUT OBSTRUCTION OR GANGRENE: Primary | ICD-10-CM

## 2022-07-21 RX ORDER — CEFAZOLIN SODIUM/WATER 2 G/20 ML
2 SYRINGE (ML) INTRAVENOUS ONCE
Status: CANCELLED | OUTPATIENT
Start: 2022-07-21 | End: 2022-07-21

## 2022-07-21 NOTE — TELEPHONE ENCOUNTER
Letter sent 7/14 via fax or [facility] management and clearance for surgerry. Office states they never received this. Faxed again.

## 2022-07-22 ENCOUNTER — LABORATORY ENCOUNTER (OUTPATIENT)
Dept: LAB | Facility: HOSPITAL | Age: 62
End: 2022-07-22
Attending: COLON & RECTAL SURGERY
Payer: COMMERCIAL

## 2022-07-22 DIAGNOSIS — Z01.818 PRE-OP TESTING: ICD-10-CM

## 2022-07-22 LAB
ANION GAP SERPL CALC-SCNC: 5 MMOL/L (ref 0–18)
BUN BLD-MCNC: 11 MG/DL (ref 7–18)
CALCIUM BLD-MCNC: 9 MG/DL (ref 8.5–10.1)
CHLORIDE SERPL-SCNC: 107 MMOL/L (ref 98–112)
CO2 SERPL-SCNC: 28 MMOL/L (ref 21–32)
CREAT BLD-MCNC: 1.05 MG/DL
FASTING STATUS PATIENT QL REPORTED: NO
GLUCOSE BLD-MCNC: 70 MG/DL (ref 70–99)
OSMOLALITY SERPL CALC.SUM OF ELEC: 288 MOSM/KG (ref 275–295)
POTASSIUM SERPL-SCNC: 3.8 MMOL/L (ref 3.5–5.1)
SODIUM SERPL-SCNC: 140 MMOL/L (ref 136–145)

## 2022-07-22 PROCEDURE — 93005 ELECTROCARDIOGRAM TRACING: CPT

## 2022-07-22 PROCEDURE — 36415 COLL VENOUS BLD VENIPUNCTURE: CPT

## 2022-07-22 PROCEDURE — 80048 BASIC METABOLIC PNL TOTAL CA: CPT

## 2022-07-22 PROCEDURE — 93010 ELECTROCARDIOGRAM REPORT: CPT | Performed by: INTERNAL MEDICINE

## 2022-07-23 LAB
ATRIAL RATE: 78 BPM
Q-T INTERVAL: 394 MS
QRS DURATION: 78 MS
QTC CALCULATION (BEZET): 422 MS
R AXIS: 99 DEGREES
SARS-COV-2 RNA RESP QL NAA+PROBE: NOT DETECTED
T AXIS: 43 DEGREES
VENTRICULAR RATE: 69 BPM

## 2022-07-24 ENCOUNTER — ANESTHESIA EVENT (OUTPATIENT)
Dept: SURGERY | Facility: HOSPITAL | Age: 62
End: 2022-07-24
Payer: COMMERCIAL

## 2022-07-25 ENCOUNTER — ANESTHESIA (OUTPATIENT)
Dept: SURGERY | Facility: HOSPITAL | Age: 62
End: 2022-07-25
Payer: COMMERCIAL

## 2022-07-25 ENCOUNTER — HOSPITAL ENCOUNTER (OUTPATIENT)
Facility: HOSPITAL | Age: 62
Setting detail: HOSPITAL OUTPATIENT SURGERY
Discharge: HOME | End: 2022-07-25
Attending: COLON & RECTAL SURGERY | Admitting: COLON & RECTAL SURGERY
Payer: COMMERCIAL

## 2022-07-25 VITALS
HEART RATE: 95 BPM | OXYGEN SATURATION: 96 % | DIASTOLIC BLOOD PRESSURE: 66 MMHG | BODY MASS INDEX: 42.56 KG/M2 | WEIGHT: 304 LBS | TEMPERATURE: 98 F | HEIGHT: 71 IN | RESPIRATION RATE: 23 BRPM | SYSTOLIC BLOOD PRESSURE: 133 MMHG

## 2022-07-25 DIAGNOSIS — K43.6 VENTRAL HERNIA WITH OBSTRUCTION AND WITHOUT GANGRENE: Primary | ICD-10-CM

## 2022-07-25 DIAGNOSIS — Z01.818 PRE-OP TESTING: ICD-10-CM

## 2022-07-25 LAB
GLUCOSE BLD-MCNC: 179 MG/DL (ref 70–99)
GLUCOSE BLD-MCNC: 98 MG/DL (ref 70–99)

## 2022-07-25 PROCEDURE — 0WUF4JZ SUPPLEMENT ABDOMINAL WALL WITH SYNTHETIC SUBSTITUTE, PERCUTANEOUS ENDOSCOPIC APPROACH: ICD-10-PCS | Performed by: COLON & RECTAL SURGERY

## 2022-07-25 PROCEDURE — 8E0W4CZ ROBOTIC ASSISTED PROCEDURE OF TRUNK REGION, PERCUTANEOUS ENDOSCOPIC APPROACH: ICD-10-PCS | Performed by: COLON & RECTAL SURGERY

## 2022-07-25 PROCEDURE — 82962 GLUCOSE BLOOD TEST: CPT

## 2022-07-25 PROCEDURE — 76942 ECHO GUIDE FOR BIOPSY: CPT | Performed by: ANESTHESIOLOGY

## 2022-07-25 DEVICE — VENTRIO HERNIA PATCH CIRCLE
Type: IMPLANTABLE DEVICE | Site: ABDOMEN | Status: FUNCTIONAL
Brand: VENTRIO HERNIA PATCH

## 2022-07-25 RX ORDER — METOPROLOL TARTRATE 5 MG/5ML
2.5 INJECTION INTRAVENOUS ONCE
Status: COMPLETED | OUTPATIENT
Start: 2022-07-25 | End: 2022-07-25

## 2022-07-25 RX ORDER — METOCLOPRAMIDE HYDROCHLORIDE 5 MG/ML
INJECTION INTRAMUSCULAR; INTRAVENOUS AS NEEDED
Status: DISCONTINUED | OUTPATIENT
Start: 2022-07-25 | End: 2022-07-25 | Stop reason: SURG

## 2022-07-25 RX ORDER — ONDANSETRON 2 MG/ML
4 INJECTION INTRAMUSCULAR; INTRAVENOUS EVERY 6 HOURS PRN
Status: DISCONTINUED | OUTPATIENT
Start: 2022-07-25 | End: 2022-07-25

## 2022-07-25 RX ORDER — ACETAMINOPHEN 500 MG
1000 TABLET ORAL ONCE AS NEEDED
Status: COMPLETED | OUTPATIENT
Start: 2022-07-25 | End: 2022-07-25

## 2022-07-25 RX ORDER — NICOTINE POLACRILEX 4 MG
30 LOZENGE BUCCAL
Status: DISCONTINUED | OUTPATIENT
Start: 2022-07-25 | End: 2022-07-25 | Stop reason: HOSPADM

## 2022-07-25 RX ORDER — ROCURONIUM BROMIDE 10 MG/ML
INJECTION, SOLUTION INTRAVENOUS AS NEEDED
Status: DISCONTINUED | OUTPATIENT
Start: 2022-07-25 | End: 2022-07-25 | Stop reason: SURG

## 2022-07-25 RX ORDER — HYDROCODONE BITARTRATE AND ACETAMINOPHEN 5; 325 MG/1; MG/1
2 TABLET ORAL ONCE AS NEEDED
Status: COMPLETED | OUTPATIENT
Start: 2022-07-25 | End: 2022-07-25

## 2022-07-25 RX ORDER — NICOTINE POLACRILEX 4 MG
15 LOZENGE BUCCAL
Status: DISCONTINUED | OUTPATIENT
Start: 2022-07-25 | End: 2022-07-25 | Stop reason: HOSPADM

## 2022-07-25 RX ORDER — DEXAMETHASONE SODIUM PHOSPHATE 4 MG/ML
VIAL (ML) INJECTION AS NEEDED
Status: DISCONTINUED | OUTPATIENT
Start: 2022-07-25 | End: 2022-07-25 | Stop reason: SURG

## 2022-07-25 RX ORDER — HYDROMORPHONE HYDROCHLORIDE 1 MG/ML
0.4 INJECTION, SOLUTION INTRAMUSCULAR; INTRAVENOUS; SUBCUTANEOUS EVERY 5 MIN PRN
Status: DISCONTINUED | OUTPATIENT
Start: 2022-07-25 | End: 2022-07-25

## 2022-07-25 RX ORDER — KETAMINE HYDROCHLORIDE 50 MG/ML
INJECTION, SOLUTION, CONCENTRATE INTRAMUSCULAR; INTRAVENOUS AS NEEDED
Status: DISCONTINUED | OUTPATIENT
Start: 2022-07-25 | End: 2022-07-25 | Stop reason: SURG

## 2022-07-25 RX ORDER — METOPROLOL TARTRATE 5 MG/5ML
INJECTION INTRAVENOUS
Status: COMPLETED
Start: 2022-07-25 | End: 2022-07-25

## 2022-07-25 RX ORDER — ACETAMINOPHEN 500 MG
1000 TABLET ORAL ONCE
Status: DISCONTINUED | OUTPATIENT
Start: 2022-07-25 | End: 2022-07-25 | Stop reason: HOSPADM

## 2022-07-25 RX ORDER — MIDAZOLAM HYDROCHLORIDE 1 MG/ML
INJECTION INTRAMUSCULAR; INTRAVENOUS AS NEEDED
Status: DISCONTINUED | OUTPATIENT
Start: 2022-07-25 | End: 2022-07-25 | Stop reason: SURG

## 2022-07-25 RX ORDER — SODIUM CHLORIDE, SODIUM LACTATE, POTASSIUM CHLORIDE, CALCIUM CHLORIDE 600; 310; 30; 20 MG/100ML; MG/100ML; MG/100ML; MG/100ML
INJECTION, SOLUTION INTRAVENOUS CONTINUOUS
Status: DISCONTINUED | OUTPATIENT
Start: 2022-07-25 | End: 2022-07-25

## 2022-07-25 RX ORDER — METOPROLOL TARTRATE 5 MG/5ML
INJECTION INTRAVENOUS AS NEEDED
Status: DISCONTINUED | OUTPATIENT
Start: 2022-07-25 | End: 2022-07-25 | Stop reason: SURG

## 2022-07-25 RX ORDER — CEFAZOLIN SODIUM IN 0.9 % NACL 3 G/100 ML
3 INTRAVENOUS SOLUTION, PIGGYBACK (ML) INTRAVENOUS ONCE
Status: DISCONTINUED | OUTPATIENT
Start: 2022-07-25 | End: 2022-07-25 | Stop reason: HOSPADM

## 2022-07-25 RX ORDER — SCOLOPAMINE TRANSDERMAL SYSTEM 1 MG/1
1 PATCH, EXTENDED RELEASE TRANSDERMAL ONCE
Status: DISCONTINUED | OUTPATIENT
Start: 2022-07-25 | End: 2022-07-25 | Stop reason: HOSPADM

## 2022-07-25 RX ORDER — ESMOLOL HYDROCHLORIDE 10 MG/ML
INJECTION INTRAVENOUS AS NEEDED
Status: DISCONTINUED | OUTPATIENT
Start: 2022-07-25 | End: 2022-07-25 | Stop reason: SURG

## 2022-07-25 RX ORDER — LABETALOL HYDROCHLORIDE 5 MG/ML
INJECTION, SOLUTION INTRAVENOUS AS NEEDED
Status: DISCONTINUED | OUTPATIENT
Start: 2022-07-25 | End: 2022-07-25 | Stop reason: SURG

## 2022-07-25 RX ORDER — NALOXONE HYDROCHLORIDE 0.4 MG/ML
80 INJECTION, SOLUTION INTRAMUSCULAR; INTRAVENOUS; SUBCUTANEOUS AS NEEDED
Status: DISCONTINUED | OUTPATIENT
Start: 2022-07-25 | End: 2022-07-25

## 2022-07-25 RX ORDER — CEFAZOLIN SODIUM 1 G/3ML
INJECTION, POWDER, FOR SOLUTION INTRAMUSCULAR; INTRAVENOUS AS NEEDED
Status: DISCONTINUED | OUTPATIENT
Start: 2022-07-25 | End: 2022-07-25 | Stop reason: SURG

## 2022-07-25 RX ORDER — LIDOCAINE HYDROCHLORIDE 10 MG/ML
INJECTION, SOLUTION EPIDURAL; INFILTRATION; INTRACAUDAL; PERINEURAL AS NEEDED
Status: DISCONTINUED | OUTPATIENT
Start: 2022-07-25 | End: 2022-07-25 | Stop reason: SURG

## 2022-07-25 RX ORDER — BUPIVACAINE HYDROCHLORIDE AND EPINEPHRINE 5; 5 MG/ML; UG/ML
INJECTION, SOLUTION EPIDURAL; INTRACAUDAL; PERINEURAL AS NEEDED
Status: DISCONTINUED | OUTPATIENT
Start: 2022-07-25 | End: 2022-07-25 | Stop reason: HOSPADM

## 2022-07-25 RX ORDER — HYDROCODONE BITARTRATE AND ACETAMINOPHEN 5; 325 MG/1; MG/1
1 TABLET ORAL ONCE AS NEEDED
Status: COMPLETED | OUTPATIENT
Start: 2022-07-25 | End: 2022-07-25

## 2022-07-25 RX ORDER — ONDANSETRON 2 MG/ML
INJECTION INTRAMUSCULAR; INTRAVENOUS AS NEEDED
Status: DISCONTINUED | OUTPATIENT
Start: 2022-07-25 | End: 2022-07-25 | Stop reason: SURG

## 2022-07-25 RX ORDER — HYDROMORPHONE HYDROCHLORIDE 1 MG/ML
0.2 INJECTION, SOLUTION INTRAMUSCULAR; INTRAVENOUS; SUBCUTANEOUS EVERY 5 MIN PRN
Status: DISCONTINUED | OUTPATIENT
Start: 2022-07-25 | End: 2022-07-25

## 2022-07-25 RX ORDER — HYDROMORPHONE HYDROCHLORIDE 1 MG/ML
0.6 INJECTION, SOLUTION INTRAMUSCULAR; INTRAVENOUS; SUBCUTANEOUS EVERY 5 MIN PRN
Status: DISCONTINUED | OUTPATIENT
Start: 2022-07-25 | End: 2022-07-25

## 2022-07-25 RX ORDER — HEPARIN SODIUM 5000 [USP'U]/ML
5000 INJECTION, SOLUTION INTRAVENOUS; SUBCUTANEOUS ONCE
Status: COMPLETED | OUTPATIENT
Start: 2022-07-25 | End: 2022-07-25

## 2022-07-25 RX ORDER — LIDOCAINE HYDROCHLORIDE ANHYDROUS AND DEXTROSE MONOHYDRATE .8; 5 G/100ML; G/100ML
INJECTION, SOLUTION INTRAVENOUS CONTINUOUS PRN
Status: DISCONTINUED | OUTPATIENT
Start: 2022-07-25 | End: 2022-07-25 | Stop reason: SURG

## 2022-07-25 RX ORDER — KETOROLAC TROMETHAMINE 30 MG/ML
INJECTION, SOLUTION INTRAMUSCULAR; INTRAVENOUS AS NEEDED
Status: DISCONTINUED | OUTPATIENT
Start: 2022-07-25 | End: 2022-07-25 | Stop reason: SURG

## 2022-07-25 RX ORDER — PROCHLORPERAZINE EDISYLATE 5 MG/ML
5 INJECTION INTRAMUSCULAR; INTRAVENOUS EVERY 8 HOURS PRN
Status: DISCONTINUED | OUTPATIENT
Start: 2022-07-25 | End: 2022-07-25

## 2022-07-25 RX ORDER — DEXTROSE MONOHYDRATE 25 G/50ML
50 INJECTION, SOLUTION INTRAVENOUS
Status: DISCONTINUED | OUTPATIENT
Start: 2022-07-25 | End: 2022-07-25 | Stop reason: HOSPADM

## 2022-07-25 RX ORDER — NALOXONE HYDROCHLORIDE 0.4 MG/ML
INJECTION, SOLUTION INTRAMUSCULAR; INTRAVENOUS; SUBCUTANEOUS AS NEEDED
Status: DISCONTINUED | OUTPATIENT
Start: 2022-07-25 | End: 2022-07-25 | Stop reason: SURG

## 2022-07-25 RX ADMIN — LABETALOL HYDROCHLORIDE 5 MG: 5 INJECTION, SOLUTION INTRAVENOUS at 15:25:00

## 2022-07-25 RX ADMIN — KETAMINE HYDROCHLORIDE 25 MG: 50 INJECTION, SOLUTION, CONCENTRATE INTRAMUSCULAR; INTRAVENOUS at 13:00:00

## 2022-07-25 RX ADMIN — CEFAZOLIN SODIUM 3 G: 1 INJECTION, POWDER, FOR SOLUTION INTRAMUSCULAR; INTRAVENOUS at 12:52:00

## 2022-07-25 RX ADMIN — LIDOCAINE HYDROCHLORIDE ANHYDROUS AND DEXTROSE MONOHYDRATE 0.5 MG/KG/HR: .8; 5 INJECTION, SOLUTION INTRAVENOUS at 12:58:00

## 2022-07-25 RX ADMIN — METOPROLOL TARTRATE 2.5 MG: 5 INJECTION INTRAVENOUS at 12:53:00

## 2022-07-25 RX ADMIN — ONDANSETRON 4 MG: 2 INJECTION INTRAMUSCULAR; INTRAVENOUS at 12:58:00

## 2022-07-25 RX ADMIN — MIDAZOLAM HYDROCHLORIDE 2 MG: 1 INJECTION INTRAMUSCULAR; INTRAVENOUS at 12:32:00

## 2022-07-25 RX ADMIN — ROCURONIUM BROMIDE 15 MG: 10 INJECTION, SOLUTION INTRAVENOUS at 14:11:00

## 2022-07-25 RX ADMIN — LIDOCAINE HYDROCHLORIDE 50 MG: 10 INJECTION, SOLUTION EPIDURAL; INFILTRATION; INTRACAUDAL; PERINEURAL at 12:32:00

## 2022-07-25 RX ADMIN — ROCURONIUM BROMIDE 5 MG: 10 INJECTION, SOLUTION INTRAVENOUS at 12:36:00

## 2022-07-25 RX ADMIN — METOCLOPRAMIDE HYDROCHLORIDE 10 MG: 5 INJECTION INTRAMUSCULAR; INTRAVENOUS at 12:58:00

## 2022-07-25 RX ADMIN — DEXAMETHASONE SODIUM PHOSPHATE 4 MG: 4 MG/ML VIAL (ML) INJECTION at 12:58:00

## 2022-07-25 RX ADMIN — KETOROLAC TROMETHAMINE 30 MG: 30 INJECTION, SOLUTION INTRAMUSCULAR; INTRAVENOUS at 14:43:00

## 2022-07-25 RX ADMIN — ESMOLOL HYDROCHLORIDE 10 MG: 10 INJECTION INTRAVENOUS at 12:41:00

## 2022-07-25 RX ADMIN — ROCURONIUM BROMIDE 45 MG: 10 INJECTION, SOLUTION INTRAVENOUS at 12:45:00

## 2022-07-25 RX ADMIN — SODIUM CHLORIDE, SODIUM LACTATE, POTASSIUM CHLORIDE, CALCIUM CHLORIDE: 600; 310; 30; 20 INJECTION, SOLUTION INTRAVENOUS at 15:46:00

## 2022-07-25 RX ADMIN — NALOXONE HYDROCHLORIDE 0.08 MG: 0.4 INJECTION, SOLUTION INTRAMUSCULAR; INTRAVENOUS; SUBCUTANEOUS at 15:25:00

## 2022-07-25 RX ADMIN — ESMOLOL HYDROCHLORIDE 10 MG: 10 INJECTION INTRAVENOUS at 12:46:00

## 2022-07-25 NOTE — ANESTHESIA PROCEDURE NOTES
Arterial Line  Performed by: Chris Norton MD  Authorized by: Chris Norton MD     General Information and Staff    Procedure Start:  7/25/2022 12:41 PM  Procedure End:  7/25/2022 12:42 PM  Anesthesiologist:  Chris Norton MD  Performed By:  Anesthesiologist  Patient Location:  OR  Indication: continuous blood pressure monitoring and blood sampling needed    Site Identification: real time ultrasound guided, surface landmarks and image stored and retrievable    Preanesthetic Checklist: 2 patient identifiers, IV checked, risks and benefits discussed, monitors and equipment checked, pre-op evaluation, timeout performed, anesthesia consent and sterile technique used    Procedure Details    Catheter Size:  20 G  Catheter Length:  1 and 3/4 inchCatheter Type:  Angiocath  Seldinger Technique?: Yes    Laterality:  LeftSite:  Radial artery  Site Prep: chlorhexidine  Line Secured:  Wrist Brace, tape and Tegaderm    Assessment    Events: patient tolerated procedure well with no complications      Medications      Additional Comments

## 2022-07-25 NOTE — ANESTHESIA PROCEDURE NOTES
Airway  Date/Time: 7/25/2022 12:38 PM  Urgency: elective      General Information and Staff    Patient location during procedure: OR  Anesthesiologist: Lambert Smith MD  Performed: anesthesiologist     Indications and Patient Condition  Indications for airway management: anesthesia  Spontaneous Ventilation: absent  Sedation level: deep  Preoxygenated: yes  Patient position: sniffing  Mask difficulty assessment: 0 - not attempted    Final Airway Details  Final airway type: endotracheal airway      Successful airway: ETT  Cuffed: yes   Successful intubation technique: Video laryngoscopy  Endotracheal tube insertion site: oral  Blade: GlideScope  Blade size: #4    Cormack-Lehane Classification: grade I - full view of glottis  Placement verified by: chest auscultation and capnometry   Cuff volume (mL): 6  Measured from: lips  ETT to lips (cm): 23  Number of attempts at approach: 1    Additional Comments  VC clean, passed smoothly, atraumatically. OGT and soft bite block to molars.  Dentition unchanged

## 2022-07-25 NOTE — INTERVAL H&P NOTE
Pre-op Diagnosis: Ventral hernia with obstruction and without gangrene [K43.6]    The above referenced H&P was reviewed by Kate Evans MD on 7/25/2022, the patient was examined and no significant changes have occurred in the patient's condition since the H&P was performed. I discussed with the patient and/or legal representative the potential benefits, risks and side effects of this procedure; the likelihood of the patient achieving goals; and potential problems that might occur during recuperation. I discussed reasonable alternatives to the procedure, including risks, benefits and side effects related to the alternatives and risks related to not receiving this procedure. We will proceed with procedure as planned.

## 2022-07-25 NOTE — ANESTHESIA PROCEDURE NOTES
Peripheral IV  Date/Time: 7/25/2022 12:39 PM  Inserted by: Julee Khanna MD    Placement  Needle size: 18 G  Laterality: left  Location: hand  Local anesthetic: none  Site prep: alcohol  Technique: anatomical landmarks  Attempts: 1

## 2022-07-26 ENCOUNTER — TELEPHONE (OUTPATIENT)
Facility: LOCATION | Age: 62
End: 2022-07-26

## 2022-07-26 DIAGNOSIS — G89.18 POST-OPERATIVE PAIN: Primary | ICD-10-CM

## 2022-07-26 NOTE — TELEPHONE ENCOUNTER
Pt called requesting a script for pain meds be called in.  PT stated he didn't request this at discharge because he thought he had some at home. He is also inquiring about needing to wear an abdominal binder and where to purchase this.

## 2022-07-27 RX ORDER — HYDROCODONE BITARTRATE AND ACETAMINOPHEN 5; 325 MG/1; MG/1
1 TABLET ORAL EVERY 6 HOURS PRN
Qty: 15 TABLET | Refills: 0 | Status: SHIPPED | OUTPATIENT
Start: 2022-07-27

## 2022-07-27 NOTE — TELEPHONE ENCOUNTER
I will give the patient a prescription for Norco.  I messaged the patient instructing him to purchase an abdominal binder or a medical supply store.

## 2022-07-31 ENCOUNTER — PATIENT MESSAGE (OUTPATIENT)
Dept: INTERNAL MEDICINE CLINIC | Facility: CLINIC | Age: 62
End: 2022-07-31

## 2022-07-31 DIAGNOSIS — S30.861A TICK BITE OF ABDOMEN, INITIAL ENCOUNTER: Primary | ICD-10-CM

## 2022-07-31 DIAGNOSIS — M54.50 ACUTE LEFT-SIDED LOW BACK PAIN WITHOUT SCIATICA: ICD-10-CM
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
## 2022-08-01 ENCOUNTER — TELEPHONE (OUTPATIENT)
Facility: LOCATION | Age: 62
End: 2022-08-01

## 2022-08-01 ENCOUNTER — OFFICE VISIT (OUTPATIENT)
Facility: LOCATION | Age: 62
End: 2022-08-01

## 2022-08-01 ENCOUNTER — PATIENT MESSAGE (OUTPATIENT)
Dept: INTERNAL MEDICINE CLINIC | Facility: CLINIC | Age: 62
End: 2022-08-01

## 2022-08-01 DIAGNOSIS — K43.6 VENTRAL HERNIA WITH OBSTRUCTION AND WITHOUT GANGRENE: Primary | ICD-10-CM

## 2022-08-01 PROCEDURE — 99024 POSTOP FOLLOW-UP VISIT: CPT

## 2022-08-01 RX ORDER — HYDROCODONE BITARTRATE AND ACETAMINOPHEN 7.5; 325 MG/1; MG/1
1 TABLET ORAL EVERY 8 HOURS PRN
Qty: 21 TABLET | Refills: 0 | Status: SHIPPED | OUTPATIENT
Start: 2022-08-01

## 2022-08-01 RX ORDER — DOXYCYCLINE HYCLATE 100 MG/1
100 CAPSULE ORAL 2 TIMES DAILY
Qty: 20 CAPSULE | Refills: 0 | Status: SHIPPED | OUTPATIENT
Start: 2022-08-01

## 2022-08-01 NOTE — PATIENT INSTRUCTIONS
The patient presents for continued care and evaluation following a ventral hernia repair with mesh on 7/25/2022. Overall, the patient states he has been doing well postoperatively. He states he went grocery shopping over the weekend, and thinks he may have \"overdid it. \"  He states he was not lifting heavy objects, but the excessive walking was somewhat exhausting. He states he felt \"delirious\" on Sunday. The patient states he is feeling much better today. He is tolerating a general diet. He denies nausea or vomiting. He states he has some loose bowel movements, but they are not liquidy. He states his bowel movements are becoming more formed. He denies fevers or chills. He states his incision sites are healing well. He has no drainage from the incision sites. Clinical examination of the abdomen reveals it to be soft, nondistended, mild tenderness to palpation in the epigastric region, bowel sounds are normal activity normal pitch. There  is no rebounding tenderness or guarding. There are no signs of ascites or peritonitis. The liver and spleen are nonpalpable. There are no palpable masses. There are no umbilical or inguinal hernias. Laparoscopic incision sites are clean, dry, intact without surrounding erythema or cellulitis. The patient is doing well status post robotic ventral hernia repair with mesh on 7/25/2022. I discussed with the patient that they should refrain from any bending, pushing, pulling, twisting, or lifting of a force greater than 20 pounds for 8 weeks post-op. The patient should avoid submerging their incisions in a bath, hot tub, pool for a total of 2 weeks postoperatively. I counseled the patient and when he may return to driving. The patient asked for refill of his [place], but was told he should take ibuprofen and Tylenol for pain management. He may also utilize heat/ice packs. He should continue to wear an abdominal binder.     All of the patient's questions were answered. The patient verbalized understanding and agreement with the plan of care. I have no further follow-up scheduled with this patient at this time. This patient can see me or Dr. Maria G Roach on an as-needed basis. This patient should return urgently for any problems or complications related to the surgical intervention.

## 2022-08-01 NOTE — TELEPHONE ENCOUNTER
From: Ramiro Jiang  To: Teresa Muñoz MD  Sent: 7/31/2022 3:33 PM CDT  Subject: Tick bite    I noticed when I was bathing after yard work I noticed a tick attached. . it was removed. This is what remains after a week.  Should I be concerned

## 2022-08-01 NOTE — TELEPHONE ENCOUNTER
From: Tasneem Holstein  Sent: 8/1/2022 11:05 AM CDT  To: [facility] Clinical Staff  Subject: Tick bite    Thanks. Melania Bloom

## 2022-08-01 NOTE — TELEPHONE ENCOUNTER
Last time medication was refilled 7/14/22  Quantity and # of refills 21 tabs w0 refills  Last OV 3/3/22  Next OV   Future Appointments   Date Time Provider Quincy Esteban   8/8/2022 10:00 AM [facility]   9/8/2022  3:00 PM Hope Mahoney MD [facility] [address]

## 2022-09-08 ENCOUNTER — OFFICE VISIT (OUTPATIENT)
Dept: INTERNAL MEDICINE CLINIC | Facility: CLINIC | Age: 62
End: 2022-09-08
Payer: COMMERCIAL

## 2022-09-08 VITALS
OXYGEN SATURATION: 98 % | DIASTOLIC BLOOD PRESSURE: 74 MMHG | WEIGHT: 295 LBS | SYSTOLIC BLOOD PRESSURE: 126 MMHG | HEIGHT: 71 IN | BODY MASS INDEX: 41.3 KG/M2 | RESPIRATION RATE: 16 BRPM | HEART RATE: 71 BPM | TEMPERATURE: 99 F

## 2022-09-08 DIAGNOSIS — I10 ESSENTIAL HYPERTENSION: ICD-10-CM

## 2022-09-08 DIAGNOSIS — M54.50 ACUTE LEFT-SIDED LOW BACK PAIN WITHOUT SCIATICA: ICD-10-CM

## 2022-09-08 DIAGNOSIS — I48.19 PERSISTENT ATRIAL FIBRILLATION (HCC): ICD-10-CM

## 2022-09-08 DIAGNOSIS — E78.2 MIXED HYPERLIPIDEMIA: ICD-10-CM

## 2022-09-08 DIAGNOSIS — Z23 NEED FOR PNEUMOCOCCAL VACCINATION: ICD-10-CM

## 2022-09-08 DIAGNOSIS — E11.3299 TYPE 2 DIABETES MELLITUS WITH MILD NONPROLIFERATIVE RETINOPATHY WITHOUT MACULAR EDEMA, WITH LONG-TERM CURRENT USE OF INSULIN, UNSPECIFIED LATERALITY (HCC): Primary | ICD-10-CM

## 2022-09-08 DIAGNOSIS — Z79.4 TYPE 2 DIABETES MELLITUS WITH MILD NONPROLIFERATIVE RETINOPATHY WITHOUT MACULAR EDEMA, WITH LONG-TERM CURRENT USE OF INSULIN, UNSPECIFIED LATERALITY (HCC): Primary | ICD-10-CM

## 2022-09-08 PROBLEM — C61 PROSTATE CANCER (HCC): Status: RESOLVED | Noted: 2017-03-21 | Resolved: 2022-09-08

## 2022-09-08 PROBLEM — K43.6 VENTRAL HERNIA WITH OBSTRUCTION AND WITHOUT GANGRENE: Status: RESOLVED | Noted: 2020-11-12 | Resolved: 2022-09-08

## 2022-09-08 PROBLEM — Z85.46 HISTORY OF PROSTATE CANCER: Status: ACTIVE | Noted: 2022-09-08

## 2022-09-08 PROCEDURE — 99214 OFFICE O/P EST MOD 30 MIN: CPT | Performed by: INTERNAL MEDICINE

## 2022-09-08 PROCEDURE — 3078F DIAST BP <80 MM HG: CPT | Performed by: INTERNAL MEDICINE

## 2022-09-08 PROCEDURE — 3008F BODY MASS INDEX DOCD: CPT | Performed by: INTERNAL MEDICINE

## 2022-09-08 PROCEDURE — 3074F SYST BP LT 130 MM HG: CPT | Performed by: INTERNAL MEDICINE

## 2022-09-08 PROCEDURE — 90471 IMMUNIZATION ADMIN: CPT | Performed by: INTERNAL MEDICINE

## 2022-09-08 PROCEDURE — 90677 PCV20 VACCINE IM: CPT | Performed by: INTERNAL MEDICINE

## 2022-09-08 RX ORDER — HYDROCODONE BITARTRATE AND ACETAMINOPHEN 7.5; 325 MG/1; MG/1
1 TABLET ORAL EVERY 8 HOURS PRN
Qty: 30 TABLET | Refills: 0 | Status: SHIPPED | OUTPATIENT
Start: 2022-09-08

## 2022-09-18 DIAGNOSIS — E78.2 MIXED HYPERLIPIDEMIA: ICD-10-CM

## 2022-09-19 DIAGNOSIS — E11.65 UNCONTROLLED TYPE 2 DIABETES MELLITUS WITH HYPERGLYCEMIA (HCC): ICD-10-CM

## 2022-09-19 RX ORDER — PRAVASTATIN SODIUM 40 MG
TABLET ORAL
Qty: 90 TABLET | Refills: 3 | Status: SHIPPED | OUTPATIENT
Start: 2022-09-19

## 2022-09-19 NOTE — TELEPHONE ENCOUNTER
Metformin 500 mg tab  Last time medication was refilled 6/13/22  Quantity and # of refills 180 tab   Last OV 9/8/22  Next OV no appt scheduled

## 2022-10-01 ENCOUNTER — PATIENT MESSAGE (OUTPATIENT)
Dept: INTERNAL MEDICINE CLINIC | Facility: CLINIC | Age: 62
End: 2022-10-01

## 2022-10-01 DIAGNOSIS — M54.50 ACUTE LEFT-SIDED LOW BACK PAIN WITHOUT SCIATICA: ICD-10-CM

## 2022-10-03 ENCOUNTER — PATIENT MESSAGE (OUTPATIENT)
Dept: INTERNAL MEDICINE CLINIC | Facility: CLINIC | Age: 62
End: 2022-10-03

## 2022-10-03 DIAGNOSIS — M54.50 ACUTE LEFT-SIDED LOW BACK PAIN WITHOUT SCIATICA: ICD-10-CM

## 2022-10-03 RX ORDER — HYDROCODONE BITARTRATE AND ACETAMINOPHEN 7.5; 325 MG/1; MG/1
1 TABLET ORAL EVERY 8 HOURS PRN
Qty: 21 TABLET | Refills: 0 | Status: SHIPPED | OUTPATIENT
Start: 2022-10-03

## 2022-10-03 NOTE — TELEPHONE ENCOUNTER
From: Estela Grahaming  To: Hannah Claire MD  Sent: 10/1/2022 11:36 PM CDT  Subject: I did something stupid    [place] now my back is jacked up. On my last prescription for norcor [facility] only filled for 7 days not the 10 it was written for due to push back from insurance.   Thanks in advance   [place]

## 2022-10-21 RX ORDER — HYDROCODONE BITARTRATE AND ACETAMINOPHEN 7.5; 325 MG/1; MG/1
1 TABLET ORAL EVERY 8 HOURS PRN
Qty: 21 TABLET | Refills: 0 | Status: SHIPPED | OUTPATIENT
Start: 2022-10-21

## 2022-10-21 NOTE — TELEPHONE ENCOUNTER
From: Jarrett Friedman  Sent: 10/21/2022 3:05 PM CDT  To: [facility] Clinical Staff  Subject: I did something stupid    Hi Johanna Plaza. .  Preparing for the winter I did it again, jacked my back again. . I have 2 tabs left from that last script. I will be taking them today.    Kyree Tobin

## 2022-11-07 DIAGNOSIS — I10 ESSENTIAL HYPERTENSION: ICD-10-CM

## 2022-11-07 RX ORDER — LOSARTAN POTASSIUM 100 MG/1
TABLET ORAL
Qty: 90 TABLET | Refills: 0 | Status: SHIPPED | OUTPATIENT
Start: 2022-11-07

## 2022-11-10 ENCOUNTER — TELEPHONE (OUTPATIENT)
Dept: INTERNAL MEDICINE CLINIC | Facility: CLINIC | Age: 62
End: 2022-11-10

## 2022-11-10 NOTE — TELEPHONE ENCOUNTER
Please complete form    [place] Persons [place] Certification for Permanent parking placard    Please mail completed document to patient   Self addressed stamp included    Call patient with any questions or concerns.

## 2022-11-18 ENCOUNTER — PATIENT MESSAGE (OUTPATIENT)
Dept: INTERNAL MEDICINE CLINIC | Facility: CLINIC | Age: 62
End: 2022-11-18

## 2022-11-18 DIAGNOSIS — M54.50 ACUTE LEFT-SIDED LOW BACK PAIN WITHOUT SCIATICA: ICD-10-CM

## 2022-11-18 RX ORDER — HYDROCODONE BITARTRATE AND ACETAMINOPHEN 7.5; 325 MG/1; MG/1
1 TABLET ORAL EVERY 8 HOURS PRN
Qty: 21 TABLET | Refills: 0 | OUTPATIENT
Start: 2022-11-18

## 2022-11-18 RX ORDER — HYDROCODONE BITARTRATE AND ACETAMINOPHEN 7.5; 325 MG/1; MG/1
1 TABLET ORAL EVERY 8 HOURS PRN
Qty: 21 TABLET | Refills: 0 | Status: SHIPPED | OUTPATIENT
Start: 2022-11-18

## 2022-11-18 NOTE — TELEPHONE ENCOUNTER
From: Julio Cesar Perez  To: Mary Lockhart MD  Sent: 11/18/2022 2:35 PM CST  Subject: Back pain    Hello. .  I am having back pain and this is what I have been getting. Thanks for asking. Clint Ramires

## 2022-11-18 NOTE — TELEPHONE ENCOUNTER
Last time medication was refilled 10/21/22  Quantity and # of refills 21/0  Last OV 9/8/22  Next OV none scheduled

## 2022-12-02 NOTE — TELEPHONE ENCOUNTER
Last refill was on 4/29/2019 for #60 and 3 refills. The pt was seen in the office yesterday. Rx called into retail. MEDICATIONS  (STANDING):  ascorbic acid 500 milliGRAM(s) Oral daily  benztropine 0.5 milliGRAM(s) Oral every 12 hours  cholecalciferol 2000 Unit(s) Oral daily  diazepam  Injectable 10 milliGRAM(s) IV Push every 6 hours  ferrous    sulfate 325 milliGRAM(s) Oral daily  haloperidol     Tablet 5 milliGRAM(s) Oral <User Schedule>  haloperidol     Tablet 10 milliGRAM(s) Oral <User Schedule>  heparin   Injectable 5000 Unit(s) SubCutaneous every 12 hours  lisinopril 2.5 milliGRAM(s) Oral daily  LORazepam     Tablet 1 milliGRAM(s) Oral three times a day  pantoprazole    Tablet 40 milliGRAM(s) Oral before breakfast  polyethylene glycol 3350 17 Gram(s) Oral daily  senna 2 Tablet(s) Oral at bedtime  sodium chloride 1 Gram(s) Oral three times a day  sodium chloride 0.9%. 1000 milliLiter(s) (100 mL/Hr) IV Continuous <Continuous>    MEDICATIONS  (PRN):  acetaminophen     Tablet .. 650 milliGRAM(s) Oral every 6 hours PRN Mild Pain (1 - 3), Moderate Pain (4 - 6)  guaiFENesin Oral Liquid (Sugar-Free) 200 milliGRAM(s) Oral every 6 hours PRN Cough  haloperidol     Tablet 5 milliGRAM(s) Oral every 6 hours PRN agitation  haloperidol    Injectable 5 milliGRAM(s) IntraMuscular every 6 hours PRN Agitation  LORazepam   Injectable 2 milliGRAM(s) IntraMuscular every 6 hours PRN Agitation

## 2022-12-08 ENCOUNTER — ANESTHESIA (OUTPATIENT)
Dept: ENDOSCOPY | Facility: HOSPITAL | Age: 62
End: 2022-12-08
Payer: COMMERCIAL

## 2022-12-08 ENCOUNTER — HOSPITAL ENCOUNTER (OUTPATIENT)
Facility: HOSPITAL | Age: 62
Setting detail: HOSPITAL OUTPATIENT SURGERY
Discharge: HOME | End: 2022-12-08
Attending: INTERNAL MEDICINE | Admitting: INTERNAL MEDICINE
Payer: COMMERCIAL

## 2022-12-08 ENCOUNTER — ANESTHESIA EVENT (OUTPATIENT)
Dept: ENDOSCOPY | Facility: HOSPITAL | Age: 62
End: 2022-12-08
Payer: COMMERCIAL

## 2022-12-08 VITALS
SYSTOLIC BLOOD PRESSURE: 99 MMHG | WEIGHT: 275 LBS | OXYGEN SATURATION: 100 % | RESPIRATION RATE: 18 BRPM | DIASTOLIC BLOOD PRESSURE: 85 MMHG | HEART RATE: 73 BPM | HEIGHT: 71 IN | BODY MASS INDEX: 38.5 KG/M2 | TEMPERATURE: 97 F

## 2022-12-08 DIAGNOSIS — Z86.010 PERSONAL HISTORY OF COLONIC POLYPS: ICD-10-CM

## 2022-12-08 LAB — GLUCOSE BLD-MCNC: 135 MG/DL (ref 70–99)

## 2022-12-08 PROCEDURE — 0DBN8ZX EXCISION OF SIGMOID COLON, VIA NATURAL OR ARTIFICIAL OPENING ENDOSCOPIC, DIAGNOSTIC: ICD-10-PCS | Performed by: INTERNAL MEDICINE

## 2022-12-08 PROCEDURE — 0DBM8ZX EXCISION OF DESCENDING COLON, VIA NATURAL OR ARTIFICIAL OPENING ENDOSCOPIC, DIAGNOSTIC: ICD-10-PCS | Performed by: INTERNAL MEDICINE

## 2022-12-08 PROCEDURE — 82962 GLUCOSE BLOOD TEST: CPT

## 2022-12-08 PROCEDURE — 88305 TISSUE EXAM BY PATHOLOGIST: CPT | Performed by: INTERNAL MEDICINE

## 2022-12-08 RX ORDER — SODIUM CHLORIDE, SODIUM LACTATE, POTASSIUM CHLORIDE, CALCIUM CHLORIDE 600; 310; 30; 20 MG/100ML; MG/100ML; MG/100ML; MG/100ML
INJECTION, SOLUTION INTRAVENOUS CONTINUOUS
Status: DISCONTINUED | OUTPATIENT
Start: 2022-12-08 | End: 2022-12-08

## 2022-12-08 RX ORDER — DEXTROSE MONOHYDRATE 25 G/50ML
50 INJECTION, SOLUTION INTRAVENOUS
Status: DISCONTINUED | OUTPATIENT
Start: 2022-12-08 | End: 2022-12-08

## 2022-12-08 RX ORDER — LIDOCAINE HYDROCHLORIDE 10 MG/ML
INJECTION, SOLUTION EPIDURAL; INFILTRATION; INTRACAUDAL; PERINEURAL AS NEEDED
Status: DISCONTINUED | OUTPATIENT
Start: 2022-12-08 | End: 2022-12-08 | Stop reason: SURG

## 2022-12-08 RX ORDER — NICOTINE POLACRILEX 4 MG
15 LOZENGE BUCCAL
Status: DISCONTINUED | OUTPATIENT
Start: 2022-12-08 | End: 2022-12-08

## 2022-12-08 RX ORDER — NICOTINE POLACRILEX 4 MG
30 LOZENGE BUCCAL
Status: DISCONTINUED | OUTPATIENT
Start: 2022-12-08 | End: 2022-12-08

## 2022-12-08 RX ADMIN — LIDOCAINE HYDROCHLORIDE 25 MG: 10 INJECTION, SOLUTION EPIDURAL; INFILTRATION; INTRACAUDAL; PERINEURAL at 12:01:00

## 2022-12-08 NOTE — DISCHARGE INSTRUCTIONS
Home Care Instructions for Colonoscopy with Sedation    Diet:  - Resume your regular diet as tolerated. - Start with light meals to minimize bloating.  - Do not drink alcohol today. Medication:  - If you have questions about resuming your normal medications, please contact your Primary Care Physician. Activities:  - Take it easy today. Do not return to work today. - Do not drive today. - Do not operate any machinery today (including kitchen equipment). Colonoscopy:  - You may notice some rectal \"spotting\" (a little blood on the toilet tissue) for a day or two after the exam. This is normal.  - If you experience any rectal bleeding (not spotting), persistent tenderness or sharp severe abdominal pains, oral temperature over 100 degrees Fahrenheit, light-headedness or dizziness, or any other problems, contact your doctor. **If unable to reach your doctor, please go to the [facility] Emergency Room**    - Your referring physician will receive a full report of your examination.  - If you do not hear from your doctor's office within two weeks of your biopsy, please call them for your results. You may be able to see your laboratory results in [facility] between 4 and 7 business days. In some cases, your physician may not have viewed the results before they are released to [address] If you have questions regarding your results contact the physician who ordered the test/exam by phone or via [address] by choosing \"Ask a Medical Question. \"

## 2022-12-08 NOTE — OPERATIVE REPORT
Mariaa Faith Patient Status:  Hospital Outpatient Surgery    1960 MRN [id number]   Location [id number] [place] [address] Attending Dylan Cardoso MD   Date 2022 PCP Joy Priest MD     PREOPERATIVE DIAGNOSIS/INDICATION: H/o polyps  POSTOPERTATIVE DIAGNOSIS: Polyps  PROCEDURE PERFORMED: COLONOSCOPY with biopsy  SEDATION: MAC sedation provided by General Anesthesia    TIME OUT WAS PERFORMED    INFORMED CONSENT: Risks, benefits and alternatives to the procedure were explained to the patient including but not limited to bleeding, infection, perforation, adverse drug reactions, pancreatitis and the need for hospitalization and surgery if this occurs, the patient understands and agrees to procedure. PROCEDURE DESCRIPTION: After careful digital rectal examination a pediatric colonoscope was introduced into the patients rectum, advanced pass the recto sigmoid junction, into the descending colon, splenic flexure, transverse colon, hepatic flexure, ascending colon, cecum, confirmed by landmarks, including the appendiceal orifice and ileocecal valve. Careful examination of the above described areas was performed on withdrawal of the endoscope. Retroflexion was performed on the rectum. The patient tolerated the procedure well, there were no immediate complication immediately following the procedure, and the patient was transferred to recovery in stable condition. QUALITY OF PREPARATION: [place] Bowel Preparation Scale:            -      Right colon 3, Transverse colon 3, Left colon 2   FINDINGS/THERAPEUTICS:  - COLON: 2 mm sessile descending colon polyp s/p excisional biopsy with cold forceps, 2 mm sessile sigmoid colon polyp s/p excisional biopsy with cold forceps    RECOMMENDATIONS:   - Post Colonoscopy/polypectomy precautions, watch for bleeding, infection, perforation, adverse drug reactions   - Follow biopsies.  - Repeat colonoscopy in 5 years.     Joey London MD  2022  12:30 PM

## 2022-12-08 NOTE — ANESTHESIA POSTPROCEDURE EVALUATION
Rashawn 81 Patient Status:  Hospital Outpatient Surgery   Age/Gender 63year old male MRN [id number]   Location [address] [address] Attending Chandrakant Rock MD   [facility] Day # 0 PCP Bridget Stone MD       Anesthesia Post-op Note    COLONOSCOPY with forcep polypectomy     Procedure Summary     Date: 12/08/22 Room / Location: [facility] ENDOSCOPY 02 / 1404 [facility] ENDOSCOPY    Anesthesia Start: 5571 Anesthesia Stop: 7323    Procedure: COLONOSCOPY with forcep polypectomy Diagnosis:       Personal history of colonic polyps      (polyps )    Surgeons: Chandrakant Rock MD Anesthesiologist: Tino Navas MD    Anesthesia Type: MAC ASA Status: 3          Anesthesia Type: MAC    Vitals Value Taken Time   /72 12/08/22 1237   Temp na 12/08/22 1238   Pulse 72 12/08/22 1238   Resp 16 12/08/22 1230   SpO2 95 % 12/08/22 1238   Vitals shown include unvalidated device data. Patient Location: Endoscopy    Anesthesia Type: MAC    Airway Patency: patent    Postop Pain Control: adequate    Mental Status: preanesthetic baseline    Nausea/Vomiting: none    Cardiopulmonary/Hydration status: stable euvolemic    Complications: no apparent anesthesia related complications    Dental Exam: Unchanged from Preop    Patient to be discharged home when criteria met.

## 2022-12-11 DIAGNOSIS — I10 ESSENTIAL HYPERTENSION: ICD-10-CM

## 2022-12-11 NOTE — PROGRESS NOTES
Date: 12/10/2022    To: Jojo Cherry  : 1960     I hope this letter finds you doing well. I am writing to inform you of the following: The biopsies obtained from your recent colonoscopy indicate that one of the polyps was adenomatous which, although benign (no evidence of cancer), are considered potentially pre-cancerous if they are left alone for many years, the second polyp was hyperplastic which is benign. They were removed at the time of your colonoscopy. I am advising you to undergo repeat colonoscopy in 5 years. We will send you a reminder card when the time of your procedure is near. Please call the office at [phone] if there are any questions.     Dakotah Carranza M.D.

## 2022-12-12 RX ORDER — CARVEDILOL 25 MG/1
TABLET ORAL
Qty: 180 TABLET | Refills: 0 | Status: SHIPPED | OUTPATIENT
Start: 2022-12-12

## 2022-12-12 RX ORDER — FUROSEMIDE 20 MG/1
TABLET ORAL
Qty: 90 TABLET | Refills: 1 | Status: SHIPPED | OUTPATIENT
Start: 2022-12-12

## 2022-12-21 DIAGNOSIS — E11.65 UNCONTROLLED TYPE 2 DIABETES MELLITUS WITH HYPERGLYCEMIA (HCC): ICD-10-CM

## 2022-12-21 DIAGNOSIS — I10 ESSENTIAL HYPERTENSION: ICD-10-CM

## 2022-12-21 RX ORDER — DILTIAZEM HYDROCHLORIDE 180 MG/1
CAPSULE, COATED, EXTENDED RELEASE ORAL
Qty: 90 CAPSULE | Refills: 0 | Status: SHIPPED | OUTPATIENT
Start: 2022-12-21

## 2022-12-22 NOTE — TELEPHONE ENCOUNTER
Metformin 500 mg   Last time medication was refilled 9/19/22  Quantity and # of refills 180 tab 1 refill  Last OV 9/8/22  Next OV no appt scheduled, due in March 2023 for six month f/u

## 2022-12-29 ENCOUNTER — LAB ENCOUNTER (OUTPATIENT)
Dept: LAB | Age: 62
End: 2022-12-29
Attending: NURSE PRACTITIONER
Payer: COMMERCIAL

## 2022-12-29 DIAGNOSIS — Z79.4 TYPE 2 DIABETES MELLITUS WITH MILD NONPROLIFERATIVE RETINOPATHY WITHOUT MACULAR EDEMA, WITH LONG-TERM CURRENT USE OF INSULIN, UNSPECIFIED LATERALITY (HCC): ICD-10-CM

## 2022-12-29 DIAGNOSIS — D44.7 PARAGANGLIOMA (HCC): ICD-10-CM

## 2022-12-29 DIAGNOSIS — E11.3299 TYPE 2 DIABETES MELLITUS WITH MILD NONPROLIFERATIVE RETINOPATHY WITHOUT MACULAR EDEMA, WITH LONG-TERM CURRENT USE OF INSULIN, UNSPECIFIED LATERALITY (HCC): ICD-10-CM

## 2022-12-29 LAB
EST. AVERAGE GLUCOSE BLD GHB EST-MCNC: 160 MG/DL (ref 68–126)
HBA1C MFR BLD: 7.2 % (ref ?–5.7)

## 2022-12-29 PROCEDURE — 83036 HEMOGLOBIN GLYCOSYLATED A1C: CPT

## 2022-12-29 PROCEDURE — 36415 COLL VENOUS BLD VENIPUNCTURE: CPT

## 2022-12-29 PROCEDURE — 83835 ASSAY OF METANEPHRINES: CPT

## 2023-01-03 LAB
METANEPHRINE: 0.13 NMOL/L
NORMETANEPHRINE: 0.76 NMOL/L

## 2023-01-18 ENCOUNTER — LAB ENCOUNTER (OUTPATIENT)
Dept: LAB | Age: 63
End: 2023-01-18
Attending: PHYSICIAN ASSISTANT
Payer: COMMERCIAL

## 2023-01-18 ENCOUNTER — OFFICE VISIT (OUTPATIENT)
Dept: INTERNAL MEDICINE CLINIC | Facility: CLINIC | Age: 63
End: 2023-01-18
Payer: COMMERCIAL

## 2023-01-18 VITALS
WEIGHT: 277 LBS | HEIGHT: 71 IN | DIASTOLIC BLOOD PRESSURE: 84 MMHG | OXYGEN SATURATION: 97 % | TEMPERATURE: 98 F | SYSTOLIC BLOOD PRESSURE: 136 MMHG | HEART RATE: 68 BPM | BODY MASS INDEX: 38.78 KG/M2 | RESPIRATION RATE: 16 BRPM

## 2023-01-18 DIAGNOSIS — E11.3299 TYPE 2 DIABETES MELLITUS WITH MILD NONPROLIFERATIVE RETINOPATHY WITHOUT MACULAR EDEMA, WITH LONG-TERM CURRENT USE OF INSULIN, UNSPECIFIED LATERALITY (HCC): ICD-10-CM

## 2023-01-18 DIAGNOSIS — Z00.00 ROUTINE PHYSICAL EXAMINATION: Primary | ICD-10-CM

## 2023-01-18 DIAGNOSIS — Z00.00 LABORATORY EXAM ORDERED AS PART OF ROUTINE GENERAL MEDICAL EXAMINATION: ICD-10-CM

## 2023-01-18 DIAGNOSIS — Z79.4 TYPE 2 DIABETES MELLITUS WITH MILD NONPROLIFERATIVE RETINOPATHY WITHOUT MACULAR EDEMA, WITH LONG-TERM CURRENT USE OF INSULIN, UNSPECIFIED LATERALITY (HCC): ICD-10-CM

## 2023-01-18 DIAGNOSIS — M54.50 ACUTE LEFT-SIDED LOW BACK PAIN WITHOUT SCIATICA: ICD-10-CM

## 2023-01-18 DIAGNOSIS — E11.69 DYSLIPIDEMIA ASSOCIATED WITH TYPE 2 DIABETES MELLITUS (HCC): ICD-10-CM

## 2023-01-18 DIAGNOSIS — E78.5 DYSLIPIDEMIA ASSOCIATED WITH TYPE 2 DIABETES MELLITUS (HCC): ICD-10-CM

## 2023-01-18 LAB
ALBUMIN SERPL-MCNC: 3.6 G/DL (ref 3.4–5)
ALBUMIN/GLOB SERPL: 1 {RATIO} (ref 1–2)
ALP LIVER SERPL-CCNC: 113 U/L
ALT SERPL-CCNC: 17 U/L
ANION GAP SERPL CALC-SCNC: 4 MMOL/L (ref 0–18)
AST SERPL-CCNC: 15 U/L (ref 15–37)
BASOPHILS # BLD AUTO: 0.03 X10(3) UL (ref 0–0.2)
BASOPHILS NFR BLD AUTO: 0.4 %
BILIRUB SERPL-MCNC: 0.9 MG/DL (ref 0.1–2)
BILIRUB UR QL STRIP.AUTO: NEGATIVE
BUN BLD-MCNC: 11 MG/DL (ref 7–18)
CALCIUM BLD-MCNC: 9 MG/DL (ref 8.5–10.1)
CHLORIDE SERPL-SCNC: 106 MMOL/L (ref 98–112)
CHOLEST SERPL-MCNC: 191 MG/DL (ref ?–200)
CLARITY UR REFRACT.AUTO: CLEAR
CO2 SERPL-SCNC: 28 MMOL/L (ref 21–32)
COLOR UR AUTO: YELLOW
COMPLEXED PSA SERPL-MCNC: 0.3 NG/ML (ref ?–4)
CREAT BLD-MCNC: 1.18 MG/DL
CREAT UR-SCNC: 41.3 MG/DL
EOSINOPHIL # BLD AUTO: 0.13 X10(3) UL (ref 0–0.7)
EOSINOPHIL NFR BLD AUTO: 1.9 %
ERYTHROCYTE [DISTWIDTH] IN BLOOD BY AUTOMATED COUNT: 14.9 %
FASTING PATIENT LIPID ANSWER: YES
FASTING STATUS PATIENT QL REPORTED: YES
GFR SERPLBLD BASED ON 1.73 SQ M-ARVRAT: 71 ML/MIN/1.73M2 (ref 60–?)
GLOBULIN PLAS-MCNC: 3.5 G/DL (ref 2.8–4.4)
GLUCOSE BLD-MCNC: 159 MG/DL (ref 70–99)
GLUCOSE UR STRIP.AUTO-MCNC: >=1000 MG/DL
HCT VFR BLD AUTO: 43.8 %
HDLC SERPL-MCNC: 56 MG/DL (ref 40–59)
HGB BLD-MCNC: 14.8 G/DL
IMM GRANULOCYTES # BLD AUTO: 0.02 X10(3) UL (ref 0–1)
IMM GRANULOCYTES NFR BLD: 0.3 %
KETONES UR STRIP.AUTO-MCNC: NEGATIVE MG/DL
LDLC SERPL CALC-MCNC: 121 MG/DL (ref ?–100)
LEUKOCYTE ESTERASE UR QL STRIP.AUTO: NEGATIVE
LYMPHOCYTES # BLD AUTO: 1.77 X10(3) UL (ref 1–4)
LYMPHOCYTES NFR BLD AUTO: 26.4 %
MCH RBC QN AUTO: 26.7 PG (ref 26–34)
MCHC RBC AUTO-ENTMCNC: 33.8 G/DL (ref 31–37)
MCV RBC AUTO: 78.9 FL
MICROALBUMIN UR-MCNC: 2.48 MG/DL
MICROALBUMIN/CREAT 24H UR-RTO: 60 UG/MG (ref ?–30)
MONOCYTES # BLD AUTO: 0.59 X10(3) UL (ref 0.1–1)
MONOCYTES NFR BLD AUTO: 8.8 %
NEUTROPHILS # BLD AUTO: 4.16 X10 (3) UL (ref 1.5–7.7)
NEUTROPHILS # BLD AUTO: 4.16 X10(3) UL (ref 1.5–7.7)
NEUTROPHILS NFR BLD AUTO: 62.2 %
NITRITE UR QL STRIP.AUTO: NEGATIVE
NONHDLC SERPL-MCNC: 135 MG/DL (ref ?–130)
OSMOLALITY SERPL CALC.SUM OF ELEC: 289 MOSM/KG (ref 275–295)
PH UR STRIP.AUTO: 6 [PH] (ref 5–8)
PLATELET # BLD AUTO: 224 10(3)UL (ref 150–450)
POTASSIUM SERPL-SCNC: 3.8 MMOL/L (ref 3.5–5.1)
PROT SERPL-MCNC: 7.1 G/DL (ref 6.4–8.2)
PROT UR STRIP.AUTO-MCNC: NEGATIVE MG/DL
RBC # BLD AUTO: 5.55 X10(6)UL
RBC UR QL AUTO: NEGATIVE
SODIUM SERPL-SCNC: 138 MMOL/L (ref 136–145)
SP GR UR STRIP.AUTO: 1.01 (ref 1–1.03)
TRIGL SERPL-MCNC: 79 MG/DL (ref 30–149)
TSI SER-ACNC: 1.81 MIU/ML (ref 0.36–3.74)
UROBILINOGEN UR STRIP.AUTO-MCNC: 0.2 MG/DL
VLDLC SERPL CALC-MCNC: 14 MG/DL (ref 0–30)
WBC # BLD AUTO: 6.7 X10(3) UL (ref 4–11)

## 2023-01-18 PROCEDURE — 3075F SYST BP GE 130 - 139MM HG: CPT | Performed by: PHYSICIAN ASSISTANT

## 2023-01-18 PROCEDURE — 80050 GENERAL HEALTH PANEL: CPT | Performed by: PHYSICIAN ASSISTANT

## 2023-01-18 PROCEDURE — 3060F POS MICROALBUMINURIA REV: CPT | Performed by: INTERNAL MEDICINE

## 2023-01-18 PROCEDURE — 99396 PREV VISIT EST AGE 40-64: CPT | Performed by: PHYSICIAN ASSISTANT

## 2023-01-18 PROCEDURE — 3061F NEG MICROALBUMINURIA REV: CPT | Performed by: INTERNAL MEDICINE

## 2023-01-18 PROCEDURE — 84153 ASSAY OF PSA TOTAL: CPT | Performed by: PHYSICIAN ASSISTANT

## 2023-01-18 PROCEDURE — 80061 LIPID PANEL: CPT | Performed by: PHYSICIAN ASSISTANT

## 2023-01-18 PROCEDURE — 81003 URINALYSIS AUTO W/O SCOPE: CPT | Performed by: PHYSICIAN ASSISTANT

## 2023-01-18 PROCEDURE — 3008F BODY MASS INDEX DOCD: CPT | Performed by: PHYSICIAN ASSISTANT

## 2023-01-18 PROCEDURE — 3079F DIAST BP 80-89 MM HG: CPT | Performed by: PHYSICIAN ASSISTANT

## 2023-01-18 PROCEDURE — 82043 UR ALBUMIN QUANTITATIVE: CPT | Performed by: PHYSICIAN ASSISTANT

## 2023-01-18 PROCEDURE — 82570 ASSAY OF URINE CREATININE: CPT | Performed by: PHYSICIAN ASSISTANT

## 2023-01-18 RX ORDER — HYDROXYZINE HYDROCHLORIDE 10 MG/1
10 TABLET, FILM COATED ORAL DAILY
COMMUNITY
Start: 2022-12-31

## 2023-01-18 RX ORDER — HYDROCODONE BITARTRATE AND ACETAMINOPHEN 7.5; 325 MG/1; MG/1
1 TABLET ORAL EVERY 8 HOURS PRN
Qty: 21 TABLET | Refills: 0 | Status: SHIPPED | OUTPATIENT
Start: 2023-01-18

## 2023-01-18 RX ORDER — BLOOD-GLUCOSE SENSOR
EACH MISCELLANEOUS
COMMUNITY
Start: 2022-12-29

## 2023-01-19 RX ORDER — PRAVASTATIN SODIUM 80 MG/1
80 TABLET ORAL NIGHTLY
Qty: 90 TABLET | Refills: 0 | Status: SHIPPED | OUTPATIENT
Start: 2023-01-19

## 2023-01-20 DIAGNOSIS — E66.01 MORBID OBESITY WITH BMI OF 45.0-49.9, ADULT (HCC): ICD-10-CM

## 2023-01-20 RX ORDER — PHENTERMINE HYDROCHLORIDE 30 MG/1
CAPSULE ORAL
Qty: 30 CAPSULE | Refills: 0 | Status: SHIPPED | OUTPATIENT
Start: 2023-01-20

## 2023-01-20 NOTE — TELEPHONE ENCOUNTER
PHENTERMINE 30 MG ORAL CAP  Last time medication was refilled 10/02/22, patient interested in restarting medication  Quantity and # of refills 30 tab 0 refill  Last OV 1/18/23  Next OV 2/8/23 for med f/u and BP check

## 2023-02-06 ENCOUNTER — HOSPITAL ENCOUNTER (OUTPATIENT)
Dept: GENERAL RADIOLOGY | Facility: HOSPITAL | Age: 63
Discharge: HOME | End: 2023-02-06
Attending: NURSE PRACTITIONER
Payer: COMMERCIAL

## 2023-02-06 ENCOUNTER — OFFICE VISIT (OUTPATIENT)
Dept: PAIN CLINIC | Facility: CLINIC | Age: 63
End: 2023-02-06
Payer: COMMERCIAL

## 2023-02-06 VITALS — HEART RATE: 81 BPM | OXYGEN SATURATION: 97 % | SYSTOLIC BLOOD PRESSURE: 120 MMHG | DIASTOLIC BLOOD PRESSURE: 80 MMHG

## 2023-02-06 DIAGNOSIS — M51.36 DEGENERATIVE LUMBAR DISC: ICD-10-CM

## 2023-02-06 DIAGNOSIS — M51.36 DEGENERATIVE LUMBAR DISC: Primary | ICD-10-CM

## 2023-02-06 DIAGNOSIS — M54.50 ACUTE LEFT-SIDED LOW BACK PAIN WITHOUT SCIATICA: ICD-10-CM

## 2023-02-06 DIAGNOSIS — I10 ESSENTIAL HYPERTENSION: ICD-10-CM

## 2023-02-06 PROCEDURE — 3079F DIAST BP 80-89 MM HG: CPT | Performed by: NURSE PRACTITIONER

## 2023-02-06 PROCEDURE — 3074F SYST BP LT 130 MM HG: CPT | Performed by: NURSE PRACTITIONER

## 2023-02-06 PROCEDURE — 99214 OFFICE O/P EST MOD 30 MIN: CPT | Performed by: NURSE PRACTITIONER

## 2023-02-06 PROCEDURE — 72114 X-RAY EXAM L-S SPINE BENDING: CPT | Performed by: NURSE PRACTITIONER

## 2023-02-06 RX ORDER — LOSARTAN POTASSIUM 100 MG/1
TABLET ORAL
Qty: 90 TABLET | Refills: 3 | Status: SHIPPED | OUTPATIENT
Start: 2023-02-06

## 2023-02-06 RX ORDER — METHYLPREDNISOLONE 4 MG/1
TABLET ORAL
Qty: 1 EACH | Refills: 0 | Status: SHIPPED | OUTPATIENT
Start: 2023-02-06

## 2023-02-06 RX ORDER — HYDROCODONE BITARTRATE AND ACETAMINOPHEN 7.5; 325 MG/1; MG/1
1 TABLET ORAL EVERY 8 HOURS PRN
Qty: 21 TABLET | Refills: 0 | Status: SHIPPED | OUTPATIENT
Start: 2023-02-06

## 2023-02-06 NOTE — PROGRESS NOTES
Patient presents in office today with reported pain in low back    Current pain level reported = 8/10    Last reported dose of norco last pm       Narcotic Contract renewal na    Urine Drug screen na

## 2023-02-07 ENCOUNTER — TELEPHONE (OUTPATIENT)
Dept: PAIN CLINIC | Facility: CLINIC | Age: 63
End: 2023-02-07

## 2023-02-07 NOTE — TELEPHONE ENCOUNTER
Prior authorization request completed for: Bilateral L3-L4, L4-L5 facet injection   Authorization # NO PRIOR AUTHORIZATION REQUIRED  Authorization dates: N/A  CPT codes approved: 53044 / 95205   Number of visits/dates of service approved: 1  Physician: Dr. [facility]   Location: [place] and spoke with Antonette Sharma who informed me these codes are valid and billable with no predetermination or PA needed     Call Ref#: [id number]  Representative Name: Merlin Bleacher    Patient can be scheduled. Routed to Navigator.

## 2023-02-07 NOTE — TELEPHONE ENCOUNTER
Medical Clearance faxed to [address] [place] [address] at Fax #: [phone];[email or web address] r'cvd    23    RE: Belynda Dakins    : 1960    Dear Dr. Troy Jackson,     Your patient is being scheduled for a pain management procedure at [facility].    Procedure:  Bilateral L3/4,L4/5 Facet Injection  Date of Procedure: TBD  Physician: Mario Carroll- Anesthesiologist     Your patient is currently taking Xarelto.  usually recommends the medication be held for 3 days prior to injection. Please verify patient is cleared to proceed with pain management procedures.

## 2023-02-07 NOTE — TELEPHONE ENCOUNTER
Question Answer Comment   Anesthesia Type Sedation    Provider [facility]    Procedure Facet    Laterality/Level BILATERAL L3/4, L4/5 FACET INJECTIONS    CPT (Hit enter after each entry) INJ PARAVERT F JNT L/S 2 LEV    Medical clearance requested (will send to Pain Navigator) Yes Bean Wallace   Patient has Medicare coverage? No    Comments (Please list entire procedure name here.) PER DR. TAVERA DO BILATERAL DUE TO AFIB AND XARELTO/REDUCE TIME OFF ANTICOAG

## 2023-02-13 NOTE — TELEPHONE ENCOUNTER
Medical Clearance re faxed to [address] at Fax #: [phone];[email or web address] r'cvd    *2nd Request

## 2023-02-14 NOTE — TELEPHONE ENCOUNTER
From: Sallie Guzman  To: Fran Rankin MD  Sent: 2/14/2023 12:04 PM CST  Subject: CT scan    Hi. .  I have this scheduled for Thursday. Can you send a script for Valium. .  Thanks   Patricia Eaton

## 2023-02-16 ENCOUNTER — HOSPITAL ENCOUNTER (OUTPATIENT)
Dept: CT IMAGING | Age: 63
Discharge: HOME | End: 2023-02-16
Attending: PHYSICIAN ASSISTANT
Payer: COMMERCIAL

## 2023-02-16 DIAGNOSIS — D44.7 PARAGANGLIOMA (HCC): ICD-10-CM

## 2023-02-16 PROCEDURE — 71260 CT THORAX DX C+: CPT | Performed by: PHYSICIAN ASSISTANT

## 2023-02-16 PROCEDURE — 74177 CT ABD & PELVIS W/CONTRAST: CPT | Performed by: PHYSICIAN ASSISTANT

## 2023-02-22 RX ORDER — PRAVASTATIN SODIUM 80 MG/1
80 TABLET ORAL NIGHTLY
Qty: 90 TABLET | Refills: 0 | Status: SHIPPED | OUTPATIENT
Start: 2023-02-22

## 2023-02-22 NOTE — TELEPHONE ENCOUNTER
Medication requested: Pravastatin   Dose: 80mg    Is patient requesting 30 or 90 day supply:  90    Pharmacy name/location:  [address], [place] [phone], [phone]    Is the patient due for appointment: no (if so, please schedule)    Additional notes:    Future Appointments   Date Time Provider Quincy Esteban   3/1/2023 12:45 Jean Coy MD [facility] Surg/Onc   7/19/2023  1:00 PM Elisa Ceron PA-C [facility] [address]

## 2023-02-27 ENCOUNTER — PATIENT MESSAGE (OUTPATIENT)
Dept: INTERNAL MEDICINE CLINIC | Facility: CLINIC | Age: 63
End: 2023-02-27

## 2023-02-27 DIAGNOSIS — M47.816 LUMBAR FACET ARTHROPATHY: Primary | ICD-10-CM

## 2023-02-28 NOTE — TELEPHONE ENCOUNTER
From: Dickey Litten  To: Myra Schaeffer MD  Sent: 2/27/2023 7:05 PM CST  Subject: Dr Belia Shelby day. . can you refer me to the Dr in Dr Covarrubias Massed office since he has moved on

## 2023-03-01 ENCOUNTER — OFFICE VISIT (OUTPATIENT)
Dept: SURGERY | Facility: CLINIC | Age: 63
End: 2023-03-01
Payer: COMMERCIAL

## 2023-03-01 VITALS
SYSTOLIC BLOOD PRESSURE: 135 MMHG | DIASTOLIC BLOOD PRESSURE: 86 MMHG | WEIGHT: 275 LBS | BODY MASS INDEX: 38 KG/M2 | OXYGEN SATURATION: 96 % | HEART RATE: 77 BPM | TEMPERATURE: 98 F | RESPIRATION RATE: 16 BRPM

## 2023-03-01 DIAGNOSIS — D44.7 PARAGANGLIOMA (HCC): Primary | ICD-10-CM

## 2023-03-01 PROCEDURE — 3075F SYST BP GE 130 - 139MM HG: CPT | Performed by: SURGERY

## 2023-03-01 PROCEDURE — 3079F DIAST BP 80-89 MM HG: CPT | Performed by: SURGERY

## 2023-03-01 PROCEDURE — 99213 OFFICE O/P EST LOW 20 MIN: CPT | Performed by: SURGERY

## 2023-03-06 ENCOUNTER — PATIENT MESSAGE (OUTPATIENT)
Dept: INTERNAL MEDICINE CLINIC | Facility: CLINIC | Age: 63
End: 2023-03-06

## 2023-03-06 LAB — AMB EXT COVID-19 RESULT: DETECTED

## 2023-03-07 ENCOUNTER — TELEMEDICINE (OUTPATIENT)
Dept: INTERNAL MEDICINE CLINIC | Facility: CLINIC | Age: 63
End: 2023-03-07
Payer: COMMERCIAL

## 2023-03-07 DIAGNOSIS — U07.1 COVID-19: Primary | ICD-10-CM

## 2023-03-07 PROBLEM — Z01.810 ENCOUNTER FOR PRE-OPERATIVE CARDIOVASCULAR CLEARANCE: Status: RESOLVED | Noted: 2017-10-17 | Resolved: 2023-03-07

## 2023-03-07 PROCEDURE — 99214 OFFICE O/P EST MOD 30 MIN: CPT | Performed by: PHYSICIAN ASSISTANT

## 2023-03-07 NOTE — TELEPHONE ENCOUNTER
From: Estela Grahaming  To: Hannah Claire MD  Sent: 3/6/2023 6:56 PM CST  Subject: Covid    Well I finally caught it. Bijan Jensen

## 2023-03-13 DIAGNOSIS — I10 ESSENTIAL HYPERTENSION: ICD-10-CM

## 2023-03-13 RX ORDER — CARVEDILOL 25 MG/1
TABLET ORAL
Qty: 180 TABLET | Refills: 0 | Status: SHIPPED | OUTPATIENT
Start: 2023-03-13

## 2023-03-14 DIAGNOSIS — I10 ESSENTIAL HYPERTENSION: ICD-10-CM

## 2023-03-15 RX ORDER — DILTIAZEM HYDROCHLORIDE 180 MG/1
CAPSULE, COATED, EXTENDED RELEASE ORAL
Qty: 90 CAPSULE | Refills: 1 | Status: SHIPPED | OUTPATIENT
Start: 2023-03-15

## 2023-03-15 RX ORDER — METAXALONE 800 MG/1
TABLET ORAL
Qty: 30 TABLET | Refills: 35 | OUTPATIENT
Start: 2023-03-15

## 2023-03-21 ENCOUNTER — APPOINTMENT (OUTPATIENT)
Dept: GENERAL RADIOLOGY | Facility: HOSPITAL | Age: 63
End: 2023-03-21
Attending: ANESTHESIOLOGY
Payer: COMMERCIAL

## 2023-03-21 ENCOUNTER — HOSPITAL ENCOUNTER (OUTPATIENT)
Facility: HOSPITAL | Age: 63
Setting detail: HOSPITAL OUTPATIENT SURGERY
Discharge: HOME | End: 2023-03-21
Attending: ANESTHESIOLOGY | Admitting: ANESTHESIOLOGY
Payer: COMMERCIAL

## 2023-03-21 VITALS
HEART RATE: 82 BPM | HEIGHT: 71 IN | DIASTOLIC BLOOD PRESSURE: 86 MMHG | OXYGEN SATURATION: 94 % | WEIGHT: 277 LBS | BODY MASS INDEX: 38.78 KG/M2 | SYSTOLIC BLOOD PRESSURE: 137 MMHG | RESPIRATION RATE: 18 BRPM | TEMPERATURE: 99 F

## 2023-03-21 LAB — GLUCOSE BLD-MCNC: 131 MG/DL (ref 70–99)

## 2023-03-21 PROCEDURE — 3E0U33Z INTRODUCTION OF ANTI-INFLAMMATORY INTO JOINTS, PERCUTANEOUS APPROACH: ICD-10-PCS | Performed by: ANESTHESIOLOGY

## 2023-03-21 PROCEDURE — 99152 MOD SED SAME PHYS/QHP 5/>YRS: CPT | Performed by: ANESTHESIOLOGY

## 2023-03-21 PROCEDURE — 64493 INJ PARAVERT F JNT L/S 1 LEV: CPT | Performed by: ANESTHESIOLOGY

## 2023-03-21 PROCEDURE — 3E0U3BZ INTRODUCTION OF ANESTHETIC AGENT INTO JOINTS, PERCUTANEOUS APPROACH: ICD-10-PCS | Performed by: ANESTHESIOLOGY

## 2023-03-21 PROCEDURE — 64494 INJ PARAVERT F JNT L/S 2 LEV: CPT | Performed by: ANESTHESIOLOGY

## 2023-03-21 RX ORDER — INSULIN ASPART 100 [IU]/ML
3 INJECTION, SOLUTION INTRAVENOUS; SUBCUTANEOUS ONCE
Status: DISCONTINUED | OUTPATIENT
Start: 2023-03-21 | End: 2023-03-21

## 2023-03-21 RX ORDER — METHYLPREDNISOLONE ACETATE 40 MG/ML
INJECTION, SUSPENSION INTRA-ARTICULAR; INTRALESIONAL; INTRAMUSCULAR; SOFT TISSUE
Status: DISCONTINUED | OUTPATIENT
Start: 2023-03-21 | End: 2023-03-21

## 2023-03-21 RX ORDER — LIDOCAINE HYDROCHLORIDE 10 MG/ML
INJECTION, SOLUTION EPIDURAL; INFILTRATION; INTRACAUDAL; PERINEURAL
Status: DISCONTINUED | OUTPATIENT
Start: 2023-03-21 | End: 2023-03-21

## 2023-03-21 RX ORDER — ONDANSETRON 2 MG/ML
4 INJECTION INTRAMUSCULAR; INTRAVENOUS ONCE AS NEEDED
Status: DISCONTINUED | OUTPATIENT
Start: 2023-03-21 | End: 2023-03-21

## 2023-03-21 RX ORDER — DIPHENHYDRAMINE HYDROCHLORIDE 50 MG/ML
50 INJECTION INTRAMUSCULAR; INTRAVENOUS ONCE AS NEEDED
Status: DISCONTINUED | OUTPATIENT
Start: 2023-03-21 | End: 2023-03-21

## 2023-03-21 RX ORDER — NICOTINE POLACRILEX 4 MG
30 LOZENGE BUCCAL
Status: DISCONTINUED | OUTPATIENT
Start: 2023-03-21 | End: 2023-03-21

## 2023-03-21 RX ORDER — DEXTROSE MONOHYDRATE 25 G/50ML
50 INJECTION, SOLUTION INTRAVENOUS
Status: DISCONTINUED | OUTPATIENT
Start: 2023-03-21 | End: 2023-03-21

## 2023-03-21 RX ORDER — NICOTINE POLACRILEX 4 MG
15 LOZENGE BUCCAL
Status: DISCONTINUED | OUTPATIENT
Start: 2023-03-21 | End: 2023-03-21

## 2023-03-21 RX ORDER — SODIUM CHLORIDE, SODIUM LACTATE, POTASSIUM CHLORIDE, CALCIUM CHLORIDE 600; 310; 30; 20 MG/100ML; MG/100ML; MG/100ML; MG/100ML
100 INJECTION, SOLUTION INTRAVENOUS CONTINUOUS
Status: DISCONTINUED | OUTPATIENT
Start: 2023-03-21 | End: 2023-03-21

## 2023-03-21 RX ORDER — MIDAZOLAM HYDROCHLORIDE 1 MG/ML
INJECTION INTRAMUSCULAR; INTRAVENOUS
Status: DISCONTINUED | OUTPATIENT
Start: 2023-03-21 | End: 2023-03-21

## 2023-03-21 RX ORDER — BUPIVACAINE HYDROCHLORIDE 5 MG/ML
INJECTION, SOLUTION EPIDURAL; INTRACAUDAL
Status: DISCONTINUED | OUTPATIENT
Start: 2023-03-21 | End: 2023-03-21

## 2023-03-21 NOTE — OR NURSING
Patient was observed for one hour due to simon.  Patient had no respiratory issues dueibg poat-op recovery

## 2023-03-21 NOTE — OPERATIVE REPORT
[facility]  Operative Report  3/21/2023     Lis Xiong Patient Status:  Hospital Outpatient Surgery    1960 MRN [id number]   Location [address] [address] Attending Breana Mazariegos MD   Hosp Day # 0 PCP Geraldine Kenny MD     Indication: Sherman Smith is a 63year old male with low back pain and lumbar facet arthropathy    Preoperative Diagnosis:  DDD (degenerative disc disease), lumbar [M51.36]    Postoperative Diagnosis: Same as above. Procedure performed: BILATERAL LUMBAR 3-4, 4-5  FACET INJECTION BILATERAL with sedation     Anesthesia: Local and IV Sedation. EBL: Less than 1 ml. Procedure Description:   After reviewing the patient's history and performing a focused physical examination, the diagnosis was confirmed and contraindications such as infection and coagulopathy were ruled out. Following review of potential side effects and complications, including but not necessarily limited to infection, allergic reaction, local tissue breakdown, nerve injury, and paresis, the patient indicated they understood and agreed to proceed. After obtaining the informed consent, the patient was brought to the procedure room and monitored. Per my order and under my supervision, the patient was sedated with intermittent intravenous doses of versed and fentanyl. The vital signs were monitored and recorded by an experienced RN. The procedure started after the patient was adequately sedated. The moderate intravenous conscious sedation was provided for 15 minutes. In the prone position, following sterile prep and drape of the lumbar region, the corresponding facet joints were identified under fluoroscopy. The skin was anesthetized via 25-gauge 1.5\" needle with approximately 2 mL of 1% lidocaine. A 22-gauge 3.5\" Quincke spinal needle was introduced and advanced into the left L3 and L4 levels atraumatically under fluoroscopic guidance.   Following negative aspiration for CSF and blood, approximately 1 mL of 1% lidocaine with 10 mg of methylprednisolone was injected into each joint without complication. The needle was withdrawn with stylet in situ after being flushed with 0.5 mL lidocaine. The contralateral injections were performed in the similar fashion. The patient tolerated procedure very well. The patient was observed until discharge criteria met. Discharge instructions were given and patient was released to a responsible adult. Complications: None. Follow up: The patient was followed in the pain clinic as needed basis.       Nannette Brumfield MD

## 2023-03-21 NOTE — DISCHARGE INSTRUCTIONS
You have been given medication that makes you sleepy. This may have included both pain medication and sleeping medication. Most of the effects have worn off but you may still have some drowsiness for the next 6 to 8 hours. Home Care  Follow these guidelines when you get home:    --Don't drink any alcohol for the next 24 hours. --Don't drive, operate dangerous machinery, make important business or personal    decisions, or sign legal documents during the next 24 hours.     Follow Up Care  Follow up with your healthcare provider if you are not alert and back to your usual level of activity within 12 hours    When to seek medical advice  Call your healthcare provider right away if any of these occur:    --Drowsiness that gets worse  --Weakness or dizziness that gets worse  --Repeated vomiting  --You can not be awakened

## 2023-03-22 ENCOUNTER — TELEPHONE (OUTPATIENT)
Dept: PAIN CLINIC | Facility: CLINIC | Age: 63
End: 2023-03-22

## 2023-04-04 ENCOUNTER — PATIENT MESSAGE (OUTPATIENT)
Dept: PAIN CLINIC | Facility: CLINIC | Age: 63
End: 2023-04-04

## 2023-04-04 ENCOUNTER — OFFICE VISIT (OUTPATIENT)
Dept: PAIN CLINIC | Facility: CLINIC | Age: 63
End: 2023-04-04
Payer: COMMERCIAL

## 2023-04-04 ENCOUNTER — MED REC SCAN ONLY (OUTPATIENT)
Dept: PAIN CLINIC | Facility: CLINIC | Age: 63
End: 2023-04-04

## 2023-04-04 ENCOUNTER — LAB ENCOUNTER (OUTPATIENT)
Dept: LAB | Age: 63
End: 2023-04-04
Attending: NURSE PRACTITIONER
Payer: COMMERCIAL

## 2023-04-04 ENCOUNTER — TELEPHONE (OUTPATIENT)
Dept: PAIN CLINIC | Facility: CLINIC | Age: 63
End: 2023-04-04

## 2023-04-04 ENCOUNTER — PATIENT MESSAGE (OUTPATIENT)
Dept: INTERNAL MEDICINE CLINIC | Facility: CLINIC | Age: 63
End: 2023-04-04

## 2023-04-04 VITALS — HEART RATE: 48 BPM | SYSTOLIC BLOOD PRESSURE: 116 MMHG | DIASTOLIC BLOOD PRESSURE: 76 MMHG | OXYGEN SATURATION: 98 %

## 2023-04-04 DIAGNOSIS — M79.18 MYOFASCIAL PAIN SYNDROME OF LUMBAR SPINE: ICD-10-CM

## 2023-04-04 DIAGNOSIS — M47.816 LUMBAR SPONDYLOSIS: ICD-10-CM

## 2023-04-04 DIAGNOSIS — M54.16 LUMBAR RADICULOPATHY: ICD-10-CM

## 2023-04-04 DIAGNOSIS — G89.29 OTHER CHRONIC PAIN: Primary | ICD-10-CM

## 2023-04-04 DIAGNOSIS — G89.29 OTHER CHRONIC PAIN: ICD-10-CM

## 2023-04-04 PROCEDURE — 80367 DRUG SCREENING PROPOXYPHENE: CPT

## 2023-04-04 PROCEDURE — 80377 DRUG/SUBSTANCE NOS 7/MORE: CPT

## 2023-04-04 PROCEDURE — 80370 SKEL MUSC RELAXANT 3 OR MORE: CPT

## 2023-04-04 PROCEDURE — 3078F DIAST BP <80 MM HG: CPT | Performed by: NURSE PRACTITIONER

## 2023-04-04 PROCEDURE — 80361 OPIATES 1 OR MORE: CPT

## 2023-04-04 PROCEDURE — 80368 SEDATIVE HYPNOTICS: CPT

## 2023-04-04 PROCEDURE — 80359 METHYLENEDIOXYAMPHETAMINES: CPT

## 2023-04-04 PROCEDURE — 99214 OFFICE O/P EST MOD 30 MIN: CPT | Performed by: NURSE PRACTITIONER

## 2023-04-04 PROCEDURE — 80326 AMPHETAMINES 5 OR MORE: CPT

## 2023-04-04 PROCEDURE — 3074F SYST BP LT 130 MM HG: CPT | Performed by: NURSE PRACTITIONER

## 2023-04-04 PROCEDURE — 80358 DRUG SCREENING METHADONE: CPT

## 2023-04-04 PROCEDURE — 80357 KETAMINE AND NORKETAMINE: CPT

## 2023-04-04 PROCEDURE — 83992 ASSAY FOR PHENCYCLIDINE: CPT

## 2023-04-04 PROCEDURE — 80348 DRUG SCREENING BUPRENORPHINE: CPT

## 2023-04-04 PROCEDURE — 80366 DRUG SCREENING PREGABALIN: CPT

## 2023-04-04 PROCEDURE — 80353 DRUG SCREENING COCAINE: CPT

## 2023-04-04 PROCEDURE — 80338 ANTIDEPRESSANT NOT SPECIFIED: CPT

## 2023-04-04 PROCEDURE — 80347 BENZODIAZEPINES 13 OR MORE: CPT

## 2023-04-04 PROCEDURE — 80307 DRUG TEST PRSMV CHEM ANLYZR: CPT

## 2023-04-04 RX ORDER — DIAZEPAM 5 MG/1
TABLET ORAL
Qty: 2 TABLET | Refills: 0 | Status: SHIPPED | OUTPATIENT
Start: 2023-04-04

## 2023-04-04 RX ORDER — CYCLOBENZAPRINE HCL 10 MG
10 TABLET ORAL 2 TIMES DAILY PRN
Qty: 20 TABLET | Refills: 0 | Status: SHIPPED | OUTPATIENT
Start: 2023-04-04

## 2023-04-04 RX ORDER — HYDROCODONE BITARTRATE AND ACETAMINOPHEN 7.5; 325 MG/1; MG/1
1 TABLET ORAL DAILY PRN
Qty: 20 TABLET | Refills: 0 | Status: SHIPPED | OUTPATIENT
Start: 2023-04-04

## 2023-04-04 NOTE — TELEPHONE ENCOUNTER
From: Kuldip Gloria  To: Shanti Epstein MD  Sent: 4/4/2023 3:50 PM CDT  Subject: Upcoming MRI    Well i have a scheduled MRI 4-28. . can you prescribe vallum prior to visit.

## 2023-04-04 NOTE — TELEPHONE ENCOUNTER
From: JARVIS Huff  To: Kenyon Borja  Sent: 4/4/2023 2:14 PM CDT  Subject: LUMBAR MRI    Hi Rio,  I did place an order for lumbar MRI so if you can have that done that would help.   Thanks  ALICIA

## 2023-04-04 NOTE — TELEPHONE ENCOUNTER
Medical Clearance faxed to [address] [place] [address] at Fax #: [phone];[email or web address] r'cvd    23    RE: Gladis Sheehan    : 1960    Dear Dr. Whitney Dumont,    Your patient is being scheduled for a pain management procedure at [facility].    Procedure:  Bilateral L3/4,L4/5 Radiofrequency Ablation   Date of Procedure: TBD  Physician: Rush Byrne- Anesthesiologist     Your patient is currently taking Xarelto.  usually recommends the medication be held for 3 days prior to injection. Please verify patient is cleared to proceed with pain management procedures.

## 2023-04-04 NOTE — TELEPHONE ENCOUNTER
Question Answer Comment   Anesthesia Type Local    Provider [facility]    Location Office    Procedure Other (please add comment) LEFT LUMBAR PARASPINAL TPI   CPT (Hit enter after each entry) INJECTION; SINGLE/MULTIPLE TRIGGER POINT(S), 1/2 MUSCLE    Medical clearance requested (will send to Pain Navigator) Yes XARELTO   Patient has Medicare coverage?  No

## 2023-04-05 ENCOUNTER — TELEPHONE (OUTPATIENT)
Dept: PAIN CLINIC | Facility: CLINIC | Age: 63
End: 2023-04-05

## 2023-04-05 NOTE — TELEPHONE ENCOUNTER
Prior authorization request completed for: Left Lumbar Paraspinal TPI    Authorization #No Prior Authorization/Nor Pre Determination is Required   -No Exclusions   Spoke with Priscilla Vaz at [facility] [phone]  Call Reference #: [id number]  time: 11:00    Authorization dates: n/a   CPT codes approved: 40586,62039  Number of visits/dates of service approved: 1  Physician: Kylie Gayle   Location: In Office         Patient can be scheduled. Routed to Navigator.

## 2023-04-05 NOTE — TELEPHONE ENCOUNTER
Patient called and mentioned that he only received 7 tablets of the HYDROcodone-acetaminophen 7.5-325 MG Oral Tab instead of the 20 tablets. Please advise.

## 2023-04-05 NOTE — TELEPHONE ENCOUNTER
Fax rcv'd indicating that PA is required. Patient ID #: [id number]    Contacted [employer] to initiate PA. Spoke to Jose Manuel, who states she will fax paperwork over.

## 2023-04-05 NOTE — TELEPHONE ENCOUNTER
Prior authorization request completed for: Bilateral L3/4,L4/5 RFA   Authorization #No Prior Authorization/Nor Pre Determination is Required   -No Exclusions   Spoke with Souleymane Linder at [facility] [phone]  Call Reference #: [id number]  time: 11:00     Authorization dates: n/a   CPT codes approved: 94950,93024  Number of visits/dates of service approved: 1  Physician: Marcela Mello   Location: [facility]     Patient can be scheduled. Routed to Navigator.

## 2023-04-07 NOTE — TELEPHONE ENCOUNTER
Pt calling in stating that his pharmacy is still only allowing him to have a week's worth of tablets and is inquiring about the status of the prior authorization for his prescription.

## 2023-04-07 NOTE — TELEPHONE ENCOUNTER
Contacted pt to advise that paperwork is complete, but needs to be signed by Gabriel Wilkins. Further advised that, depending on the ins plan, it can take between 24 and 72 hrs to rcv a response. Blaine MORELAND.

## 2023-04-10 ENCOUNTER — TELEPHONE (OUTPATIENT)
Dept: PAIN CLINIC | Facility: CLINIC | Age: 63
End: 2023-04-10

## 2023-04-10 ENCOUNTER — PATIENT MESSAGE (OUTPATIENT)
Dept: PAIN CLINIC | Facility: CLINIC | Age: 63
End: 2023-04-10

## 2023-04-11 NOTE — TELEPHONE ENCOUNTER
From: JARVIS Webster  To: Judit Matute  Sent: 4/10/2023 3:02 PM CDT  Subject: Urine drug screen    Hi Rio,  Based on the amount of THC in the urine it seems that you are using this quite a bit and I am not comfortable prescribing opioid medications, which is in essence like prescribing morphine on top of that. The risk of sedation, overdose and death can occur. Also, opioid pain medications were not part of a long term plan of care for your pain. As we discussed they lose their effectiveness the more you take them and are best kept for severe pain on an as needed basis. Are you indicating that you need daily opioids to manage your every day activities? I know you are on the blood thinners therefore nsaids are not recommended due to increased risk of bleeding, but tylenol for mild pain, exercise, heat, stretching and weight modification are the first line of defense for ongoing back pain. I know you are also following up with the spine surgeon and having an updated MRI so we can certainly wait to see what that shows as well to determine the next best treatment options. Please let me   know.     Thank you,  Brinda Veras

## 2023-04-15 DIAGNOSIS — E66.01 MORBID OBESITY WITH BMI OF 45.0-49.9, ADULT (HCC): ICD-10-CM

## 2023-04-16 RX ORDER — PHENTERMINE HYDROCHLORIDE 30 MG/1
30 CAPSULE ORAL EVERY MORNING
Qty: 30 CAPSULE | Refills: 0 | Status: SHIPPED | OUTPATIENT
Start: 2023-04-16

## 2023-04-19 ENCOUNTER — NURSE ONLY (OUTPATIENT)
Dept: PAIN CLINIC | Facility: CLINIC | Age: 63
End: 2023-04-19
Payer: COMMERCIAL

## 2023-04-19 ENCOUNTER — OFFICE VISIT (OUTPATIENT)
Dept: PAIN CLINIC | Facility: CLINIC | Age: 63
End: 2023-04-19
Payer: COMMERCIAL

## 2023-04-19 VITALS — OXYGEN SATURATION: 98 % | DIASTOLIC BLOOD PRESSURE: 82 MMHG | HEART RATE: 74 BPM | SYSTOLIC BLOOD PRESSURE: 118 MMHG

## 2023-04-19 DIAGNOSIS — M79.18 MYOFASCIAL PAIN ON LEFT SIDE: Primary | ICD-10-CM

## 2023-04-19 PROCEDURE — 3079F DIAST BP 80-89 MM HG: CPT | Performed by: ANESTHESIOLOGY

## 2023-04-19 PROCEDURE — 3074F SYST BP LT 130 MM HG: CPT | Performed by: ANESTHESIOLOGY

## 2023-04-19 PROCEDURE — S0020 INJECTION, BUPIVICAINE HYDRO: HCPCS | Performed by: ANESTHESIOLOGY

## 2023-04-19 PROCEDURE — 20553 NJX 1/MLT TRIGGER POINTS 3/>: CPT | Performed by: ANESTHESIOLOGY

## 2023-04-19 RX ORDER — TRIAMCINOLONE ACETONIDE 40 MG/ML
40 INJECTION, SUSPENSION INTRA-ARTICULAR; INTRAMUSCULAR ONCE
Status: COMPLETED | OUTPATIENT
Start: 2023-04-19 | End: 2023-04-19

## 2023-04-19 RX ORDER — BUPIVACAINE HYDROCHLORIDE 5 MG/ML
9 INJECTION, SOLUTION EPIDURAL; INTRACAUDAL ONCE
Status: COMPLETED | OUTPATIENT
Start: 2023-04-19 | End: 2023-04-19

## 2023-04-19 NOTE — PROCEDURES
Date of procedure: April 19, 2023    Preop diagnosis: Lumbar myofascial pain    Postop diagnosis: Same    Procedure: Left lumbar TPI    Surgeon: Kathy Martinez    Anesthesia: None    EBL: 0    Indication: Patient is a 82-year-old gentleman with a history of low back pain    Procedure detail: Informed consent obtained. Timeout was done. The skin overlying the left side of the spine was prepped in the usual sterile fashion. Subsequently, a 27-gauge needle was used to deliver a total mixture of 40 mg of triamcinolone with 9 cc of 0.5% ropivacaine to multiple trigger points. Muscles injected include the erector spinae, the gluteus alfredo, and the quadratus lumborum. Patient tolerated procedure well. Needle withdrawn tip intact.     Kathy Martinez MD

## 2023-04-19 NOTE — PROGRESS NOTES
Patient presents in office today with reported pain in left low back    Current pain level reported = 6/10    Last reported dose of norco a couple days ago, cyclobenzaprine this morning      Narcotic Contract renewal 4/4/23    Urine Drug screen 4/4/23

## 2023-04-19 NOTE — PROGRESS NOTES
Timeout completed prior to procedure @ 9367. Participants present for timeout:  Dr. Linda Mcdowell , and patient.

## 2023-04-26 RX ORDER — CYCLOBENZAPRINE HCL 10 MG
TABLET ORAL
Qty: 20 TABLET | Refills: 0 | Status: SHIPPED | OUTPATIENT
Start: 2023-04-26

## 2023-04-26 NOTE — TELEPHONE ENCOUNTER
Medication: cyclobenzaprine 10 MG Oral Tab    Date of last refill: 4/4/23    Last office visit: 4/4/23  Due back to clinic per last office note:  n/a  Date next office visit scheduled:  none      Last OV note recommendation:   Plan:  > Recommend radiofrequency to bilateral L3-4 L4-5 medial branches with Dr. Nyla Mathews ENG if okay to be off Xarelto  > Recommend left lumbar paraspinal trigger point  > Per patient his primary care provider is not willing to provide him hydrocodone. Patient had 20 tablets February 6 and still has a remainder of these tablets. Patient last used this medication over the weekend does not use it on a daily basis. Discussed with patient our opioid prescribing guidelines including contract and urine drug screen. Patient does use an edible THC daily for pain and uses the hydrocodone only for severe breakthrough pain. Discussed this with patient regarding increased sedation risks. Patient has used hydrocodone in the past with his edible THC without any side effects.   We will stabilize patient and then have discussion with patient's primary care provider to send him back for ongoing medications  > Flexeril 10 mg twice daily as needed for muscle pain

## 2023-04-28 ENCOUNTER — HOSPITAL ENCOUNTER (OUTPATIENT)
Dept: NUCLEAR MEDICINE | Facility: HOSPITAL | Age: 63
End: 2023-04-28
Attending: UROLOGY
Payer: COMMERCIAL

## 2023-04-28 ENCOUNTER — HOSPITAL ENCOUNTER (OUTPATIENT)
Dept: MRI IMAGING | Facility: HOSPITAL | Age: 63
Discharge: HOME | End: 2023-04-28
Attending: NURSE PRACTITIONER
Payer: COMMERCIAL

## 2023-04-28 DIAGNOSIS — M54.16 LUMBAR RADICULOPATHY: ICD-10-CM

## 2023-04-28 DIAGNOSIS — M47.816 LUMBAR SPONDYLOSIS: ICD-10-CM

## 2023-04-28 DIAGNOSIS — G89.29 OTHER CHRONIC PAIN: ICD-10-CM

## 2023-04-28 PROCEDURE — 72148 MRI LUMBAR SPINE W/O DYE: CPT | Performed by: NURSE PRACTITIONER

## 2023-05-02 ENCOUNTER — SOCIAL WORK SERVICES (OUTPATIENT)
Dept: HEMATOLOGY/ONCOLOGY | Facility: HOSPITAL | Age: 63
End: 2023-05-02

## 2023-05-02 ENCOUNTER — HOSPITAL ENCOUNTER (OUTPATIENT)
Dept: RADIATION ONCOLOGY | Facility: HOSPITAL | Age: 63
Discharge: HOME | End: 2023-05-02
Attending: RADIOLOGY
Payer: COMMERCIAL

## 2023-05-02 VITALS
SYSTOLIC BLOOD PRESSURE: 125 MMHG | TEMPERATURE: 98 F | OXYGEN SATURATION: 96 % | DIASTOLIC BLOOD PRESSURE: 82 MMHG | RESPIRATION RATE: 18 BRPM | WEIGHT: 274.38 LBS | HEART RATE: 74 BPM | BODY MASS INDEX: 38 KG/M2

## 2023-05-02 DIAGNOSIS — C61 PROSTATE CANCER (HCC): Primary | ICD-10-CM

## 2023-05-02 PROCEDURE — 99214 OFFICE O/P EST MOD 30 MIN: CPT

## 2023-05-02 NOTE — PROGRESS NOTES
spoke with patient and completed Reduced Schedule FMLA, faxing to [employer] S#[phone] and sending copy to patient via [address]

## 2023-05-02 NOTE — PATIENT INSTRUCTIONS
- WE WILL CALL TO SCHEDULE YOUR CT SIMULATION FOR RADIATION PLANNING.       - IF YOU HAVE ANY QUESTIONS OR CONCERNS REGARDING RADIATION THERAPY, PLEASE CALL [phone].

## 2023-05-04 ENCOUNTER — PATIENT MESSAGE (OUTPATIENT)
Dept: PAIN CLINIC | Facility: CLINIC | Age: 63
End: 2023-05-04

## 2023-05-04 NOTE — TELEPHONE ENCOUNTER
From: Della Rivers  To: Itzel Mcclain MD  Sent: 5/4/2023 9:51 AM CDT  Subject: MRI Results    Good day to you. . just checking in on if anything was seen on my MRI?

## 2023-05-08 NOTE — TELEPHONE ENCOUNTER
Contacted pt at this time and relayed message below. Pt agreed to schedule an OV with Richmond Knutson. Scheduled on 5/15/2023

## 2023-05-09 ENCOUNTER — TELEPHONE (OUTPATIENT)
Dept: RADIATION ONCOLOGY | Facility: HOSPITAL | Age: 63
End: 2023-05-09

## 2023-05-09 NOTE — TELEPHONE ENCOUNTER
Discussed with Dr. 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

## 2023-05-15 ENCOUNTER — OFFICE VISIT (OUTPATIENT)
Dept: PAIN CLINIC | Facility: CLINIC | Age: 63
End: 2023-05-15
Payer: COMMERCIAL

## 2023-05-15 VITALS — DIASTOLIC BLOOD PRESSURE: 90 MMHG | SYSTOLIC BLOOD PRESSURE: 138 MMHG | HEART RATE: 80 BPM | OXYGEN SATURATION: 96 %

## 2023-05-15 DIAGNOSIS — M51.36 DDD (DEGENERATIVE DISC DISEASE), LUMBAR: Primary | ICD-10-CM

## 2023-05-15 DIAGNOSIS — M47.816 LUMBAR SPONDYLOSIS: ICD-10-CM

## 2023-05-15 PROCEDURE — 99214 OFFICE O/P EST MOD 30 MIN: CPT | Performed by: NURSE PRACTITIONER

## 2023-05-15 PROCEDURE — 3080F DIAST BP >= 90 MM HG: CPT | Performed by: NURSE PRACTITIONER

## 2023-05-15 PROCEDURE — 3075F SYST BP GE 130 - 139MM HG: CPT | Performed by: NURSE PRACTITIONER

## 2023-05-15 NOTE — PROGRESS NOTES
Patient presents in office today with reported pain in lower back    Current pain level reported = 3/10    Last reported dose of n/a      Narcotic Contract renewal 4/4/2024    Urine Drug screen 4/4/2023

## 2023-05-22 ENCOUNTER — HOSPITAL ENCOUNTER (OUTPATIENT)
Dept: RADIATION ONCOLOGY | Facility: HOSPITAL | Age: 63
Discharge: HOME | End: 2023-05-22
Attending: RADIOLOGY
Payer: COMMERCIAL

## 2023-05-26 ENCOUNTER — HOSPITAL ENCOUNTER (OUTPATIENT)
Dept: RADIATION ONCOLOGY | Facility: HOSPITAL | Age: 63
Discharge: HOME | End: 2023-05-26
Attending: RADIOLOGY
Payer: COMMERCIAL

## 2023-05-26 NOTE — PATIENT INSTRUCTIONS
A Sample Walking Program  Experts recommend walking briskly on most days. Aim for a target of 30 minutes on most days, or 150 or more minutes a week.  Walking programs can help you reach this goal by slowly increasing the frequency and the amount of  time soft/nontender

## 2023-05-30 DIAGNOSIS — M79.18 MYOFASCIAL PAIN ON LEFT SIDE: Primary | ICD-10-CM

## 2023-05-30 DIAGNOSIS — G89.29 OTHER CHRONIC PAIN: ICD-10-CM

## 2023-05-30 DIAGNOSIS — M54.50 ACUTE LEFT-SIDED LOW BACK PAIN WITHOUT SCIATICA: ICD-10-CM

## 2023-05-30 DIAGNOSIS — M79.18 MYOFASCIAL PAIN SYNDROME OF LUMBAR SPINE: ICD-10-CM

## 2023-05-30 RX ORDER — CYCLOBENZAPRINE HCL 10 MG
TABLET ORAL
Qty: 20 TABLET | Refills: 0 | Status: SHIPPED | OUTPATIENT
Start: 2023-05-30

## 2023-06-01 ENCOUNTER — HOSPITAL ENCOUNTER (OUTPATIENT)
Dept: RADIATION ONCOLOGY | Facility: HOSPITAL | Age: 63
Discharge: HOME | End: 2023-06-01
Attending: RADIOLOGY
Payer: COMMERCIAL

## 2023-06-03 DIAGNOSIS — M54.50 ACUTE LEFT-SIDED LOW BACK PAIN WITHOUT SCIATICA: ICD-10-CM

## 2023-06-04 DIAGNOSIS — M54.50 ACUTE LEFT-SIDED LOW BACK PAIN WITHOUT SCIATICA: ICD-10-CM

## 2023-06-05 ENCOUNTER — TELEPHONE (OUTPATIENT)
Dept: PAIN CLINIC | Facility: CLINIC | Age: 63
End: 2023-06-05

## 2023-06-05 DIAGNOSIS — M79.18 MYOFASCIAL PAIN: Primary | ICD-10-CM

## 2023-06-05 RX ORDER — HYDROCODONE BITARTRATE AND ACETAMINOPHEN 7.5; 325 MG/1; MG/1
1 TABLET ORAL EVERY 8 HOURS PRN
Qty: 21 TABLET | Refills: 0 | Status: SHIPPED | OUTPATIENT
Start: 2023-06-05

## 2023-06-05 RX ORDER — HYDROCODONE BITARTRATE AND ACETAMINOPHEN 7.5; 325 MG/1; MG/1
1 TABLET ORAL EVERY 8 HOURS PRN
Qty: 21 TABLET | Refills: 0 | OUTPATIENT
Start: 2023-06-05

## 2023-06-05 NOTE — TELEPHONE ENCOUNTER
Question Answer   Anesthesia Type Local   Location Office   CPT (Hit enter after each entry) INJECTION; SINGLE/MULTIPLE TRIGGER POINT(S), 3> MUSCLE   Medical clearance requested (will send to Pain Navigator) No   Patient has Medicare coverage?  No   Comments (Please list entire procedure name here.) LEFT LUMBAR TPI

## 2023-06-05 NOTE — TELEPHONE ENCOUNTER
Pt calling in stating he was seen in office 05/15 and is requesting for a repeat in TPI. Pt would like us to start authorization if possible so he can get in as soon asap, please advise.

## 2023-06-06 ENCOUNTER — TELEPHONE (OUTPATIENT)
Dept: PAIN CLINIC | Facility: CLINIC | Age: 63
End: 2023-06-06

## 2023-06-06 NOTE — TELEPHONE ENCOUNTER
Prior authorization request completed for: Left lumbar TPI   Authorization # NO PRIOR AUTHORIZATION / PREDETERMINATION REQUIRED / [address] dates: N/A  CPT codes approved: 14364 / 00423   Number of visits/dates of service approved: 1  Physician: Dr. Kane Torrez   Location: [facility]  Call Ref#: [id number]  Representative Name: Littie Goldberg  Patient can be scheduled. Routed to Navigator.

## 2023-06-07 LAB — AMB EXT HGBA1C: 7.2 %

## 2023-06-07 PROCEDURE — 77300 RADIATION THERAPY DOSE PLAN: CPT | Performed by: RADIOLOGY

## 2023-06-07 PROCEDURE — 3051F HG A1C>EQUAL 7.0%<8.0%: CPT | Performed by: PHYSICIAN ASSISTANT

## 2023-06-07 PROCEDURE — 77301 RADIOTHERAPY DOSE PLAN IMRT: CPT | Performed by: RADIOLOGY

## 2023-06-07 PROCEDURE — 77338 DESIGN MLC DEVICE FOR IMRT: CPT | Performed by: RADIOLOGY

## 2023-06-12 DIAGNOSIS — I10 ESSENTIAL HYPERTENSION: ICD-10-CM

## 2023-06-12 RX ORDER — FUROSEMIDE 20 MG/1
TABLET ORAL
Qty: 90 TABLET | Refills: 3 | Status: SHIPPED | OUTPATIENT
Start: 2023-06-12

## 2023-06-12 NOTE — TELEPHONE ENCOUNTER
Last time medication was refilled 12/12/22  Quantity and # of refills 90/1  Last OV 3/7/23  Next OV 7/19/23

## 2023-06-13 ENCOUNTER — HOSPITAL ENCOUNTER (OUTPATIENT)
Dept: RADIATION ONCOLOGY | Facility: HOSPITAL | Age: 63
Discharge: HOME | End: 2023-06-13
Attending: RADIOLOGY
Payer: COMMERCIAL

## 2023-06-13 PROCEDURE — 77385 HC IMRT SIMPLE: CPT | Performed by: RADIOLOGY

## 2023-06-14 PROCEDURE — 77385 HC IMRT SIMPLE: CPT | Performed by: RADIOLOGY

## 2023-06-15 ENCOUNTER — HOSPITAL ENCOUNTER (OUTPATIENT)
Dept: RADIATION ONCOLOGY | Facility: HOSPITAL | Age: 63
Discharge: HOME | End: 2023-06-15
Attending: RADIOLOGY
Payer: COMMERCIAL

## 2023-06-15 VITALS
SYSTOLIC BLOOD PRESSURE: 164 MMHG | WEIGHT: 278.81 LBS | RESPIRATION RATE: 20 BRPM | OXYGEN SATURATION: 94 % | DIASTOLIC BLOOD PRESSURE: 104 MMHG | BODY MASS INDEX: 39 KG/M2 | TEMPERATURE: 98 F | HEART RATE: 78 BPM

## 2023-06-15 DIAGNOSIS — C61 PROSTATE CANCER (HCC): Primary | ICD-10-CM

## 2023-06-15 PROCEDURE — 77385 HC IMRT SIMPLE: CPT | Performed by: RADIOLOGY

## 2023-06-16 PROCEDURE — 77385 HC IMRT SIMPLE: CPT | Performed by: RADIOLOGY

## 2023-06-19 PROCEDURE — 77385 HC IMRT SIMPLE: CPT | Performed by: RADIOLOGY

## 2023-06-20 ENCOUNTER — OFFICE VISIT (OUTPATIENT)
Dept: HEMATOLOGY/ONCOLOGY | Facility: HOSPITAL | Age: 63
End: 2023-06-20
Attending: RADIOLOGY
Payer: COMMERCIAL

## 2023-06-20 PROCEDURE — 77385 HC IMRT SIMPLE: CPT | Performed by: RADIOLOGY

## 2023-06-21 ENCOUNTER — OFFICE VISIT (OUTPATIENT)
Dept: PAIN CLINIC | Facility: CLINIC | Age: 63
End: 2023-06-21
Payer: COMMERCIAL

## 2023-06-21 ENCOUNTER — NURSE ONLY (OUTPATIENT)
Dept: PAIN CLINIC | Facility: CLINIC | Age: 63
End: 2023-06-21
Payer: COMMERCIAL

## 2023-06-21 VITALS — SYSTOLIC BLOOD PRESSURE: 136 MMHG | OXYGEN SATURATION: 97 % | DIASTOLIC BLOOD PRESSURE: 70 MMHG | HEART RATE: 93 BPM

## 2023-06-21 DIAGNOSIS — M79.18 MYOFASCIAL PAIN ON LEFT SIDE: Primary | ICD-10-CM

## 2023-06-21 PROCEDURE — 77385 HC IMRT SIMPLE: CPT | Performed by: RADIOLOGY

## 2023-06-21 RX ORDER — TIRZEPATIDE 7.5 MG/.5ML
7.5 INJECTION, SOLUTION SUBCUTANEOUS WEEKLY
COMMUNITY
Start: 2023-06-12

## 2023-06-21 RX ORDER — TRIAMCINOLONE ACETONIDE 40 MG/ML
40 INJECTION, SUSPENSION INTRA-ARTICULAR; INTRAMUSCULAR ONCE
Status: SHIPPED | OUTPATIENT
Start: 2023-06-21

## 2023-06-21 RX ORDER — BUPIVACAINE HYDROCHLORIDE 5 MG/ML
9 INJECTION, SOLUTION EPIDURAL; INTRACAUDAL ONCE
Status: SHIPPED | OUTPATIENT
Start: 2023-06-21

## 2023-06-21 NOTE — PROCEDURES
Name: Mohsen Wheatley  : 1986  DOS: 2021    Indication: Clint Park is a 62-year old patient with chronic myofascial pain syndrome failed conservative treatment with medication and physical therapy was here for trigger point injection in lumbar paravertebral muscle. Preoperative Diagnosis: Chronic myofascial pain syndrome    Postoperative Diagnosis: Same as above. Procedure performed: Trigger point injection in left lumbar paravertebral muscle. Anesthesia: Local.    EBL: Less than 1ml. Procedure Description: After reviewing the patient's history and performing a focused physical  examination, the diagnosis was confirmed and contraindications such as infection and  coagulopathy were ruled out. Following review of allergies and review of potential side effects  and complications, including but not necessarily limited to infection, allergic reaction, local  tissue breakdown, nerve injury, and paresis, the patient indicated they understood and agreed to  proceed. After obtaining the informed consent, the patient was brought to the procedure room  and monitored. The patient was brought to the procedure room and positioned appropriately. After the left lumbar spine area was sterilely prepped and draped, the trigger point was isolated and  marked on left erector spinae, gluteus alfredo, gluteus medius muscle. The trigger points were injected from a solution of 40 mg of Kenalog mixed with 10 cc 1% preservative-free lidocaine with positive pain provocation. The patient tolerated the procedure very well and was discharged home in good condition. Complications: None. Follow up: The patient will be followed in the pain clinic as needed basis. Paramjit Lentz MD  Pain management.

## 2023-06-22 ENCOUNTER — APPOINTMENT (OUTPATIENT)
Dept: RADIATION ONCOLOGY | Facility: HOSPITAL | Age: 63
End: 2023-06-22
Payer: COMMERCIAL

## 2023-06-22 ENCOUNTER — HOSPITAL ENCOUNTER (OUTPATIENT)
Dept: RADIATION ONCOLOGY | Facility: HOSPITAL | Age: 63
Discharge: HOME | End: 2023-06-22
Attending: INTERNAL MEDICINE
Payer: COMMERCIAL

## 2023-06-22 VITALS
HEART RATE: 71 BPM | BODY MASS INDEX: 39 KG/M2 | DIASTOLIC BLOOD PRESSURE: 98 MMHG | RESPIRATION RATE: 18 BRPM | SYSTOLIC BLOOD PRESSURE: 162 MMHG | TEMPERATURE: 98 F | OXYGEN SATURATION: 96 % | WEIGHT: 276.63 LBS

## 2023-06-22 DIAGNOSIS — C61 PROSTATE CANCER (HCC): Primary | ICD-10-CM

## 2023-06-22 PROCEDURE — 77385 HC IMRT SIMPLE: CPT | Performed by: RADIOLOGY

## 2023-06-23 PROCEDURE — 77336 RADIATION PHYSICS CONSULT: CPT | Performed by: RADIOLOGY

## 2023-06-23 PROCEDURE — 77385 HC IMRT SIMPLE: CPT | Performed by: RADIOLOGY

## 2023-06-26 DIAGNOSIS — E78.2 MIXED HYPERLIPIDEMIA: ICD-10-CM

## 2023-06-26 PROCEDURE — 77385 HC IMRT SIMPLE: CPT | Performed by: RADIOLOGY

## 2023-06-26 RX ORDER — PRAVASTATIN SODIUM 80 MG/1
TABLET ORAL
Qty: 90 TABLET | Refills: 0 | Status: SHIPPED | OUTPATIENT
Start: 2023-06-26

## 2023-06-26 NOTE — TELEPHONE ENCOUNTER
Cholesterol Medication Protocol [id number]/26/2023 12:27 AM   Protocol Details ALT < 80    ALT resulted within past year    Lipid panel within past 12 months    Appointment within past 12 or next 3 months

## 2023-06-27 PROCEDURE — 77385 HC IMRT SIMPLE: CPT | Performed by: RADIOLOGY

## 2023-06-28 PROCEDURE — 77385 HC IMRT SIMPLE: CPT | Performed by: RADIOLOGY

## 2023-06-29 ENCOUNTER — HOSPITAL ENCOUNTER (OUTPATIENT)
Dept: RADIATION ONCOLOGY | Facility: HOSPITAL | Age: 63
Discharge: HOME | End: 2023-06-29
Attending: RADIOLOGY
Payer: COMMERCIAL

## 2023-06-29 ENCOUNTER — APPOINTMENT (OUTPATIENT)
Dept: RADIATION ONCOLOGY | Facility: HOSPITAL | Age: 63
End: 2023-06-29
Payer: COMMERCIAL

## 2023-06-29 ENCOUNTER — APPOINTMENT (OUTPATIENT)
Dept: RADIATION ONCOLOGY | Facility: HOSPITAL | Age: 63
End: 2023-06-29
Attending: RADIOLOGY
Payer: COMMERCIAL

## 2023-06-29 VITALS
HEART RATE: 84 BPM | BODY MASS INDEX: 38 KG/M2 | OXYGEN SATURATION: 95 % | WEIGHT: 271 LBS | SYSTOLIC BLOOD PRESSURE: 135 MMHG | RESPIRATION RATE: 18 BRPM | TEMPERATURE: 98 F | DIASTOLIC BLOOD PRESSURE: 82 MMHG

## 2023-06-29 DIAGNOSIS — C61 PROSTATE CANCER (HCC): Primary | ICD-10-CM

## 2023-06-29 PROCEDURE — 77385 HC IMRT SIMPLE: CPT | Performed by: RADIOLOGY

## 2023-06-30 PROCEDURE — 77385 HC IMRT SIMPLE: CPT | Performed by: RADIOLOGY

## 2023-06-30 PROCEDURE — 77336 RADIATION PHYSICS CONSULT: CPT | Performed by: RADIOLOGY

## 2023-07-01 ENCOUNTER — HOSPITAL ENCOUNTER (OUTPATIENT)
Dept: RADIATION ONCOLOGY | Facility: HOSPITAL | Age: 63
Discharge: HOME | End: 2023-07-01
Attending: RADIOLOGY
Payer: COMMERCIAL

## 2023-07-03 PROCEDURE — 77385 HC IMRT SIMPLE: CPT | Performed by: RADIOLOGY

## 2023-07-05 PROCEDURE — 77385 HC IMRT SIMPLE: CPT | Performed by: RADIOLOGY

## 2023-07-06 ENCOUNTER — HOSPITAL ENCOUNTER (OUTPATIENT)
Dept: RADIATION ONCOLOGY | Facility: HOSPITAL | Age: 63
Discharge: HOME | End: 2023-07-06
Attending: RADIOLOGY
Payer: COMMERCIAL

## 2023-07-06 VITALS
RESPIRATION RATE: 16 BRPM | WEIGHT: 273.63 LBS | OXYGEN SATURATION: 95 % | DIASTOLIC BLOOD PRESSURE: 83 MMHG | HEART RATE: 78 BPM | BODY MASS INDEX: 38 KG/M2 | SYSTOLIC BLOOD PRESSURE: 142 MMHG | TEMPERATURE: 98 F

## 2023-07-06 DIAGNOSIS — C61 PROSTATE CANCER (HCC): Primary | ICD-10-CM

## 2023-07-06 PROCEDURE — 77385 HC IMRT SIMPLE: CPT | Performed by: RADIOLOGY

## 2023-07-07 PROCEDURE — 77336 RADIATION PHYSICS CONSULT: CPT | Performed by: RADIOLOGY

## 2023-07-07 PROCEDURE — 77385 HC IMRT SIMPLE: CPT | Performed by: RADIOLOGY

## 2023-07-10 PROCEDURE — 77385 HC IMRT SIMPLE: CPT | Performed by: RADIOLOGY

## 2023-07-11 PROCEDURE — 77385 HC IMRT SIMPLE: CPT | Performed by: RADIOLOGY

## 2023-07-12 PROCEDURE — 77385 HC IMRT SIMPLE: CPT | Performed by: RADIOLOGY

## 2023-07-13 ENCOUNTER — HOSPITAL ENCOUNTER (OUTPATIENT)
Dept: RADIATION ONCOLOGY | Facility: HOSPITAL | Age: 63
Discharge: HOME | End: 2023-07-13
Attending: INTERNAL MEDICINE
Payer: COMMERCIAL

## 2023-07-13 VITALS
WEIGHT: 272 LBS | TEMPERATURE: 97 F | HEART RATE: 63 BPM | RESPIRATION RATE: 16 BRPM | DIASTOLIC BLOOD PRESSURE: 79 MMHG | OXYGEN SATURATION: 97 % | SYSTOLIC BLOOD PRESSURE: 136 MMHG | BODY MASS INDEX: 38 KG/M2

## 2023-07-13 DIAGNOSIS — C61 PROSTATE CANCER (HCC): Primary | ICD-10-CM

## 2023-07-13 PROCEDURE — 77385 HC IMRT SIMPLE: CPT | Performed by: RADIOLOGY

## 2023-07-14 PROCEDURE — 77336 RADIATION PHYSICS CONSULT: CPT | Performed by: RADIOLOGY

## 2023-07-14 PROCEDURE — 77385 HC IMRT SIMPLE: CPT | Performed by: RADIOLOGY

## 2023-07-17 ENCOUNTER — TELEPHONE (OUTPATIENT)
Dept: INTERNAL MEDICINE CLINIC | Facility: CLINIC | Age: 63
End: 2023-07-17

## 2023-07-17 DIAGNOSIS — Z79.4 TYPE 2 DIABETES MELLITUS WITH MILD NONPROLIFERATIVE RETINOPATHY WITHOUT MACULAR EDEMA, WITH LONG-TERM CURRENT USE OF INSULIN, UNSPECIFIED LATERALITY (HCC): Primary | ICD-10-CM

## 2023-07-17 DIAGNOSIS — E11.3299 TYPE 2 DIABETES MELLITUS WITH MILD NONPROLIFERATIVE RETINOPATHY WITHOUT MACULAR EDEMA, WITH LONG-TERM CURRENT USE OF INSULIN, UNSPECIFIED LATERALITY (HCC): Primary | ICD-10-CM

## 2023-07-17 PROCEDURE — 77385 HC IMRT SIMPLE: CPT | Performed by: RADIOLOGY

## 2023-07-17 RX ORDER — FLASH GLUCOSE SCANNING READER
EACH MISCELLANEOUS
Qty: 1 EACH | Refills: 0 | Status: SHIPPED | OUTPATIENT
Start: 2023-07-17

## 2023-07-17 RX ORDER — FLASH GLUCOSE SENSOR
KIT MISCELLANEOUS
Qty: 6 EACH | Refills: 3 | Status: SHIPPED | OUTPATIENT
Start: 2023-07-17

## 2023-07-17 NOTE — TELEPHONE ENCOUNTER
Medication requested:     Freestyle Osito Sensor Kit    Is patient requesting 30 or 90 day supply:  1 kit    Pharmacy name/location:  [address] [facility] [phone], [phone]     LOV:  03/07/2023    Is the patient due for appointment: yes (if so, please schedule)    Additional notes:    Future Appointments   Date Time Provider Quincy Esteban   7/20/2023  1:30 PM Yanira Eavns MD [facility] RAD [address]   7/21/2023  1:15 PM Yanira Evans MD [facility] RAD [address]   7/24/2023  2:15 PM Gideon Conde MD [facility] [address]   7/27/2023  1:30 PM Risa Robison MD [facility] RAD [address]   8/1/2023  7:00 AM MONTHLY BILLING RESOURCE [address] [place] [address]

## 2023-07-18 PROCEDURE — 77385 HC IMRT SIMPLE: CPT | Performed by: RADIOLOGY

## 2023-07-19 PROCEDURE — 77300 RADIATION THERAPY DOSE PLAN: CPT | Performed by: RADIOLOGY

## 2023-07-19 PROCEDURE — 77338 DESIGN MLC DEVICE FOR IMRT: CPT | Performed by: RADIOLOGY

## 2023-07-19 PROCEDURE — 77385 HC IMRT SIMPLE: CPT | Performed by: RADIOLOGY

## 2023-07-20 ENCOUNTER — HOSPITAL ENCOUNTER (OUTPATIENT)
Dept: RADIATION ONCOLOGY | Facility: HOSPITAL | Age: 63
Discharge: HOME | End: 2023-07-20
Attending: RADIOLOGY
Payer: COMMERCIAL

## 2023-07-20 VITALS
DIASTOLIC BLOOD PRESSURE: 90 MMHG | HEART RATE: 65 BPM | RESPIRATION RATE: 18 BRPM | OXYGEN SATURATION: 98 % | SYSTOLIC BLOOD PRESSURE: 131 MMHG | WEIGHT: 278 LBS | BODY MASS INDEX: 39 KG/M2 | TEMPERATURE: 98 F

## 2023-07-20 DIAGNOSIS — C61 PROSTATE CANCER (HCC): Primary | ICD-10-CM

## 2023-07-20 PROCEDURE — 77385 HC IMRT SIMPLE: CPT | Performed by: RADIOLOGY

## 2023-07-21 ENCOUNTER — HOSPITAL ENCOUNTER (OUTPATIENT)
Dept: RADIATION ONCOLOGY | Facility: HOSPITAL | Age: 63
Discharge: HOME | End: 2023-07-21
Attending: RADIOLOGY
Payer: COMMERCIAL

## 2023-07-21 PROCEDURE — 77385 HC IMRT SIMPLE: CPT | Performed by: RADIOLOGY

## 2023-07-21 PROCEDURE — 77336 RADIATION PHYSICS CONSULT: CPT | Performed by: RADIOLOGY

## 2023-07-24 ENCOUNTER — OFFICE VISIT (OUTPATIENT)
Dept: INTERNAL MEDICINE CLINIC | Facility: CLINIC | Age: 63
End: 2023-07-24
Payer: COMMERCIAL

## 2023-07-24 VITALS
RESPIRATION RATE: 14 BRPM | OXYGEN SATURATION: 95 % | WEIGHT: 276 LBS | BODY MASS INDEX: 39.51 KG/M2 | TEMPERATURE: 97 F | HEART RATE: 78 BPM | SYSTOLIC BLOOD PRESSURE: 130 MMHG | DIASTOLIC BLOOD PRESSURE: 80 MMHG | HEIGHT: 70 IN

## 2023-07-24 DIAGNOSIS — R51.9 CHRONIC DAILY HEADACHE: Primary | ICD-10-CM

## 2023-07-24 DIAGNOSIS — F41.9 ANXIETY: ICD-10-CM

## 2023-07-24 PROBLEM — Z85.46 HISTORY OF PROSTATE CANCER: Status: RESOLVED | Noted: 2022-09-08 | Resolved: 2023-07-24

## 2023-07-24 PROBLEM — M79.18 MYOFASCIAL PAIN ON LEFT SIDE: Status: RESOLVED | Noted: 2023-04-19 | Resolved: 2023-07-24

## 2023-07-24 PROBLEM — C61 PROSTATE CANCER (HCC): Status: ACTIVE | Noted: 2023-07-24

## 2023-07-24 PROCEDURE — 3075F SYST BP GE 130 - 139MM HG: CPT | Performed by: INTERNAL MEDICINE

## 2023-07-24 PROCEDURE — 3008F BODY MASS INDEX DOCD: CPT | Performed by: INTERNAL MEDICINE

## 2023-07-24 PROCEDURE — 77385 HC IMRT SIMPLE: CPT | Performed by: RADIOLOGY

## 2023-07-24 PROCEDURE — 3079F DIAST BP 80-89 MM HG: CPT | Performed by: INTERNAL MEDICINE

## 2023-07-24 PROCEDURE — 99214 OFFICE O/P EST MOD 30 MIN: CPT | Performed by: INTERNAL MEDICINE

## 2023-07-24 RX ORDER — DIAZEPAM 5 MG/1
5 TABLET ORAL EVERY 12 HOURS PRN
Qty: 4 TABLET | Refills: 0 | Status: SHIPPED | OUTPATIENT
Start: 2023-07-24

## 2023-07-24 NOTE — PROGRESS NOTES
Subjective:   Patient ID: Portia Leung is a 66year old male. Headache   This is a chronic problem. The current episode started more than 1 month ago. The problem occurs constantly. The problem has been unchanged. The pain is located in the Frontal and bilateral region. The pain does not radiate. The pain quality is similar to prior headaches. The quality of the pain is described as dull and aching. The pain is moderate. Pertinent negatives include no abdominal pain, abnormal behavior, blurred vision, coughing, dizziness, drainage, facial sweating, loss of balance, nausea, neck pain, scalp tenderness, seizures, sinus pressure, swollen glands, tingling or tinnitus. Nothing aggravates the symptoms. He has tried nothing for the symptoms. The treatment provided mild relief. His past medical history is significant for cancer (prostate cancer undergoing rads therapy). History/Other:   Review of Systems   HENT:  Negative for sinus pressure and tinnitus. Eyes:  Negative for blurred vision. Respiratory:  Negative for cough. Gastrointestinal:  Negative for abdominal pain and nausea. Musculoskeletal:  Negative for neck pain. Neurological:  Positive for headaches. Negative for dizziness, tingling, seizures and loss of balance. All other systems reviewed and are negative. Current Outpatient Medications   Medication Sig Dispense Refill    diazePAM 5 MG Oral Tab Take 1 tablet (5 mg total) by mouth every 12 (twelve) hours as needed for Anxiety. 4 tablet 0    Continuous Blood Gluc  (FREESTYLE HENRRY 14 DAY READER) Does not apply Device Use to monitor blood sugar. 1 each 0    Continuous Blood Gluc Sensor (FREESTYLE HENRRY 14 DAY SENSOR) Does not apply Misc Place 1 sensor on for 14 days, then remove and place new sensor for 14 days.  6 each 3    Pravastatin Sodium 80 MG Oral Tab TAKE 1 TABLET NIGHTLY 90 tablet 0    MOUNJARO 7.5 MG/0.5ML Subcutaneous Solution Pen-injector Inject 7.5 mg into the skin once a week. FUROSEMIDE 20 MG Oral Tab TAKE 1 TABLET DAILY 90 tablet 3    HYDROcodone-acetaminophen (NORCO) 7.5-325 MG Oral Tab Take 1 tablet by mouth every 8 (eight) hours as needed for Pain (back). 21 tablet 0    CYCLOBENZAPRINE 10 MG Oral Tab TAKE 1 TABLET(10 MG) BY MOUTH TWICE DAILY AS NEEDED FOR MUSCLE SPASMS 20 tablet 0    DILTIAZEM HCL ER COATED BEADS 180 MG Oral Capsule SR 24 Hr TAKE 1 CAPSULE DAILY 90 capsule 1    METFORMIN 500 MG Oral Tab TAKE 1 TABLET(500 MG) BY MOUTH TWICE DAILY WITH MEALS 180 tablet 1    CARVEDILOL 25 MG Oral Tab TAKE 1 TABLET TWICE A DAY WITH MEALS 180 tablet 0    diazePAM 5 MG Oral Tab Take 1 tablet 30 minutes prior to scan. Repeat at time of scan if needed. 2 tablet 0    LOSARTAN 100 MG Oral Tab TAKE 1 TABLET DAILY 90 tablet 3    hydrOXYzine 10 MG Oral Tab Take 1 tablet (10 mg total) by mouth daily. Continuous Blood Gluc Sensor (SkeedSTYLE HENRRY 3 SENSOR) Does not apply Misc       FARXIGA 10 MG Oral Tab TAKE 1 TABLET DAILY 90 tablet 0    amLODIPine 5 MG Oral Tab Take 1 tablet (5 mg total) by mouth daily. 90 tablet 1    XARELTO 20 MG Oral Tab TAKE 1 TABLET DAILY WITH FOOD 90 tablet 3    LATANOPROST 0.005 % Ophthalmic Solution INSTILL 1 DROP IN BOTH EYES NIGHTLY (NO ADDITIONAL REFILLS UNTIL EVALUATED BY DOCTOR) 7.5 mL 3    Sertraline HCl 100 MG Oral Tab Take 1 tablet (100 mg total) by mouth daily. traZODone HCl 100 MG Oral Tab Take 1.5 tablets (150 mg total) by mouth nightly. Insulin Syringe-Needle U-100 (BD INSULIN SYRINGE ULTRAFINE) 31G X 5/16\" 1 ML Does not apply Misc As directed 90 each 5    aspirin EC 81 MG Oral Tab EC Take 1 tablet (81 mg total) by mouth daily. 90 tablet 1     Allergies:No Known Allergies    Objective:   Physical Exam  Vitals and nursing note reviewed. Constitutional:       General: He is not in acute distress. Appearance: Normal appearance. He is not ill-appearing.    Cardiovascular:      Rate and Rhythm: Normal rate and regular rhythm. Pulses: Normal pulses. Heart sounds: Normal heart sounds. No murmur heard. Pulmonary:      Effort: Pulmonary effort is normal.      Breath sounds: Normal breath sounds. Abdominal:      General: Abdomen is flat. Bowel sounds are normal.      Palpations: Abdomen is soft. Musculoskeletal:         General: Normal range of motion. Neurological:      General: No focal deficit present. Mental Status: He is alert. Assessment & Plan:   Chronic daily headache  (primary encounter diagnosis)  Anxiety  - doubt related to to radiation therapy  - check mri brain for eval  - pt need preop imaging medication for anxiety  No orders of the defined types were placed in this encounter. Meds This Visit:  Requested Prescriptions     Signed Prescriptions Disp Refills    diazePAM 5 MG Oral Tab 4 tablet 0     Sig: Take 1 tablet (5 mg total) by mouth every 12 (twelve) hours as needed for Anxiety.        Imaging & Referrals:  MRI BRAIN (W+WO) (CPT=70553)

## 2023-07-25 DIAGNOSIS — M54.50 ACUTE LEFT-SIDED LOW BACK PAIN WITHOUT SCIATICA: ICD-10-CM

## 2023-07-25 PROCEDURE — 77385 HC IMRT SIMPLE: CPT | Performed by: RADIOLOGY

## 2023-07-25 RX ORDER — HYDROCODONE BITARTRATE AND ACETAMINOPHEN 7.5; 325 MG/1; MG/1
1 TABLET ORAL EVERY 8 HOURS PRN
Qty: 21 TABLET | Refills: 0 | Status: SHIPPED | OUTPATIENT
Start: 2023-07-25

## 2023-07-25 NOTE — TELEPHONE ENCOUNTER
Medication: HYDROcodone-acetaminophen (NORCO) 7.5-325 MG Oral Tab     Date of last refill: 6/5/23 (Q 21)  [place] 5/9/13 (Q 21)    Last office visit: 5/15/23  Due back to clinic per last office note:  n/a  Date next office visit scheduled:  none    Last URINE Screen: 4/4/23  Screen Results:    JARVIS Tate  4/11/2023  9:47 AM CDT Back to [facility]      D/w patient/see jeff messages         Narcotic Contract EXPIRATION date: 4/4/23    Last OV note recommendation: Plan:   > Continue pain medications as prescribed no refills needed today  > Referral to acupuncture for anxiety, inflammation, back pain and generalized pain related to chemotherapy and radiation for prostate cancer  > Continue water aerobics  > Would consider repeat facet injections if low back pain returns to severe disabling pain

## 2023-07-26 PROCEDURE — 77385 HC IMRT SIMPLE: CPT | Performed by: RADIOLOGY

## 2023-07-27 ENCOUNTER — APPOINTMENT (OUTPATIENT)
Dept: RADIATION ONCOLOGY | Facility: HOSPITAL | Age: 63
End: 2023-07-27
Attending: INTERNAL MEDICINE
Payer: COMMERCIAL

## 2023-07-27 ENCOUNTER — HOSPITAL ENCOUNTER (OUTPATIENT)
Dept: RADIATION ONCOLOGY | Facility: HOSPITAL | Age: 63
Discharge: HOME | End: 2023-07-27
Attending: INTERNAL MEDICINE
Payer: COMMERCIAL

## 2023-07-27 VITALS
RESPIRATION RATE: 20 BRPM | BODY MASS INDEX: 39 KG/M2 | WEIGHT: 274 LBS | OXYGEN SATURATION: 98 % | DIASTOLIC BLOOD PRESSURE: 75 MMHG | SYSTOLIC BLOOD PRESSURE: 117 MMHG | HEART RATE: 79 BPM | TEMPERATURE: 99 F

## 2023-07-27 DIAGNOSIS — C61 PROSTATE CANCER (HCC): Primary | ICD-10-CM

## 2023-07-27 PROCEDURE — 77385 HC IMRT SIMPLE: CPT | Performed by: RADIOLOGY

## 2023-07-27 NOTE — PATIENT INSTRUCTIONS
- WE WILL CALL TO SCHEDULE YOU FOR A FOLLOW-UP WITH DR. HEDRICK IN 3 MONTHS, AFTER YOUR NEXT PSA BLOOD WORK  - SIDE EFFECTS OF RADIATION WILL GRADUALLY SUBSIDE. IT MAY TAKE 1- 2 WEEKS POST-RADIATION FOR YOU TO NOTICE CHANGES SUCH AS A DECREASE IN YOUR FATIGUE LEVEL, DECREASE IN URINARY SYMPTOMS (FREQUENCY, BURNING SENSATION OR WEAK STREAM), DECREASE IN BOWEL SYMPTOMS (LOOSE STOOLS/DIARRHEA). - CONTINUE TAKING YOUR MEDICATIONS AS DIRECTED.   - CALL THE NURSE LINE AT [phone] IF YOU HAVE ANY QUESTIONS/CONCERNS REGARDING RADIATION THERAPY.

## 2023-07-28 PROCEDURE — 77336 RADIATION PHYSICS CONSULT: CPT | Performed by: RADIOLOGY

## 2023-07-28 PROCEDURE — 77385 HC IMRT SIMPLE: CPT | Performed by: RADIOLOGY

## 2023-07-31 PROCEDURE — 77385 HC IMRT SIMPLE: CPT | Performed by: RADIOLOGY

## 2023-08-01 ENCOUNTER — HOSPITAL ENCOUNTER (OUTPATIENT)
Dept: RADIATION ONCOLOGY | Facility: HOSPITAL | Age: 63
Discharge: HOME | End: 2023-08-01
Attending: RADIOLOGY
Payer: COMMERCIAL

## 2023-08-01 ENCOUNTER — DOCUMENTATION ONLY (OUTPATIENT)
Dept: RADIATION ONCOLOGY | Facility: HOSPITAL | Age: 63
End: 2023-08-01

## 2023-08-01 PROCEDURE — 77385 HC IMRT SIMPLE: CPT | Performed by: RADIOLOGY

## 2023-08-17 ENCOUNTER — HOSPITAL ENCOUNTER (OUTPATIENT)
Dept: MRI IMAGING | Facility: HOSPITAL | Age: 63
Discharge: HOME | End: 2023-08-17
Attending: INTERNAL MEDICINE
Payer: COMMERCIAL

## 2023-08-17 DIAGNOSIS — R51.9 CHRONIC DAILY HEADACHE: ICD-10-CM

## 2023-08-17 PROCEDURE — A9575 INJ GADOTERATE MEGLUMI 0.1ML: HCPCS | Performed by: INTERNAL MEDICINE

## 2023-08-17 PROCEDURE — 70553 MRI BRAIN STEM W/O & W/DYE: CPT | Performed by: INTERNAL MEDICINE

## 2023-08-17 RX ORDER — GADOTERATE MEGLUMINE 376.9 MG/ML
20 INJECTION INTRAVENOUS
Status: COMPLETED | OUTPATIENT
Start: 2023-08-17 | End: 2023-08-17

## 2023-08-17 RX ADMIN — GADOTERATE MEGLUMINE 20 ML: 376.9 INJECTION INTRAVENOUS at 10:17:00

## 2023-08-23 ENCOUNTER — TELEPHONE (OUTPATIENT)
Dept: PAIN CLINIC | Facility: CLINIC | Age: 63
End: 2023-08-23

## 2023-08-23 ENCOUNTER — OFFICE VISIT (OUTPATIENT)
Dept: PAIN CLINIC | Facility: CLINIC | Age: 63
End: 2023-08-23
Payer: COMMERCIAL

## 2023-08-23 VITALS
HEART RATE: 52 BPM | SYSTOLIC BLOOD PRESSURE: 116 MMHG | DIASTOLIC BLOOD PRESSURE: 68 MMHG | BODY MASS INDEX: 39 KG/M2 | WEIGHT: 274 LBS | OXYGEN SATURATION: 98 %

## 2023-08-23 DIAGNOSIS — M47.816 LUMBAR SPONDYLOSIS: Primary | ICD-10-CM

## 2023-08-23 DIAGNOSIS — M54.50 ACUTE LEFT-SIDED LOW BACK PAIN WITHOUT SCIATICA: ICD-10-CM

## 2023-08-23 DIAGNOSIS — M53.3 SI (SACROILIAC) PAIN: ICD-10-CM

## 2023-08-23 PROCEDURE — 3074F SYST BP LT 130 MM HG: CPT | Performed by: ANESTHESIOLOGY

## 2023-08-23 PROCEDURE — 99214 OFFICE O/P EST MOD 30 MIN: CPT | Performed by: ANESTHESIOLOGY

## 2023-08-23 PROCEDURE — 3078F DIAST BP <80 MM HG: CPT | Performed by: ANESTHESIOLOGY

## 2023-08-23 RX ORDER — HYDROCODONE BITARTRATE AND ACETAMINOPHEN 7.5; 325 MG/1; MG/1
1 TABLET ORAL EVERY 8 HOURS PRN
Qty: 30 TABLET | Refills: 0 | Status: SHIPPED | OUTPATIENT
Start: 2023-08-23

## 2023-08-23 NOTE — PROGRESS NOTES
Patient presents in office today with reported pain in left side low back    Current pain level reported = 6/10    Last reported dose of nothing recent      Narcotic Contract renewal 04/04/23    Urine Drug screen 04/04/23

## 2023-08-23 NOTE — TELEPHONE ENCOUNTER
Order Questions      Question Answer Comment   Anesthesia Type Sedation    Provider [facility] Procedure Lab    Procedure Facet    Laterality/Level bilateral L4, L5    CPT (Hit enter after each entry) INJ PARAVERT F JNT L/S 1 LEV     INJ PARAVERT F JNT L/S 2 LEV    Procedure Modifier ;BILATERAL PROCEDURE    Medications to hold xarelto    Medical clearance requested (will send to Pain Navigator) Yes Shahab   Patient has Medicare coverage?  No    Comments (Please list entire procedure name here.) bilateral lumbar facet injections

## 2023-08-23 NOTE — TELEPHONE ENCOUNTER
Medical Clearance faxed to [address] [place] [address] at Fax #: [phone];[email or web address]     23    RE: Gladis Sheehan    : 1960    Dear Dr. Radha Hernandez,    Your patient is being scheduled for a pain management procedure at [facility].    Procedure:  Bilateral L4,L5 Facet Injection  Date of Procedure: TBD -pending medical clearance. Physician: Rush Byrne- Anesthesiologist     Your patient is currently taking Xarelto.  usually recommends the medication be held for 3 days prior to injection. Please verify patient is cleared to proceed with pain management procedures.

## 2023-08-24 ENCOUNTER — TELEPHONE (OUTPATIENT)
Dept: PAIN CLINIC | Facility: CLINIC | Age: 63
End: 2023-08-24

## 2023-08-24 DIAGNOSIS — M53.3 SACROILIAC PAIN: Primary | ICD-10-CM

## 2023-08-24 NOTE — TELEPHONE ENCOUNTER
Prior authorization request completed for: bilateral sacroiliac joint injection  Authorization dates: n/a  CPT codes approved: 37756  Number of visits/dates of service approved: 1  Physician: Sabrina Gilliland  Location: [facility]    Call Ref#: [id number]  Representative Name: Naina Manrique: [email or web address]    Patient can be scheduled. Routed to Navigator.

## 2023-08-24 NOTE — TELEPHONE ENCOUNTER
Prior authorization request completed for: bilateral L4,5 lumbar facet injection  Authorization # no auth/ pre-d is required   Authorization dates: n/a  CPT codes approved: 68642,24552  Number of visits/dates of service approved: 1  Physician: Perez Lima  Location: [facility]    Call Ref#: [id number]  Representative Name: Karol Amor: [email or web address]    Patient can be scheduled. Routed to Navigator.

## 2023-08-25 NOTE — TELEPHONE ENCOUNTER
Patient advised of insurance approval to proceed with injections and is agreeable to scheduling. Patient scheduled for procedure, pre-procedure instructions reviewed. Patient prefers conscious  sedation. Reviewed sedation instructions including Fasting 8 hours prior, Required and No Covid Test Required. Patient encouraged but not required to hold ASA 81mg for 24 hours prior to procedure. Advised patient that he does not require to hold Xarelto for this type of injection. Also advised patient that he can take blood pressure medication w/sip of water morning of procedure. Patient is on oral diabetic medication (Metformin) - advised patient to monitor glucose levels and make sure levels are within normal range for him, if he needs to can adjust medication w/sip of water. Patient verbalized understanding of instructions, no further needs at this time. [address]  PRE-PROCEDURE INSTRUCTIONS WITH IV SEDATION     Procedure: Bilateral SIJI     Appointment Date: 09/14/2023      Check-In Time: 08:30 AM      Prior to the procedure:  Please update us prior to the procedure if you are experiencing any symptoms of infection such as cough, fever, chills, urinary symptoms, or have recently been prescribed antibiotics, have open wounds, have recently had surgery or dental procedures. Day of Procedure:  Do not eat or drink anything (including water) 8 hours prior to your procedure. If you take morning blood pressure medication or oral diabetic medication, please take with a small sip of water. You are required to have a responsible adult drive you home after your procedure. You may not take a cab or ride share unless you have a responsible adult with you in the cab or ride share. A family member or friend is required to stay in our waiting room or hospital garage because of the sedation you will receive, you may be sleepy and forgetful.  You may not remember anything told to you after your procedure including discharge instructions. Please note: children are not allowed in the holding area so please make appropriate arrangements. Any additional family members and friends will need to remain in the surgical waiting room or hospital garage for the duration of your procedure. *If your family member or friend elects to wait in the garage, they must leave a cell phone number with the lab staff in case they need to be reached. Please park in the [facility] parking garage and follow the signs to the [facility]. Please bring your Insurance Card, Photo ID, List of Current Medications and Referral (if applicable) to your appointment. Check in at [facility] ([address] [facility]. [address]) outpatient registration in the [facility]. Please note-No prescriptions will be written by Pain Clinic in OR on the day of procedure. If you require a refill of medications, please contact the office 48 hours prior to your procedure. If you have an implanted Spinal Cord or Peripheral Nerve Stimulator: Please remember to turn device off for procedure    *If you are fasting, you may take blood pressure and thyroid medications with a small sip of water the day of your procedure. *If you are diabetic, your glucose must be within a normal range for you. If you are fasting, you should check your glucose levels and adjust with medication if needed.     Medication Hold:  Number of days you need to be off for the following medications:    Aggrenox 10 days   Agrylin (Anagrelide) 10 days  Brilinta (Ticagrelor) 7 days  Imbruvica (Ibrutinib) 3 days   Enbrel (Etanercept) 24 hours   Fragmin (Dalteparin) 24 hours   Pletal (Cilostazol) 7 days  Effient (Prasugrel) 7 days  Pradaxa 10 days  Trental 7 days  Eliquis (Apixaban) 3 days  Xarelto (Rivaroxaban) 3 days  Lovenox (Enoxaparin) 24 hours  Aspirin  Greater than 81mg but less than 325mg   5 days  325mg and greater                  7 days  (*81 mg      24 hours preferred, but not required)  Coumadin       5 days  Procedure may be cancelled if INR is elevated. Excedrin (with aspirin) 7 days  Plavix (Clopidogrel)                             7 days    NSAIDs: 24 hours preferred, but not required      Ibuprofen (Motrin, Advil, Vicoprofen), Naproxen (Naprosyn, Aleve), Piroxcam (Feldene), Meloxicam (Mobic), Oxaprozin (Daypro), Diclofenac (Voltaren), Indomethacin (Indocin), Etodolac (Lodine), Nabumetone (Relafen), Celebrex (Celecoxib)           HERBAL SUPPLEMENTS  5 days preferred, but not required  Fish oil, krill oil, Omega-3, Vascepa, Vitamin E, Turmeric, Garlic                       Insurance Authorization:   Most insurances are now requiring a preauthorization for all procedures. Please contact your insurance carrier to determine what your financial responsibility will be for the procedure(s). Cancellation/Rescheduling Appointment:   In the event you need to cancel or reschedule your appointment, you must notify the office 24 hours prior. Post-procedure instructions:        Please schedule a follow up visit within 2 to 4 weeks after your last procedure date   Please call our office with any questions or concerns before or after your procedure at [phone], #[phone]. If you are a diabetic, please increase the frequency of your glucose monitoring after the procedure as this may cause a temporary increase in your blood sugar. Contact your primary care physician if your blood sugar rises as you may require some medication adjustment. It is normal to have increased pain at injection site for up to 3-5 days after procedure, you can        use heat or ice (20 minutes on 20 minutes off) for comfort.

## 2023-08-25 NOTE — TELEPHONE ENCOUNTER
Cynthia Panchal Medical Assistant Signed  Yesterday     Copy     Prior authorization request completed for: bilateral L4,5 lumbar facet injection  Authorization # no auth/ pre-d is required   Authorization dates: n/a  CPT codes approved: 62830,83423  Number of visits/dates of service approved: 1  Physician: Meridee Cushing  Location: [facility]     Call Ref#: [id number]  Representative Name: Natasha Amos: [email or web address]     Patient can be scheduled. Routed to Navigator.

## 2023-09-08 NOTE — PROGRESS NOTES
[facility] RADIATION ONCOLOGY  TREATMENT SUMMARY     PATIENT:  Aron Babatunde MD: MD Jesus  DIAGNOSIS:  Prostate CA    HISTORY   68-year-old man with history of prostatectomy in November 2019. Initial PSA was 12, surgical Beba score was 4+3. He had pathologic T2 disease. He did have 6 negative pelvic nodes. He had 1 positive right pelvic node out of 7. It was a 4 mm noah deposit with extracapsular extension. Surgical margins at the primary site were negative. No extraprostatic extension nor seminal vesicle invasion. His postoperative PSA remained at 0.07 or lower until February 2021. In October 2021, PSA carolina to 0.24. Further rise of PSA to 0.3 in January 2023. Outside PET scan in April 2023 was difficult to interpret in the prostatic fossa due to artifact from his hip prostheses. He had equivocal noah uptake in the right external iliac region and the left common iliac region. No evidence of distant osseous disease. He was advised to undergo a combination of ADT for 12 to 24 months along with salvage pelvic EBRT. DOSE DELIVERED     Pelvis   4860 cGy in 27 fractions   6 MV photons   VMAT   6/13/2023 to 7/20/2023     Prostate bed boost   1440 cGy in 8 fractions   6 MV photons   VMAT   7/21/2023 to 8/1/2023     Concurrent systemic Rx  no  IGRT    yes    CLINICAL COURSE   Total of 63 Hodge in 35 fractions delivered to the prostatic fossa.  48.6 Hodge delivered to the pelvic lymph nodes. He did have some fatigue but had relatively mild GI/ toxicity. PLAN   Continue ADT under urology care in November  Follow-up 3 months    Karlene Cervantes M.D.   Radiation Oncology    CC: Minnie Mejia MD

## 2023-09-11 DIAGNOSIS — I10 ESSENTIAL HYPERTENSION: ICD-10-CM

## 2023-09-11 RX ORDER — DILTIAZEM HYDROCHLORIDE 180 MG/1
CAPSULE, COATED, EXTENDED RELEASE ORAL
Qty: 90 CAPSULE | Refills: 1 | Status: SHIPPED | OUTPATIENT
Start: 2023-09-11

## 2023-09-11 NOTE — TELEPHONE ENCOUNTER
Last time medication was refilled 3/15/23  Quantity and # of refills 90 w 1  Last OV 7/24/23  Next OV No future appts scheduled. Passed protocol, Rx sent.

## 2023-09-14 ENCOUNTER — APPOINTMENT (OUTPATIENT)
Dept: GENERAL RADIOLOGY | Facility: HOSPITAL | Age: 63
End: 2023-09-14
Attending: ANESTHESIOLOGY
Payer: COMMERCIAL

## 2023-09-14 ENCOUNTER — HOSPITAL ENCOUNTER (OUTPATIENT)
Facility: HOSPITAL | Age: 63
Setting detail: HOSPITAL OUTPATIENT SURGERY
Discharge: HOME | End: 2023-09-14
Attending: ANESTHESIOLOGY | Admitting: ANESTHESIOLOGY
Payer: COMMERCIAL

## 2023-09-14 VITALS
TEMPERATURE: 97 F | BODY MASS INDEX: 39.22 KG/M2 | HEART RATE: 83 BPM | DIASTOLIC BLOOD PRESSURE: 98 MMHG | RESPIRATION RATE: 18 BRPM | HEIGHT: 70 IN | OXYGEN SATURATION: 96 % | WEIGHT: 274 LBS | SYSTOLIC BLOOD PRESSURE: 155 MMHG

## 2023-09-14 LAB — GLUCOSE BLD-MCNC: 101 MG/DL (ref 70–99)

## 2023-09-14 PROCEDURE — 3E0U33Z INTRODUCTION OF ANTI-INFLAMMATORY INTO JOINTS, PERCUTANEOUS APPROACH: ICD-10-PCS | Performed by: ANESTHESIOLOGY

## 2023-09-14 PROCEDURE — 99152 MOD SED SAME PHYS/QHP 5/>YRS: CPT | Performed by: ANESTHESIOLOGY

## 2023-09-14 PROCEDURE — 3E0U3BZ INTRODUCTION OF ANESTHETIC AGENT INTO JOINTS, PERCUTANEOUS APPROACH: ICD-10-PCS | Performed by: ANESTHESIOLOGY

## 2023-09-14 PROCEDURE — 27096 INJECT SACROILIAC JOINT: CPT | Performed by: ANESTHESIOLOGY

## 2023-09-14 RX ORDER — DEXTROSE MONOHYDRATE 25 G/50ML
50 INJECTION, SOLUTION INTRAVENOUS
Status: DISCONTINUED | OUTPATIENT
Start: 2023-09-14 | End: 2023-09-14

## 2023-09-14 RX ORDER — ONDANSETRON 2 MG/ML
4 INJECTION INTRAMUSCULAR; INTRAVENOUS ONCE AS NEEDED
Status: DISCONTINUED | OUTPATIENT
Start: 2023-09-14 | End: 2023-09-14

## 2023-09-14 RX ORDER — NICOTINE POLACRILEX 4 MG
15 LOZENGE BUCCAL
Status: DISCONTINUED | OUTPATIENT
Start: 2023-09-14 | End: 2023-09-14

## 2023-09-14 RX ORDER — METHYLPREDNISOLONE ACETATE 40 MG/ML
INJECTION, SUSPENSION INTRA-ARTICULAR; INTRALESIONAL; INTRAMUSCULAR; SOFT TISSUE
Status: DISCONTINUED | OUTPATIENT
Start: 2023-09-14 | End: 2023-09-14

## 2023-09-14 RX ORDER — BUPIVACAINE HYDROCHLORIDE 5 MG/ML
INJECTION, SOLUTION EPIDURAL; INTRACAUDAL
Status: DISCONTINUED | OUTPATIENT
Start: 2023-09-14 | End: 2023-09-14

## 2023-09-14 RX ORDER — INSULIN ASPART 100 [IU]/ML
3 INJECTION, SOLUTION INTRAVENOUS; SUBCUTANEOUS ONCE
Status: DISCONTINUED | OUTPATIENT
Start: 2023-09-14 | End: 2023-09-14

## 2023-09-14 RX ORDER — DIPHENHYDRAMINE HYDROCHLORIDE 50 MG/ML
50 INJECTION INTRAMUSCULAR; INTRAVENOUS ONCE AS NEEDED
Status: DISCONTINUED | OUTPATIENT
Start: 2023-09-14 | End: 2023-09-14

## 2023-09-14 RX ORDER — LIDOCAINE HYDROCHLORIDE 10 MG/ML
INJECTION, SOLUTION EPIDURAL; INFILTRATION; INTRACAUDAL; PERINEURAL
Status: DISCONTINUED | OUTPATIENT
Start: 2023-09-14 | End: 2023-09-14

## 2023-09-14 RX ORDER — NICOTINE POLACRILEX 4 MG
30 LOZENGE BUCCAL
Status: DISCONTINUED | OUTPATIENT
Start: 2023-09-14 | End: 2023-09-14

## 2023-09-14 RX ORDER — SODIUM CHLORIDE, SODIUM LACTATE, POTASSIUM CHLORIDE, CALCIUM CHLORIDE 600; 310; 30; 20 MG/100ML; MG/100ML; MG/100ML; MG/100ML
100 INJECTION, SOLUTION INTRAVENOUS CONTINUOUS
Status: DISCONTINUED | OUTPATIENT
Start: 2023-09-14 | End: 2023-09-14

## 2023-09-14 RX ORDER — MIDAZOLAM HYDROCHLORIDE 1 MG/ML
INJECTION INTRAMUSCULAR; INTRAVENOUS
Status: DISCONTINUED | OUTPATIENT
Start: 2023-09-14 | End: 2023-09-14

## 2023-09-14 NOTE — OPERATIVE REPORT
[facility]  Operative Report  2023     Jenny Ball Patient Status:  Hospital Outpatient Surgery    1960 MRN [id number]   Location [id number] [place] [address] Attending Stephie Medellin MD   Hosp Day # 0 PCP Devin Snell MD     Indication: Pat Harvey is a 66year old male with SI joint pain    Preoperative Diagnosis:  Sacroiliac pain [M53.3]    Postoperative Diagnosis: Same as above. Procedure performed: SACROILIAC JOINT INJECTION BILATERAL WITH CONSCIOUS SEDATION    Anesthesia: Local and IV Sedation. EBL: Less than 1 ml. Procedure Description:   After reviewing the patient's history and performing a focused physical examination, the diagnosis was confirmed and contraindications such as infection and coagulopathy were ruled out. Following review of potential side effects and complications, including but not necessarily limited to infection, allergic reaction, local tissue breakdown, nerve injury, and paresis, the patient indicated they understood and agreed to proceed. After obtaining the informed consent, the patient was brought to the procedure room and monitored. Per my order and under my supervision, the patient was sedated with intermittent intravenous doses of versed and fentanyl. The vital signs were monitored and recorded by an experienced RN. The procedure started after the patient was adequately sedated. The moderate intravenous conscious sedation was provided for 11 minutes. In the prone position, the patient's lumbar and sacral areas were prepped and draped in sterile fashion. The subcutaneous lidocaine was instilled into the superficial soft tissues for local anesthesia. Under fluoroscopic guidance, the anterior and posterior aspects of the sacroiliac joint were overlapped. A 22-gauge, Quincke spinal needle was advanced into the middle portion of the first sacroiliac joint.  After the needle  positions were confirmed by AP and lateral views of fluoroscopy, 1 cc Omnipaque-240 was injected into the sacroiliac joint. There was a nice sacroiliac joint arthrogram revealed. After that methylprednisolone 20 mg with 2 cc of 1% lidocaine was injected. The needle was flushed with 1 cc of 1% lidocaine. The needles were removed with stylet in situ. The injection for the contralateral joint was performed in the similar fashion. The patient tolerated the procedure very well. There was no subjective or objective loss of motor strength. The patient was observed until discharge criteria met. Discharge instructions were given and patient was released to a responsible adult. Complications: None. Follow up: The patient was followed in the pain clinic as needed basis.       Itzel Mcclain MD

## 2023-09-14 NOTE — H&P
History & Physical Examination    Patient Name: Sallie Guzman  MRN: [id number]  CSN: [id number]  YOB: 1960    Pre-Operative Diagnosis:  Sacroiliac pain [M53.3]    Present Illness: SI joint pain    bupivacaine PF (Marcaine) 0.5% injection, 9 mL, Injection, Once, Cristy Phelan MD  triamcinolone acetonide (Kenalog-40) 40 MG/ML injection 40 mg, 40 mg, Intramuscular, Once, Cristy Phelan MD    METFORMIN 500 MG Oral Tab, TAKE 1 TABLET(500 MG) BY MOUTH TWICE DAILY WITH MEALS, Disp: 180 tablet, Rfl: 1, 9/13/2023  FARXIGA 10 MG Oral Tab, TAKE 1 TABLET DAILY, Disp: 90 tablet, Rfl: 0, 9/13/2023  dilTIAZem HCl ER Coated Beads 180 MG Oral Capsule SR 24 Hr, TAKE 1 CAPSULE DAILY, Disp: 90 capsule, Rfl: 1  HYDROcodone-acetaminophen (NORCO) 7.5-325 MG Oral Tab, Take 1 tablet by mouth every 8 (eight) hours as needed for Pain (back). , Disp: 30 tablet, Rfl: 0  Continuous Blood Gluc  (FREESTYLE HENRRY 14 DAY READER) Does not apply Device, Use to monitor blood sugar., Disp: 1 each, Rfl: 0  Pravastatin Sodium 80 MG Oral Tab, TAKE 1 TABLET NIGHTLY, Disp: 90 tablet, Rfl: 0  MOUNJARO 7.5 MG/0.5ML Subcutaneous Solution Pen-injector, Inject 7.5 mg into the skin once a week., Disp: , Rfl:   FUROSEMIDE 20 MG Oral Tab, TAKE 1 TABLET DAILY, Disp: 90 tablet, Rfl: 3  CYCLOBENZAPRINE 10 MG Oral Tab, TAKE 1 TABLET(10 MG) BY MOUTH TWICE DAILY AS NEEDED FOR MUSCLE SPASMS, Disp: 20 tablet, Rfl: 0  CARVEDILOL 25 MG Oral Tab, TAKE 1 TABLET TWICE A DAY WITH MEALS, Disp: 180 tablet, Rfl: 0  LOSARTAN 100 MG Oral Tab, TAKE 1 TABLET DAILY, Disp: 90 tablet, Rfl: 3  hydrOXYzine 10 MG Oral Tab, Take 1 tablet (10 mg total) by mouth daily. , Disp: , Rfl:   amLODIPine 5 MG Oral Tab, Take 1 tablet (5 mg total) by mouth daily. , Disp: 90 tablet, Rfl: 1  XARELTO 20 MG Oral Tab, TAKE 1 TABLET DAILY WITH FOOD, Disp: 90 tablet, Rfl: 3, 9/10/2023  LATANOPROST 0.005 % Ophthalmic Solution, INSTILL 1 DROP IN BOTH EYES NIGHTLY (NO ADDITIONAL REFILLS UNTIL EVALUATED BY DOCTOR), Disp: 7.5 mL, Rfl: 3  Sertraline HCl 100 MG Oral Tab, Take 1 tablet (100 mg total) by mouth daily. , Disp: , Rfl:   traZODone HCl 100 MG Oral Tab, Take 1.5 tablets (150 mg total) by mouth nightly., Disp: , Rfl:   Insulin Syringe-Needle U-100 (BD INSULIN SYRINGE ULTRAFINE) 31G X 5/16\" 1 ML Does not apply Misc, As directed, Disp: 90 each, Rfl: 5  aspirin EC 81 MG Oral Tab EC, Take 1 tablet (81 mg total) by mouth daily. , Disp: 90 tablet, Rfl: 1, 9/10/2023      insulin aspart (NovoLOG) 100 Units/mL vial 3 Units, 3 Units, Subcutaneous, Once  glucose (Dex4) 15 GM/59ML oral liquid 15 g, 15 g, Oral, Q15 Min PRN   Or  glucose (Glutose) 40% oral gel 15 g, 15 g, Oral, Q15 Min PRN   Or  glucose-vitamin C (Dex-4) chewable tab 4 tablet, 4 tablet, Oral, Q15 Min PRN   Or  dextrose 50% injection 50 mL, 50 mL, Intravenous, Q15 Min PRN   Or  glucose (Dex4) 15 GM/59ML oral liquid 30 g, 30 g, Oral, Q15 Min PRN   Or  glucose (Glutose) 40% oral gel 30 g, 30 g, Oral, Q15 Min PRN   Or  glucose-vitamin C (Dex-4) chewable tab 8 tablet, 8 tablet, Oral, Q15 Min PRN  lactated ringers infusion, 100 mL/hr, Intravenous, Continuous  ondansetron (Zofran) 4 MG/2ML injection 4 mg, 4 mg, Intravenous, Once PRN        Allergies: No Known Allergies    Past Medical History:   Diagnosis Date    Abdominal hernia     Abdominal pain     Anxiety     Arrhythmia     A-FIB DX 2 YRS AGO    Arthritis     Back pain     Back problem     Low back pain    Cancer ([facility] [address] 11/2019    prostate    Deep vein thrombosis (HCC)     left    Depression     Diabetes mellitus (HCC)     Esophageal reflux     Fatigue     Glaucoma     Heartburn     High blood pressure     High cholesterol     History of depression     History of hip replacement     March 2020, 11/2010    Insomnia     Night sweats     OA (osteoarthritis)     Obesity     Osteoarthritis     Pain in joints     Sleep apnea     repeat SAM test neg after weight loss    Stress     Type II or unspecified type diabetes mellitus without mention of complication, not stated as uncontrolled     Visual impairment     glasses    Wears glasses      Past Surgical History:   Procedure Laterality Date    ABDOMEN SURGERY PROC UNLISTED  01/26/2021    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 ANESTH,PERINEAL REMV OF PROSTATE  2019    COLONOSCOPY      COLONOSCOPY N/A 12/8/2022    Procedure: COLONOSCOPY with forcep polypectomy;  Surgeon: Chandrakant Rock MD;  Location: [address] [place] [address] ENDOSCOPY    HERNIA SURGERY      HIP REPLACEMENT SURGERY Left 2010    HIP REPLACEMENT SURGERY Right 03/03/2020    LAPAROSCOPY, SURGICAL PROSTATECTOMY, RETROPUBIC RADICAL, W/NERVE SPARING  11/15/2019        OTHER SURGICAL HISTORY      vocal cord polyps removed    OTHER SURGICAL HISTORY  11/15/2019    RALP Dr. Hosea Bergeron      Family History   Problem Relation Age of Onset    Cancer Father 59        lung    Other (Other) Mother 67        aneurism    Diabetes Sister     Suicide History Sister     Alcohol and Other Disorders Associated Brother     Cancer Brother 67        prostate cancer     Social History    Tobacco Use      Smoking status: Never      Smokeless tobacco: Never    Alcohol use: No      SYSTEM Check if Review is Normal Check if Physical Exam is Normal If not normal, please explain:   HEENT [x ] [x ]    NECK & BACK [x ] [x ]    HEART [x ] [x ]    LUNGS [x ] [x ]    ABDOMEN [x ] [x ]    UROGENITAL [x ] [x ]    EXTREMITIES [x ] [x ]    OTHER        [ x ] I have discussed the risks and benefits and alternatives with the patient/family. They understand and agree to proceed with plan of care. [ x ] I have reviewed the History and Physical done within the last 30 days. Any changes noted above.     Nicolle Mccollum MD

## 2023-09-14 NOTE — DISCHARGE INSTRUCTIONS
You have been given medication that makes you sleepy. This may have included both pain medication and sleeping medication. Most of the effects have worn off but you may still have some drowsiness for the next 6 to 8 hours. Home Care  Follow these guidelines when you get home:    --Don't drink any alcohol for the next 24 hours. --Don't drive, operate dangerous machinery, make important business or personal    decisions, or sign legal documents during the next 24 hours. Follow Up Care  Follow up with your healthcare provider if you are not alert and back to your usual level of activity within 12 hours    When to seek medical advice  Call your healthcare provider right away if any of these occur:    --Drowsiness that gets worse  --Weakness or dizziness that gets worse  --Repeated vomiting  --You can not be awakened   Home Care Instructions Following Your Pain Procedure     Chapo Amaro,  It has been a pleasure to have you as our patient. To help you at home, you must follow these general discharge instructions. We will review these with you before you are discharged. It is our hope that you have a complete and uneventful recovery from our procedure. General Instructions:  What to Expect:  Bandages from your procedure today can be removed when you get home. Please avoid soaking and/or swimming for 24 hours. Showering is okay  It is normal to have increased pain symptoms and/or pain at injection site for up to 3-5 days after procedure, you can use heat or ice (20 minutes on 20 minutes off) for comfort. You may experience some temporary side effects which may include restlessness or insomnia, flushing of the face, or heart palpitations. Please contact the provider if these symptoms do not resolve within 3-4 days. Lightheadedness or nausea may occur and should resolve within 24 to 48 hours.   If you develop a headache after treatment, rest, drink fluids (with caffeine, if possible) and take mild over-the-counter pain medication. If the headache does not improve with the above treatment, contact the physician. Home Medications:  Resume all previously prescribed medication. Please avoid taking NSAIDs (Non-Steriodal Anti-Inflammatory Drugs) such as:  Ibuprofen ( Advil, Motrin) Aleve (Naproxen), Diclofenac, Meloxicam for 6 hours after procedure. If you are on Coumadin (Warfarin) or any other anti-coagulant (or \"blood thinning\") medication such as Plavix (Clopidogrel), Xarelto (Rivaroxaban), Eliquis (Apixaban), Effient (Prasugrel) etc., restart on the following day from the procedure unless otherwise directed by your provider. If you are a diabetic, please increase the frequency of your glucose monitoring after the procedure as steroids may cause a temporary (2-3 day) increase in your blood sugar. Contact your primary care physician if your blood sugar remains elevated as you may require some medication adjustment. Diet:  Resume your regular diet as tolerated. Activity: We recommend that you relax and rest during the rest of your procedure day. If you feel weakness in your arms or legs do not drive. Follow-up Appointment  Please schedule a follow-up visit within 3 to 4 weeks after your last procedure date. Question or Concerns:  Feel free to call our office with any questions or concerns at [phone] (option #2)    Jero Bal  Thank you for coming to [facility] for your procedure. The nurses try very hard to make sure you receive the best care possible. Your trust in them as well as us is greatly appreciated.     Thanks so much,   Dr. Bryn Ennis

## 2023-09-15 ENCOUNTER — TELEPHONE (OUTPATIENT)
Dept: PAIN CLINIC | Facility: CLINIC | Age: 63
End: 2023-09-15

## 2023-09-25 DIAGNOSIS — M54.50 ACUTE LEFT-SIDED LOW BACK PAIN WITHOUT SCIATICA: ICD-10-CM

## 2023-09-25 DIAGNOSIS — E78.2 MIXED HYPERLIPIDEMIA: ICD-10-CM

## 2023-09-25 RX ORDER — PRAVASTATIN SODIUM 80 MG/1
TABLET ORAL
Qty: 90 TABLET | Refills: 0 | Status: SHIPPED | OUTPATIENT
Start: 2023-09-25

## 2023-09-25 NOTE — TELEPHONE ENCOUNTER
Last time medication was refilled 06/26/2023  Quantity and # of refills 90 w/ 0  Last OV 07/24/2023  Next OV No Future Appointment    Passed protocol, Rx sent.

## 2023-09-26 RX ORDER — HYDROCODONE BITARTRATE AND ACETAMINOPHEN 7.5; 325 MG/1; MG/1
1 TABLET ORAL EVERY 8 HOURS PRN
Qty: 30 TABLET | Refills: 0 | Status: SHIPPED | OUTPATIENT
Start: 2023-09-26

## 2023-09-26 NOTE — TELEPHONE ENCOUNTER
Medication: HYDROcodone-acetaminophen (NORCO) 7.5-325 MG Oral Tab     Date of last refill: 8/23/23 (21 tabs, 7 days supply)  [place] 3/03/68 (21 tabs, 7 days supply)    Last office visit: 8/23/23  Due back to clinic per last office note:  n/a  Date next office visit scheduled:  10/6/23    Last URINE Screen: 4/4/23  Screen Results:    Yessenia Alex, JARVIS  4/11/2023  9:47 AM CDT Back to [facility]      [facility]         Narcotic Contract EXPIRATION date: 4/4/23    Last OV note recommendation: The patient is a 45-year-old gentleman with a history of back pain. Also has a history of prostate CA with bony mets. Recently completed radiation therapy to the spine with modest improvement. However, still complains of reproducible axial back pain and positive facet loading. Had good response to facet injection providing greater than 50% relief for more than 6 months. He is interested in repeating the facet injection. Additionally, as SI joint pain with 3 positive SI joint provocation tests. Offered him SI joint injection. Does take hydrocodone approximately 1 tablet/day. This was refilled for him.

## 2023-10-06 ENCOUNTER — PATIENT MESSAGE (OUTPATIENT)
Dept: PAIN CLINIC | Facility: CLINIC | Age: 63
End: 2023-10-06

## 2023-10-06 DIAGNOSIS — M79.18 MYOFASCIAL PAIN SYNDROME OF LUMBAR SPINE: ICD-10-CM

## 2023-10-06 DIAGNOSIS — G89.29 OTHER CHRONIC PAIN: ICD-10-CM

## 2023-10-06 DIAGNOSIS — M79.18 MYOFASCIAL PAIN ON LEFT SIDE: ICD-10-CM

## 2023-10-06 DIAGNOSIS — M54.50 ACUTE LEFT-SIDED LOW BACK PAIN WITHOUT SCIATICA: ICD-10-CM

## 2023-10-06 RX ORDER — CYCLOBENZAPRINE HCL 10 MG
10 TABLET ORAL 2 TIMES DAILY PRN
Qty: 20 TABLET | Refills: 0 | Status: SHIPPED | OUTPATIENT
Start: 2023-10-06

## 2023-10-06 NOTE — TELEPHONE ENCOUNTER
From: Della Rivers  To: Itzel Mcclain  Sent: 10/6/2023 9:47 AM CDT  Subject: Back spasm. Anabelle Guerra day to you. .  Can you call in a muscle relaxer to relieve this issue. .  Thanks   Viviana Yang

## 2023-10-16 ENCOUNTER — OFFICE VISIT (OUTPATIENT)
Dept: PAIN CLINIC | Facility: CLINIC | Age: 63
End: 2023-10-16
Payer: COMMERCIAL

## 2023-10-16 VITALS — DIASTOLIC BLOOD PRESSURE: 78 MMHG | HEART RATE: 102 BPM | SYSTOLIC BLOOD PRESSURE: 126 MMHG | OXYGEN SATURATION: 98 %

## 2023-10-16 DIAGNOSIS — M47.816 LUMBAR SPONDYLOSIS: Primary | ICD-10-CM

## 2023-10-16 PROCEDURE — 3074F SYST BP LT 130 MM HG: CPT | Performed by: ANESTHESIOLOGY

## 2023-10-16 PROCEDURE — 99214 OFFICE O/P EST MOD 30 MIN: CPT | Performed by: ANESTHESIOLOGY

## 2023-10-16 PROCEDURE — 3078F DIAST BP <80 MM HG: CPT | Performed by: ANESTHESIOLOGY

## 2023-10-16 NOTE — PROGRESS NOTES
Patient presents in office today with reported pain in lower back- wants to discuss further injections    Current pain level reported = 2/10    Last reported dose of NA today      Narcotic Contract renewal 4/4/24    Urine Drug screen 4/4/24

## 2023-10-16 NOTE — PROGRESS NOTES
Name: Ryan Mosqueda   : 1960   DOS: 10/16/2023     Pain Clinic Follow Up Visit:   Patient presents with: Follow - Up: FU regarding back pain      Ryan Mosqueda is a 66year old male with a history of lumbar spinal gnosis here for follow-up. The patient complains of axial low back pain which is deep and aching. This is focal and nonradicular. Had good success with bilateral lumbar medial branch blocks done in 2023. He is interested in moving forward with a second set of diagnostic injections to facilitate radiofrequency ablation. Pt denies any chills, fever, or weakness. There is no bladder or bowel incontinence associated with the pain. REVIEW OF SYSTEMS:  A ten point review of systems was performed with pertinent positives and negatives in the HPI. No Known Allergies    Current Outpatient Medications   Medication Sig Dispense Refill    cyclobenzaprine 10 MG Oral Tab Take 1 tablet (10 mg total) by mouth 2 (two) times daily as needed for Muscle spasms. 20 tablet 0    HYDROcodone-acetaminophen (NORCO) 7.5-325 MG Oral Tab Take 1 tablet by mouth every 8 (eight) hours as needed for Pain (back). 30 tablet 0    Pravastatin Sodium 80 MG Oral Tab TAKE 1 TABLET NIGHTLY 90 tablet 0    dilTIAZem HCl ER Coated Beads 180 MG Oral Capsule SR 24 Hr TAKE 1 CAPSULE DAILY 90 capsule 1    Continuous Blood Gluc  (FREESTYLE HENRRY 14 DAY READER) Does not apply Device Use to monitor blood sugar. 1 each 0    MOUNJARO 7.5 MG/0.5ML Subcutaneous Solution Pen-injector Inject 7.5 mg into the skin once a week. FUROSEMIDE 20 MG Oral Tab TAKE 1 TABLET DAILY 90 tablet 3    METFORMIN 500 MG Oral Tab TAKE 1 TABLET(500 MG) BY MOUTH TWICE DAILY WITH MEALS 180 tablet 1    CARVEDILOL 25 MG Oral Tab TAKE 1 TABLET TWICE A DAY WITH MEALS 180 tablet 0    LOSARTAN 100 MG Oral Tab TAKE 1 TABLET DAILY 90 tablet 3    hydrOXYzine 10 MG Oral Tab Take 1 tablet (10 mg total) by mouth daily.       FARXIGA 10 MG Oral Tab TAKE 1 TABLET DAILY 90 tablet 0    amLODIPine 5 MG Oral Tab Take 1 tablet (5 mg total) by mouth daily. 90 tablet 1    XARELTO 20 MG Oral Tab TAKE 1 TABLET DAILY WITH FOOD 90 tablet 3    LATANOPROST 0.005 % Ophthalmic Solution INSTILL 1 DROP IN BOTH EYES NIGHTLY (NO ADDITIONAL REFILLS UNTIL EVALUATED BY DOCTOR) 7.5 mL 3    Sertraline HCl 100 MG Oral Tab Take 1 tablet (100 mg total) by mouth daily. traZODone HCl 100 MG Oral Tab Take 1.5 tablets (150 mg total) by mouth nightly. Insulin Syringe-Needle U-100 (BD INSULIN SYRINGE ULTRAFINE) 31G X 5/16\" 1 ML Does not apply Misc As directed 90 each 5    aspirin EC 81 MG Oral Tab EC Take 1 tablet (81 mg total) by mouth daily. 90 tablet 1         EXAM:   /78 (BP Location: Left arm, Patient Position: Sitting)   Pulse 102   SpO2 98%   General:  Patient is a(n) 66year old year old male in no acute distress. Neurologic[de-identified] WNL-Orientation to time, place and person, normal mood & affect, concentration & attention span intact. Inspection:  Ambulates with well-coordinated, fluid, non-antalgic gait. Gait is normal.  Neck: Full range of motion  Cranial nerve: Grossly intact  Respiratory: Nonlabored  Back: Gait intact. Facet loading positive bilaterally  IMAGES:   Lumbar MRI reviewed with evidence of multilevel lumbar degenerative disc disease with moderate to severe facet arthropathy L 3-4 and L4-5 leading to moderate to severe foraminal stenosis      ASSESSMENT AND PLAN:   Lumbar spondylosis  (primary encounter diagnosis)    The patient is a pleasant 59-year-old gentleman with a history of low back pain. This is due to lumbar facet arthropathy. Had good symptom relief with diagnostic medial branch block completed in March 2023. Now has return of pain symptoms with bilateral facet loading. Discussed repeat lumbar medial branch block to facilitate radiofrequency ablation. Patient reports good understanding like to proceed.   Of note, patient had more than 95% relief during the initial diagnostic phase of the local anesthetic which is 0.5% bupivacaine. This lasted for 8 hours after the injection. This was also accompanied by improved walking and patient was able to vacuum and do dishes. Orders:Orders Placed This Encounter      [address]        Radiology orders and consultations:None  The patient indicates understanding of these issues and agrees to the plan. No follow-ups on file.     Nikko Ramirez MD, 10/16/2023, 3:43 PM

## 2023-10-17 ENCOUNTER — TELEPHONE (OUTPATIENT)
Dept: PAIN CLINIC | Facility: CLINIC | Age: 63
End: 2023-10-17

## 2023-10-17 DIAGNOSIS — M47.816 LUMBAR SPONDYLOSIS: Primary | ICD-10-CM

## 2023-10-17 NOTE — TELEPHONE ENCOUNTER
Medical Clearance faxed to [address] [place] [address] at Fax #: [phone];[email or web address] [email or web address]     10/17/23    RE: Janneth Barbosa    : 1960    Dear Dr. Marino Funez,    Your patient is being scheduled for a pain management procedure at [facility].    Procedure:  Bilateral L4,L5 Medial Branch Block   Date of Procedure: TBD -pending medical clearance. Physician: Madhu Brown- Anesthesiologist     Your patient is currently taking Xarelto.  usually recommends the medication be held for 3 days prior to injection. Please verify patient is cleared to proceed with pain management procedures.

## 2023-10-17 NOTE — TELEPHONE ENCOUNTER
Order Questions    Question Answer Comment   Anesthesia Type Sedation    Provider [facility]' [facility] Procedure Lab    Procedure Medial Branch Block    Laterality/Level bilateral L4, L5    CPT (Hit enter after each entry) INJ PARAVERT F JNT L/S 1 LEV     INJ PARAVERT F JNT L/S 2 LEV    Procedure Modifier ;BILATERAL PROCEDURE    Medications to hold xarelto    Medical clearance requested (will send to Pain Navigator) Yes Shahab   Patient has Medicare coverage?  No    Comments (Please list entire procedure name here.) bilateral lumbar 4-5, lumbar 5-sacral 1 medial branch blocks

## 2023-10-19 ENCOUNTER — TELEPHONE (OUTPATIENT)
Dept: PAIN CLINIC | Facility: CLINIC | Age: 63
End: 2023-10-19

## 2023-10-19 NOTE — TELEPHONE ENCOUNTER
Prior authorization request completed for: bilateral L4/5,L5/S1 MBB  Authorization # no auth/pre d is needed   Authorization dates: n/a  CPT codes approved: 93388,91976  Number of visits/dates of service approved: 1  Physician: tosin  Location: [facility]  Call Ref#: [id number]  Representative Name: Paul Pelayo  Insurance Carrier: [phone]  Effective date 1/1/2020    Patient can be scheduled. Routed to Navigator.

## 2023-10-19 NOTE — TELEPHONE ENCOUNTER
Mayda March hour ago (12:19 PM)     FAYE  Prior authorization request completed for: bilateral L4/5,L5/S1 MBB  Authorization # no auth/pre d is needed   Authorization dates: n/a  CPT codes approved: 20016,73544  Number of visits/dates of service approved: 1  Physician: tosin  Location: [facility]  Call Ref#: [id number]  Representative Name: [employer]  Insurance Carrier: [phone]  Effective date 1/1/2020     Patient can be scheduled. Routed to Navigator.

## 2023-10-23 NOTE — TELEPHONE ENCOUNTER
Received Medical Clearance from [address] [place] [address] Road - patient is cleared to hold Xarelto for 3 days prior to injection. Sent to scan.

## 2023-10-23 NOTE — TELEPHONE ENCOUNTER
Patient advised of insurance approval to proceed with injections and is agreeable to scheduling. Patient scheduled for procedure, pre-procedure instructions reviewed. Patient prefers conscious  sedation. Reviewed sedation instructions including Fasting 8 hours prior, Required and No Covid Test Required. Patient advised to hold Xarelto for 3 days (10/28/23) and encouraged but not required to hold ASA 81mg for 24 hours prior to procedure. Advised patient that he can take blood pressure medication w/sip of water morning of procedure. Patient is on oral diabetic medication -advised patient to monitor glucose levels make sure they are within normal range. If needed, can adjust with sip of water. Patient verbalized understanding of instructions, no further needs at this time. [address]  PRE-PROCEDURE INSTRUCTIONS WITH IV SEDATION     Procedure: Bilateral L4,L5 MBB    Appointment Date: 10/31/2023    Check-In Time: 07:15 AM       Prior to the procedure:  Please update us prior to the procedure if you are experiencing any symptoms of infection such as cough, fever, chills, urinary symptoms, or have recently been prescribed antibiotics, have open wounds, have recently had surgery or dental procedures. Day of Procedure:  Do not eat or drink anything (including water) 8 hours prior to your procedure. If you take morning blood pressure medication or oral diabetic medication, please take with a small sip of water. You are required to have a responsible adult drive you home after your procedure. You may not take a cab or ride share unless you have a responsible adult with you in the cab or ride share. A family member or friend is required to stay in our waiting room or hospital garage because of the sedation you will receive, you may be sleepy and forgetful. You may not remember anything told to you after your procedure including discharge instructions.  Please note: children are not allowed in the holding area so please make appropriate arrangements. Any additional family members and friends will need to remain in the surgical waiting room or hospital garage for the duration of your procedure. *If your family member or friend elects to wait in the garage, they must leave a cell phone number with the lab staff in case they need to be reached. Please park in the [place] parking garage and follow the signs to the Sols. Please bring your Insurance Card, Photo ID, List of Current Medications and Referral (if applicable) to your appointment. Check in at [facility] ([address] [facility]. [address], [place], [place]) outpatient registration in the Sols. Please note-No prescriptions will be written by Pain Clinic in OR on the day of procedure. If you require a refill of medications, please contact the office 48 hours prior to your procedure. If you have an implanted Spinal Cord or Peripheral Nerve Stimulator: Please remember to turn device off for procedure    *If you are fasting, you may take blood pressure and thyroid medications with a small sip of water the day of your procedure. *If you are diabetic, your glucose must be within a normal range for you. If you are fasting, you should check your glucose levels and adjust with medication if needed.     Medication Hold:  Number of days you need to be off for the following medications:    Aggrenox 10 days   Agrylin (Anagrelide) 10 days  Brilinta (Ticagrelor) 7 days  Imbruvica (Ibrutinib) 3 days   Enbrel (Etanercept) 24 hours   Fragmin (Dalteparin) 24 hours   Pletal (Cilostazol) 7 days  Effient (Prasugrel) 7 days  Pradaxa 10 days  Trental 7 days  Eliquis (Apixaban) 3 days  Xarelto (Rivaroxaban) 3 days  Lovenox (Enoxaparin) 24 hours  Aspirin  Greater than 81mg but less than 325mg   5 days  325mg and greater                  7 days  (*81 mg      24 hours preferred, but not required)  Coumadin       5 days  Procedure may be cancelled if INR is elevated. Excedrin (with aspirin) 7 days  Plavix (Clopidogrel)                             7 days    NSAIDs: 24 hours preferred, but not required      Ibuprofen (Motrin, Advil, Vicoprofen), Naproxen (Naprosyn, Aleve), Piroxcam (Feldene), Meloxicam (Mobic), Oxaprozin (Daypro), Diclofenac (Voltaren), Indomethacin (Indocin), Etodolac (Lodine), Nabumetone (Relafen), Celebrex (Celecoxib)           HERBAL SUPPLEMENTS  5 days preferred, but not required  Fish oil, krill oil, Omega-3, Vascepa, Vitamin E, Turmeric, Garlic                       Insurance Authorization:   Most insurances are now requiring a preauthorization for all procedures. Please contact your insurance carrier to determine what your financial responsibility will be for the procedure(s). Cancellation/Rescheduling Appointment:   In the event you need to cancel or reschedule your appointment, you must notify the office 24 hours prior. Post-procedure instructions:        Please schedule a follow up visit within 2 to 4 weeks after your last procedure date   Please call our office with any questions or concerns before or after your procedure at [phone], #[phone]. If you are a diabetic, please increase the frequency of your glucose monitoring after the procedure as this may cause a temporary increase in your blood sugar. Contact your primary care physician if your blood sugar rises as you may require some medication adjustment. It is normal to have increased pain at injection site for up to 3-5 days after procedure, you can        use heat or ice (20 minutes on 20 minutes off) for comfort.

## 2023-10-24 ENCOUNTER — OFFICE VISIT (OUTPATIENT)
Dept: INTERNAL MEDICINE CLINIC | Facility: CLINIC | Age: 63
End: 2023-10-24

## 2023-10-24 VITALS
TEMPERATURE: 97 F | HEART RATE: 110 BPM | RESPIRATION RATE: 20 BRPM | DIASTOLIC BLOOD PRESSURE: 86 MMHG | SYSTOLIC BLOOD PRESSURE: 136 MMHG | WEIGHT: 267 LBS | BODY MASS INDEX: 38 KG/M2 | OXYGEN SATURATION: 95 %

## 2023-10-24 DIAGNOSIS — B02.9 HERPES ZOSTER WITHOUT COMPLICATION: ICD-10-CM

## 2023-10-24 DIAGNOSIS — I48.0 PAROXYSMAL ATRIAL FIBRILLATION (HCC): ICD-10-CM

## 2023-10-24 DIAGNOSIS — R00.2 PALPITATIONS: Primary | ICD-10-CM

## 2023-10-24 PROCEDURE — 3079F DIAST BP 80-89 MM HG: CPT | Performed by: PHYSICIAN ASSISTANT

## 2023-10-24 PROCEDURE — 99214 OFFICE O/P EST MOD 30 MIN: CPT | Performed by: PHYSICIAN ASSISTANT

## 2023-10-24 PROCEDURE — 3075F SYST BP GE 130 - 139MM HG: CPT | Performed by: PHYSICIAN ASSISTANT

## 2023-10-24 RX ORDER — BLOOD-GLUCOSE SENSOR
EACH MISCELLANEOUS
COMMUNITY
Start: 2023-09-08

## 2023-10-24 RX ORDER — TRIAMCINOLONE ACETONIDE 1 MG/G
CREAM TOPICAL 2 TIMES DAILY PRN
Qty: 60 G | Refills: 0 | Status: SHIPPED | OUTPATIENT
Start: 2023-10-24

## 2023-10-24 NOTE — PATIENT INSTRUCTIONS
Try capsaicin cream over-the-counter, and triamcinolone cream up to twice daily as needed for itching/irritation from shingles  If more painful we can add prednisone

## 2023-10-29 DIAGNOSIS — M54.50 ACUTE LEFT-SIDED LOW BACK PAIN WITHOUT SCIATICA: ICD-10-CM

## 2023-10-29 DIAGNOSIS — M79.18 MYOFASCIAL PAIN ON LEFT SIDE: ICD-10-CM

## 2023-10-29 DIAGNOSIS — M79.18 MYOFASCIAL PAIN SYNDROME OF LUMBAR SPINE: ICD-10-CM

## 2023-10-29 DIAGNOSIS — G89.29 OTHER CHRONIC PAIN: ICD-10-CM

## 2023-10-30 ENCOUNTER — TELEPHONE (OUTPATIENT)
Dept: PAIN CLINIC | Facility: CLINIC | Age: 63
End: 2023-10-30

## 2023-10-30 RX ORDER — HYDROCODONE BITARTRATE AND ACETAMINOPHEN 7.5; 325 MG/1; MG/1
1 TABLET ORAL EVERY 8 HOURS PRN
Qty: 30 TABLET | Refills: 0 | Status: SHIPPED | OUTPATIENT
Start: 2023-10-30

## 2023-10-30 RX ORDER — CYCLOBENZAPRINE HCL 10 MG
10 TABLET ORAL 2 TIMES DAILY PRN
Qty: 20 TABLET | Refills: 0 | Status: SHIPPED | OUTPATIENT
Start: 2023-10-30

## 2023-10-30 NOTE — TELEPHONE ENCOUNTER
Medication: cyclobenzaprine 10 MG     Date of last refill: 10/06/23      Last office visit: 10/16/23  Due back to clinic per last office note:  NA  Date next office visit scheduled:  none        Last OV note recommendation:   ASSESSMENT AND PLAN:   Lumbar spondylosis  (primary encounter diagnosis)     The patient is a pleasant 51-year-old gentleman with a history of low back pain. This is due to lumbar facet arthropathy. Had good symptom relief with diagnostic medial branch block completed in March 2023. Now has return of pain symptoms with bilateral facet loading. Discussed repeat lumbar medial branch block to facilitate radiofrequency ablation. Patient reports good understanding like to proceed. Of note, patient had more than 95% relief during the initial diagnostic phase of the local anesthetic which is 0.5% bupivacaine. This lasted for 8 hours after the injection. This was also accompanied by improved walking and patient was able to vacuum and do dishes.

## 2023-10-31 ENCOUNTER — APPOINTMENT (OUTPATIENT)
Dept: GENERAL RADIOLOGY | Facility: HOSPITAL | Age: 63
End: 2023-10-31
Attending: ANESTHESIOLOGY
Payer: COMMERCIAL

## 2023-10-31 ENCOUNTER — HOSPITAL ENCOUNTER (OUTPATIENT)
Facility: HOSPITAL | Age: 63
Setting detail: HOSPITAL OUTPATIENT SURGERY
Discharge: HOME | End: 2023-10-31
Attending: ANESTHESIOLOGY | Admitting: ANESTHESIOLOGY
Payer: COMMERCIAL

## 2023-10-31 ENCOUNTER — HOSPITAL ENCOUNTER (OUTPATIENT)
Dept: CV DIAGNOSTICS | Facility: HOSPITAL | Age: 63
Discharge: HOME | End: 2023-10-31
Attending: PHYSICIAN ASSISTANT | Admitting: ANESTHESIOLOGY
Payer: COMMERCIAL

## 2023-10-31 VITALS
HEIGHT: 70 IN | WEIGHT: 267 LBS | OXYGEN SATURATION: 95 % | DIASTOLIC BLOOD PRESSURE: 87 MMHG | BODY MASS INDEX: 38.22 KG/M2 | SYSTOLIC BLOOD PRESSURE: 147 MMHG | RESPIRATION RATE: 16 BRPM | HEART RATE: 109 BPM | TEMPERATURE: 98 F

## 2023-10-31 DIAGNOSIS — I48.0 PAROXYSMAL ATRIAL FIBRILLATION (HCC): ICD-10-CM

## 2023-10-31 DIAGNOSIS — R00.2 PALPITATIONS: ICD-10-CM

## 2023-10-31 LAB — GLUCOSE BLD-MCNC: 123 MG/DL (ref 70–99)

## 2023-10-31 PROCEDURE — 93227 XTRNL ECG REC<48 HR R&I: CPT | Performed by: PHYSICIAN ASSISTANT

## 2023-10-31 PROCEDURE — 3E0T3BZ INTRODUCTION OF ANESTHETIC AGENT INTO PERIPHERAL NERVES AND PLEXI, PERCUTANEOUS APPROACH: ICD-10-PCS | Performed by: ANESTHESIOLOGY

## 2023-10-31 PROCEDURE — 64494 INJ PARAVERT F JNT L/S 2 LEV: CPT | Performed by: ANESTHESIOLOGY

## 2023-10-31 PROCEDURE — 93225 XTRNL ECG REC<48 HRS REC: CPT | Performed by: PHYSICIAN ASSISTANT

## 2023-10-31 PROCEDURE — BR161ZZ FLUOROSCOPY OF LUMBAR FACET JOINT(S) USING LOW OSMOLAR CONTRAST: ICD-10-PCS | Performed by: ANESTHESIOLOGY

## 2023-10-31 PROCEDURE — 64493 INJ PARAVERT F JNT L/S 1 LEV: CPT | Performed by: ANESTHESIOLOGY

## 2023-10-31 PROCEDURE — 93226 XTRNL ECG REC<48 HR SCAN A/R: CPT | Performed by: PHYSICIAN ASSISTANT

## 2023-10-31 RX ORDER — DIPHENHYDRAMINE HYDROCHLORIDE 50 MG/ML
50 INJECTION INTRAMUSCULAR; INTRAVENOUS ONCE AS NEEDED
Status: DISCONTINUED | OUTPATIENT
Start: 2023-10-31 | End: 2023-10-31

## 2023-10-31 RX ORDER — NICOTINE POLACRILEX 4 MG
15 LOZENGE BUCCAL
Status: DISCONTINUED | OUTPATIENT
Start: 2023-10-31 | End: 2023-10-31

## 2023-10-31 RX ORDER — INSULIN ASPART 100 [IU]/ML
3 INJECTION, SOLUTION INTRAVENOUS; SUBCUTANEOUS ONCE
Status: DISCONTINUED | OUTPATIENT
Start: 2023-10-31 | End: 2023-10-31

## 2023-10-31 RX ORDER — ONDANSETRON 2 MG/ML
4 INJECTION INTRAMUSCULAR; INTRAVENOUS ONCE AS NEEDED
Status: DISCONTINUED | OUTPATIENT
Start: 2023-10-31 | End: 2023-10-31

## 2023-10-31 RX ORDER — DEXTROSE MONOHYDRATE 25 G/50ML
50 INJECTION, SOLUTION INTRAVENOUS
Status: DISCONTINUED | OUTPATIENT
Start: 2023-10-31 | End: 2023-10-31

## 2023-10-31 RX ORDER — BUPIVACAINE HYDROCHLORIDE 5 MG/ML
INJECTION, SOLUTION EPIDURAL; INTRACAUDAL
Status: DISCONTINUED | OUTPATIENT
Start: 2023-10-31 | End: 2023-10-31

## 2023-10-31 RX ORDER — SODIUM CHLORIDE, SODIUM LACTATE, POTASSIUM CHLORIDE, CALCIUM CHLORIDE 600; 310; 30; 20 MG/100ML; MG/100ML; MG/100ML; MG/100ML
100 INJECTION, SOLUTION INTRAVENOUS CONTINUOUS
Status: DISCONTINUED | OUTPATIENT
Start: 2023-10-31 | End: 2023-10-31

## 2023-10-31 RX ORDER — MIDAZOLAM HYDROCHLORIDE 1 MG/ML
INJECTION INTRAMUSCULAR; INTRAVENOUS
Status: DISCONTINUED | OUTPATIENT
Start: 2023-10-31 | End: 2023-10-31

## 2023-10-31 RX ORDER — NICOTINE POLACRILEX 4 MG
30 LOZENGE BUCCAL
Status: DISCONTINUED | OUTPATIENT
Start: 2023-10-31 | End: 2023-10-31

## 2023-10-31 RX ORDER — METHYLPREDNISOLONE ACETATE 40 MG/ML
INJECTION, SUSPENSION INTRA-ARTICULAR; INTRALESIONAL; INTRAMUSCULAR; SOFT TISSUE
Status: DISCONTINUED | OUTPATIENT
Start: 2023-10-31 | End: 2023-10-31

## 2023-10-31 RX ORDER — LIDOCAINE HYDROCHLORIDE 10 MG/ML
INJECTION, SOLUTION EPIDURAL; INFILTRATION; INTRACAUDAL; PERINEURAL
Status: DISCONTINUED | OUTPATIENT
Start: 2023-10-31 | End: 2023-10-31

## 2023-10-31 NOTE — H&P
History & Physical Examination    Patient Name: Jarrett Friedman  MRN: [id number]  [facility]: [id number]  YOB: 1960    Pre-Operative Diagnosis:  Lumbar spondylosis [M47.816]    Present Illness: Lumbar spondylosis    bupivacaine PF (Marcaine) 0.5% injection, 9 mL, Injection, Once, Mallory Kilpatrick MD  triamcinolone acetonide (Kenalog-40) 40 MG/ML injection 40 mg, 40 mg, Intramuscular, Once, Mallory Kilpatrick MD    HYDROcodone-acetaminophen [facility]) 7.5-325 MG Oral Tab, Take 1 tablet by mouth every 8 (eight) hours as needed for Pain (back). To last one month, Disp: 30 tablet, Rfl: 0, Past Week  cyclobenzaprine 10 MG Oral Tab, Take 1 tablet (10 mg total) by mouth 2 (two) times daily as needed for Muscle spasms. , Disp: 20 tablet, Rfl: 0, Past Week  Pravastatin Sodium 80 MG Oral Tab, TAKE 1 TABLET NIGHTLY, Disp: 90 tablet, Rfl: 0, 10/30/2023 at 2100  MOUNJARO 7.5 MG/0.5ML Subcutaneous Solution Pen-injector, Inject 7.5 mg into the skin once a week., Disp: , Rfl: , Past Week  FUROSEMIDE 20 MG Oral Tab, TAKE 1 TABLET DAILY, Disp: 90 tablet, Rfl: 3, 10/30/2023 at 0800  METFORMIN 500 MG Oral Tab, TAKE 1 TABLET(500 MG) BY MOUTH TWICE DAILY WITH MEALS, Disp: 180 tablet, Rfl: 1, 10/30/2023 at 2100  CARVEDILOL 25 MG Oral Tab, TAKE 1 TABLET TWICE A DAY WITH MEALS, Disp: 180 tablet, Rfl: 0, 10/30/2023 at 0800  LOSARTAN 100 MG Oral Tab, TAKE 1 TABLET DAILY, Disp: 90 tablet, Rfl: 3, 10/30/2023 at 0800  hydrOXYzine 10 MG Oral Tab, Take 1 tablet (10 mg total) by mouth daily. , Disp: , Rfl: , 10/30/2023 at 0800  FARXIGA 10 MG Oral Tab, TAKE 1 TABLET DAILY, Disp: 90 tablet, Rfl: 0, 10/30/2023 at 0800  amLODIPine 5 MG Oral Tab, Take 1 tablet (5 mg total) by mouth daily. , Disp: 90 tablet, Rfl: 1, 10/30/2023 at 0800  XARELTO 20 MG Oral Tab, TAKE 1 TABLET DAILY WITH FOOD, Disp: 90 tablet, Rfl: 3, Past Week  Sertraline HCl 100 MG Oral Tab, Take 1 tablet (100 mg total) by mouth daily. , Disp: , Rfl: , 10/30/2023 at 2100  traZODone HCl 100 MG Oral Tab, Take 1.5 tablets (150 mg total) by mouth nightly., Disp: , Rfl: , 10/30/2023 at 2100  aspirin EC 81 MG Oral Tab EC, Take 1 tablet (81 mg total) by mouth daily. , Disp: 90 tablet, Rfl: 1, Past Week  Continuous Blood Gluc Sensor (FREESTYLE HENRRY 3 SENSOR) Does not apply Misc, , Disp: , Rfl:   triamcinolone 0.1 % External Cream, Apply topically 2 (two) times daily as needed. , Disp: 60 g, Rfl: 0  dilTIAZem HCl ER Coated Beads 180 MG Oral Capsule SR 24 Hr, TAKE 1 CAPSULE DAILY, Disp: 90 capsule, Rfl: 1  Continuous Blood Gluc  (FREESTYLE HENRRY 14 DAY READER) Does not apply Device, Use to monitor blood sugar., Disp: 1 each, Rfl: 0  LATANOPROST 0.005 % Ophthalmic Solution, INSTILL 1 DROP IN BOTH EYES NIGHTLY (NO ADDITIONAL REFILLS UNTIL EVALUATED BY DOCTOR), Disp: 7.5 mL, Rfl: 3  Insulin Syringe-Needle U-100 (BD INSULIN SYRINGE ULTRAFINE) 31G X 5/16\" 1 ML Does not apply Misc, As directed, Disp: 90 each, Rfl: 5      insulin aspart (NovoLOG) 100 Units/mL vial 3 Units, 3 Units, Subcutaneous, Once  glucose (Dex4) 15 GM/59ML oral liquid 15 g, 15 g, Oral, Q15 Min PRN   Or  glucose (Glutose) 40% oral gel 15 g, 15 g, Oral, Q15 Min PRN   Or  glucose-vitamin C (Dex-4) chewable tab 4 tablet, 4 tablet, Oral, Q15 Min PRN   Or  dextrose 50% injection 50 mL, 50 mL, Intravenous, Q15 Min PRN   Or  glucose (Dex4) 15 GM/59ML oral liquid 30 g, 30 g, Oral, Q15 Min PRN   Or  glucose (Glutose) 40% oral gel 30 g, 30 g, Oral, Q15 Min PRN   Or  glucose-vitamin C (Dex-4) chewable tab 8 tablet, 8 tablet, Oral, Q15 Min PRN  lactated ringers infusion, 100 mL/hr, Intravenous, Continuous  ondansetron (Zofran) 4 MG/2ML injection 4 mg, 4 mg, Intravenous, Once PRN        Allergies: No Known Allergies    Past Medical History:   Diagnosis Date    Abdominal hernia     Abdominal pain     Anxiety     Arrhythmia     A-FIB DX 2 YRS AGO    Arthritis     Back pain     Back problem     Low back pain    Cancer (HCC) 11/2019    prostate    Deep vein thrombosis (HCC)     left    Depression     Diabetes mellitus (HCC)     Esophageal reflux     Fatigue     Glaucoma     Heartburn     High blood pressure     High cholesterol     History of depression     History of hip replacement     March 2020, 11/2010    Insomnia     Night sweats     OA (osteoarthritis)     Obesity     Osteoarthritis     Pain in joints     Sleep apnea     repeat SAM test neg after weight loss    Stress     Type II or unspecified type diabetes mellitus without mention of complication, not stated as uncontrolled     Visual impairment     glasses    Wears glasses      Past Surgical History:   Procedure Laterality Date    ABDOMEN SURGERY PROC UNLISTED  01/26/2021    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     ANESTH,PERINEAL REMV OF PROSTATE  2019    COLONOSCOPY      COLONOSCOPY N/A 12/8/2022    Procedure: COLONOSCOPY with forcep polypectomy;  Surgeon: Kaushal Shepard MD;  Location: [address] [place] [address] ENDOSCOPY    HERNIA SURGERY      HIP REPLACEMENT SURGERY Left 2010    HIP REPLACEMENT SURGERY Right 03/03/2020    LAPAROSCOPY, SURGICAL PROSTATECTOMY, RETROPUBIC RADICAL, W/NERVE SPARING  11/15/2019        OTHER SURGICAL HISTORY      vocal cord polyps removed    OTHER SURGICAL HISTORY  11/15/2019    RALP Dr. Be Quinn      Family History   Problem Relation Age of Onset    Cancer Father 59        lung    Other (Other) Mother 67        aneurism    Diabetes Sister     Suicide History Sister     Alcohol and Other Disorders Associated Brother     Cancer Brother 67        prostate cancer     Social History    Tobacco Use      Smoking status: Never      Smokeless tobacco: Never    Alcohol use: No      SYSTEM Check if Review is Normal Check if Physical Exam is Normal If not normal, please explain:   HEENT [x ] [x ]    NECK & BACK [x ] [x ]    HEART [x ] [x ]    LUNGS [x ] [x ]    ABDOMEN [x ] [x ]    UROGENITAL [x ] [x ]    EXTREMITIES [x ] [x ] OTHER        [ x ] I have discussed the risks and benefits and alternatives with the patient/family. They understand and agree to proceed with plan of care. [ x ] I have reviewed the History and Physical done within the last 30 days. Any changes noted above.     Collette Griffins, MD

## 2023-10-31 NOTE — DISCHARGE INSTRUCTIONS
You have been given medication that makes you sleepy. This may have included both pain medication and sleeping medication. Most of the effects have worn off but you may still have some drowsiness for the next 6 to 8 hours. Home Care  Follow these guidelines when you get home:    --Don't drink any alcohol for the next 24 hours. --Don't drive, operate dangerous machinery, make important business or personal    decisions, or sign legal documents during the next 24 hours. Follow Up Care  Follow up with your healthcare provider if you are not alert and back to your usual level of activity within 12 hours    When to seek medical advice  Call your healthcare provider right away if any of these occur:    --Drowsiness that gets worse  --Weakness or dizziness that gets worse  --Repeated vomiting  --You can not be awakened   Home Care Instructions Following Your Pain Procedure     Vickie Reardon,  It has been a pleasure to have you as our patient. To help you at home, you must follow these general discharge instructions. We will review these with you before you are discharged. It is our hope that you have a complete and uneventful recovery from our procedure. General Instructions:  What to Expect:  Bandages from your procedure today can be removed when you get home. Please avoid soaking and/or swimming for 24 hours. Showering is okay  It is normal to have increased pain symptoms and/or pain at injection site for up to 3-5 days after procedure, you can use heat or ice (20 minutes on 20 minutes off) for comfort. You may experience some temporary side effects which may include restlessness or insomnia, flushing of the face, or heart palpitations. Please contact the provider if these symptoms do not resolve within 3-4 days. Lightheadedness or nausea may occur and should resolve within 24 to 48 hours.   If you develop a headache after treatment, rest, drink fluids (with caffeine, if possible) and take mild over-the-counter pain medication. If the headache does not improve with the above treatment, contact the physician. Home Medications:  Resume all previously prescribed medication. Please avoid taking NSAIDs (Non-Steriodal Anti-Inflammatory Drugs) such as:  Ibuprofen ( Advil, Motrin) Aleve (Naproxen), Diclofenac, Meloxicam for 6 hours after procedure. If you are on Coumadin (Warfarin) or any other anti-coagulant (or \"blood thinning\") medication such as Plavix (Clopidogrel), Xarelto (Rivaroxaban), Eliquis (Apixaban), Effient (Prasugrel) etc., restart on the following day from the procedure unless otherwise directed by your provider. If you are a diabetic, please increase the frequency of your glucose monitoring after the procedure as steroids may cause a temporary (2-3 day) increase in your blood sugar. Contact your primary care physician if your blood sugar remains elevated as you may require some medication adjustment. Diet:  Resume your regular diet as tolerated. Activity: We recommend that you relax and rest during the rest of your procedure day. If you feel weakness in your arms or legs do not drive. Follow-up Appointment  Please schedule a follow-up visit within 3 to 4 weeks after your last procedure date. Question or Concerns:  Feel free to call our office with any questions or concerns at [phone] (option #2)    Jero Bal  Thank you for coming to [facility] for your procedure. The nurses try very hard to make sure you receive the best care possible. Your trust in them as well as us is greatly appreciated.     Thanks so much,   Dr. Bryn Ennis

## 2023-11-01 ENCOUNTER — TELEPHONE (OUTPATIENT)
Dept: PAIN CLINIC | Facility: CLINIC | Age: 63
End: 2023-11-01

## 2023-11-01 NOTE — TELEPHONE ENCOUNTER
Courtesy called placed to patient for post procedure follow up. Patient stated \"I'm doing well up and running\". Pt verbalized understanding to call with any questions or concerns.       Procedure: LUMBAR  MEDIAL BRANCH BLOCK BILATERAL   Date: 10/31/2023  Follow up Visit Scheduled: 11/13/2023 with

## 2023-11-03 ENCOUNTER — TELEPHONE (OUTPATIENT)
Dept: PAIN CLINIC | Facility: CLINIC | Age: 63
End: 2023-11-03

## 2023-11-03 NOTE — TELEPHONE ENCOUNTER
Contacted pharmacy at this time and notified the approval of [place].Pharmacist Vu and no further questions at this time

## 2023-11-08 ENCOUNTER — OFFICE VISIT (OUTPATIENT)
Dept: PAIN CLINIC | Facility: CLINIC | Age: 63
End: 2023-11-08
Payer: COMMERCIAL

## 2023-11-08 VITALS — OXYGEN SATURATION: 95 % | HEART RATE: 103 BPM | SYSTOLIC BLOOD PRESSURE: 140 MMHG | DIASTOLIC BLOOD PRESSURE: 90 MMHG

## 2023-11-08 DIAGNOSIS — G89.29 OTHER CHRONIC PAIN: ICD-10-CM

## 2023-11-08 DIAGNOSIS — M47.816 LUMBAR SPONDYLOSIS: Primary | ICD-10-CM

## 2023-11-08 DIAGNOSIS — M79.18 MYOFASCIAL PAIN ON LEFT SIDE: ICD-10-CM

## 2023-11-08 DIAGNOSIS — M54.50 ACUTE LEFT-SIDED LOW BACK PAIN WITHOUT SCIATICA: ICD-10-CM

## 2023-11-08 DIAGNOSIS — M79.18 MYOFASCIAL PAIN SYNDROME OF LUMBAR SPINE: ICD-10-CM

## 2023-11-08 PROCEDURE — 3080F DIAST BP >= 90 MM HG: CPT | Performed by: ANESTHESIOLOGY

## 2023-11-08 PROCEDURE — 99214 OFFICE O/P EST MOD 30 MIN: CPT | Performed by: ANESTHESIOLOGY

## 2023-11-08 PROCEDURE — 3077F SYST BP >= 140 MM HG: CPT | Performed by: ANESTHESIOLOGY

## 2023-11-08 RX ORDER — CYCLOBENZAPRINE HCL 10 MG
10 TABLET ORAL 3 TIMES DAILY PRN
Qty: 60 TABLET | Refills: 0 | Status: SHIPPED | OUTPATIENT
Start: 2023-11-08

## 2023-11-08 NOTE — PROGRESS NOTES
Last procedure:   LUMBAR  MEDIAL BRANCH BLOCK BILATERAL with sedation     Date: 10/31/23  Percentage of relief obtained: 95%  Duration of relief: consistent    Lower back spasms happening    Current Pain Score: 2    Narcotic Contract Exp: 4/4/24

## 2023-11-08 NOTE — PROGRESS NOTES
Name: Dickey Litten   : 1960   DOS: 2023     Pain Clinic Follow Up Visit:     Chief Complaint   Patient presents with    Procedure Follow Up     FU after LUMBAR  MEDIAL BRANCH BLOCK BILATERAL with sedation        Dickey Litten is a 66year old male with a history of lumbar spondylosis following up after diagnostic lumbar medial branch block. Patient reports 95% improvement in pain following the procedure. This is during initial diagnostic phase with lidocaine. Patient is very happy with results and is interested in moving forward with radiofrequency ablation. Pt denies any chills, fever, or weakness. There is no bladder or bowel incontinence associated with the pain. REVIEW OF SYSTEMS:  A ten point review of systems was performed with pertinent positives and negatives in the HPI. No Known Allergies    Current Outpatient Medications   Medication Sig Dispense Refill    cyclobenzaprine 10 MG Oral Tab Take 1 tablet (10 mg total) by mouth 3 (three) times daily as needed for Muscle spasms. 60 tablet 0    HYDROcodone-acetaminophen (NORCO) 7.5-325 MG Oral Tab Take 1 tablet by mouth every 8 (eight) hours as needed for Pain (back). To last one month 30 tablet 0    Continuous Blood Gluc Sensor (FREESTYLE HENRRY 3 SENSOR) Does not apply Misc       triamcinolone 0.1 % External Cream Apply topically 2 (two) times daily as needed. 60 g 0    Pravastatin Sodium 80 MG Oral Tab TAKE 1 TABLET NIGHTLY 90 tablet 0    dilTIAZem HCl ER Coated Beads 180 MG Oral Capsule SR 24 Hr TAKE 1 CAPSULE DAILY 90 capsule 1    Continuous Blood Gluc  (FREESTYLE HENRRY 14 DAY READER) Does not apply Device Use to monitor blood sugar. 1 each 0    MOUNJARO 7.5 MG/0.5ML Subcutaneous Solution Pen-injector Inject 7.5 mg into the skin once a week.       FUROSEMIDE 20 MG Oral Tab TAKE 1 TABLET DAILY 90 tablet 3    METFORMIN 500 MG Oral Tab TAKE 1 TABLET(500 MG) BY MOUTH TWICE DAILY WITH MEALS 180 tablet 1    CARVEDILOL 25 MG Oral Tab TAKE 1 TABLET TWICE A DAY WITH MEALS 180 tablet 0    LOSARTAN 100 MG Oral Tab TAKE 1 TABLET DAILY 90 tablet 3    hydrOXYzine 10 MG Oral Tab Take 1 tablet (10 mg total) by mouth daily. FARXIGA 10 MG Oral Tab TAKE 1 TABLET DAILY 90 tablet 0    amLODIPine 5 MG Oral Tab Take 1 tablet (5 mg total) by mouth daily. 90 tablet 1    XARELTO 20 MG Oral Tab TAKE 1 TABLET DAILY WITH FOOD 90 tablet 3    LATANOPROST 0.005 % Ophthalmic Solution INSTILL 1 DROP IN BOTH EYES NIGHTLY (NO ADDITIONAL REFILLS UNTIL EVALUATED BY DOCTOR) 7.5 mL 3    Sertraline HCl 100 MG Oral Tab Take 1 tablet (100 mg total) by mouth daily. traZODone HCl 100 MG Oral Tab Take 1.5 tablets (150 mg total) by mouth nightly. Insulin Syringe-Needle U-100 (BD INSULIN SYRINGE ULTRAFINE) 31G X 5/16\" 1 ML Does not apply Misc As directed 90 each 5    aspirin EC 81 MG Oral Tab EC Take 1 tablet (81 mg total) by mouth daily. 90 tablet 1         EXAM:   /90 (BP Location: Left arm, Patient Position: Sitting)   Pulse 103   SpO2 95%   General:  Patient is a(n) 66year old year old male in no acute distress. Neurologic[de-identified] WNL-Orientation to time, place and person, normal mood & affect, concentration & attention span intact. Inspection:  Ambulates with well-coordinated, fluid, non-antalgic gait. Gait is normal.  Neck: Full range of motion  Back: Gait intact. Injection unclear facet loading positive  Respiratory: Speech nonlabored  Cranial nerve: Grossly intact      IMAGES:   No new imaging      ASSESSMENT AND PLAN:     1. Lumbar spondylosis    2. Myofascial pain on left side    3. Other chronic pain    4. Myofascial pain syndrome of lumbar spine    5. Acute left-sided low back pain without sciatica        The patient is a pleasant 65-year-old gentleman with a history of lumbar spondylosis. The patient is following up after diagnostic lumbar medial branch block performed on October 31, 2023.   Reports 95% improvement during the initial diagnostic phase. This is the second set of lumbar medial branch blocks. The first was done in April 2023 leading to 88% improvement during the diagnostic phase. Given these findings, recommended moving forward with radiofrequency ablation of bilateral L4-5 L5-S1 medial branches. Pain Scale: 7  Functional Deficits: standing, walking  Duration of pain symptoms: chronic  Specific Levels (Only 2 allowed): L4-5, L5-S1  Initial treatment or subsequent? (Per area of the body): initial  Radiculopathy & neurogenic claudication ruled out?: yes  Pre-procedure assessment completed on: 10/31/2023  Post-procedure assessment completed on: 11/1/2023  % of pain relief from previous procedure/s: 95%  Duration of relief from previous procedure/s: 6 hours  Prior Conservative Treatment: PT, medications, home exercise         Pain is  limiting functional status. Failed conservative treatment consisting of medications, activity modification, and  PT. 1.Risks of long term narcotic use d/w pt including dependence, abuse, and death from overdose and mixing narcotic with alcohol. Pt verbalized understanding. Medications filled today:  Requested Prescriptions     Signed Prescriptions Disp Refills    cyclobenzaprine 10 MG Oral Tab 60 tablet 0     Sig: Take 1 tablet (10 mg total) by mouth 3 (three) times daily as needed for Muscle spasms. Orders:No orders of the defined types were placed in this encounter. Radiology orders and consultations:None  The patient indicates understanding of these issues and agrees to the plan. No follow-ups on file.     Melly Montero MD, 11/8/2023, 2:27 PM

## 2023-11-09 ENCOUNTER — TELEPHONE (OUTPATIENT)
Dept: PAIN CLINIC | Facility: CLINIC | Age: 63
End: 2023-11-09

## 2023-11-09 NOTE — TELEPHONE ENCOUNTER
Prior authorization request completed for: bilateral L4/5,L5/S1 RFA  Authorization #  no auth needed   Pre-D: optical  Exclusions/Restrictions: no  Covered Benefit: yes  Authorization dates: n/a  CPT codes approved: 19796,50584  Number of visits/dates of service approved: 1  Physician: tosin  Location: [facility]  Call Ref#: [id number]  Representative Name: Nina Condon Carrier: [phone]    Patient can be scheduled. Routed to Navigator.

## 2023-11-10 NOTE — TELEPHONE ENCOUNTER
Medical Clearance faxed to 's Office at Fax #: [phone];[email or web address]     11/10/23      RE: Ryan Mosqueda    : 1960    Dear Dr. Ángela Erickson,    Your patient is being scheduled for a pain management procedure at [facility].    Procedure:  Bilateral L4/5,L5/S1 Radiofrequency Ablation   Date of Procedure: TBD -pending medical clearance. Physician: Jairon Hammer- Anesthesiologist     Your patient is currently taking Xarelto.  usually recommends the medication be held for 3 days prior to injection. Please verify patient is cleared to proceed with pain management procedures.

## 2023-11-27 DIAGNOSIS — I10 ESSENTIAL HYPERTENSION: ICD-10-CM

## 2023-11-28 ENCOUNTER — APPOINTMENT (OUTPATIENT)
Dept: RADIATION ONCOLOGY | Facility: HOSPITAL | Age: 63
End: 2023-11-28
Attending: RADIOLOGY
Payer: COMMERCIAL

## 2023-11-28 ENCOUNTER — HOSPITAL ENCOUNTER (OUTPATIENT)
Dept: RADIATION ONCOLOGY | Facility: HOSPITAL | Age: 63
Discharge: HOME | End: 2023-11-28
Attending: RADIOLOGY
Payer: COMMERCIAL

## 2023-11-28 ENCOUNTER — PATIENT MESSAGE (OUTPATIENT)
Dept: INTERNAL MEDICINE CLINIC | Facility: CLINIC | Age: 63
End: 2023-11-28

## 2023-11-28 VITALS
HEART RATE: 89 BPM | WEIGHT: 258.63 LBS | BODY MASS INDEX: 37 KG/M2 | TEMPERATURE: 98 F | OXYGEN SATURATION: 96 % | DIASTOLIC BLOOD PRESSURE: 108 MMHG | RESPIRATION RATE: 20 BRPM | SYSTOLIC BLOOD PRESSURE: 162 MMHG

## 2023-11-28 DIAGNOSIS — C61 PROSTATE CANCER (HCC): Primary | ICD-10-CM

## 2023-11-28 DIAGNOSIS — I10 ESSENTIAL HYPERTENSION: ICD-10-CM

## 2023-11-28 PROCEDURE — 99213 OFFICE O/P EST LOW 20 MIN: CPT

## 2023-11-28 RX ORDER — CARVEDILOL 25 MG/1
25 TABLET ORAL 2 TIMES DAILY WITH MEALS
Qty: 30 TABLET | Refills: 0 | Status: SHIPPED | OUTPATIENT
Start: 2023-11-28 | End: 2023-11-29

## 2023-11-28 RX ORDER — TIRZEPATIDE 7.5 MG/.5ML
7.5 INJECTION, SOLUTION SUBCUTANEOUS WEEKLY
Qty: 4 ML | Refills: 0 | Status: SHIPPED | OUTPATIENT
Start: 2023-11-28

## 2023-11-28 RX ORDER — CARVEDILOL 25 MG/1
25 TABLET ORAL 2 TIMES DAILY WITH MEALS
Qty: 180 TABLET | Refills: 0 | Status: SHIPPED | OUTPATIENT
Start: 2023-11-28 | End: 2023-11-28

## 2023-11-28 NOTE — PATIENT INSTRUCTIONS
- WE WILL CALL TO SCHEDULE YOUR FOLLOW-UP APPOINTMENT WITH DR. HEDRICK IN 9 MONTHS        - CALL [phone] IF YOU HAVE ANY QUESTIONS/CONCERNS REGARDING RADIATION THERAPY

## 2023-11-28 NOTE — TELEPHONE ENCOUNTER
From: Dallas Winters  To: Franko Estes  Sent: 11/28/2023 1:01 PM CST  Subject: carvedilol 25 MG Oral Tab    Good day to you. .  I am totally out of this and need to have some sent to [place] until my mail order arrives. Sweetie Mcmillan

## 2023-11-28 NOTE — TELEPHONE ENCOUNTER
CARVEDILOL 25 MG Oral Tab   Last time medication was refilled 03/13/2023  Quantity and # of refills 180 w 0  Last OV 10/24/2023  Next OV No appointment scheduled      Passed protocol, Rx sent. MOUNJARO 7.5 MG/0.5ML Subcutaneous Solution Pen-injector   Last time medication was refilled 10/10/2023  Quantity and # of refills 4 ml w 0  Last OV 10/24/2023  Next OV No appointment scheduled      Passed protocol, Rx sent.

## 2023-11-28 NOTE — TELEPHONE ENCOUNTER
Last time medication was refilled 11/28/2023  Quantity and # of refills 180 w 0  Last OV 10/24/2023  Next OV No appointment scheduled      Will send a 15 day supply to [facility] while Patient waits for home delivery     Passed protocol, Rx sent.

## 2023-11-28 NOTE — PROGRESS NOTES
[facility]  RADIATION ONCOLOGY   FOLLOW UP     Rafael Boyer  5/4/1960    DIAGNOSIS: Prostate cancer      CANCER HISTORY   Hx of prostatectomy in November 2019. iPSA was 12, surgical Beba score 4+3. pathologic T2 disease. 6 negative pelvic nodes. 1 positive right pelvic node out of 7. It was a 4 mm noah deposit with extracapsular extension. Surgical margins were negative. No extraprostatic extension nor seminal vesicle invasion. His postoperative PSA remained at 0.07 or lower until February 2021. In October 2021, PSA carolina to 0.24. Further rise of PSA to 0.3 in January 2023. PET scan in April 2023 was difficult to interpret in the prostatic fossa due to artifact from his hip prostheses. He had equivocal noah uptake in the right external iliac region and the left common iliac region. No evidence of distant osseous disease. He was advised to undergo a combination of ADT for 12 to 24 months along with salvage pelvic EBRT.    48.6 Gy in 27 fx to pelvic nodes and 63 Gy in 35 fx to prostate bed, completed 8/1/2023  ADT started May 2023, Dr Yulisa Bill   3 months out from RT  AUA is 2 and BMs are same as baseline  No blood in stool  Hx of anal fissures, but no issues currently  Urgency is stable, 0-1 pads per day  HFs from ADT    Followed by Dr. Dav Hemphill for L spine DJD  Followed for afib and mild CAD  F/b Dr. Renard Villaloobs for knee DJD    EXAM   258#  NAD  RRR    IMPRESSION/PLAN   Rising PSA to 0.3 after RP in Nov 2019 ([id number])  Started ADT May 2023  Completed pelvic and prostate fossa RT Aug 2023  Plan minimum 12 mo ADT (urology); longer as tolerated  Has ADT and PSA scheduled for Dec  F/u here in 9 mo    Amy Franks MD  Radiation Oncology  [email or web address]  [phone]    CC: Andrew Rios MD    20 minutes were spent with the patient/family, more than 50 percent on counseling/coordination of care (discuss disease status, management of any side effects, future follow up plans)

## 2023-11-29 RX ORDER — CARVEDILOL 25 MG/1
25 TABLET ORAL 2 TIMES DAILY WITH MEALS
Qty: 30 TABLET | Refills: 0 | Status: SHIPPED | OUTPATIENT
Start: 2023-11-29

## 2023-12-05 ENCOUNTER — HOSPITAL ENCOUNTER (OUTPATIENT)
Facility: HOSPITAL | Age: 63
Setting detail: HOSPITAL OUTPATIENT SURGERY
Discharge: HOME | End: 2023-12-05
Attending: ANESTHESIOLOGY | Admitting: ANESTHESIOLOGY
Payer: COMMERCIAL

## 2023-12-05 ENCOUNTER — APPOINTMENT (OUTPATIENT)
Dept: GENERAL RADIOLOGY | Facility: HOSPITAL | Age: 63
End: 2023-12-05
Attending: ANESTHESIOLOGY
Payer: COMMERCIAL

## 2023-12-05 VITALS
RESPIRATION RATE: 18 BRPM | HEART RATE: 71 BPM | HEIGHT: 70 IN | TEMPERATURE: 98 F | BODY MASS INDEX: 36.94 KG/M2 | DIASTOLIC BLOOD PRESSURE: 98 MMHG | SYSTOLIC BLOOD PRESSURE: 129 MMHG | WEIGHT: 258 LBS | OXYGEN SATURATION: 95 %

## 2023-12-05 LAB — GLUCOSE BLD-MCNC: 140 MG/DL (ref 70–99)

## 2023-12-05 PROCEDURE — 64636 DESTROY L/S FACET JNT ADDL: CPT | Performed by: ANESTHESIOLOGY

## 2023-12-05 PROCEDURE — 99152 MOD SED SAME PHYS/QHP 5/>YRS: CPT | Performed by: ANESTHESIOLOGY

## 2023-12-05 PROCEDURE — 3E0T3TZ INTRODUCTION OF DESTRUCTIVE AGENT INTO PERIPHERAL NERVES AND PLEXI, PERCUTANEOUS APPROACH: ICD-10-PCS | Performed by: ANESTHESIOLOGY

## 2023-12-05 PROCEDURE — 64635 DESTROY LUMB/SAC FACET JNT: CPT | Performed by: ANESTHESIOLOGY

## 2023-12-05 RX ORDER — SODIUM CHLORIDE, SODIUM LACTATE, POTASSIUM CHLORIDE, CALCIUM CHLORIDE 600; 310; 30; 20 MG/100ML; MG/100ML; MG/100ML; MG/100ML
100 INJECTION, SOLUTION INTRAVENOUS CONTINUOUS
Status: DISCONTINUED | OUTPATIENT
Start: 2023-12-05 | End: 2023-12-05

## 2023-12-05 RX ORDER — ONDANSETRON 2 MG/ML
4 INJECTION INTRAMUSCULAR; INTRAVENOUS ONCE AS NEEDED
Status: DISCONTINUED | OUTPATIENT
Start: 2023-12-05 | End: 2023-12-05

## 2023-12-05 RX ORDER — MIDAZOLAM HYDROCHLORIDE 1 MG/ML
INJECTION INTRAMUSCULAR; INTRAVENOUS
Status: DISCONTINUED | OUTPATIENT
Start: 2023-12-05 | End: 2023-12-05

## 2023-12-05 RX ORDER — DIPHENHYDRAMINE HYDROCHLORIDE 50 MG/ML
50 INJECTION INTRAMUSCULAR; INTRAVENOUS ONCE AS NEEDED
Status: DISCONTINUED | OUTPATIENT
Start: 2023-12-05 | End: 2023-12-05

## 2023-12-05 RX ORDER — METHYLPREDNISOLONE ACETATE 40 MG/ML
INJECTION, SUSPENSION INTRA-ARTICULAR; INTRALESIONAL; INTRAMUSCULAR; SOFT TISSUE
Status: DISCONTINUED | OUTPATIENT
Start: 2023-12-05 | End: 2023-12-05

## 2023-12-05 RX ORDER — LIDOCAINE HYDROCHLORIDE 10 MG/ML
INJECTION, SOLUTION EPIDURAL; INFILTRATION; INTRACAUDAL; PERINEURAL
Status: DISCONTINUED | OUTPATIENT
Start: 2023-12-05 | End: 2023-12-05

## 2023-12-05 NOTE — OPERATIVE REPORT
[facility]  Operative Report  2023     Eladio Suggs Patient Status:  Hospital Outpatient Surgery    1960 MRN [id number]   Location [address] [address] Attending Karena Valle MD   Hosp Day # 0 PCP Milton Choi MD     Indication: Nito Gonzalez is a 66year old male with lumbar spondylosis    Preoperative Diagnosis:  Lumbar spondylosis [M47.816]    Postoperative Diagnosis: Same as above. Procedure performed: RADIOFREQUENCY ABLATION OF BILATERAL LUMBAR MEDIAL BRANCH with sedation (L4-5, L5-S1)    Anesthesia: Local and IV Sedation. EBL: Less than 1 ml. Procedure Description:   After reviewing the patient's history and performing a focused physical examination, the diagnosis was confirmed and contraindications such as infection and coagulopathy were ruled out. Following review of potential side effects and complications, including but not necessarily limited to infection, allergic reaction, local tissue breakdown, nerve injury, and paresis, the patient indicated they understood and agreed to proceed. After obtaining the informed consent, the patient was brought to the procedure room and monitored. Per my order and under my supervision, the patient was sedated with intermittent intravenous doses of versed and fentanyl. The vital signs were monitored and recorded by an experienced RN. The procedure started after the patient was adequately sedated. The moderate intravenous conscious sedation was provided for 23 minutes. The patient was placed prone on the table. The patient's back was prepped and draped in sterile fashion. Attention was turned towards the patient's right side first.  The skin was anesthetized via 25-gauge 1.5\" needle with approximately 2 mL of 1% lidocaine for local anesthesia.   Under fluoroscopic guidance, a 22-gauge, 100-mm SMK needle was advanced to the junction of the superior aspect of the L3 transverse process and the lateral aspect of the same level superior articular process at right L3 level . The needle was then walked off the bony tissue and advanced 2 to 3 mm to lie along the path of the L4 medial branch nerve. AP and lateral radiographs were obtained to document proper needle position. Sensory and motor stimulation were then performed, which elicited deep local back discomfort but no evidence of motor stimulation in the gluteal muscles or extremities. At this point, 0.5 mL of 1% lidocaine was injected to the tissues around the tip of the SMK needle. Subsequently, a medial branch nerve denervation was performed for 90 seconds at 80 degrees centigrade without complication. Similar procedures were performed at right L4-L5 and L5-S1 level. The radiofrequency probe was removed with the needle left in place and 1% lidocaine and 5 mg methylprednisolone was injected through each needle. The needles were removed with tips intact. This was then repeated on the contralateral side. The patient tolerated the procedure very well. There was no subjective or objective loss of motor strength. The patient was observed until discharge criteria met. Discharge instructions were given and patient was released to a responsible adult. Complications: None. Follow up: The patient will be followed in the pain clinic as needed basis.       Itzel Mcclain MD

## 2023-12-05 NOTE — H&P
History & Physical Examination    Patient Name: Jenny Ball  MRN: [id number]  [facility]: [id number]  YOB: 1960    Pre-Operative Diagnosis:  Lumbar spondylosis [M47.816]    Present Illness: Lumbar spondylosis    Facility-Administered Medications Prior to Admission   Medication Dose Route Frequency Provider Last Rate Last Admin    bupivacaine PF (Marcaine) 0.5% injection  9 mL Injection Once Stephie Medellin MD        triamcinolone acetonide (Kenalog-40) 40 MG/ML injection 40 mg  40 mg Intramuscular Once Stephie Medellin MD         Medications Prior to Admission   Medication Sig Dispense Refill Last Dose    carvedilol 25 MG Oral Tab Take 1 tablet (25 mg total) by mouth 2 (two) times daily with meals. 30 tablet 0     MOUNJARO 7.5 MG/0.5ML Subcutaneous Solution Pen-injector Inject 7.5 mg into the skin once a week. 4 mL 0     cyclobenzaprine 10 MG Oral Tab Take 1 tablet (10 mg total) by mouth 3 (three) times daily as needed for Muscle spasms. (Patient not taking: Reported on 11/28/2023) 60 tablet 0     HYDROcodone-acetaminophen (NORCO) 7.5-325 MG Oral Tab Take 1 tablet by mouth every 8 (eight) hours as needed for Pain (back). To last one month (Patient not taking: Reported on 11/28/2023) 30 tablet 0     Continuous Blood Gluc Sensor (FREESTYLE HENRRY 3 SENSOR) Does not apply Misc        triamcinolone 0.1 % External Cream Apply topically 2 (two) times daily as needed. (Patient not taking: Reported on 11/28/2023) 60 g 0     Pravastatin Sodium 80 MG Oral Tab TAKE 1 TABLET NIGHTLY 90 tablet 0     dilTIAZem HCl ER Coated Beads 180 MG Oral Capsule SR 24 Hr TAKE 1 CAPSULE DAILY 90 capsule 1     Continuous Blood Gluc  (FREESTYLE HENRRY 14 DAY READER) Does not apply Device Use to monitor blood sugar.  1 each 0     FUROSEMIDE 20 MG Oral Tab TAKE 1 TABLET DAILY 90 tablet 3     METFORMIN 500 MG Oral Tab TAKE 1 TABLET(500 MG) BY MOUTH TWICE DAILY WITH MEALS 180 tablet 1     LOSARTAN 100 MG Oral Tab TAKE 1 TABLET DAILY 90 tablet 3     hydrOXYzine 10 MG Oral Tab Take 1 tablet (10 mg total) by mouth daily. FARXIGA 10 MG Oral Tab TAKE 1 TABLET DAILY 90 tablet 0     amLODIPine 5 MG Oral Tab Take 1 tablet (5 mg total) by mouth daily. 90 tablet 1     XARELTO 20 MG Oral Tab TAKE 1 TABLET DAILY WITH FOOD 90 tablet 3 12/1/2023    LATANOPROST 0.005 % Ophthalmic Solution INSTILL 1 DROP IN BOTH EYES NIGHTLY (NO ADDITIONAL REFILLS UNTIL EVALUATED BY DOCTOR) 7.5 mL 3     Sertraline HCl 100 MG Oral Tab Take 1 tablet (100 mg total) by mouth daily. traZODone HCl 100 MG Oral Tab Take 1.5 tablets (150 mg total) by mouth nightly. Insulin Syringe-Needle U-100 (BD INSULIN SYRINGE ULTRAFINE) 31G X 5/16\" 1 ML Does not apply Misc As directed 90 each 5     aspirin EC 81 MG Oral Tab EC Take 1 tablet (81 mg total) by mouth daily.  90 tablet 1 12/1/2023     Current Facility-Administered Medications   Medication Dose Route Frequency    lactated ringers infusion  100 mL/hr Intravenous Continuous    ondansetron (Zofran) 4 MG/2ML injection 4 mg  4 mg Intravenous Once PRN       Allergies: No Known Allergies    Past Medical History:   Diagnosis Date    Abdominal hernia     Abdominal pain     Anxiety     Arrhythmia     A-FIB DX 2 YRS AGO    Arthritis     Back pain     Back problem     Low back pain    Cancer ([address] 11/2019    prostate    Deep vein thrombosis (HCC)     left    Depression     Diabetes mellitus ([address]     Esophageal reflux     Fatigue     Glaucoma     Heartburn     High blood pressure     High cholesterol     History of depression     History of hip replacement     March 2020, 11/2010    Insomnia     Night sweats     OA (osteoarthritis)     Obesity     Osteoarthritis     Pain in joints     Sleep apnea     repeat SAM test neg after weight loss    Stress     Type II or unspecified type diabetes mellitus without mention of complication, not stated as uncontrolled     Visual impairment     glasses    Wears glasses      Past Surgical History:   Procedure Laterality Date    ABDOMEN SURGERY PROC UNLISTED  01/26/2021    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 ANESTH,PERINEAL REMV OF PROSTATE  2019    COLONOSCOPY      COLONOSCOPY N/A 12/8/2022    Procedure: COLONOSCOPY with forcep polypectomy;  Surgeon: Franko Solomon MD;  [facility]: [facility] ENDOSCOPY    HERNIA SURGERY      HIP REPLACEMENT SURGERY Left 2010    HIP REPLACEMENT SURGERY Right 03/03/2020    LAPAROSCOPY, SURGICAL PROSTATECTOMY, RETROPUBIC RADICAL, W/NERVE SPARING  11/15/2019        OTHER SURGICAL HISTORY      vocal cord polyps removed    OTHER SURGICAL HISTORY  11/15/2019    RALP Dr. Belia Anthony      Family History   Problem Relation Age of Onset    Cancer Father 59        lung    Other (Other) Mother 67        aneurism    Diabetes Sister     Suicide History Sister     Alcohol and Other Disorders Associated Brother     Cancer Brother 67        prostate cancer     Social History     Tobacco Use    Smoking status: Never    Smokeless tobacco: Never   Substance Use Topics    Alcohol use: No       SYSTEM Check if Review is Normal Check if Physical Exam is Normal If not normal, please explain:   HEENT [x ] [x ]    NECK & BACK [x ] [x ]    HEART [x ] [x ]    LUNGS [x ] [x ]    ABDOMEN [x ] [x ]    UROGENITAL [x ] [x ]    EXTREMITIES [x ] [x ]    OTHER        [ x ] I have discussed the risks and benefits and alternatives with the patient/family. They understand and agree to proceed with plan of care. [ x ] I have reviewed the History and Physical done within the last 30 days. Any changes noted above.     Osvaldo Valiente MD

## 2023-12-05 NOTE — DISCHARGE INSTRUCTIONS
You have been given medication that makes you sleepy. This may have included both pain medication and sleeping medication. Most of the effects have worn off but you may still have some drowsiness for the next 6 to 8 hours. Home Care  Follow these guidelines when you get home:    --Don't drink any alcohol for the next 24 hours. --Don't drive, operate dangerous machinery, make important business or personal    decisions, or sign legal documents during the next 24 hours. Follow Up Care  Follow up with your healthcare provider if you are not alert and back to your usual level of activity within 12 hours    When to seek medical advice  Call your healthcare provider right away if any of these occur:    --Drowsiness that gets worse  --Weakness or dizziness that gets worse  --Repeated vomiting  --You can not be awakened   Home Care Instructions Following Your Pain Procedure     Vickie Reardon,  It has been a pleasure to have you as our patient. To help you at home, you must follow these general discharge instructions. We will review these with you before you are discharged. It is our hope that you have a complete and uneventful recovery from our procedure. General Instructions:  What to Expect:  Bandages from your procedure today can be removed when you get home. Please avoid soaking and/or swimming for 24 hours. Showering is okay  It is normal to have increased pain symptoms and/or pain at injection site for up to 3-5 days after procedure, you can use heat or ice (20 minutes on 20 minutes off) for comfort. You may experience some temporary side effects which may include restlessness or insomnia, flushing of the face, or heart palpitations. Please contact the provider if these symptoms do not resolve within 3-4 days. Lightheadedness or nausea may occur and should resolve within 24 to 48 hours.   If you develop a headache after treatment, rest, drink fluids (with caffeine, if possible) and take mild over-the-counter pain medication. If the headache does not improve with the above treatment, contact the physician. Home Medications:  Resume all previously prescribed medication. Please avoid taking NSAIDs (Non-Steriodal Anti-Inflammatory Drugs) such as:  Ibuprofen ( Advil, Motrin) Aleve (Naproxen), Diclofenac, Meloxicam for 6 hours after procedure. If you are on Coumadin (Warfarin) or any other anti-coagulant (or \"blood thinning\") medication such as Plavix (Clopidogrel), Xarelto (Rivaroxaban), Eliquis (Apixaban), Effient (Prasugrel) etc., restart on the following day from the procedure unless otherwise directed by your provider. If you are a diabetic, please increase the frequency of your glucose monitoring after the procedure as steroids may cause a temporary (2-3 day) increase in your blood sugar. Contact your primary care physician if your blood sugar remains elevated as you may require some medication adjustment. Diet:  Resume your regular diet as tolerated. Activity: We recommend that you relax and rest during the rest of your procedure day. If you feel weakness in your arms or legs do not drive. Follow-up Appointment  Please schedule a follow-up visit within 3 to 4 weeks after your last procedure date. Question or Concerns:  Feel free to call our office with any questions or concerns at [phone] (option #2)    Felicia Rockwell  Thank you for coming to [facility] for your procedure. The nurses try very hard to make sure you receive the best care possible. Your trust in them as well as us is greatly appreciated.     Thanks so much,   Dr. Bobo Gong

## 2023-12-25 DIAGNOSIS — E78.2 MIXED HYPERLIPIDEMIA: ICD-10-CM

## 2023-12-26 RX ORDER — PRAVASTATIN SODIUM 80 MG/1
TABLET ORAL
Qty: 90 TABLET | Refills: 0 | Status: SHIPPED | OUTPATIENT
Start: 2023-12-26

## 2023-12-26 NOTE — TELEPHONE ENCOUNTER
Last time medication was refilled 9/25/23  Quantity and # of refills 90 w 0  Last OV 10/24/23  Next OV No future appts scheduled. Passed protocol, Rx sent.

## 2024-01-03 ENCOUNTER — OFFICE VISIT (OUTPATIENT)
Dept: PAIN CLINIC | Facility: CLINIC | Age: 64
End: 2024-01-03
Payer: COMMERCIAL

## 2024-01-03 VITALS
DIASTOLIC BLOOD PRESSURE: 84 MMHG | BODY MASS INDEX: 37 KG/M2 | HEART RATE: 78 BPM | WEIGHT: 258 LBS | OXYGEN SATURATION: 96 % | SYSTOLIC BLOOD PRESSURE: 138 MMHG

## 2024-01-03 DIAGNOSIS — M47.816 LUMBAR SPONDYLOSIS: Primary | ICD-10-CM

## 2024-01-03 DIAGNOSIS — M79.18 MYOFASCIAL PAIN SYNDROME OF LUMBAR SPINE: ICD-10-CM

## 2024-01-03 DIAGNOSIS — M54.50 ACUTE LEFT-SIDED LOW BACK PAIN WITHOUT SCIATICA: ICD-10-CM

## 2024-01-03 DIAGNOSIS — G89.29 OTHER CHRONIC PAIN: ICD-10-CM

## 2024-01-03 DIAGNOSIS — M79.18 MYOFASCIAL PAIN ON LEFT SIDE: ICD-10-CM

## 2024-01-03 PROCEDURE — 3079F DIAST BP 80-89 MM HG: CPT | Performed by: ANESTHESIOLOGY

## 2024-01-03 PROCEDURE — 99214 OFFICE O/P EST MOD 30 MIN: CPT | Performed by: ANESTHESIOLOGY

## 2024-01-03 PROCEDURE — 3075F SYST BP GE 130 - 139MM HG: CPT | Performed by: ANESTHESIOLOGY

## 2024-01-03 RX ORDER — CYCLOBENZAPRINE HCL 10 MG
10 TABLET ORAL 3 TIMES DAILY PRN
Qty: 60 TABLET | Refills: 0 | Status: SHIPPED | OUTPATIENT
Start: 2024-01-03

## 2024-01-03 NOTE — PROGRESS NOTES
Last procedure: RADIOFREQUENCY ABLATION OF BILATERAL LUMBAR MEDIAL BRANCH with sedation   Date: 12/05/23  Percentage of relief obtained: 95%  Duration of relief: current    Current Pain Score: 0-1

## 2024-01-03 NOTE — PATIENT INSTRUCTIONS
Refill policies:    Allow 2-3 business days for refills; controlled substances may take longer.  Contact your pharmacy at least 5 days prior to running out of medication and have them send an electronic request or submit request through the “request refill” option in your [employer] account.  Refills are not addressed on weekends; covering physicians do not authorize routine medications on weekends.  No narcotics or controlled substances are refilled after noon on Fridays or by on call physicians.  By law, narcotics must be electronically prescribed.  A 30 day supply with no refills is the maximum allowed.  If your prescription is due for a refill, you may be due for a follow up appointment.  To best provide you care, patients receiving routine medications need to be seen at least once a year.  Patients receiving narcotic/controlled substance medications need to be seen at least once every 3 months.  In the event that your preferred pharmacy does not have the requested medication in stock (e.g. Backordered), it is your responsibility to find another pharmacy that has the requested medication available.  We will gladly send a new prescription to that pharmacy at your request.    Scheduling Tests:    If your physician has ordered radiology tests such as MRI or CT scans, please contact Central Scheduling at [phone] right away to schedule the test.  Once scheduled, the [facility] Centralized Referral Team will work with your insurance carrier to obtain pre-certification or prior authorization.  Depending on your insurance carrier, approval may take 3-10 days.  It is highly recommended patients assure they have received an authorization before having a test performed.  If test is done without insurance authorization, patient may be responsible for the entire amount billed.      Precertification and Prior Authorizations:  If your physician has recommended that you have a procedure or additional testing performed the [facility]  Centralized Referral Team will contact your insurance carrier to obtain pre-certification or prior authorization.    You are strongly encouraged to contact your insurance carrier to verify that your procedure/test has been approved and is a COVERED benefit.  Although the [facility] Centralized Referral Team does its due diligence, the insurance carrier gives the disclaimer that \"Although the procedure is authorized, this does not guarantee payment.\"    Ultimately the patient is responsible for payment.   Thank you for your understanding in this matter.  Paperwork Completion:  If you require FMLA or disability paperwork for your recovery, please make sure to either drop it off or have it faxed to our office at [phone]. Be sure the form has your name and date of birth on it.  The form will be faxed to our Forms Department and they will complete it for you.  There is a 25$ fee for all forms that need to be filled out.  Please be aware there is a 10-14 day turnaround time.  You will need to sign a release of information (DEBBIE) form if your paperwork does not come with one.  You may call the Forms Department with any questions at [phone].  Their fax number is [phone].

## 2024-01-03 NOTE — PROGRESS NOTES
Name: Darrel Salazar   : 1960   DOS: 1/3/2024     Pain Clinic Follow Up Visit:     Chief Complaint   Patient presents with    Procedure Follow Up     RADIOFREQUENCY ABLATION OF BILATERAL LUMBAR MEDIAL BRANCH with sedation       Darrel Salazar is a 63 year old male with low back pain and lumbar spondylosis here for follow-up after radiofrequency ablation of bilateral L4-5 and L5-S1 medial branches.  The patient reports significant improvement and rates his overall improvement to be 95%.  He has no back pain at this time.    Pt denies any chills, fever, or weakness. There is no bladder or bowel incontinence associated with the pain.    REVIEW OF SYSTEMS:  A ten point review of systems was performed with pertinent positives and negatives in the HPI.    No Known Allergies    Current Outpatient Medications   Medication Sig Dispense Refill    cyclobenzaprine 10 MG Oral Tab Take 1 tablet (10 mg total) by mouth 3 (three) times daily as needed for Muscle spasms. 60 tablet 0    Pravastatin Sodium 80 MG Oral Tab TAKE 1 TABLET NIGHTLY 90 tablet 0    carvedilol 25 MG Oral Tab Take 1 tablet (25 mg total) by mouth 2 (two) times daily with meals. 30 tablet 0    MOUNJARO 7.5 MG/0.5ML Subcutaneous Solution Pen-injector Inject 7.5 mg into the skin once a week. 4 mL 0    HYDROcodone-acetaminophen (NORCO) 7.5-325 MG Oral Tab Take 1 tablet by mouth every 8 (eight) hours as needed for Pain (back). To last one month 30 tablet 0    Continuous Blood Gluc Sensor (FREESTYLE HENRRY 3 SENSOR) Does not apply Misc       triamcinolone 0.1 % External Cream Apply topically 2 (two) times daily as needed. 60 g 0    dilTIAZem HCl ER Coated Beads 180 MG Oral Capsule SR 24 Hr TAKE 1 CAPSULE DAILY 90 capsule 1    Continuous Blood Gluc  (FREESTYLE HENRRY 14 DAY READER) Does not apply Device Use to monitor blood sugar. 1 each 0    FUROSEMIDE 20 MG Oral Tab TAKE 1 TABLET DAILY 90 tablet 3    METFORMIN 500 MG Oral Tab TAKE 1  TABLET(500 MG) BY MOUTH TWICE DAILY WITH MEALS 180 tablet 1    LOSARTAN 100 MG Oral Tab TAKE 1 TABLET DAILY 90 tablet 3    hydrOXYzine 10 MG Oral Tab Take 1 tablet (10 mg total) by mouth daily.      FARXIGA 10 MG Oral Tab TAKE 1 TABLET DAILY 90 tablet 0    amLODIPine 5 MG Oral Tab Take 1 tablet (5 mg total) by mouth daily. 90 tablet 1    XARELTO 20 MG Oral Tab TAKE 1 TABLET DAILY WITH FOOD 90 tablet 3    LATANOPROST 0.005 % Ophthalmic Solution INSTILL 1 DROP IN BOTH EYES NIGHTLY (NO ADDITIONAL REFILLS UNTIL EVALUATED BY DOCTOR) 7.5 mL 3    Sertraline HCl 100 MG Oral Tab Take 1 tablet (100 mg total) by mouth daily.      traZODone HCl 100 MG Oral Tab Take 1.5 tablets (150 mg total) by mouth nightly.      Insulin Syringe-Needle U-100 (BD INSULIN SYRINGE ULTRAFINE) 31G X 5/16\" 1 ML Does not apply Misc As directed 90 each 5    aspirin EC 81 MG Oral Tab EC Take 1 tablet (81 mg total) by mouth daily. 90 tablet 1         EXAM:   /84   Pulse 78   Wt 258 lb (117 kg)   SpO2 96%   BMI 37.02 kg/m²   General:  Patient is a(n) 63 year old year old male in no acute distress.  Neurologic:: WNL-Orientation to time, place and person, normal mood & affect, concentration & attention span intact.   Inspection:  Ambulates with well-coordinated, fluid, non-antalgic gait.  Gait is normal.  Neck: Upper extremity range of motion is intact  Cranial nerve: Grossly intact  Respiratory: Nonlabored  Back: Injection site clear.  Gait intact.  Strength intact    IMAGES:     Intraoperative fluoroscopy reviewed the patient consistent with radiofrequency ablation of lumbar medial branches    ASSESSMENT AND PLAN:     1. Lumbar spondylosis    2. Myofascial pain on left side    3. Other chronic pain    4. Myofascial pain syndrome of lumbar spine    5. Acute left-sided low back pain without sciatica        The patient is a very pleasant 63-year-old gentleman with a history of axial low back pain.  The patient is doing very well after recent  radiofrequency ablation of bilateral lumbar medial branches.  The patient reports sustained relief at 95%.  Overall, he is very happy with the results of the procedure.  Denies any complications.  Does not have any acute needs at this time.  He did ask for refill of his cyclobenzaprine which should be sent to his pharmacy electronically.  Patient is welcome to follow-up on as-needed basis    Pain is  limiting functional status. Failed conservative treatment consisting of medications, activity modification, and  PT.  1.Risks of long term narcotic use d/w pt including dependence, abuse, and death from overdose and mixing narcotic with alcohol. Pt verbalized understanding.     Medications filled today:  Requested Prescriptions     Signed Prescriptions Disp Refills    cyclobenzaprine 10 MG Oral Tab 60 tablet 0     Sig: Take 1 tablet (10 mg total) by mouth 3 (three) times daily as needed for Muscle spasms.     Orders:No orders of the defined types were placed in this encounter.        Radiology orders and consultations:None  The patient indicates understanding of these issues and agrees to the plan.  No follow-ups on file.    Aman Tanner MD, 1/3/2024, 1:22 PM

## 2024-01-31 DIAGNOSIS — I10 ESSENTIAL HYPERTENSION: ICD-10-CM

## 2024-01-31 RX ORDER — LOSARTAN POTASSIUM 100 MG/1
100 TABLET ORAL DAILY
Qty: 90 TABLET | Refills: 3 | Status: SHIPPED | OUTPATIENT
Start: 2024-01-31

## 2024-01-31 NOTE — TELEPHONE ENCOUNTER
Future Appointments   Date Time Provider Department Center   2/23/2024  1:15 PM Ryan Astudillo MD [facility] [address]   Sent to Dr. Astudillo for approval.

## 2024-01-31 NOTE — TELEPHONE ENCOUNTER
From: Darrel Salazar  To: Ryan Astudillo  Sent: 1/31/2024 12:33 PM CST  Subject: PET Scan    Good day..  I have a PET scan for Feb 14 and will require medication (Valium)  Thanks in advance..   [place]

## 2024-02-05 ENCOUNTER — OFFICE VISIT (OUTPATIENT)
Dept: PAIN CLINIC | Facility: CLINIC | Age: 64
End: 2024-02-05
Payer: COMMERCIAL

## 2024-02-05 ENCOUNTER — TELEPHONE (OUTPATIENT)
Dept: PAIN CLINIC | Facility: CLINIC | Age: 64
End: 2024-02-05

## 2024-02-05 VITALS — DIASTOLIC BLOOD PRESSURE: 80 MMHG | HEART RATE: 75 BPM | SYSTOLIC BLOOD PRESSURE: 122 MMHG | OXYGEN SATURATION: 97 %

## 2024-02-05 DIAGNOSIS — M79.18 MYOFASCIAL PAIN SYNDROME OF LUMBAR SPINE: ICD-10-CM

## 2024-02-05 DIAGNOSIS — M79.18 MYOFASCIAL PAIN ON LEFT SIDE: ICD-10-CM

## 2024-02-05 DIAGNOSIS — M47.816 LUMBAR SPONDYLOSIS: Primary | ICD-10-CM

## 2024-02-05 DIAGNOSIS — G89.29 OTHER CHRONIC PAIN: ICD-10-CM

## 2024-02-05 DIAGNOSIS — M54.16 LUMBAR RADICULOPATHY: ICD-10-CM

## 2024-02-05 DIAGNOSIS — M54.50 ACUTE LEFT-SIDED LOW BACK PAIN WITHOUT SCIATICA: ICD-10-CM

## 2024-02-05 DIAGNOSIS — M51.36 DDD (DEGENERATIVE DISC DISEASE), LUMBAR: ICD-10-CM

## 2024-02-05 RX ORDER — HYDROCODONE BITARTRATE AND ACETAMINOPHEN 7.5; 325 MG/1; MG/1
1 TABLET ORAL EVERY 8 HOURS PRN
Qty: 30 TABLET | Refills: 0 | Status: SHIPPED | OUTPATIENT
Start: 2024-02-05

## 2024-02-05 RX ORDER — KETOROLAC TROMETHAMINE 30 MG/ML
15 INJECTION, SOLUTION INTRAMUSCULAR; INTRAVENOUS ONCE
Status: COMPLETED | OUTPATIENT
Start: 2024-02-05 | End: 2024-02-05

## 2024-02-05 RX ORDER — METHYLPREDNISOLONE 4 MG/1
TABLET ORAL
Qty: 1 EACH | Refills: 0 | Status: SHIPPED | OUTPATIENT
Start: 2024-02-05

## 2024-02-05 RX ORDER — CYCLOBENZAPRINE HCL 10 MG
10 TABLET ORAL 3 TIMES DAILY PRN
Qty: 60 TABLET | Refills: 0 | Status: SHIPPED | OUTPATIENT
Start: 2024-02-05

## 2024-02-05 NOTE — TELEPHONE ENCOUNTER
Medical Clearance faxed to  Office at Fax #: [phone];confirmation r'cvd     24      RE: Darrel Salazar    : 1960    Dear Dr. Chairez,    Your patient is being scheduled for a pain management procedure at [facility].    Procedure:  Lumbar Epidural Steroid Injection.   Date of Procedure: TBD -pending medical clearance.   Physician: - Anesthesiologist     Your patient is currently taking Xarelto.  usually recommends the medication be held for 3 days prior to injection.     Please verify patient is cleared to proceed with pain management procedures.

## 2024-02-05 NOTE — PROGRESS NOTES
HPI:   Darrel Salazar presents with complaints of Low back pain.  Radiating down the left lower extremity.  Patient states that he was doing quite well after the radiofrequency procedure however last Thursday this pain started to come on.  He does not recall any inciting event or injury.  He denies any fever or chills.  He denies any falls or loss of bowel or bladder control.  He states that over the weekend he had to lay down to reduce the symptoms.  Symptoms are brought on by walking.  He describes the pain as moderate to severe aching shooting stabbing and is fairly constant.  Patient has had the symptoms on and off over the last year however the radicular symptoms have not been as severe.  His back pain has been managed with injection therapy and most recent radiofrequency.  Patient states that the pain is not worse based on time of day it is based on activity.  Patient continues to work from home and adjusts his desk from seated to standing to modify his pain.    Last procedure: Bilateral L4-5 L5-S1 radiofrequency ablation    date: December 5, 2023    Percentage of relief experienced from the procedure: 100%    Duration of the relief: until last thursday    The following activities will increase the patient’s pain: walking    The following activities decrease the patient’s pain: medications    Functional Assessment: Patient reports that they are able to complete all of their ADL's such as eating, bathing, using the toilet, dressing and getting up from a bed or a chair independently.    Past Surgical History:   Procedure Laterality Date    Abdomen surgery proc unlisted  01/26/2021    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    Anesth,perineal remv of prostate  2019    Colonoscopy      Colonoscopy N/A 12/8/2022    Procedure: COLONOSCOPY with forcep polypectomy;  Surgeon: Godwin Eller MD;  Location:  ENDOSCOPY    Hernia surgery      Hip  replacement surgery Left 2010    Hip replacement surgery Right 03/03/2020    Laparoscopy, surgical prostatectomy, retropubic radical, w/nerve sparing  11/15/2019        Other surgical history      vocal cord polyps removed    Other surgical history  11/15/2019    RALP Dr. Muir         Current Medications:  Current Outpatient Medications   Medication Sig Dispense Refill    losartan 100 MG Oral Tab Take 1 tablet (100 mg total) by mouth daily. 90 tablet 3    diazePAM 5 MG Oral Tab Take 5 mg tablet half hour prior to procedure. Ok to repeat one, 5 mg tablet immediately before procedure if needed. 2 tablet 0    cyclobenzaprine 10 MG Oral Tab Take 1 tablet (10 mg total) by mouth 3 (three) times daily as needed for Muscle spasms. 60 tablet 0    Pravastatin Sodium 80 MG Oral Tab TAKE 1 TABLET NIGHTLY 90 tablet 0    carvedilol 25 MG Oral Tab Take 1 tablet (25 mg total) by mouth 2 (two) times daily with meals. 30 tablet 0    MOUNJARO 7.5 MG/0.5ML Subcutaneous Solution Pen-injector Inject 7.5 mg into the skin once a week. 4 mL 0    HYDROcodone-acetaminophen (NORCO) 7.5-325 MG Oral Tab Take 1 tablet by mouth every 8 (eight) hours as needed for Pain (back). To last one month 30 tablet 0    Continuous Blood Gluc Sensor (FREESTYLE HENRRY 3 SENSOR) Does not apply Misc       triamcinolone 0.1 % External Cream Apply topically 2 (two) times daily as needed. 60 g 0    dilTIAZem HCl ER Coated Beads 180 MG Oral Capsule SR 24 Hr TAKE 1 CAPSULE DAILY 90 capsule 1    Continuous Blood Gluc  (FREESTYLE HENRRY 14 DAY READER) Does not apply Device Use to monitor blood sugar. 1 each 0    FUROSEMIDE 20 MG Oral Tab TAKE 1 TABLET DAILY 90 tablet 3    METFORMIN 500 MG Oral Tab TAKE 1 TABLET(500 MG) BY MOUTH TWICE DAILY WITH MEALS 180 tablet 1    hydrOXYzine 10 MG Oral Tab Take 1 tablet (10 mg total) by mouth daily.      FARXIGA 10 MG Oral Tab TAKE 1 TABLET DAILY 90 tablet 0    amLODIPine 5 MG Oral Tab Take 1 tablet (5 mg  total) by mouth daily. 90 tablet 1    XARELTO 20 MG Oral Tab TAKE 1 TABLET DAILY WITH FOOD 90 tablet 3    LATANOPROST 0.005 % Ophthalmic Solution INSTILL 1 DROP IN BOTH EYES NIGHTLY (NO ADDITIONAL REFILLS UNTIL EVALUATED BY DOCTOR) 7.5 mL 3    Sertraline HCl 100 MG Oral Tab Take 1 tablet (100 mg total) by mouth daily.      traZODone HCl 100 MG Oral Tab Take 1.5 tablets (150 mg total) by mouth nightly.      Insulin Syringe-Needle U-100 (BD INSULIN SYRINGE ULTRAFINE) 31G X 5/16\" 1 ML Does not apply Misc As directed 90 each 5    aspirin EC 81 MG Oral Tab EC Take 1 tablet (81 mg total) by mouth daily. 90 tablet 1      Patient requires assistance with: lifting      Have you recently had any feelings of harming yourself or others? The patient's response was no.    Physical Exam:   /80 (BP Location: Right arm, Patient Position: Sitting, Cuff Size: large)   Pulse 75   SpO2 97%   VAS Pain Score:  10/10  General Appearance: Well developed, well nourished, normal build, independent body habitus, no apparent physical disabilities, well groomed    Neurological Exam: WNL-Orientation to time, place and person, normal mood & effect, normal concentration & attention span    Inspection:   Antalgic  Positive left straight leg raise  Positive pain with lumbar flexion into the low back down the left leg  Able to rise on toes and heels  Strength bilateral lower extremities 5 out of 5  Gross motor or sensory deficits  Radiology/Lab Test Reviewed:   Narrative   PROCEDURE:  MRI SPINE LUMBAR (CPT=72148)     LOCATION:  [facility]       COMPARISON:  MR [facility], MRI SPINE LUMBAR (CPT=72148), 9/03/2019, 3:57 PM.     INDICATIONS:  M54.16 Lumbar radiculopathy M47.816 Lumbar spondylosis G89.29 Other chronic pain     TECHNIQUE:  Multiplanar T1 and T2 weighted images including fat suppression sequences.  Images acquired in sagittal and axial planes.       PATIENT STATED HISTORY: (As transcribed by Technologist)  Patient describes having pain to  low back with pain going down right leg occasional to knee. Symptoms for 3 years getting worse over time.         FINDINGS:  Marked motion degradation.     There is lumbar lordosis.  There is trace retrolisthesis of L4 on L5.  The vertebral body heights are maintained.  There is minimal disc height loss at L3-L4, L4-5, and L5-S1.  There is scattered disc desiccation.  Vacuum disc phenomenon is identified at   L3-L4 and L4-5.  There is mild T2 hyperintense signal within the L5-S1 disc on the right which is favored to be degenerative in etiology.  This T2 hyperintense signal was also present on 9/3/2019.  The conus and cauda equina nerve roots are unremarkable   in caliber and signal.     T12-L1:  Mild facet hypertrophy.  Minimal disc bulge.  No spinal canal or foraminal narrowing.     L1-L2:  Mild disc bulge asymmetrically extending into the left neural foramen.  Mild facet hypertrophy.  No spinal canal stenosis.  Moderate left and mild right foraminal narrowing.     L2-L3:  Mild facet hypertrophy.  Trace disc bulge.  No spinal canal stenosis.  Moderate left and mild right foraminal narrowing.     L3-L4:  Mild to moderate facet hypertrophy.  Mild disc bulge.  No spinal canal stenosis.  Moderate to severe bilateral foraminal narrowing.     L4-5:  Mild to moderate diffuse disc bulge.  Mild to moderate facet hypertrophy.  No spinal canal stenosis.  Moderate left and severe right foraminal narrowing.     L5-S1:  Mild facet hypertrophy.  No spinal canal stenosis.  No significant foraminal narrowing.                   Impression   CONCLUSION:  Multilevel advanced foraminal narrowing.  No significant spinal canal stenosis.  This foraminal narrowing is most notable L3-L4 and L4-5.  Please see above for further details.        Dictated by (CST): Stromberg, LeRoy, MD on 4/28/2023 at 9:42 AM      Finalized by (CST): Stromberg, LeRoy, MD on 4/28/2023 at 9:53 AM     Lab Results   Component Value Date    WBC 6.7 01/18/2023    WBC  6.9 03/04/2022    WBC 7.2 10/19/2021   No results found for: \"HEMOGLOBIN\"  Lab Results   Component Value Date    .0 01/18/2023    .0 03/04/2022    .0 10/19/2021         Patient Education: Patient was advised to continue normal activities as tolerated and was advised against any form of bedrest, since recent guidelines promote and encourage physical activity for improvment of functionality and overall pain.  (Family Practice Vol. 16, No. 1, 65-09Â© [employer] 1999 ), Patient was given brochure,website information, and handouts., Patient was shown interactive content, shown and explained procedure on spinal model..  Patient educated and verbalized understanding.  Medical Decision Making:   Diagnosis:    Encounter Diagnoses   Name Primary?    Lumbar spondylosis Yes    Myofascial pain on left side     Other chronic pain     Myofascial pain syndrome of lumbar spine     Acute left-sided low back pain without sciatica     Lumbar radiculopathy     DDD (degenerative disc disease), lumbar      Impression:   Patient is a 63-year-old male with complex medical history including sickle cell trait, atrial fibs, type 2 diabetes, morbid obesity, dyslipidemia, hypertension, insomnia, SAM with chronic low back pain most recently treated with radiofrequency procedure of the medial branches at L4-5 and L5-S1.  Patient reported 100% relief of his back symptoms up until the middle of last week when he began noticing return of back pain but primarily left lumbar radicular symptoms.  Patient reports he tried to modify his workstation, continued his home exercises, use his pain medication and muscle relaxants however the symptoms did not resolve.  He presents today for further clinical evaluation and recommendations.  We did review his lumbar MRI.  He has multiple levels of foraminal narrowing and based on his symptoms we would recommend a lumbar epidural steroid injection however he is on Xarelto and  aspirin and will need blood thinner clearance prior to that.    Patient is in acute pain today and did discuss with patient a 15 mg dose of Toradol to help his pain symptoms and then have him start his oral steroid pack tomorrow morning until we can get him in for a lumbar epidural steroid injection.  Patient also requested refill on his hydrocodone medication as well as Flexeril to help manage his current symptoms.  These were sent to the local pharmacy  Plan:   > 1 dose of 15 mg Toradol IM for acute pain (patient on Xarelto and creatinine slightly elevated therefore half dose given)  > Start Medrol Dosepak tomorrow morning take as prescribed  > Refilled Flexeril and hydrocodone medication  > Recommend lumbar epidural steroid injection patient prefers IV sedation  > Follow-up after injection therapy if no relief will need to update imaging and refer to surgical team  > Continue home exercises as learned in physical therapy  Orders Placed This Encounter   Procedures    [facility] PAIN NAVIGATOR       Meds & Refills for this Visit:  Requested Prescriptions     Signed Prescriptions Disp Refills    methylPREDNISolone (MEDROL) 4 MG Oral Tablet Therapy Pack 1 each 0     Sig: Take as directed    cyclobenzaprine 10 MG Oral Tab 60 tablet 0     Sig: Take 1 tablet (10 mg total) by mouth 3 (three) times daily as needed for Muscle spasms.    HYDROcodone-acetaminophen (NORCO) 7.5-325 MG Oral Tab 30 tablet 0     Sig: Take 1 tablet by mouth every 8 (eight) hours as needed for Pain (back). To last one month       Imaging & Consults:  None    The patient indicates understanding of these issues and agrees to the plan.      ID#[id number]

## 2024-02-05 NOTE — PROGRESS NOTES
Patient presents in office today with reported pain in left side low back    Current pain level reported = 6/10    Last reported dose of n/a      Narcotic Contract renewal n/a    Urine Drug screen n/a

## 2024-02-05 NOTE — TELEPHONE ENCOUNTER
Order Questions    Question Answer Comment   Anesthesia Type Sedation    Provider Ciro    Location [facility] Procedure Lab    Procedure Lumbar RICHARD    CPT (Hit enter after each entry) NJX INTERLAMINAR LMBR/SAC    Medical clearance requested (will send to Pain Navigator) Yes xarelto and ASA /geannopoulous   Patient has Medicare coverage? No

## 2024-02-05 NOTE — PATIENT INSTRUCTIONS
Refill policies:    Allow 2-3 business days for refills; controlled substances may take longer.  Contact your pharmacy at least 5 days prior to running out of medication and have them send an electronic request or submit request through the “request refill” option in your [employer] account.  Refills are not addressed on weekends; covering physicians do not authorize routine medications on weekends.  No narcotics or controlled substances are refilled after noon on Fridays or by on call physicians.  By law, narcotics must be electronically prescribed.  A 30 day supply with no refills is the maximum allowed.  If your prescription is due for a refill, you may be due for a follow up appointment.  To best provide you care, patients receiving routine medications need to be seen at least once a year.  Patients receiving narcotic/controlled substance medications need to be seen at least once every 3 months.  In the event that your preferred pharmacy does not have the requested medication in stock (e.g. Backordered), it is your responsibility to find another pharmacy that has the requested medication available.  We will gladly send a new prescription to that pharmacy at your request.    Scheduling Tests:    If your physician has ordered radiology tests such as MRI or CT scans, please contact Central Scheduling at [phone] right away to schedule the test.  Once scheduled, the [facility] Centralized Referral Team will work with your insurance carrier to obtain pre-certification or prior authorization.  Depending on your insurance carrier, approval may take 3-10 days.  It is highly recommended patients assure they have received an authorization before having a test performed.  If test is done without insurance authorization, patient may be responsible for the entire amount billed.      Precertification and Prior Authorizations:  If your physician has recommended that you have a procedure or additional testing performed the [facility]  Centralized Referral Team will contact your insurance carrier to obtain pre-certification or prior authorization.    You are strongly encouraged to contact your insurance carrier to verify that your procedure/test has been approved and is a COVERED benefit.  Although the [facility] Centralized Referral Team does its due diligence, the insurance carrier gives the disclaimer that \"Although the procedure is authorized, this does not guarantee payment.\"    Ultimately the patient is responsible for payment.   Thank you for your understanding in this matter.  Paperwork Completion:  If you require FMLA or disability paperwork for your recovery, please make sure to either drop it off or have it faxed to our office at [phone]. Be sure the form has your name and date of birth on it.  The form will be faxed to our Forms Department and they will complete it for you.  There is a 25$ fee for all forms that need to be filled out.  Please be aware there is a 10-14 day turnaround time.  You will need to sign a release of information (DEBBIE) form if your paperwork does not come with one.  You may call the Forms Department with any questions at [phone].  Their fax number is [phone].

## 2024-02-06 ENCOUNTER — TELEPHONE (OUTPATIENT)
Dept: PAIN CLINIC | Facility: CLINIC | Age: 64
End: 2024-02-06

## 2024-02-06 NOTE — TELEPHONE ENCOUNTER
Prior authorization request completed for: LESSUJATA  Authorization # no auth needed   Pre-D: no   Exclusions/Restrictions: no  Covered Benefit: yes   Authorization dates: n/a  CPT codes approved: 88145  Number of visits/dates of service approved: 1  Physician: tosin  Location: [facility]  Call Ref#: [id number]  Representative Name: [employer]  Insurance Carrier: [phone]  Patient can be scheduled. Routed to Navigator.

## 2024-02-06 NOTE — TELEPHONE ENCOUNTER
Yesenia Wood11 minutes ago (1:59 PM)     FAYE  Prior authorization request completed for: LESSUJATA  Authorization # no auth needed   Pre-D: no   Exclusions/Restrictions: no  Covered Benefit: yes   Authorization dates: n/a  CPT codes approved: 15580  Number of visits/dates of service approved: 1  Physician: tosin  Location: [facility]  Call Ref#: [id number]  Representative Name: [employer]  Insurance Carrier: [phone]  Patient can be scheduled. Routed to Navigator.

## 2024-02-14 ENCOUNTER — HOSPITAL ENCOUNTER (OUTPATIENT)
Dept: NUCLEAR MEDICINE | Facility: HOSPITAL | Age: 64
Discharge: HOME | End: 2024-02-14
Attending: UROLOGY
Payer: COMMERCIAL

## 2024-02-14 DIAGNOSIS — C61 PROSTATE CANCER (HCC): ICD-10-CM

## 2024-02-14 PROCEDURE — 78815 PET IMAGE W/CT SKULL-THIGH: CPT | Performed by: UROLOGY

## 2024-02-23 ENCOUNTER — OFFICE VISIT (OUTPATIENT)
Dept: INTERNAL MEDICINE CLINIC | Facility: CLINIC | Age: 64
End: 2024-02-23
Payer: COMMERCIAL

## 2024-02-23 VITALS
WEIGHT: 268 LBS | HEART RATE: 70 BPM | OXYGEN SATURATION: 94 % | HEIGHT: 70 IN | TEMPERATURE: 97 F | RESPIRATION RATE: 16 BRPM | SYSTOLIC BLOOD PRESSURE: 124 MMHG | BODY MASS INDEX: 38.37 KG/M2 | DIASTOLIC BLOOD PRESSURE: 80 MMHG

## 2024-02-23 DIAGNOSIS — Z00.00 ANNUAL PHYSICAL EXAM: Primary | ICD-10-CM

## 2024-02-23 DIAGNOSIS — R80.9 TYPE 2 DIABETES MELLITUS WITH MICROALBUMINURIA, WITHOUT LONG-TERM CURRENT USE OF INSULIN (HCC): ICD-10-CM

## 2024-02-23 DIAGNOSIS — E11.29 TYPE 2 DIABETES MELLITUS WITH MICROALBUMINURIA, WITHOUT LONG-TERM CURRENT USE OF INSULIN (HCC): ICD-10-CM

## 2024-02-23 PROBLEM — C61 PROSTATE CANCER (HCC): Status: RESOLVED | Noted: 2023-07-24 | Resolved: 2024-02-23

## 2024-02-23 PROCEDURE — 3008F BODY MASS INDEX DOCD: CPT | Performed by: INTERNAL MEDICINE

## 2024-02-23 PROCEDURE — 3079F DIAST BP 80-89 MM HG: CPT | Performed by: INTERNAL MEDICINE

## 2024-02-23 PROCEDURE — 99396 PREV VISIT EST AGE 40-64: CPT | Performed by: INTERNAL MEDICINE

## 2024-02-23 PROCEDURE — 3074F SYST BP LT 130 MM HG: CPT | Performed by: INTERNAL MEDICINE

## 2024-02-23 RX ORDER — TIRZEPATIDE 10 MG/.5ML
INJECTION, SOLUTION SUBCUTANEOUS
COMMUNITY

## 2024-02-23 NOTE — PROGRESS NOTES
Subjective:   Patient ID: Darrel Salazar is a 63 year old male.    HPI  Darrel Salazar is a 63 year old male who presents for a complete physical exam.   HPI:   Pt complains of nothing  Had labs completed which were ordered by endo and cardio    PAST MEDICAL, SOCIAL, FAMILY HISTORIES REVIEWED WITH PT    .    Immunization History   Administered Date(s) Administered    >=3 YRS QUAD MULTIDOSE VIAL (89095) FLU CLINIC 10/12/2016    Afluria, 9 Years & >, IM 09/25/2015    Covid-19 Vaccine Moderna 50 Mcg/0.25 Ml 05/18/2022    Covid-19 Vaccine Pfizer 30 mcg/0.3 ml 03/19/2021, 04/09/2021, 10/31/2021    Covid-19 Vaccine Pfizer Bivalent 30mcg/0.3mL 09/08/2022    FLULAVAL 6 months & older 0.5 ml Prefilled syringe (54186) 09/08/2017, 09/24/2019, 09/22/2020, 09/28/2021    FLUZONE 6 months and older PFS 0.5 ml (09718) 12/31/2015, 09/08/2017, 10/08/2018, 09/24/2019, 09/22/2020, 09/28/2021, 10/17/2023    FLUZONE 6-35 Mos 0.25 ml Dose Quad Split PF (82203) 09/21/2022    IPV 11/12/2015, 11/12/2015    Influenza 09/26/2013, 10/22/2014, 09/21/2022, 09/21/2022, 10/19/2023    Pfizer Covid-19 Vaccine 30mcg/0.3ml 12yrs+ (7817-1357) 10/21/2023    Pneumococcal (Prevnar 13) 11/09/2017    Pneumococcal Conjugate PCV20 09/08/2022, 04/14/2023    Pneumovax 23 12/06/2016    RSV, bivalent, diluent reconstituted PF (Abrysvo) 10/21/2023    TDAP 03/26/2014    Typhoid, Oral 11/12/2015    Zoster Vaccine Recombinant Adjuvanted (Shingrix) 10/03/2021, 05/18/2022     Wt Readings from Last 6 Encounters:   02/23/24 268 lb (121.6 kg)   01/03/24 258 lb (117 kg)   12/04/23 258 lb (117 kg)   11/28/23 258 lb 9.6 oz (117.3 kg)   10/30/23 267 lb (121.1 kg)   10/24/23 267 lb (121.1 kg)     Body mass index is 38.45 kg/m².     Lab Results   Component Value Date     (H) 01/18/2023    GLU 70 07/22/2022     (H) 03/04/2022     Lab Results   Component Value Date    CHOLEST 191 01/18/2023    CHOLEST 181 03/04/2022    CHOLEST 184 12/02/2021     Lab  Results   Component Value Date    HDL 56 01/18/2023    HDL 55 03/04/2022    HDL 54 12/02/2021     Lab Results   Component Value Date     (H) 01/18/2023     (H) 03/04/2022     (H) 12/02/2021     Lab Results   Component Value Date    AST 15 01/18/2023    AST 20 03/04/2022    AST 13 (L) 12/02/2021     Lab Results   Component Value Date    ALT 17 01/18/2023    ALT 24 03/04/2022    ALT 18 12/02/2021     Lab Results   Component Value Date    PSA 0.20 12/02/2021    PSA 0.244 10/21/2021    PSA 0.085 02/25/2021        Current Outpatient Medications   Medication Sig Dispense Refill    Tirzepatide (MOUNJARO) 10 MG/0.5ML Subcutaneous Solution Pen-injector Inject into the skin.      cyclobenzaprine 10 MG Oral Tab Take 1 tablet (10 mg total) by mouth 3 (three) times daily as needed for Muscle spasms. 60 tablet 0    HYDROcodone-acetaminophen (NORCO) 7.5-325 MG Oral Tab Take 1 tablet by mouth every 8 (eight) hours as needed for Pain (back). To last one month 30 tablet 0    losartan 100 MG Oral Tab Take 1 tablet (100 mg total) by mouth daily. 90 tablet 3    diazePAM 5 MG Oral Tab Take 5 mg tablet half hour prior to procedure. Ok to repeat one, 5 mg tablet immediately before procedure if needed. 2 tablet 0    Pravastatin Sodium 80 MG Oral Tab TAKE 1 TABLET NIGHTLY 90 tablet 0    carvedilol 25 MG Oral Tab Take 1 tablet (25 mg total) by mouth 2 (two) times daily with meals. 30 tablet 0    Continuous Blood Gluc Sensor (FREESTYLE HENRRY 3 SENSOR) Does not apply Misc       triamcinolone 0.1 % External Cream Apply topically 2 (two) times daily as needed. 60 g 0    dilTIAZem HCl ER Coated Beads 180 MG Oral Capsule SR 24 Hr TAKE 1 CAPSULE DAILY 90 capsule 1    Continuous Blood Gluc  (FREESTYLE HENRRY 14 DAY READER) Does not apply Device Use to monitor blood sugar. 1 each 0    FUROSEMIDE 20 MG Oral Tab TAKE 1 TABLET DAILY 90 tablet 3    METFORMIN 500 MG Oral Tab TAKE 1 TABLET(500 MG) BY MOUTH TWICE DAILY WITH MEALS  180 tablet 1    hydrOXYzine 10 MG Oral Tab Take 1 tablet (10 mg total) by mouth daily.      FARXIGA 10 MG Oral Tab TAKE 1 TABLET DAILY 90 tablet 0    amLODIPine 5 MG Oral Tab Take 1 tablet (5 mg total) by mouth daily. 90 tablet 1    XARELTO 20 MG Oral Tab TAKE 1 TABLET DAILY WITH FOOD 90 tablet 3    LATANOPROST 0.005 % Ophthalmic Solution INSTILL 1 DROP IN BOTH EYES NIGHTLY (NO ADDITIONAL REFILLS UNTIL EVALUATED BY DOCTOR) 7.5 mL 3    Sertraline HCl 100 MG Oral Tab Take 1 tablet (100 mg total) by mouth daily.      traZODone HCl 100 MG Oral Tab Take 1.5 tablets (150 mg total) by mouth nightly.      Insulin Syringe-Needle U-100 (BD INSULIN SYRINGE ULTRAFINE) 31G X 5/16\" 1 ML Does not apply Misc As directed 90 each 5    aspirin EC 81 MG Oral Tab EC Take 1 tablet (81 mg total) by mouth daily. 90 tablet 1      Past Medical History:   Diagnosis Date    Abdominal hernia     Abdominal pain     Anxiety     Arrhythmia     A-FIB DX 2 YRS AGO    Arthritis     Back pain     Back problem     Low back pain    Cancer (HCC) 11/2019    prostate    Deep vein thrombosis (HCC)     left    Depression     Diabetes mellitus (HCC)     Esophageal reflux     Fatigue     Glaucoma     Heartburn     High blood pressure     High cholesterol     History of depression     History of hip replacement     March 2020, 11/2010    Insomnia     Night sweats     OA (osteoarthritis)     Obesity     Osteoarthritis     Pain in joints     Sleep apnea     repeat SAM test neg after weight loss    Stress     Type II or unspecified type diabetes mellitus without mention of complication, not stated as uncontrolled     Visual impairment     glasses    Wears glasses       Past Surgical History:   Procedure Laterality Date    ABDOMEN SURGERY PROC UNLISTED  01/26/2021    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    ANESTH,PERINEAL REMV OF PROSTATE  2019    COLONOSCOPY      COLONOSCOPY N/A 12/8/2022    Procedure:  COLONOSCOPY with forcep polypectomy;  Surgeon: Godwin Eller MD;  Location:  ENDOSCOPY    HERNIA SURGERY      HIP REPLACEMENT SURGERY Left 2010    HIP REPLACEMENT SURGERY Right 03/03/2020    LAPAROSCOPY, SURGICAL PROSTATECTOMY, RETROPUBIC RADICAL, W/NERVE SPARING  11/15/2019        OTHER SURGICAL HISTORY      vocal cord polyps removed    OTHER SURGICAL HISTORY  11/15/2019    ALEX Muir       Family History   Problem Relation Age of Onset    Cancer Father 64        lung    Other (Other) Mother 72        aneurism    Diabetes Sister     Suicide History Sister     Alcohol and Other Disorders Associated Brother     Cancer Brother 72        prostate cancer      Social History:  Social History     Socioeconomic History    Marital status:     Number of children: 4   Tobacco Use    Smoking status: Never    Smokeless tobacco: Never   Vaping Use    Vaping Use: Never used   Substance and Sexual Activity    Alcohol use: No    Drug use: Yes     Types: Cannabis   Other Topics Concern    Caffeine Concern Yes     Comment: 2 cups    Exercise No     Comment: WATER AEROBICS WEEKLY    Seat Belt No    Special Diet No    Stress Concern Yes    Weight Concern Yes   Social History Narrative    - lives with wife    4 grown children      Occ: yes. : yes. Children: yes.   Exercise:  twice per week, three times per week.  Diet: watches fats closely and watches sugar closely     REVIEW OF SYSTEMS:   A comprehensive 10 point review of systems was completed.     Pertinent positives and negatives noted in the HPI.      EXAM:   /80   Pulse 70   Temp 97.4 °F (36.3 °C)   Resp 16   Ht 5' 10\" (1.778 m)   Wt 268 lb (121.6 kg)   SpO2 94%   BMI 38.45 kg/m²   Body mass index is 38.45 kg/m².   GENERAL: well developed, well nourished,in no apparent distress  SKIN: no rashes,no suspicious lesions  HEENT: atraumatic, normocephalic,ears and throat are clear  EYES:PERRLA, EOMI, normal optic  disk,conjunctiva are clear  NECK: supple,no adenopathy,no bruits  LUNGS: clear to auscultation  CARDIO: RRR without murmur  GI: good BS's,no masses, HSM or tenderness  : deferred  MUSCULOSKELETAL: back is not tender  EXTREMITIES: no cyanosis, clubbing or edema  NEURO: Oriented times three,motor and sensory are grossly intact  Bilateral barefoot skin diabetic exam is normal, visualized feet and the appearance is normal.  Bilateral monofilament/sensation of both feet is normal.  Pulsation pedal pulse exam of both lower legs/feet is normal as well.        ASSESSMENT AND PLAN:   Darrel Salazar is a 63 year old male who presents for a complete physical exam. Body mass index is 38.45 kg/m²., recommended low fat diet and aerobic exercise 30 minutes three times weekly.   Health maintenance, labs ordered and we reviewed at goal fasting Lipids, CMP, CBC and PSA with pt.   Cont current meds  UTD with screening colonoscopy.   Pt info handouts given for: exercise, low fat diet,   The patient indicates understanding of these issues and agrees to the plan.  The patient is asked to return for CPX in 12 m.    History/Other:   Review of Systems  Current Outpatient Medications   Medication Sig Dispense Refill    Tirzepatide (MOUNJARO) 10 MG/0.5ML Subcutaneous Solution Pen-injector Inject into the skin.      cyclobenzaprine 10 MG Oral Tab Take 1 tablet (10 mg total) by mouth 3 (three) times daily as needed for Muscle spasms. 60 tablet 0    HYDROcodone-acetaminophen (NORCO) 7.5-325 MG Oral Tab Take 1 tablet by mouth every 8 (eight) hours as needed for Pain (back). To last one month 30 tablet 0    losartan 100 MG Oral Tab Take 1 tablet (100 mg total) by mouth daily. 90 tablet 3    diazePAM 5 MG Oral Tab Take 5 mg tablet half hour prior to procedure. Ok to repeat one, 5 mg tablet immediately before procedure if needed. 2 tablet 0    Pravastatin Sodium 80 MG Oral Tab TAKE 1 TABLET NIGHTLY 90 tablet 0    carvedilol 25 MG Oral Tab  Take 1 tablet (25 mg total) by mouth 2 (two) times daily with meals. 30 tablet 0    Continuous Blood Gluc Sensor (FREESTYLE HENRRY 3 SENSOR) Does not apply Misc       triamcinolone 0.1 % External Cream Apply topically 2 (two) times daily as needed. 60 g 0    dilTIAZem HCl ER Coated Beads 180 MG Oral Capsule SR 24 Hr TAKE 1 CAPSULE DAILY 90 capsule 1    Continuous Blood Gluc  (FREESTYLE HENRRY 14 DAY READER) Does not apply Device Use to monitor blood sugar. 1 each 0    FUROSEMIDE 20 MG Oral Tab TAKE 1 TABLET DAILY 90 tablet 3    METFORMIN 500 MG Oral Tab TAKE 1 TABLET(500 MG) BY MOUTH TWICE DAILY WITH MEALS 180 tablet 1    hydrOXYzine 10 MG Oral Tab Take 1 tablet (10 mg total) by mouth daily.      FARXIGA 10 MG Oral Tab TAKE 1 TABLET DAILY 90 tablet 0    amLODIPine 5 MG Oral Tab Take 1 tablet (5 mg total) by mouth daily. 90 tablet 1    XARELTO 20 MG Oral Tab TAKE 1 TABLET DAILY WITH FOOD 90 tablet 3    LATANOPROST 0.005 % Ophthalmic Solution INSTILL 1 DROP IN BOTH EYES NIGHTLY (NO ADDITIONAL REFILLS UNTIL EVALUATED BY DOCTOR) 7.5 mL 3    Sertraline HCl 100 MG Oral Tab Take 1 tablet (100 mg total) by mouth daily.      traZODone HCl 100 MG Oral Tab Take 1.5 tablets (150 mg total) by mouth nightly.      Insulin Syringe-Needle U-100 (BD INSULIN SYRINGE ULTRAFINE) 31G X 5/16\" 1 ML Does not apply Misc As directed 90 each 5    aspirin EC 81 MG Oral Tab EC Take 1 tablet (81 mg total) by mouth daily. 90 tablet 1     Allergies:No Known Allergies    Objective:   Physical Exam    Assessment & Plan:   1. Annual physical exam    2. Type 2 diabetes mellitus with microalbuminuria, without long-term current use of insulin (HCC)        No orders of the defined types were placed in this encounter.      Meds This Visit:  Requested Prescriptions      No prescriptions requested or ordered in this encounter       Imaging & Referrals:  None

## 2024-02-29 ENCOUNTER — HOSPITAL ENCOUNTER (OUTPATIENT)
Facility: HOSPITAL | Age: 64
Setting detail: HOSPITAL OUTPATIENT SURGERY
Discharge: HOME | End: 2024-02-29
Attending: ANESTHESIOLOGY | Admitting: ANESTHESIOLOGY
Payer: COMMERCIAL

## 2024-02-29 ENCOUNTER — APPOINTMENT (OUTPATIENT)
Dept: GENERAL RADIOLOGY | Facility: HOSPITAL | Age: 64
End: 2024-02-29
Attending: ANESTHESIOLOGY
Payer: COMMERCIAL

## 2024-02-29 VITALS
BODY MASS INDEX: 38.37 KG/M2 | DIASTOLIC BLOOD PRESSURE: 75 MMHG | WEIGHT: 268 LBS | SYSTOLIC BLOOD PRESSURE: 126 MMHG | TEMPERATURE: 98 F | HEIGHT: 70 IN | OXYGEN SATURATION: 96 % | RESPIRATION RATE: 18 BRPM | HEART RATE: 84 BPM

## 2024-02-29 LAB — GLUCOSE BLD-MCNC: 154 MG/DL (ref 70–99)

## 2024-02-29 PROCEDURE — 3E0U33Z INTRODUCTION OF ANTI-INFLAMMATORY INTO JOINTS, PERCUTANEOUS APPROACH: ICD-10-PCS | Performed by: ANESTHESIOLOGY

## 2024-02-29 PROCEDURE — 62323 NJX INTERLAMINAR LMBR/SAC: CPT | Performed by: ANESTHESIOLOGY

## 2024-02-29 RX ORDER — SODIUM CHLORIDE, SODIUM LACTATE, POTASSIUM CHLORIDE, CALCIUM CHLORIDE 600; 310; 30; 20 MG/100ML; MG/100ML; MG/100ML; MG/100ML
100 INJECTION, SOLUTION INTRAVENOUS CONTINUOUS
Status: DISCONTINUED | OUTPATIENT
Start: 2024-02-29 | End: 2024-02-29

## 2024-02-29 RX ORDER — MIDAZOLAM HYDROCHLORIDE 1 MG/ML
INJECTION INTRAMUSCULAR; INTRAVENOUS
Status: DISCONTINUED | OUTPATIENT
Start: 2024-02-29 | End: 2024-02-29

## 2024-02-29 RX ORDER — DIPHENHYDRAMINE HYDROCHLORIDE 50 MG/ML
50 INJECTION INTRAMUSCULAR; INTRAVENOUS ONCE AS NEEDED
Status: DISCONTINUED | OUTPATIENT
Start: 2024-02-29 | End: 2024-02-29

## 2024-02-29 RX ORDER — SODIUM CHLORIDE 9 MG/ML
INJECTION, SOLUTION INTRAMUSCULAR; INTRAVENOUS; SUBCUTANEOUS
Status: DISCONTINUED | OUTPATIENT
Start: 2024-02-29 | End: 2024-02-29

## 2024-02-29 RX ORDER — ONDANSETRON 2 MG/ML
4 INJECTION INTRAMUSCULAR; INTRAVENOUS ONCE AS NEEDED
Status: DISCONTINUED | OUTPATIENT
Start: 2024-02-29 | End: 2024-02-29

## 2024-02-29 RX ORDER — DEXAMETHASONE SODIUM PHOSPHATE 10 MG/ML
INJECTION, SOLUTION INTRAMUSCULAR; INTRAVENOUS
Status: DISCONTINUED | OUTPATIENT
Start: 2024-02-29 | End: 2024-02-29

## 2024-02-29 RX ORDER — LIDOCAINE HYDROCHLORIDE 10 MG/ML
INJECTION, SOLUTION EPIDURAL; INFILTRATION; INTRACAUDAL; PERINEURAL
Status: DISCONTINUED | OUTPATIENT
Start: 2024-02-29 | End: 2024-02-29

## 2024-02-29 NOTE — OPERATIVE REPORT
[facility]  Operative Report  2024     Darrel Salazar Patient Status:  Hospital Outpatient Surgery    1960 MRN [id number]   Location [facility] Attending Aman Tanner MD   Hosp Day # 0 PCP Ryan Astudillo MD     Indication: Darrel is a 63 year old male for radiculitis    Preoperative Diagnosis:  Lumbar radiculopathy [M54.16]    Postoperative Diagnosis: Same as above.    Procedure performed: LUMBAR INTERLAMINAR EPIDURAL INJECTION with sedation    Anesthesia: Local and IV Sedation.    EBL: Less than 1 ml.    Procedure Description:  After reviewing the patient's history and performing a focused physical examination, the diagnosis and positive previous diagnostic tests were confirmed and contraindications such as infection and coagulopathy were ruled out.  Following review of allergies and potential side effects and complications, including but not necessarily limited to infection, allergic reaction, local tissue breakdown, nerve injury, post-dural puncture headache and paresis, the patient consented for the procedure.    The patient was brought to the procedure room in prone position.  After comprehensive monitors were applied and IV access in the patient's arm, moderate intravenous sedation was given with versed and fentanyl. Moderate sedation was given for 11 minutes. After sterile prep of the lumbar spine, the L3-4 interspace was identified with the help of fluoroscopy. Local anesthetic was given by a 25 gauge 1.5 inch needle with 1% lidocaine in that space level.  Thereafter, a 20 gauge Tuohy needle was introduced and advanced under fluoroscopy.  The epidural space was accessed by using loss of resistance to air technique.  The needle position was confirmed with AP and lateral view under fluoroscopy.  Omnipaque 180 was injected in 1 mL volume. A good epidurogram was obtained.  Thereafter, dexamethasone 10 mg with normal saline of 5 mL in total volume of 6 mL was  injected through the Tuohy needle.  The needle was flushed with 1 mL lidocaine.  The needle was withdrawn with the stylet intact in situ.  The needle's tip was intact.  The patient tolerated the procedure very well without significant immediate complication.  The patient's back was cleaned and sterile dressing was applied.  The patient was discharged with an instruction to a responsible adult after discharge criteria were met.  The patient was advised to contact us should any problems happen.  The patient was also informed to go to the emergency room immediately if experiencing severe numbness or weakness in the extremities or experiencing bowel or bladder incontinence.            Complications: None.    Follow up: The patient was followed in the pain clinic as needed basis.          Aman Tanner MD

## 2024-02-29 NOTE — DISCHARGE INSTRUCTIONS
You have been given medication that makes you sleepy. This may have included both pain medication and sleeping medication. Most of the effects have worn off but you may still have some drowsiness for the next 6 to 8 hours.    Home Care  Follow these guidelines when you get home:    --Don't drink any alcohol for the next 24 hours.    --Don't drive, operate dangerous machinery, make important business or personal    decisions, or sign legal documents during the next 24 hours.    Follow Up Care  Follow up with your healthcare provider if you are not alert and back to your usual level of activity within 12 hours    When to seek medical advice  Call your healthcare provider right away if any of these occur:    --Drowsiness that gets worse  --Weakness or dizziness that gets worse  --Repeated vomiting  --You can not be awakened   Home Care Instructions Following Your Pain Procedure     Darrel,  It has been a pleasure to have you as our patient. To help you at home, you must follow these general discharge instructions. We will review these with you before you are discharged. It is our hope that you have a complete and uneventful recovery from our procedure.     General Instructions:  What to Expect:  Bandages from your procedure today can be removed when you get home.  Please avoid soaking and/or swimming for 24 hours.  Showering is okay  It is normal to have increased pain symptoms and/or pain at injection site for up to 3-5 days after procedure, you can use heat or ice (20 minutes on 20 minutes off) for comfort.  You may experience some temporary side effects which may include restlessness or insomnia, flushing of the face, or heart palpitations.  Please contact the provider if these symptoms do not resolve within 3-4 days.  Lightheadedness or nausea may occur and should resolve within 24 to 48 hours.  If you develop a headache after treatment, rest, drink fluids (with caffeine, if possible) and take mild over-the-counter  pain medication.  If the headache does not improve with the above treatment, contact the physician.  Home Medications:  Resume all previously prescribed medication.  Please avoid taking NSAIDs (Non-Steriodal Anti-Inflammatory Drugs) such as:  Ibuprofen ( Advil, Motrin) Aleve (Naproxen), Diclofenac, Meloxicam for 6 hours after procedure.   If you are on Coumadin (Warfarin) or any other anti-coagulant (or \"blood thinning\") medication such as Plavix (Clopidogrel), Xarelto (Rivaroxaban), Eliquis (Apixaban), Effient (Prasugrel) etc., restart on the following day from the procedure unless otherwise directed by your provider.  If you are a diabetic, please increase the frequency of your glucose monitoring after the procedure as steroids may cause a temporary (2-3 day) increase in your blood sugar.  Contact your primary care physician if your blood sugar remains elevated as you may require some medication adjustment.  Diet:  Resume your regular diet as tolerated.  Activity:  We recommend that you relax and rest during the rest of your procedure day.  If you feel weakness in your arms or legs do not drive.  Follow-up Appointment  Please schedule a follow-up visit within 3 to 4 weeks after your last procedure date.  Question or Concerns:  Feel free to call our office with any questions or concerns at [phone] (option #[phone])    Darrel  Thank you for coming to [facility] for your procedure.  The nurses try very hard to make sure you receive the best care possible.  Your trust in them as well as us is greatly appreciated.    Thanks so much,   Dr. Aman Tanner

## 2024-02-29 NOTE — H&P
History & Physical Examination    Patient Name: Darrel Salazar  MRN: [id number]  [facility]: [id number]  YOB: 1960    Pre-Operative Diagnosis:  Lumbar radiculopathy [M54.16]    Present Illness: Lumbar radiculopathy    Facility-Administered Medications Prior to Admission   Medication Dose Route Frequency Provider Last Rate Last Admin    [COMPLETED] ketorolac (Toradol) 30 MG/ML injection 15 mg  15 mg Intramuscular Once Sarah Wells APRN   15 mg at 02/05/24 1128    bupivacaine PF (Marcaine) 0.5% injection  9 mL Injection Once Aman Tanner MD        triamcinolone acetonide (Kenalog-40) 40 MG/ML injection 40 mg  40 mg Intramuscular Once Aman Tanner MD         Medications Prior to Admission   Medication Sig Dispense Refill Last Dose    Tirzepatide (MOUNJARO) 10 MG/0.5ML Subcutaneous Solution Pen-injector Inject into the skin.       cyclobenzaprine 10 MG Oral Tab Take 1 tablet (10 mg total) by mouth 3 (three) times daily as needed for Muscle spasms. 60 tablet 0     HYDROcodone-acetaminophen (NORCO) 7.5-325 MG Oral Tab Take 1 tablet by mouth every 8 (eight) hours as needed for Pain (back). To last one month 30 tablet 0     losartan 100 MG Oral Tab Take 1 tablet (100 mg total) by mouth daily. 90 tablet 3     diazePAM 5 MG Oral Tab Take 5 mg tablet half hour prior to procedure. Ok to repeat one, 5 mg tablet immediately before procedure if needed. 2 tablet 0     Pravastatin Sodium 80 MG Oral Tab TAKE 1 TABLET NIGHTLY 90 tablet 0     carvedilol 25 MG Oral Tab Take 1 tablet (25 mg total) by mouth 2 (two) times daily with meals. 30 tablet 0     Continuous Blood Gluc Sensor (FREESTYLE HENRRY 3 SENSOR) Does not apply Misc        triamcinolone 0.1 % External Cream Apply topically 2 (two) times daily as needed. 60 g 0     dilTIAZem HCl ER Coated Beads 180 MG Oral Capsule SR 24 Hr TAKE 1 CAPSULE DAILY 90 capsule 1     Continuous Blood Gluc  (FREESTYLE HENRRY 14 DAY READER) Does not apply Device Use  to monitor blood sugar. 1 each 0     FUROSEMIDE 20 MG Oral Tab TAKE 1 TABLET DAILY 90 tablet 3     METFORMIN 500 MG Oral Tab TAKE 1 TABLET(500 MG) BY MOUTH TWICE DAILY WITH MEALS 180 tablet 1     hydrOXYzine 10 MG Oral Tab Take 1 tablet (10 mg total) by mouth daily.       FARXIGA 10 MG Oral Tab TAKE 1 TABLET DAILY 90 tablet 0     amLODIPine 5 MG Oral Tab Take 1 tablet (5 mg total) by mouth daily. 90 tablet 1     XARELTO 20 MG Oral Tab TAKE 1 TABLET DAILY WITH FOOD 90 tablet 3 2/25/2024    LATANOPROST 0.005 % Ophthalmic Solution INSTILL 1 DROP IN BOTH EYES NIGHTLY (NO ADDITIONAL REFILLS UNTIL EVALUATED BY DOCTOR) 7.5 mL 3     Sertraline HCl 100 MG Oral Tab Take 1 tablet (100 mg total) by mouth daily.       traZODone HCl 100 MG Oral Tab Take 1.5 tablets (150 mg total) by mouth nightly.       Insulin Syringe-Needle U-100 (BD INSULIN SYRINGE ULTRAFINE) 31G X 5/16\" 1 ML Does not apply Misc As directed 90 each 5     aspirin EC 81 MG Oral Tab EC Take 1 tablet (81 mg total) by mouth daily. 90 tablet 1 2/25/2024     Current Facility-Administered Medications   Medication Dose Route Frequency    lactated ringers infusion  100 mL/hr Intravenous Continuous    ondansetron (Zofran) 4 MG/2ML injection 4 mg  4 mg Intravenous Once PRN       Allergies: No Known Allergies    Past Medical History:   Diagnosis Date    Abdominal hernia     Abdominal pain     Anxiety     Arrhythmia     A-FIB DX 2 YRS AGO    Arthritis     Back pain     Back problem     Low back pain    Cancer (HCC) 11/2019    prostate    Deep vein thrombosis (HCC)     left    Depression     Diabetes mellitus (HCC)     Esophageal reflux     Fatigue     Glaucoma     Heartburn     High blood pressure     High cholesterol     History of depression     History of hip replacement     March 2020, 11/2010    Insomnia     Night sweats     OA (osteoarthritis)     Obesity     Osteoarthritis     Pain in joints     Sleep apnea     repeat SAM test neg after weight loss    Stress     Type  II or unspecified type diabetes mellitus without mention of complication, not stated as uncontrolled     Visual impairment     glasses    Wears glasses      Past Surgical History:   Procedure Laterality Date    ABDOMEN SURGERY PROC UNLISTED  01/26/2021    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    ANESTH,PERINEAL REMV OF PROSTATE  2019    COLONOSCOPY      COLONOSCOPY N/A 12/8/2022    Procedure: COLONOSCOPY with forcep polypectomy;  Surgeon: Godwin Eller MD;  Location:  ENDOSCOPY    HERNIA SURGERY      HIP REPLACEMENT SURGERY Left 2010    HIP REPLACEMENT SURGERY Right 03/03/2020    LAPAROSCOPY, SURGICAL PROSTATECTOMY, RETROPUBIC RADICAL, W/NERVE SPARING  11/15/2019        OTHER SURGICAL HISTORY      vocal cord polyps removed    OTHER SURGICAL HISTORY  11/15/2019    RALP Dr. Muir      Family History   Problem Relation Age of Onset    Cancer Father 64        lung    Other (Other) Mother 72        aneurism    Diabetes Sister     Suicide History Sister     Alcohol and Other Disorders Associated Brother     Cancer Brother 72        prostate cancer     Social History     Tobacco Use    Smoking status: Never    Smokeless tobacco: Never   Substance Use Topics    Alcohol use: No       SYSTEM Check if Review is Normal Check if Physical Exam is Normal If not normal, please explain:   HEENT [x ] [x ]    NECK & BACK [x ] [x ]    HEART [x ] [x ]    LUNGS [x ] [x ]    ABDOMEN [x ] [x ]    UROGENITAL [x ] [x ]    EXTREMITIES [x ] [x ]    OTHER        [ x ] I have discussed the risks and benefits and alternatives with the patient/family.  They understand and agree to proceed with plan of care.  [ x ] I have reviewed the History and Physical done within the last 30 days.  Any changes noted above.    Aman Tanner MD

## 2024-03-01 ENCOUNTER — TELEPHONE (OUTPATIENT)
Dept: PAIN CLINIC | Facility: CLINIC | Age: 64
End: 2024-03-01

## 2024-03-01 DIAGNOSIS — M54.50 ACUTE LEFT-SIDED LOW BACK PAIN WITHOUT SCIATICA: ICD-10-CM

## 2024-03-01 DIAGNOSIS — G89.29 OTHER CHRONIC PAIN: ICD-10-CM

## 2024-03-01 DIAGNOSIS — M79.18 MYOFASCIAL PAIN ON LEFT SIDE: ICD-10-CM

## 2024-03-01 DIAGNOSIS — M79.18 MYOFASCIAL PAIN SYNDROME OF LUMBAR SPINE: ICD-10-CM

## 2024-03-01 RX ORDER — CYCLOBENZAPRINE HCL 10 MG
10 TABLET ORAL 3 TIMES DAILY PRN
Qty: 60 TABLET | Refills: 0 | Status: SHIPPED | OUTPATIENT
Start: 2024-03-01

## 2024-03-01 NOTE — TELEPHONE ENCOUNTER
Belen called placed to patient for post procedure follow up. Patient stated  'i'm doing ok '. IPt verbalized understanding to call with any questions or concerns.  Pt requesting for refill of his flexeril      Procedure: MALIA  Date: 2/29/2024  Follow up Visit Scheduled: 3/12/2024 with Sarah

## 2024-03-01 NOTE — TELEPHONE ENCOUNTER
Medication: cyclobenzaprine 10 MG Oral Tab     Date of last refill: 2/5/2024  [place]5/2024    Last office visit: 2/5/2024  Due back to clinic per last office note:  f/u after injection  Date next office visit scheduled:  3/12/2024        Last OV note recommendation: Patient is a 63-year-old male with complex medical history including sickle cell trait, atrial fibs, type 2 diabetes, morbid obesity, dyslipidemia, hypertension, insomnia, SAM with chronic low back pain most recently treated with radiofrequency procedure of the medial branches at L4-5 and L5-S1.  Patient reported 100% relief of his back symptoms up until the middle of last week when he began noticing return of back pain but primarily left lumbar radicular symptoms.  Patient reports he tried to modify his workstation, continued his home exercises, use his pain medication and muscle relaxants however the symptoms did not resolve.  He presents today for further clinical evaluation and recommendations.  We did review his lumbar MRI.  He has multiple levels of foraminal narrowing and based on his symptoms we would recommend a lumbar epidural steroid injection however he is on Xarelto and aspirin and will need blood thinner clearance prior to that.     Patient is in acute pain today and did discuss with patient a 15 mg dose of Toradol to help his pain symptoms and then have him start his oral steroid pack tomorrow morning until we can get him in for a lumbar epidural steroid injection.  Patient also requested refill on his hydrocodone medication as well as Flexeril to help manage his current symptoms.  These were sent to the local pharmacy  Plan:   > 1 dose of 15 mg Toradol IM for acute pain (patient on Xarelto and creatinine slightly elevated therefore half dose given)  > Start Medrol Dosepak tomorrow morning take as prescribed  > Refilled Flexeril and hydrocodone medication  > Recommend lumbar epidural steroid  injection patient prefers IV sedation  > Follow-up after injection therapy if no relief will need to update imaging and refer to surgical team  > Continue home exercises as learned in physical therapy      Orders Placed This Encounter

## 2024-03-11 ENCOUNTER — TELEPHONE (OUTPATIENT)
Dept: INTERNAL MEDICINE CLINIC | Facility: CLINIC | Age: 64
End: 2024-03-11

## 2024-03-11 DIAGNOSIS — M25.511 CHRONIC RIGHT SHOULDER PAIN: Primary | ICD-10-CM

## 2024-03-11 DIAGNOSIS — G89.29 CHRONIC RIGHT SHOULDER PAIN: Primary | ICD-10-CM

## 2024-03-12 ENCOUNTER — LAB ENCOUNTER (OUTPATIENT)
Dept: LAB | Age: 64
End: 2024-03-12
Attending: NURSE PRACTITIONER
Payer: COMMERCIAL

## 2024-03-12 ENCOUNTER — OFFICE VISIT (OUTPATIENT)
Dept: PAIN CLINIC | Facility: CLINIC | Age: 64
End: 2024-03-12
Payer: COMMERCIAL

## 2024-03-12 ENCOUNTER — MED REC SCAN ONLY (OUTPATIENT)
Dept: PAIN CLINIC | Facility: CLINIC | Age: 64
End: 2024-03-12

## 2024-03-12 VITALS — DIASTOLIC BLOOD PRESSURE: 72 MMHG | SYSTOLIC BLOOD PRESSURE: 118 MMHG | OXYGEN SATURATION: 96 % | HEART RATE: 73 BPM

## 2024-03-12 DIAGNOSIS — G89.29 OTHER CHRONIC PAIN: Primary | ICD-10-CM

## 2024-03-12 DIAGNOSIS — G89.29 OTHER CHRONIC PAIN: ICD-10-CM

## 2024-03-12 DIAGNOSIS — M47.816 LUMBAR SPONDYLOSIS: ICD-10-CM

## 2024-03-12 DIAGNOSIS — M79.18 MYOFASCIAL PAIN ON LEFT SIDE: ICD-10-CM

## 2024-03-12 DIAGNOSIS — M51.36 DDD (DEGENERATIVE DISC DISEASE), LUMBAR: ICD-10-CM

## 2024-03-12 DIAGNOSIS — M54.50 ACUTE LEFT-SIDED LOW BACK PAIN WITHOUT SCIATICA: ICD-10-CM

## 2024-03-12 DIAGNOSIS — M79.18 MYOFASCIAL PAIN SYNDROME OF LUMBAR SPINE: ICD-10-CM

## 2024-03-12 PROCEDURE — 3074F SYST BP LT 130 MM HG: CPT | Performed by: NURSE PRACTITIONER

## 2024-03-12 PROCEDURE — 80307 DRUG TEST PRSMV CHEM ANLYZR: CPT

## 2024-03-12 PROCEDURE — 3078F DIAST BP <80 MM HG: CPT | Performed by: NURSE PRACTITIONER

## 2024-03-12 PROCEDURE — 99214 OFFICE O/P EST MOD 30 MIN: CPT | Performed by: NURSE PRACTITIONER

## 2024-03-12 RX ORDER — CYCLOBENZAPRINE HCL 10 MG
10 TABLET ORAL 3 TIMES DAILY PRN
Qty: 60 TABLET | Refills: 0 | Status: SHIPPED | OUTPATIENT
Start: 2024-03-12

## 2024-03-12 RX ORDER — HYDROCODONE BITARTRATE AND ACETAMINOPHEN 7.5; 325 MG/1; MG/1
1 TABLET ORAL EVERY 8 HOURS PRN
Qty: 30 TABLET | Refills: 0 | Status: SHIPPED | OUTPATIENT
Start: 2024-03-12

## 2024-03-12 NOTE — PATIENT INSTRUCTIONS
Refill policies:    Allow 2-3 business days for refills; controlled substances may take longer.  Contact your pharmacy at least 5 days prior to running out of medication and have them send an electronic request or submit request through the “request refill” option in your [employer] account.  Refills are not addressed on weekends; covering physicians do not authorize routine medications on weekends.  No narcotics or controlled substances are refilled after noon on Fridays or by on call physicians.  By law, narcotics must be electronically prescribed.  A 30 day supply with no refills is the maximum allowed.  If your prescription is due for a refill, you may be due for a follow up appointment.  To best provide you care, patients receiving routine medications need to be seen at least once a year.  Patients receiving narcotic/controlled substance medications need to be seen at least once every 3 months.  In the event that your preferred pharmacy does not have the requested medication in stock (e.g. Backordered), it is your responsibility to find another pharmacy that has the requested medication available.  We will gladly send a new prescription to that pharmacy at your request.    Scheduling Tests:    If your physician has ordered radiology tests such as MRI or CT scans, please contact Central Scheduling at [phone] right away to schedule the test.  Once scheduled, the [facility] Centralized Referral Team will work with your insurance carrier to obtain pre-certification or prior authorization.  Depending on your insurance carrier, approval may take 3-10 days.  It is highly recommended patients assure they have received an authorization before having a test performed.  If test is done without insurance authorization, patient may be responsible for the entire amount billed.      Precertification and Prior Authorizations:  If your physician has recommended that you have a procedure or additional testing performed the [facility]  Centralized Referral Team will contact your insurance carrier to obtain pre-certification or prior authorization.    You are strongly encouraged to contact your insurance carrier to verify that your procedure/test has been approved and is a COVERED benefit.  Although the [facility] Centralized Referral Team does its due diligence, the insurance carrier gives the disclaimer that \"Although the procedure is authorized, this does not guarantee payment.\"    Ultimately the patient is responsible for payment.   Thank you for your understanding in this matter.  Paperwork Completion:  If you require FMLA or disability paperwork for your recovery, please make sure to either drop it off or have it faxed to our office at [phone]. Be sure the form has your name and date of birth on it.  The form will be faxed to our Forms Department and they will complete it for you.  There is a 25$ fee for all forms that need to be filled out.  Please be aware there is a 10-14 day turnaround time.  You will need to sign a release of information (DEBBIE) form if your paperwork does not come with one.  You may call the Forms Department with any questions at [phone].  Their fax number is [phone].

## 2024-03-12 NOTE — PROGRESS NOTES
Last procedure: LUMBAR INTERLAMINAR EPIDURAL INJECTION with sedation   Date: 2/29/24  Percentage of relief obtained: 65%  Duration of relief: current    Current Pain Score: 3/10    Narcotic Contract Exp: 4/4/23

## 2024-03-12 NOTE — PROGRESS NOTES
HPI:   Darrel Salazar presents in follow-up to lumbar epidural steroid injection done on February 29, 2024.   Patient reports 65% relief of his pain symptoms.  He denies any radicular symptoms after his lumbar epidural steroid injection.  He is still having some achiness in his low back.  He is requesting a refill on his hydrocodone and Flexeril.  He uses these medications on an as-needed basis.  He has not used his hydrocodone x 5 days.  He has not used his muscle relaxant in approximately 3 days.  He will update his opioid agreement today and complete his urine drug screen as well.  He does continue his water aerobics.  He is working on weight reduction.  Patient's low back pain is reported as mild achiness soreness that is intermittent.  His activity levels remain the same and patient reports that the pain is worse based on his activity  .    Prior procedures:  Bilateral L4-5 L5-S1 radiofrequency on December 5, 2023: 100% relief of pain  #2 bilateral lumbar medial branch blocks October 31, 2023 95% improvement during initial diagnostic phase  #1 bilateral lumbar medial branch blocks done April 2023 88% improvement during diagnostic phase  Bilateral sacroiliac joint injection September 14, 2023: Reported feeling fantastic after SI joint injections      Last procedure: Lumbar epidural steroid injection #1    date: February 29, 2024    Percentage of relief experienced from the procedure: 65%    Duration of the relief: Sustained    The following activities will increase the patient’s pain: lifting, bending, household chores, outside yard work, staying in the same position for prolonged periods of time, transitioning, any prolonged activity    The following activities decrease the patient’s pain: medications, heat, stretching, whirlpool , changing position, water aerobics    Functional Assessment: Patient reports that they are able to complete all of their ADL's such as eating, bathing, using the toilet,  dressing and getting up from a bed or a chair independently.    A comprehensive 10 point review of systems was completed.  Pertinent positives and negatives noted in the the HPI.      Past Medical History:   Diagnosis Date    Cancer (HCC) 11/2019    prostate    Abdominal hernia     Abdominal pain     Anxiety     Arrhythmia     A-FIB DX 2 YRS AGO    Arthritis     Back pain     Back problem     Low back pain    Deep vein thrombosis (HCC)     left    Depression     Diabetes mellitus (HCC)     Esophageal reflux     Fatigue     Glaucoma     Heartburn     High blood pressure     High cholesterol     History of depression     History of hip replacement     March 2020, 11/2010    Insomnia     Night sweats     OA (osteoarthritis)     Obesity     Osteoarthritis     Pain in joints     Sleep apnea     repeat SAM test neg after weight loss    Stress     Type II or unspecified type diabetes mellitus without mention of complication, not stated as uncontrolled     Visual impairment     glasses    Wears glasses         Past Surgical History:   Procedure Laterality Date    Abdomen surgery proc unlisted  01/26/2021    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    Anesth,perineal remv of prostate  2019    Colonoscopy      Colonoscopy N/A 12/8/2022    Procedure: COLONOSCOPY with forcep polypectomy;  Surgeon: Godwin Eller MD;  Location:  ENDOSCOPY    Hernia surgery      Hip replacement surgery Left 2010    Hip replacement surgery Right 03/03/2020    Laparoscopy, surgical prostatectomy, retropubic radical, w/nerve sparing  11/15/2019        Other surgical history      vocal cord polyps removed    Other surgical history  11/15/2019    RALP Dr. Muir         Current Medications:  Current Outpatient Medications   Medication Sig Dispense Refill    cyclobenzaprine 10 MG Oral Tab Take 1 tablet (10 mg total) by mouth 3 (three) times daily as needed for Muscle spasms.  60 tablet 0    Tirzepatide (MOUNJARO) 10 MG/0.5ML Subcutaneous Solution Pen-injector Inject into the skin.      HYDROcodone-acetaminophen (NORCO) 7.5-325 MG Oral Tab Take 1 tablet by mouth every 8 (eight) hours as needed for Pain (back). To last one month 30 tablet 0    losartan 100 MG Oral Tab Take 1 tablet (100 mg total) by mouth daily. 90 tablet 3    diazePAM 5 MG Oral Tab Take 5 mg tablet half hour prior to procedure. Ok to repeat one, 5 mg tablet immediately before procedure if needed. 2 tablet 0    Pravastatin Sodium 80 MG Oral Tab TAKE 1 TABLET NIGHTLY 90 tablet 0    carvedilol 25 MG Oral Tab Take 1 tablet (25 mg total) by mouth 2 (two) times daily with meals. 30 tablet 0    Continuous Blood Gluc Sensor (FREESTYLE HENRRY 3 SENSOR) Does not apply Misc       triamcinolone 0.1 % External Cream Apply topically 2 (two) times daily as needed. 60 g 0    dilTIAZem HCl ER Coated Beads 180 MG Oral Capsule SR 24 Hr TAKE 1 CAPSULE DAILY 90 capsule 1    Continuous Blood Gluc  (FREESTYLE HENRRY 14 DAY READER) Does not apply Device Use to monitor blood sugar. 1 each 0    FUROSEMIDE 20 MG Oral Tab TAKE 1 TABLET DAILY 90 tablet 3    METFORMIN 500 MG Oral Tab TAKE 1 TABLET(500 MG) BY MOUTH TWICE DAILY WITH MEALS 180 tablet 1    hydrOXYzine 10 MG Oral Tab Take 1 tablet (10 mg total) by mouth daily.      FARXIGA 10 MG Oral Tab TAKE 1 TABLET DAILY 90 tablet 0    amLODIPine 5 MG Oral Tab Take 1 tablet (5 mg total) by mouth daily. 90 tablet 1    XARELTO 20 MG Oral Tab TAKE 1 TABLET DAILY WITH FOOD 90 tablet 3    LATANOPROST 0.005 % Ophthalmic Solution INSTILL 1 DROP IN BOTH EYES NIGHTLY (NO ADDITIONAL REFILLS UNTIL EVALUATED BY DOCTOR) 7.5 mL 3    Sertraline HCl 100 MG Oral Tab Take 1 tablet (100 mg total) by mouth daily.      traZODone HCl 100 MG Oral Tab Take 1.5 tablets (150 mg total) by mouth nightly.      Insulin Syringe-Needle U-100 (BD INSULIN SYRINGE ULTRAFINE) 31G X 5/16\" 1 ML Does not apply Misc As directed 90 each  5    aspirin EC 81 MG Oral Tab EC Take 1 tablet (81 mg total) by mouth daily. 90 tablet 1      Patient requires assistance with: No assistance required      Have you recently had any feelings of harming yourself or others? The patient's response was no.    Physical Exam:   /72 (BP Location: Left arm, Patient Position: Sitting, Cuff Size: large)   Pulse 73   SpO2 96%   VAS Pain Score: 3 /10  General Appearance: Well developed, well nourished, normal build, independent body habitus, no apparent physical disabilities, well groomed    Neurological Exam: WNL-Orientation to time, place and person, normal mood & effect, normal concentration & attention span  Inspection:   Nonantalgic  No gross motor or sensory deficits  Bilateral lower extremities 5 out of 5  Lumbar skin clean dry and intact  Mild tenderness to palpation lumbar paraspinals bilaterally  Negative straight leg raise  Radiology/Lab Test Reviewed: No new images to review  Lab Results   Component Value Date    WBC 6.7 01/18/2023    WBC 6.9 03/04/2022    WBC 7.2 10/19/2021   No results found for: \"HEMOGLOBIN\"  Lab Results   Component Value Date    .0 01/18/2023    .0 03/04/2022    .0 10/19/2021         Patient Education: Patient was advised to continue normal activities as tolerated and was advised against any form of bedrest, since recent guidelines promote and encourage physical activity for improvment of functionality and overall pain.  (Family Practice Vol. 16, No. 1, 39-77Â© [employer] 1999 ), Patient was given brochure,website information, and handouts., Patient was shown interactive content, shown and explained procedure on spinal model..  Patient educated and verbalized understanding.  Medical Decision Making:   Diagnosis:    Encounter Diagnoses   Name Primary?    Other chronic pain Yes    Lumbar spondylosis     DDD (degenerative disc disease), lumbar     Myofascial pain on left side     Myofascial pain syndrome  of lumbar spine     Acute left-sided low back pain without sciatica      Impression:   Patient is a 63-year-old male with complex medical history including sickle cell trait, atrial fibs, type 2 diabetes, morbid obesity, dyslipidemia, hypertension, insomnia, SAM with chronic low back pain most recently treated with radiofrequency procedure of the medial branches at L4-5 and L5-S1.  Patient reported 100% relief of his back symptoms but developed left lumbar radicular symptoms.  Patient's lumbar MRI was reviewed and he does have multiple levels of foraminal narrowing most notable at L3-4 and L4-5, therefore he was recommended for #1 lumbar epidural steroid injection.  He presents today for follow-up.  He reports 65% relief of his symptoms.  He denies any radicular symptoms status post lumbar epidural steroid injection.  He still has mild achiness in his low back for which he uses his medications on an as-needed basis is well as his water aerobics records.    Patient also continues to use pain medications including cyclobenzaprine and hydrocodone 7.5/325 1 tablet 3 times daily as needed for breakthrough pain, this was last filled on February 5, 2024.  Patient did request a refill on his medications.  He has not used his hydrocodone x 5 days.  He has not used his Flexeril x 3 days.  He did update his opioid agreement today as well as his urine drug screen.  Patient does report that the medications improve his quality of life and overall functional ability without side effects therefore we will not make any changes at this current time       Plan:   > No further injection therapy recommended at this time  > Continue water aerobics  > Follow-up if symptoms return in the left leg: Would reconsider repeat lumbar epidural steroid injection  > Return to clinic if low back pain symptoms return and progress as we would consider repeat radiofrequency 6 months from last RFA of lumbar medial branches  > Update opioid agreement and  urine drug screen today: Do not expect hydrocodone as patient has not used this medication in 5 days  > Refill hydrocodone 7.5/325 #30 to last 1 month and cyclobenzaprine 10 mg  > Follow-up every 3 months for medication management  No orders of the defined types were placed in this encounter.      Meds & Refills for this Visit:  Requested Prescriptions     Signed Prescriptions Disp Refills    cyclobenzaprine 10 MG Oral Tab 60 tablet 0     Sig: Take 1 tablet (10 mg total) by mouth 3 (three) times daily as needed for Muscle spasms.    HYDROcodone-acetaminophen (NORCO) 7.5-325 MG Oral Tab 30 tablet 0     Sig: Take 1 tablet by mouth every 8 (eight) hours as needed for Pain (back). To last one month       Imaging & Consults:  None    The patient indicates understanding of these issues and agrees to the plan.      ID#[id number]

## 2024-03-19 DIAGNOSIS — I10 ESSENTIAL HYPERTENSION: ICD-10-CM

## 2024-03-19 RX ORDER — CARVEDILOL 25 MG/1
25 TABLET ORAL 2 TIMES DAILY WITH MEALS
Qty: 180 TABLET | Refills: 3 | Status: SHIPPED | OUTPATIENT
Start: 2024-03-19

## 2024-03-19 RX ORDER — DILTIAZEM HYDROCHLORIDE 180 MG/1
180 CAPSULE, COATED, EXTENDED RELEASE ORAL DAILY
Qty: 90 CAPSULE | Refills: 3 | Status: SHIPPED | OUTPATIENT
Start: 2024-03-19

## 2024-03-19 NOTE — TELEPHONE ENCOUNTER
carvedilol 25 MG Oral Tab   Last time medication was refilled 11/29/2023  Quantity and # of refills 30 w 0  Last OV 02/23/2024  Next OV   Future Appointments   Date Time Provider Department Center   8/23/2024  2:00 PM Ryan Astudillo MD [facility]       Failed protocol.     dilTIAZem HCl ER Coated Beads 180 MG Oral Capsule SR 24 Hr   Last time medication was refilled 09/11/2023  Quantity and # of refills 90 w 1  Last OV 02/23/2024  Next OV   Future Appointments   Date Time Provider Department Center   8/23/2024  2:00 PM Ryan Astudillo MD [facility]     Failed protocol.     Sent to Dr. Astudillo for approval

## 2024-04-16 NOTE — PROGRESS NOTES
Dx: Status post right hip replacement (A57.100)            Insurance (Authorized # of Visits):  PPO, 12 per POC           Authorizing Physician: Dr. Maylin Rutledge  Next MD visit: none scheduled  Fall Risk: standard         Precautions: n/a             Subjective weight shift (feet directly below hip joints to maintain no cross midline precaution) 5s hold R x 10 x 2 x 2 daily    Goals: (To be met in 12 visits)   · Pt will have improved hip AROM Flex to 100 deg and ABD to 30 deg to be able to don/doff shoes and perf IR, <5 ER for pain relief and to reduce guarding  Light STM TFL, proximal rectus femoris, sartorius with bolster under knee, taper to 1/2 bolster to work on hip extension to neutral tolerance       Ice x 15' supine with 1/2 FR under knee         Charges: M Intact family

## 2024-04-26 DIAGNOSIS — M79.18 MYOFASCIAL PAIN ON LEFT SIDE: ICD-10-CM

## 2024-04-26 DIAGNOSIS — M54.50 ACUTE LEFT-SIDED LOW BACK PAIN WITHOUT SCIATICA: ICD-10-CM

## 2024-04-26 DIAGNOSIS — M79.18 MYOFASCIAL PAIN SYNDROME OF LUMBAR SPINE: ICD-10-CM

## 2024-04-26 DIAGNOSIS — G89.29 OTHER CHRONIC PAIN: ICD-10-CM

## 2024-04-26 RX ORDER — CYCLOBENZAPRINE HCL 10 MG
10 TABLET ORAL 3 TIMES DAILY PRN
Qty: 60 TABLET | Refills: 0 | Status: SHIPPED | OUTPATIENT
Start: 2024-04-26

## 2024-04-26 RX ORDER — HYDROCODONE BITARTRATE AND ACETAMINOPHEN 7.5; 325 MG/1; MG/1
1 TABLET ORAL EVERY 8 HOURS PRN
Qty: 30 TABLET | Refills: 0 | Status: SHIPPED | OUTPATIENT
Start: 2024-04-26

## 2024-04-26 NOTE — TELEPHONE ENCOUNTER
Medication: cyclobenzaprine 10 MG     Date of last refill: 03/12/24        Medication: HYDROcodone-acetaminophen (NORCO) 7.5-325 MG     Date of last refill: 03/12/24  [place]12[place]24    Last office visit: 03/12/24  Due back to clinic per last office note:  3 months  Date next office visit scheduled:  none    Last URINE Screen: 03/12/24  Screen Results:  as expected      Narcotic Contract EXPIRATION date: 03/12/25    Last OV note recommendation:   Plan:   > No further injection therapy recommended at this time  > Continue water aerobics  > Follow-up if symptoms return in the left leg: Would reconsider repeat lumbar epidural steroid injection  > Return to clinic if low back pain symptoms return and progress as we would consider repeat radiofrequency 6 months from last RFA of lumbar medial branches  > Update opioid agreement and urine drug screen today: Do not expect hydrocodone as patient has not used this medication in 5 days  > Refill hydrocodone 7.5/325 #30 to last 1 month and cyclobenzaprine 10 mg  > Follow-up every 3 months for medication management  No orders of the defined types were placed in this encounter.

## 2024-04-30 RX ORDER — HYDROXYZINE HYDROCHLORIDE 10 MG/1
10 TABLET, FILM COATED ORAL DAILY
Qty: 90 TABLET | Refills: 0 | Status: SHIPPED | OUTPATIENT
Start: 2024-04-30

## 2024-04-30 NOTE — TELEPHONE ENCOUNTER
Last time medication was refilled 01/25/2024  Last OV 02/23/2024  Next OV due/scheduled   Future Appointments   Date Time Provider Department Center   8/23/2024  2:00 PM Ryan Astudillo MD [facility]     Medication not on protocol.

## 2024-05-25 DIAGNOSIS — M79.18 MYOFASCIAL PAIN SYNDROME OF LUMBAR SPINE: ICD-10-CM

## 2024-05-25 DIAGNOSIS — G89.29 OTHER CHRONIC PAIN: ICD-10-CM

## 2024-05-25 DIAGNOSIS — M54.50 ACUTE LEFT-SIDED LOW BACK PAIN WITHOUT SCIATICA: ICD-10-CM

## 2024-05-25 DIAGNOSIS — M79.18 MYOFASCIAL PAIN ON LEFT SIDE: ICD-10-CM

## 2024-05-28 RX ORDER — CYCLOBENZAPRINE HCL 10 MG
10 TABLET ORAL 3 TIMES DAILY PRN
Qty: 60 TABLET | Refills: 0 | Status: SHIPPED | OUTPATIENT
Start: 2024-05-28

## 2024-05-28 RX ORDER — HYDROCODONE BITARTRATE AND ACETAMINOPHEN 7.5; 325 MG/1; MG/1
1 TABLET ORAL EVERY 8 HOURS PRN
Qty: 30 TABLET | Refills: 0 | Status: SHIPPED | OUTPATIENT
Start: 2024-05-28

## 2024-05-28 NOTE — TELEPHONE ENCOUNTER
Medication: cyclobenzaprine 10 MG Oral Tab     Date of last refill: 4/26/24      Medication: HYDROcodone-acetaminophen (NORCO) 7.5-325 MG Oral Tab     Date of last refill: 4/26/24  [place]24    Last office visit: 3/12/24  Due back to clinic per last office note:  3 months  Date next office visit scheduled:  6/10/24    Last URINE Screen: 3/12/24  Screen Results:    Sarah Wells, APRN  3/19/2024  2:28 PM CDT Back to Top      As expected         Narcotic Contract EXPIRATION date: 3/12/25    Last OV note recommendation: Plan:   > No further injection therapy recommended at this time  > Continue water aerobics  > Follow-up if symptoms return in the left leg: Would reconsider repeat lumbar epidural steroid injection  > Return to clinic if low back pain symptoms return and progress as we would consider repeat radiofrequency 6 months from last RFA of lumbar medial branches  > Update opioid agreement and urine drug screen today: Do not expect hydrocodone as patient has not used this medication in 5 days  > Refill hydrocodone 7.5/325 #30 to last 1 month and cyclobenzaprine 10 mg  > Follow-up every 3 months for medication management

## 2024-05-29 ENCOUNTER — TELEPHONE (OUTPATIENT)
Dept: RADIATION ONCOLOGY | Facility: HOSPITAL | Age: 64
End: 2024-05-29

## 2024-06-03 ENCOUNTER — OFFICE VISIT (OUTPATIENT)
Dept: PAIN CLINIC | Facility: CLINIC | Age: 64
End: 2024-06-03
Payer: COMMERCIAL

## 2024-06-03 VITALS
WEIGHT: 268 LBS | BODY MASS INDEX: 38 KG/M2 | HEART RATE: 76 BPM | OXYGEN SATURATION: 95 % | DIASTOLIC BLOOD PRESSURE: 80 MMHG | SYSTOLIC BLOOD PRESSURE: 128 MMHG

## 2024-06-03 DIAGNOSIS — M54.16 LUMBAR RADICULOPATHY: Primary | ICD-10-CM

## 2024-06-03 DIAGNOSIS — M51.36 DEGENERATIVE LUMBAR DISC: ICD-10-CM

## 2024-06-03 PROCEDURE — 3079F DIAST BP 80-89 MM HG: CPT | Performed by: NURSE PRACTITIONER

## 2024-06-03 PROCEDURE — 99214 OFFICE O/P EST MOD 30 MIN: CPT | Performed by: NURSE PRACTITIONER

## 2024-06-03 PROCEDURE — 3074F SYST BP LT 130 MM HG: CPT | Performed by: NURSE PRACTITIONER

## 2024-06-03 NOTE — PATIENT INSTRUCTIONS
Refill policies:    Allow 2-3 business days for refills; controlled substances may take longer.  Contact your pharmacy at least 5 days prior to running out of medication and have them send an electronic request or submit request through the “request refill” option in your [employer] account.  Refills are not addressed on weekends; covering physicians do not authorize routine medications on weekends.  No narcotics or controlled substances are refilled after noon on Fridays or by on call physicians.  By law, narcotics must be electronically prescribed.  A 30 day supply with no refills is the maximum allowed.  If your prescription is due for a refill, you may be due for a follow up appointment.  To best provide you care, patients receiving routine medications need to be seen at least once a year.  Patients receiving narcotic/controlled substance medications need to be seen at least once every 3 months.  In the event that your preferred pharmacy does not have the requested medication in stock (e.g. Backordered), it is your responsibility to find another pharmacy that has the requested medication available.  We will gladly send a new prescription to that pharmacy at your request.    Scheduling Tests:    If your physician has ordered radiology tests such as MRI or CT scans, please contact Central Scheduling at [phone] right away to schedule the test.  Once scheduled, the [facility] Centralized Referral Team will work with your insurance carrier to obtain pre-certification or prior authorization.  Depending on your insurance carrier, approval may take 3-10 days.  It is highly recommended patients assure they have received an authorization before having a test performed.  If test is done without insurance authorization, patient may be responsible for the entire amount billed.      Precertification and Prior Authorizations:  If your physician has recommended that you have a procedure or additional testing performed the [facility]  Centralized Referral Team will contact your insurance carrier to obtain pre-certification or prior authorization.    You are strongly encouraged to contact your insurance carrier to verify that your procedure/test has been approved and is a COVERED benefit.  Although the [facility] Centralized Referral Team does its due diligence, the insurance carrier gives the disclaimer that \"Although the procedure is authorized, this does not guarantee payment.\"    Ultimately the patient is responsible for payment.   Thank you for your understanding in this matter.  Paperwork Completion:  If you require FMLA or disability paperwork for your recovery, please make sure to either drop it off or have it faxed to our office at [phone]. Be sure the form has your name and date of birth on it.  The form will be faxed to our Forms Department and they will complete it for you.  There is a 25$ fee for all forms that need to be filled out.  Please be aware there is a 10-14 day turnaround time.  You will need to sign a release of information (DEBBIE) form if your paperwork does not come with one.  You may call the Forms Department with any questions at [phone].  Their fax number is [phone].

## 2024-06-03 NOTE — PROGRESS NOTES
HPI:   Darrel Salazar presents clinic today in follow-up regarding his back pain.  He today he is reporting that he has had progressive worsening symptoms from the left low back radiating around to the left lateral hip and the top of the left thigh.  Patient is concerned regarding his left hip replacement.  He is planning on following up with his orthopedic surgeon Dr. Edmond.  He is unsure if his left hip is the cause of his current symptoms.  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  He does report that lifting his left leg to go up the stairs leading with the left leg as well as lifting the left leg in a seated position increases his low back pain on the left side and symptoms radiating around the left hip.  Patient denies any radicular symptoms down the right lower extremity.  He denies any fever chills loss of bowel or bladder control.  He has not had any updated imaging.  He is using Flexeril sparingly as well as his hydrocodone 7.5-325 sparingly.  He does not need refill on his medications today.     Patient does not feel that the symptoms are severe enough that he needs any additional medications or oral steroids but is willing to evaluate symptoms further with imaging and potentially have injection therapy if warranted.  Patient has done physical therapy in the past and continues his home exercises.  He is not an NSAID candidate since he is currently on blood thinning medication Xarelto.       The pain is described as moderate aching, stabbing, grabbing, shooting, soreness that is constant .  The patient’s activity level has remained the same since last visit.  The pain is worst unrelated to time of day.    Prior procedures:  Bilateral L4-5 L5-S1 radiofrequency on December 5, 2023: 100% relief of pain  #2 bilateral lumbar medial branch blocks October 31, 2023 95% improvement during initial diagnostic phase  #1 bilateral lumbar  medial branch blocks done April 2023 88% improvement during diagnostic phase  Bilateral sacroiliac joint injection September 14, 2023: Reported feeling fantastic after SI joint injections        Last procedure: Lumbar epidural steroid injection #1    date: February 29, 2024     Percentage of relief experienced from the procedure: 65% for approximately 6 weeks       The following activities decrease the patient’s pain: medications, limiting activity level, changing position    Functional Assessment: Patient reports that they are able to complete all of their ADL's such as eating, bathing, using the toilet, dressing and getting up from a bed or a chair independently.    A comprehensive 10 point review of systems was completed.  Pertinent positives and negatives noted in the the HPI.     Past Medical History:   Diagnosis Date    Abdominal hernia     Abdominal pain     Anxiety     Arrhythmia     A-FIB DX 2 YRS AGO    Arthritis     Back pain     Back problem     Low back pain    Cancer (HCC) 11/2019    prostate    Deep vein thrombosis (HCC)     left    Depression     Diabetes mellitus (HCC)     Esophageal reflux     Fatigue     Glaucoma     Heartburn     High blood pressure     High cholesterol     History of depression     History of hip replacement     March 2020, 11/2010    Insomnia     Night sweats     OA (osteoarthritis)     Obesity     Osteoarthritis     Pain in joints     Sleep apnea     repeat SAM test neg after weight loss    Stress     Type II or unspecified type diabetes mellitus without mention of complication, not stated as uncontrolled     Visual impairment     glasses    Wears glasses         Past Surgical History:   Procedure Laterality Date    Abdomen surgery proc unlisted  01/26/2021    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    Anesth,perineal remv of prostate  2019    Colonoscopy      Colonoscopy N/A 12/8/2022    Procedure: COLONOSCOPY with  forcep polypectomy;  Surgeon: Godwin Eller MD;  Location:  ENDOSCOPY    Hernia surgery      Hip replacement surgery Left 2010    Hip replacement surgery Right 03/03/2020    Laparoscopy, surgical prostatectomy, retropubic radical, w/nerve sparing  11/15/2019        Other surgical history      vocal cord polyps removed    Other surgical history  11/15/2019    RALP Dr. Muir         Current Medications:  Current Outpatient Medications   Medication Sig Dispense Refill    cyclobenzaprine 10 MG Oral Tab Take 1 tablet (10 mg total) by mouth 3 (three) times daily as needed for Muscle spasms. 60 tablet 0    HYDROcodone-acetaminophen (NORCO) 7.5-325 MG Oral Tab Take 1 tablet by mouth every 8 (eight) hours as needed for Pain (back). To last one month 30 tablet 0    hydrOXYzine 10 MG Oral Tab Take 1 tablet (10 mg total) by mouth daily. 90 tablet 0    dilTIAZem HCl ER Coated Beads 180 MG Oral Capsule SR 24 Hr Take 1 capsule (180 mg total) by mouth daily. 90 capsule 3    carvedilol 25 MG Oral Tab Take 1 tablet (25 mg total) by mouth 2 (two) times daily with meals. 180 tablet 3    Tirzepatide (MOUNJARO) 10 MG/0.5ML Subcutaneous Solution Pen-injector Inject into the skin.      losartan 100 MG Oral Tab Take 1 tablet (100 mg total) by mouth daily. 90 tablet 3    Pravastatin Sodium 80 MG Oral Tab TAKE 1 TABLET NIGHTLY 90 tablet 0    Continuous Blood Gluc Sensor (FREESTYLE HENRRY 3 SENSOR) Does not apply Misc       triamcinolone 0.1 % External Cream Apply topically 2 (two) times daily as needed. 60 g 0    Continuous Blood Gluc  (FREESTYLE HENRRY 14 DAY READER) Does not apply Device Use to monitor blood sugar. 1 each 0    FUROSEMIDE 20 MG Oral Tab TAKE 1 TABLET DAILY 90 tablet 3    METFORMIN 500 MG Oral Tab TAKE 1 TABLET(500 MG) BY MOUTH TWICE DAILY WITH MEALS 180 tablet 1    FARXIGA 10 MG Oral Tab TAKE 1 TABLET DAILY 90 tablet 0    amLODIPine 5 MG Oral Tab Take 1 tablet (5 mg total) by mouth daily. 90  tablet 1    XARELTO 20 MG Oral Tab TAKE 1 TABLET DAILY WITH FOOD 90 tablet 3    LATANOPROST 0.005 % Ophthalmic Solution INSTILL 1 DROP IN BOTH EYES NIGHTLY (NO ADDITIONAL REFILLS UNTIL EVALUATED BY DOCTOR) 7.5 mL 3    Sertraline HCl 100 MG Oral Tab Take 1 tablet (100 mg total) by mouth daily.      traZODone HCl 100 MG Oral Tab Take 1.5 tablets (150 mg total) by mouth nightly.      Insulin Syringe-Needle U-100 (BD INSULIN SYRINGE ULTRAFINE) 31G X 5/16\" 1 ML Does not apply Misc As directed 90 each 5    aspirin EC 81 MG Oral Tab EC Take 1 tablet (81 mg total) by mouth daily. 90 tablet 1      Patient requires assistance with: No assistance required    Have you recently had any feelings of harming yourself or others? The patient's response was no.    Physical Exam:   /80   Pulse 76   Wt 268 lb (121.6 kg)   SpO2 95%   BMI 38.45 kg/m²     General Appearance: Well developed, well nourished, normal build, independent body habitus, no apparent physical disabilities, well groomed    Neurological Exam: WNL-Orientation to time, place and person, normal mood & effect, normal concentration & attention span  Inspection:   Inspection:  No acute distress    Patient displays Non-antalgic gait, and is able to Normal heel walk, Normal toe walk.    Coordination:  Well-coordinated, fluent gait, able to engage in rapid alternating movements of upper and lower extremities.    ROM Lumbar Spine:  See chart below:  Motion Right (+ or -) Left (+ or -)   Lumbar Flexion - +   Lumbar Extension - +   Lumbar Bending - +   Lumbar Extension/ twisting motion - +     Lumbar/Sacral Integument:  Skin over lumbar sacral spine is intact without rashes, excoriations, lesions or erythema noted    Palpation:  See chart below:  Palpation of lumbar area Right (+ or -) Left (+ or -)   Lumbar facets - +   Lumbar paraspinals - +   Piriformis - -   SIJ - -   Trochanteric Bursa - -     Deep Tendon Reflexes:  Grossly intact to bilateral lower extremities 2/4  throughout    Strength:  Strength deficits noted:  left IP/Hip flexor 4+/5     Sensation:  Sensory and sensory details noted on bilateral lower extremities to light touch.    Tests:  Test Right (+ or -) Left (+ or -)   SLR - +   Alok’s - -   Babinski - -   Clonus - -     ID#[id number]  Radiology/Lab Test Reviewed:   PROCEDURE:  MRI SPINE LUMBAR (CPT=72148)     LOCATION:  [facility]       COMPARISON:  AVAFRANCOIS , , MRI SPINE LUMBAR (CPT=72148), 9/03/2019, 3:57 PM.     INDICATIONS:  M54.16 Lumbar radiculopathy M47.816 Lumbar spondylosis G89.29 Other chronic pain     TECHNIQUE:  Multiplanar T1 and T2 weighted images including fat suppression sequences.  Images acquired in sagittal and axial planes.       PATIENT STATED HISTORY: (As transcribed by Technologist)  Patient describes having pain to low back with pain going down right leg occasional to knee. Symptoms for 3 years getting worse over time.         FINDINGS:  Marked motion degradation.     There is lumbar lordosis.  There is trace retrolisthesis of L4 on L5.  The vertebral body heights are maintained.  There is minimal disc height loss at L3-L4, L4-5, and L5-S1.  There is scattered disc desiccation.  Vacuum disc phenomenon is identified at   L3-L4 and L4-5.  There is mild T2 hyperintense signal within the L5-S1 disc on the right which is favored to be degenerative in etiology.  This T2 hyperintense signal was also present on 9/3/2019.  The conus and cauda equina nerve roots are unremarkable   in caliber and signal.     T12-L1:  Mild facet hypertrophy.  Minimal disc bulge.  No spinal canal or foraminal narrowing.     L1-L2:  Mild disc bulge asymmetrically extending into the left neural foramen.  Mild facet hypertrophy.  No spinal canal stenosis.  Moderate left and mild right foraminal narrowing.     L2-L3:  Mild facet hypertrophy.  Trace disc bulge.  No spinal canal stenosis.  Moderate left and mild right foraminal narrowing.     L3-L4:  Mild to moderate facet  hypertrophy.  Mild disc bulge.  No spinal canal stenosis.  Moderate to severe bilateral foraminal narrowing.     L4-5:  Mild to moderate diffuse disc bulge.  Mild to moderate facet hypertrophy.  No spinal canal stenosis.  Moderate left and severe right foraminal narrowing.     L5-S1:  Mild facet hypertrophy.  No spinal canal stenosis.  No significant foraminal narrowing.                   Impression   CONCLUSION:  Multilevel advanced foraminal narrowing.  No significant spinal canal stenosis.  This foraminal narrowing is most notable L3-L4 and L4-5.  Please see above for further details.        Dictated by (CST): Stromberg, LeRoy, MD on 4/28/2023 at 9:42 AM      Finalized by (CST): Stromberg, LeRoy, MD on 4/28/2023 at 9:53 AM       Result History    MRI SPINE LUMBAR (CPT=72148) (Order #[id number]) on 4/28/2023 - Order Result History Re  Lab Results   Component Value Date    WBC 6.7 01/18/2023    WBC 6.9 03/04/2022    WBC 7.2 10/19/2021   No results found for: \"HEMOGLOBIN\"  Lab Results   Component Value Date    .0 01/18/2023    .0 03/04/2022    .0 10/19/2021           Patient Education: Patient was advised to continue normal activities as tolerated and was advised against any form of bedrest, since recent guidelines promote and encourage physical activity for improvment of functionality and overall pain.  (Family Practice Vol. 16, No. 1, 59-31Â© [employer] 1999 ), Patient was shown interactive content, shown and explained procedure on spinal model..  Patient educated and verbalized understanding.  Medical Decision Making:   Diagnosis:    Encounter Diagnoses   Name Primary?    Lumbar radiculopathy Yes    Degenerative lumbar disc      Impression:   Patient is a 64-year-old male with chronic back pain and intermittent lumbar radicular symptoms.  His last lumbar epidural steroid injection resulted in approximately 65% relief of his low back pain.  Patient is now noting progressive  worsening of pain in the left low back radiating around the left hip to the anterior thigh.  Patient states he was concerned that it was related to his total hip replacement that was done in 2020 however after further review of the lumbar MRI there is foraminal narrowing and disc bulges on the left at L1-2.  Patient has not had any updated imaging in over a year.  He does have weakness in the left iliopsoas and hip flexor against resistance.  Discussed with patient updated imaging and if symptoms persist would recommend left transforaminal epidural steroid injections targeted at the L1 to L2-3 level, however this would be determined after updated imaging is reviewed.  Patient does not need refills on his Flexeril or hydrocodone at this time.  Patient does not feel that the symptoms require any additional medication such as a Medrol Dosepak or gabapentin.  Patient is not an NSAID candidate due to blood thinning status.  Plan:   > Update lumbar imaging including MRI with and without contrast due to history of prostate cancer  > Follow-up after imaging  > Continue using the hydrocodone 7.5/325 sparingly patient receives 30 tablets to last each month  > Continue cyclobenzaprine 10 mg 3 times daily as needed call for refills  No orders of the defined types were placed in this encounter.      Meds & Refills for this Visit:  Requested Prescriptions      No prescriptions requested or ordered in this encounter       Imaging & Consults:  MRI SPINE LUMBAR (W+WO) (CPT=72158)  XR LUMBAR SPINE COMPLETE W/FLEX + EXT (CPT=72114)    The patient indicates understanding of these issues and agrees to the plan.      ID#[id number]

## 2024-06-03 NOTE — PROGRESS NOTES
Patient presents in office today with reported pain in lumbar spine    Current pain level reported = 2/10    Last reported dose of norco last night      Narcotic Contract renewal 03/12/24    Urine Drug screen 03/17/24

## 2024-06-04 ENCOUNTER — HOSPITAL ENCOUNTER (OUTPATIENT)
Dept: GENERAL RADIOLOGY | Age: 64
Discharge: HOME | End: 2024-06-04
Attending: NURSE PRACTITIONER
Payer: COMMERCIAL

## 2024-06-04 DIAGNOSIS — I10 ESSENTIAL HYPERTENSION: ICD-10-CM

## 2024-06-04 DIAGNOSIS — M51.36 DEGENERATIVE LUMBAR DISC: ICD-10-CM

## 2024-06-04 PROCEDURE — 72114 X-RAY EXAM L-S SPINE BENDING: CPT | Performed by: NURSE PRACTITIONER

## 2024-06-05 RX ORDER — FUROSEMIDE 20 MG/1
TABLET ORAL
Qty: 90 TABLET | Refills: 3 | Status: SHIPPED | OUTPATIENT
Start: 2024-06-05

## 2024-06-05 NOTE — TELEPHONE ENCOUNTER
Last time medication was refilled 06/12/2023  Last office visit  02/23/2024  Next office visit due/scheduled   Future Appointments   Date Time Provider Department Center   7/12/2024  3:15 PM [facility] MRI RM1 (1.5T WIDE) [facility] MRI [facility]   8/23/2024  2:00 PM Ryan Astudillo MD [facility] [address]   8/28/2024 10:00 AM Regina Genao MD  RAD ONC [facility]         Failed protocol.     Sent to Doctor Astudillo for approval

## 2024-07-09 ENCOUNTER — PATIENT MESSAGE (OUTPATIENT)
Dept: PAIN CLINIC | Facility: CLINIC | Age: 64
End: 2024-07-09

## 2024-07-09 DIAGNOSIS — G89.29 OTHER CHRONIC PAIN: ICD-10-CM

## 2024-07-09 DIAGNOSIS — M79.18 MYOFASCIAL PAIN ON LEFT SIDE: ICD-10-CM

## 2024-07-09 DIAGNOSIS — M54.50 ACUTE LEFT-SIDED LOW BACK PAIN WITHOUT SCIATICA: ICD-10-CM

## 2024-07-09 DIAGNOSIS — M79.18 MYOFASCIAL PAIN SYNDROME OF LUMBAR SPINE: ICD-10-CM

## 2024-07-09 RX ORDER — CYCLOBENZAPRINE HCL 10 MG
10 TABLET ORAL 3 TIMES DAILY PRN
Qty: 60 TABLET | Refills: 0 | Status: SHIPPED | OUTPATIENT
Start: 2024-07-09

## 2024-07-09 RX ORDER — HYDROCODONE BITARTRATE AND ACETAMINOPHEN 7.5; 325 MG/1; MG/1
1 TABLET ORAL EVERY 8 HOURS PRN
Qty: 30 TABLET | Refills: 0 | Status: SHIPPED | OUTPATIENT
Start: 2024-07-09

## 2024-07-09 NOTE — TELEPHONE ENCOUNTER
From: Darrel Salazar  To: Aman Tanner  Sent: 7/9/2024 12:47 PM CDT  Subject: Need refills    cyclobenzaprine 10 MG Oral Tab   - HYDROcodone-acetaminophen (NORCO) 7.5-325 MG Oral Tab

## 2024-07-09 NOTE — TELEPHONE ENCOUNTER
Medication: cyclobenzaprine 10 MG     Date of last refill: 05/28/24          Medication: HYDROcodone-acetaminophen (NORCO) 7.5-325 MG     Date of last refill: 05/28/24  [place] 05[place]28/24    Last office visit: 06/03/24  Due back to clinic per last office note:  na  Date next office visit scheduled:  none    Last URINE Screen: 03/17/24  Screen Results:  as expected      Narcotic Contract EXPIRATION date: 03/12/24    Last OV note recommendation:   Plan:   > Update lumbar imaging including MRI with and without contrast due to history of prostate cancer  > Follow-up after imaging  > Continue using the hydrocodone 7.5/325 sparingly patient receives 30 tablets to last each month  > Continue cyclobenzaprine 10 mg 3 times daily as needed call for refills  No orders of the defined types were placed in this encounter.

## 2024-07-12 ENCOUNTER — HOSPITAL ENCOUNTER (OUTPATIENT)
Dept: MRI IMAGING | Age: 64
Discharge: HOME | End: 2024-07-12
Attending: NURSE PRACTITIONER
Payer: COMMERCIAL

## 2024-07-12 DIAGNOSIS — M51.36 DEGENERATIVE LUMBAR DISC: ICD-10-CM

## 2024-07-12 DIAGNOSIS — M54.16 LUMBAR RADICULOPATHY: ICD-10-CM

## 2024-07-12 PROCEDURE — A9575 INJ GADOTERATE MEGLUMI 0.1ML: HCPCS | Performed by: NURSE PRACTITIONER

## 2024-07-12 PROCEDURE — 72158 MRI LUMBAR SPINE W/O & W/DYE: CPT | Performed by: NURSE PRACTITIONER

## 2024-07-12 RX ORDER — GADOTERATE MEGLUMINE 376.9 MG/ML
20 INJECTION INTRAVENOUS
Status: COMPLETED | OUTPATIENT
Start: 2024-07-12 | End: 2024-07-12

## 2024-07-12 RX ADMIN — GADOTERATE MEGLUMINE 20 ML: 376.9 INJECTION INTRAVENOUS at 15:32:00

## 2024-07-16 ENCOUNTER — OFFICE VISIT (OUTPATIENT)
Dept: PAIN CLINIC | Facility: CLINIC | Age: 64
End: 2024-07-16
Payer: COMMERCIAL

## 2024-07-16 VITALS
BODY MASS INDEX: 38 KG/M2 | OXYGEN SATURATION: 98 % | SYSTOLIC BLOOD PRESSURE: 126 MMHG | DIASTOLIC BLOOD PRESSURE: 78 MMHG | HEART RATE: 60 BPM | WEIGHT: 268 LBS

## 2024-07-16 DIAGNOSIS — M62.5A9: Primary | ICD-10-CM

## 2024-07-16 PROCEDURE — 3078F DIAST BP <80 MM HG: CPT | Performed by: NURSE PRACTITIONER

## 2024-07-16 PROCEDURE — 3074F SYST BP LT 130 MM HG: CPT | Performed by: NURSE PRACTITIONER

## 2024-07-16 PROCEDURE — 99214 OFFICE O/P EST MOD 30 MIN: CPT | Performed by: NURSE PRACTITIONER

## 2024-07-16 RX ORDER — ATORVASTATIN CALCIUM 40 MG/1
TABLET, FILM COATED ORAL
COMMUNITY
Start: 2024-06-17

## 2024-07-16 NOTE — PROGRESS NOTES
HPI:   Darrel Salazar presents to follow-up on most recent lumbar MRI that was ordered at last office visit Samantha 3, 2024.       The pain is described as moderate stabbing, soreness that is constant .  The patient’s activity level has remained the same since last visit.  The pain is worst unrelated to time of day.    Changes in condition/history since last visit:   Notes from Samantha 3, 2024 visit see below  Darrel Salazar presents clinic today in follow-up regarding his back pain.  He today he is reporting that he has had progressive worsening symptoms from the left low back radiating around to the left lateral hip and the top of the left thigh.  Patient is concerned regarding his left hip replacement.  He is planning on following up with his orthopedic surgeon Dr. Edmond.  He is unsure if his left hip is the cause of his current symptoms.  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  He does report that lifting his left leg to go up the stairs leading with the left leg as well as lifting the left leg in a seated position increases his low back pain on the left side and symptoms radiating around the left hip.  Patient denies any radicular symptoms down the right lower extremity.  He denies any fever chills loss of bowel or bladder control.  He has not had any updated imaging.  He is using Flexeril sparingly as well as his hydrocodone 7.5-325 sparingly.  He does not need refill on his medications today.      Patient does not feel that the symptoms are severe enough that he needs any additional medications or oral steroids but is willing to evaluate symptoms further with imaging and potentially have injection therapy if warranted.  Patient has done physical therapy in the past and continues his home exercises.  He is not an NSAID candidate since he is currently on blood thinning medication Xarelto.         The pain is described as moderate  aching, stabbing, grabbing, shooting, soreness that is constant .  The patient’s activity level has remained the same since last visit.  The pain is worst unrelated to time of day.     Prior procedures:  Bilateral L4-5 L5-S1 radiofrequency on December 5, 2023: 100% relief of pain  #2 bilateral lumbar medial branch blocks October 31, 2023 95% improvement during initial diagnostic phase  #1 bilateral lumbar medial branch blocks done April 2023 88% improvement during diagnostic phase  Bilateral sacroiliac joint injection September 14, 2023: Reported feeling fantastic after SI joint injections  Last procedure: Last lumbar epidural steroid injection   date: February 29, 2024    Percentage of relief experienced from the procedure: 65% relief %    Duration of the relief: Sustained at follow-up visit conducted March 12, 2024    The following activities will increase the patient’s pain: any prolonged activity, prolonged sitting /stairs    The following activities decrease the patient’s pain: limiting activity level    Functional Assessment: Patient reports that they are able to complete all of their ADL's such as eating, bathing, using the toilet, dressing and getting up from a bed or a chair independently.    A comprehensive 10 point review of systems was completed.  Pertinent positives and negatives noted in the the HPI.     Past Medical History:   Diagnosis Date    Abdominal hernia     Abdominal pain     Anxiety     Arrhythmia     A-FIB DX 2 YRS AGO    Arthritis     Back pain     Back problem     Low back pain    Cancer (HCC) 11/2019    prostate    Deep vein thrombosis (HCC)     left    Depression     Diabetes mellitus (HCC)     Esophageal reflux     Fatigue     Glaucoma     Heartburn     High blood pressure     High cholesterol     History of depression     History of hip replacement     March 2020, 11/2010    Insomnia     Night sweats     OA (osteoarthritis)     Obesity     Osteoarthritis     Pain in joints     Sleep  apnea     repeat SAM test neg after weight loss    Stress     Type II or unspecified type diabetes mellitus without mention of complication, not stated as uncontrolled     Visual impairment     glasses    Wears glasses       Past Surgical History:   Procedure Laterality Date    Abdomen surgery proc unlisted  01/26/2021    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    Anesth,perineal remv of prostate  2019    Colonoscopy      Colonoscopy N/A 12/8/2022    Procedure: COLONOSCOPY with forcep polypectomy;  Surgeon: Godwin Eller MD;  Location:  ENDOSCOPY    Hernia surgery      Hip replacement surgery Left 2010    Hip replacement surgery Right 03/03/2020    Laparoscopy, surgical prostatectomy, retropubic radical, w/nerve sparing  11/15/2019        Other surgical history      vocal cord polyps removed    Other surgical history  11/15/2019    RALP Dr. Muir         Current Medications:  Current Outpatient Medications   Medication Sig Dispense Refill    atorvastatin 40 MG Oral Tab       cyclobenzaprine 10 MG Oral Tab Take 1 tablet (10 mg total) by mouth 3 (three) times daily as needed for Muscle spasms. 60 tablet 0    HYDROcodone-acetaminophen (NORCO) 7.5-325 MG Oral Tab Take 1 tablet by mouth every 8 (eight) hours as needed for Pain (back). To last one month 30 tablet 0    diazePAM (VALIUM) 5 MG Oral Tab Take one tablet half hour prior to imaging. Okay to repeat one, 5 mg tablet immediately before procedure if needed. 2 tablet 0    FUROSEMIDE 20 MG Oral Tab TAKE 1 TABLET DAILY 90 tablet 3    hydrOXYzine 10 MG Oral Tab Take 1 tablet (10 mg total) by mouth daily. 90 tablet 0    dilTIAZem HCl ER Coated Beads 180 MG Oral Capsule SR 24 Hr Take 1 capsule (180 mg total) by mouth daily. 90 capsule 3    carvedilol 25 MG Oral Tab Take 1 tablet (25 mg total) by mouth 2 (two) times daily with meals. 180 tablet 3    Tirzepatide (MOUNJARO) 10 MG/0.5ML  Subcutaneous Solution Pen-injector Inject into the skin.      losartan 100 MG Oral Tab Take 1 tablet (100 mg total) by mouth daily. 90 tablet 3    Pravastatin Sodium 80 MG Oral Tab TAKE 1 TABLET NIGHTLY 90 tablet 0    Continuous Blood Gluc Sensor (FREESTYLE HENRRY 3 SENSOR) Does not apply Misc       triamcinolone 0.1 % External Cream Apply topically 2 (two) times daily as needed. 60 g 0    Continuous Blood Gluc  (FREESTYLE HENRRY 14 DAY READER) Does not apply Device Use to monitor blood sugar. 1 each 0    METFORMIN 500 MG Oral Tab TAKE 1 TABLET(500 MG) BY MOUTH TWICE DAILY WITH MEALS 180 tablet 1    FARXIGA 10 MG Oral Tab TAKE 1 TABLET DAILY 90 tablet 0    amLODIPine 5 MG Oral Tab Take 1 tablet (5 mg total) by mouth daily. 90 tablet 1    XARELTO 20 MG Oral Tab TAKE 1 TABLET DAILY WITH FOOD 90 tablet 3    LATANOPROST 0.005 % Ophthalmic Solution INSTILL 1 DROP IN BOTH EYES NIGHTLY (NO ADDITIONAL REFILLS UNTIL EVALUATED BY DOCTOR) 7.5 mL 3    Sertraline HCl 100 MG Oral Tab Take 1 tablet (100 mg total) by mouth daily.      traZODone HCl 100 MG Oral Tab Take 1.5 tablets (150 mg total) by mouth nightly.      Insulin Syringe-Needle U-100 (BD INSULIN SYRINGE ULTRAFINE) 31G X 5/16\" 1 ML Does not apply Misc As directed 90 each 5    aspirin EC 81 MG Oral Tab EC Take 1 tablet (81 mg total) by mouth daily. 90 tablet 1      Patient requires assistance with: No assistance required  Have you recently had any feelings of harming yourself or others? The patient's response was no.    Physical Exam:   /78   Pulse 60   Wt 268 lb (121.6 kg)   SpO2 98%   BMI 38.45 kg/m²   VAS Pain Score: 7 /10  General Appearance: Well developed, well nourished, normal build, independent body habitus, no apparent physical disabilities, well groomed    Neurological Exam: WNL-Orientation to time, place and person, normal mood & effect, normal concentration & attention span  Inspection:   Antalgic off left leg  3+ out of 5 left iliopsoas  against resistance otherwise strength bilateral lower extremities 5 out of 5  .  Ambulation with lumbar forward flexion  Positive tenderness to lumbar paraspinals  Limited painful range of motion left hip  No pain with range of motion right hip  Radiology/Lab Test Reviewed:   PROCEDURE:  MRI SPINE LUMBAR (W+WO) (CPT=72158)      COMPARISON:  [facility] , , MRI SPINE LUMBAR (CPT=72148), 4/28/2023, 8:27 AM.  [facility], XR, XR LUMBAR SPINE COMPLETE W/FLEX + EXT (CPT=72114), 6/04/2024, 5:15 PM.     INDICATIONS:  M51.36 Degenerative lumbar disc M54.16 Lumbar radiculopathy     TECHNIQUE:  Multiplanar T1 and T2 weighted images including fat suppression sequences.  Images acquired in sagittal and axial planes. Intravenous gadolinium was administered followed by multiplanar post-infusion T1 weighted sequences.       PATIENT STATED HISTORY:(As transcribed by Technologist)  Patient has low back pain with left hip pain.      CONTRAST USED:  20 mL of Dotarem     FINDINGS:  Marked motion degradation.     There is normal lumbar lordosis.  There is a stable trace retrolisthesis of L4 on L5.  The vertebral body heights are maintained.  There is minimal disc height loss at L3-L4, L4-5, and L5-S1.  There is scattered disc desiccation.  Vacuum disc phenomenon  is identified at L3-L4 and L4-5.  There is mild T2 hyperintense signal within the L5-S1 disc on the right which is favored to be degenerative in etiology.  This T2 hyperintense signal was also present on 9/3/2019.  The conus and cauda equina nerve roots  are unremarkable in caliber and signal.     No paraspinal soft tissue mass lesions.  There is however extensive atrophy and fatty replacement involving the left psoas and left iliacus musculature, which can be retrospectively seen and is stable.     T12-L1:  Mild facet hypertrophy.  Minimal disc bulge.  No spinal canal or foraminal narrowing.     L1-L2:  Mild disc bulge asymmetrically extending into the left  neural foramen.  Mild facet hypertrophy.  No spinal canal stenosis.  Moderate left and mild right foraminal narrowing.     L2-L3:  Mild facet hypertrophy.  Trace disc bulge.  No spinal canal stenosis.  Moderate left and mild right foraminal narrowing.     L3-L4:  Mild to moderate facet hypertrophy.  Mild disc bulge.  No spinal canal stenosis.  Moderate to severe bilateral foraminal narrowing.     L4-5:  Mild to moderate diffuse disc bulge.  Mild to moderate facet hypertrophy.  No spinal canal stenosis.  Moderate left and severe right foraminal narrowing.     L5-S1:  Mild facet hypertrophy.  No spinal canal stenosis.  No significant foraminal narrowing.                   Impression   CONCLUSION:    1. There is no evidence of an acute process.  2. There is a stable degree of multilevel disc disease and facet arthropathy without interval progression.  There is again evidence of foraminal narrowing most notably at L3-4 and L4-5 as previously seen.  3. Severe atrophy and fatty replacement of the left psoas and left iliacus musculature which can be retrospectively seen and is stable.  4. Overall, there are no significant interval changes from the previous study.        LOCATION:  [facility]            Dictated by (CST): Samantha Albrecht DO on 7/12/2024 at 4:30 PM      Finalized by (CST): Samantha Albrecht DO on 7/12/2024 at 4:36 PM       Result History    MRI SPINE LUMBAR (W+WO) (CPT=72158) (Order #[id number]) on 7/12/2024 - Order Result History Report  Signed by    Signed Time Ph     Lab Results   Component Value Date    WBC 6.7 01/18/2023    WBC 6.9 03/04/2022    WBC 7.2 10/19/2021   No results found for: \"HEMOGLOBIN\"  Lab Results   Component Value Date    .0 01/18/2023    .0 03/04/2022    .0 10/19/2021           Patient Education: Patient was advised to continue normal activities as tolerated and was advised against any form of bedrest, since recent guidelines promote and encourage physical activity for  improvment of functionality and overall pain.  (Family Practice Vol. 16, No. 1, 98-73Â© [employer] 1999 ).  Patient educated and verbalized understanding.  Medical Decision Making:   Diagnosis:    Encounter Diagnosis   Name Primary?    Atrophy of muscle of back, unspecified region Yes     Impression:   Patient is a 64-year-old male with history of sickle cell trait, A-fib, type 2 diabetes, morbid obesity, dyslipidemia, depression, sedative hypnotic anxiolytic dependence, hypertension, RUBEN, history of prostate cancer, obstructive sleep apnea, history of right total hip replacement as well as left total hip replacement.  The left hip was done in 2010 in [facility].  Patient has been complaining of left-sided low back pain radiating around towards the left hip with weakness in the left iliopsoas muscle.  Patient was asked to obtain a lumbar MRI and is here today for review.  Of significance finding of left psoas and iliac at this muscle atrophy with fatty replacement was noted on MRI.  Discussed with patient his symptoms are consistent with this muscle atrophy and I would like to consult with our physical therapy team to see if returning to therapy can help reduce some pain and regain some muscle strength on the side.  Patient is in agreement with this plan.  After I discussed with the physical therapist I will contact patient and notify him of the response.  In the meantime he will continue on his current regimen.      Plan:     > Will consult with physical therapist regarding getting PT course to improve atrophy and function of left ileal psoas and iliac us muscle group  No orders of the defined types were placed in this encounter.      Meds & Refills for this Visit:  Requested Prescriptions      No prescriptions requested or ordered in this encounter       Imaging & Consults:  None    The patient indicates understanding of these issues and agrees to the plan.      ID#[id number]

## 2024-07-16 NOTE — PROGRESS NOTES
Patient presents in office today with reported pain in lumbar spine    Current pain level reported = 7/10    Last reported dose of nothing today      Narcotic Contract renewal 03/12/24    Urine Drug screen 03/17/24

## 2024-07-16 NOTE — PATIENT INSTRUCTIONS
Refill policies:    Allow 2-3 business days for refills; controlled substances may take longer.  Contact your pharmacy at least 5 days prior to running out of medication and have them send an electronic request or submit request through the “request refill” option in your [employer] account.  Refills are not addressed on weekends; covering physicians do not authorize routine medications on weekends.  No narcotics or controlled substances are refilled after noon on Fridays or by on call physicians.  By law, narcotics must be electronically prescribed.  A 30 day supply with no refills is the maximum allowed.  If your prescription is due for a refill, you may be due for a follow up appointment.  To best provide you care, patients receiving routine medications need to be seen at least once a year.  Patients receiving narcotic/controlled substance medications need to be seen at least once every 3 months.  In the event that your preferred pharmacy does not have the requested medication in stock (e.g. Backordered), it is your responsibility to find another pharmacy that has the requested medication available.  We will gladly send a new prescription to that pharmacy at your request.    Scheduling Tests:    If your physician has ordered radiology tests such as MRI or CT scans, please contact Central Scheduling at [phone] right away to schedule the test.  Once scheduled, the [facility] Centralized Referral Team will work with your insurance carrier to obtain pre-certification or prior authorization.  Depending on your insurance carrier, approval may take 3-10 days.  It is highly recommended patients assure they have received an authorization before having a test performed.  If test is done without insurance authorization, patient may be responsible for the entire amount billed.      Precertification and Prior Authorizations:  If your physician has recommended that you have a procedure or additional testing performed the [facility]  Centralized Referral Team will contact your insurance carrier to obtain pre-certification or prior authorization.    You are strongly encouraged to contact your insurance carrier to verify that your procedure/test has been approved and is a COVERED benefit.  Although the [facility] Centralized Referral Team does its due diligence, the insurance carrier gives the disclaimer that \"Although the procedure is authorized, this does not guarantee payment.\"    Ultimately the patient is responsible for payment.   Thank you for your understanding in this matter.  Paperwork Completion:  If you require FMLA or disability paperwork for your recovery, please make sure to either drop it off or have it faxed to our office at [phone]. Be sure the form has your name and date of birth on it.  The form will be faxed to our Forms Department and they will complete it for you.  There is a 25$ fee for all forms that need to be filled out.  Please be aware there is a 10-14 day turnaround time.  You will need to sign a release of information (DEBBIE) form if your paperwork does not come with one.  You may call the Forms Department with any questions at [phone].  Their fax number is [phone].

## 2024-07-30 DIAGNOSIS — M79.18 MYOFASCIAL PAIN SYNDROME OF LUMBAR SPINE: ICD-10-CM

## 2024-07-30 DIAGNOSIS — G89.29 OTHER CHRONIC PAIN: ICD-10-CM

## 2024-07-30 DIAGNOSIS — M54.50 ACUTE LEFT-SIDED LOW BACK PAIN WITHOUT SCIATICA: ICD-10-CM

## 2024-07-30 DIAGNOSIS — M79.18 MYOFASCIAL PAIN ON LEFT SIDE: ICD-10-CM

## 2024-07-31 RX ORDER — HYDROCODONE BITARTRATE AND ACETAMINOPHEN 7.5; 325 MG/1; MG/1
1 TABLET ORAL EVERY 8 HOURS PRN
Qty: 30 TABLET | Refills: 0 | Status: SHIPPED | OUTPATIENT
Start: 2024-08-08

## 2024-07-31 RX ORDER — CYCLOBENZAPRINE HCL 10 MG
10 TABLET ORAL 3 TIMES DAILY PRN
Qty: 60 TABLET | Refills: 0 | Status: SHIPPED | OUTPATIENT
Start: 2024-07-31

## 2024-07-31 NOTE — TELEPHONE ENCOUNTER
Medication:   Cyclobenzaprine 10mg Oral Tab  Hydrocodone-acetaminophen 7.5-325mg Oral Tab    Date of last refill for cyclobenzaprine: 07/09/24  Date of last refill for Hydrocodone-acetaminophen: 07/09/24    [place] 07/09/24    Last office visit: 07/16/24  Due back to clinic per last office note:  Not indicated  Date next office visit scheduled:  None    Last URINE Screen: 3/12/24  Screen Results:    Sarah Wells, APRN  3/19/2024  2:28 PM CDT Back to Top      As expected         Narcotic Contract EXPIRATION date: 3/12/25    Last OV note recommendation:    Impression:   Patient is a 64-year-old male with history of sickle cell trait, A-fib, type 2 diabetes, morbid obesity, dyslipidemia, depression, sedative hypnotic anxiolytic dependence, hypertension, RUBEN, history of prostate cancer, obstructive sleep apnea, history of right total hip replacement as well as left total hip replacement.  The left hip was done in 2010 in [facility].  Patient has been complaining of left-sided low back pain radiating around towards the left hip with weakness in the left iliopsoas muscle.  Patient was asked to obtain a lumbar MRI and is here today for review.  Of significance finding of left psoas and iliac at this muscle atrophy with fatty replacement was noted on MRI.  Discussed with patient his symptoms are consistent with this muscle atrophy and I would like to consult with our physical therapy team to see if returning to therapy can help reduce some pain and regain some muscle strength on the side.  Patient is in agreement with this plan.  After I discussed with the physical therapist I will contact patient and notify him of the response.  In the meantime he will continue on his current regimen.        Plan:      > Will consult with physical therapist regarding getting PT course to improve atrophy and function of left ileal psoas and iliac us muscle group  No orders of the defined types  were placed in this encounter.

## 2024-08-01 RX ORDER — HYDROXYZINE HYDROCHLORIDE 10 MG/1
10 TABLET, FILM COATED ORAL DAILY
Qty: 90 TABLET | Refills: 0 | Status: SHIPPED | OUTPATIENT
Start: 2024-08-01

## 2024-08-01 NOTE — TELEPHONE ENCOUNTER
Last time medication was refilled 4/30/24  Last office visit  2/23/24  Next office visit due/scheduled   Future Appointments   Date Time Provider Department Center   8/23/2024  2:00 PM Ryan Astudillo MD [facility] [address]   8/28/2024 10:00 AM Regina Genao MD  [facility]     Medication not on protocol.

## 2024-08-22 PROBLEM — M62.5A9: Status: RESOLVED | Noted: 2024-07-16 | Resolved: 2024-08-22

## 2024-08-23 ENCOUNTER — OFFICE VISIT (OUTPATIENT)
Dept: INTERNAL MEDICINE CLINIC | Facility: CLINIC | Age: 64
End: 2024-08-23
Payer: COMMERCIAL

## 2024-08-23 ENCOUNTER — LAB ENCOUNTER (OUTPATIENT)
Dept: LAB | Age: 64
End: 2024-08-23
Attending: INTERNAL MEDICINE
Payer: COMMERCIAL

## 2024-08-23 VITALS
BODY MASS INDEX: 40.47 KG/M2 | OXYGEN SATURATION: 98 % | HEIGHT: 68 IN | SYSTOLIC BLOOD PRESSURE: 130 MMHG | RESPIRATION RATE: 14 BRPM | DIASTOLIC BLOOD PRESSURE: 80 MMHG | WEIGHT: 267 LBS | HEART RATE: 76 BPM

## 2024-08-23 DIAGNOSIS — R21 RASH: ICD-10-CM

## 2024-08-23 DIAGNOSIS — I10 ESSENTIAL HYPERTENSION: ICD-10-CM

## 2024-08-23 DIAGNOSIS — E11.29 TYPE 2 DIABETES MELLITUS WITH MICROALBUMINURIA, WITHOUT LONG-TERM CURRENT USE OF INSULIN (HCC): Primary | ICD-10-CM

## 2024-08-23 DIAGNOSIS — R80.9 TYPE 2 DIABETES MELLITUS WITH MICROALBUMINURIA, WITHOUT LONG-TERM CURRENT USE OF INSULIN (HCC): ICD-10-CM

## 2024-08-23 DIAGNOSIS — R80.9 TYPE 2 DIABETES MELLITUS WITH MICROALBUMINURIA, WITHOUT LONG-TERM CURRENT USE OF INSULIN (HCC): Primary | ICD-10-CM

## 2024-08-23 DIAGNOSIS — Z23 NEED FOR TDAP VACCINATION: ICD-10-CM

## 2024-08-23 DIAGNOSIS — E11.29 TYPE 2 DIABETES MELLITUS WITH MICROALBUMINURIA, WITHOUT LONG-TERM CURRENT USE OF INSULIN (HCC): ICD-10-CM

## 2024-08-23 DIAGNOSIS — I48.0 PAROXYSMAL ATRIAL FIBRILLATION (HCC): ICD-10-CM

## 2024-08-23 LAB
ALBUMIN SERPL-MCNC: 4.6 G/DL (ref 3.2–4.8)
ALBUMIN/GLOB SERPL: 1.5 {RATIO} (ref 1–2)
ALP LIVER SERPL-CCNC: 150 U/L
ALT SERPL-CCNC: 18 U/L
ANION GAP SERPL CALC-SCNC: 5 MMOL/L (ref 0–18)
AST SERPL-CCNC: 23 U/L (ref ?–34)
BILIRUB SERPL-MCNC: 0.8 MG/DL (ref 0.2–1.1)
BUN BLD-MCNC: 14 MG/DL (ref 9–23)
CALCIUM BLD-MCNC: 9.5 MG/DL (ref 8.7–10.4)
CHLORIDE SERPL-SCNC: 105 MMOL/L (ref 98–112)
CHOLEST SERPL-MCNC: 154 MG/DL (ref ?–200)
CO2 SERPL-SCNC: 28 MMOL/L (ref 21–32)
CREAT BLD-MCNC: 1.05 MG/DL
CREAT UR-SCNC: 27.4 MG/DL
EGFRCR SERPLBLD CKD-EPI 2021: 80 ML/MIN/1.73M2 (ref 60–?)
FASTING PATIENT LIPID ANSWER: NO
FASTING STATUS PATIENT QL REPORTED: NO
GLOBULIN PLAS-MCNC: 3 G/DL (ref 2–3.5)
GLUCOSE BLD-MCNC: 169 MG/DL (ref 70–99)
HDLC SERPL-MCNC: 39 MG/DL (ref 40–59)
LDLC SERPL CALC-MCNC: 95 MG/DL (ref ?–100)
MICROALBUMIN UR-MCNC: <0.3 MG/DL
NONHDLC SERPL-MCNC: 115 MG/DL (ref ?–130)
OSMOLALITY SERPL CALC.SUM OF ELEC: 290 MOSM/KG (ref 275–295)
POTASSIUM SERPL-SCNC: 3.6 MMOL/L (ref 3.5–5.1)
PROT SERPL-MCNC: 7.6 G/DL (ref 5.7–8.2)
SODIUM SERPL-SCNC: 138 MMOL/L (ref 136–145)
TRIGL SERPL-MCNC: 112 MG/DL (ref 30–149)
VLDLC SERPL CALC-MCNC: 18 MG/DL (ref 0–30)

## 2024-08-23 PROCEDURE — 3079F DIAST BP 80-89 MM HG: CPT | Performed by: INTERNAL MEDICINE

## 2024-08-23 PROCEDURE — 3075F SYST BP GE 130 - 139MM HG: CPT | Performed by: INTERNAL MEDICINE

## 2024-08-23 PROCEDURE — 82570 ASSAY OF URINE CREATININE: CPT | Performed by: INTERNAL MEDICINE

## 2024-08-23 PROCEDURE — 99214 OFFICE O/P EST MOD 30 MIN: CPT | Performed by: INTERNAL MEDICINE

## 2024-08-23 PROCEDURE — 3008F BODY MASS INDEX DOCD: CPT | Performed by: INTERNAL MEDICINE

## 2024-08-23 PROCEDURE — 82043 UR ALBUMIN QUANTITATIVE: CPT | Performed by: INTERNAL MEDICINE

## 2024-08-23 PROCEDURE — 83036 HEMOGLOBIN GLYCOSYLATED A1C: CPT | Performed by: INTERNAL MEDICINE

## 2024-08-23 PROCEDURE — 80061 LIPID PANEL: CPT | Performed by: INTERNAL MEDICINE

## 2024-08-23 PROCEDURE — 80053 COMPREHEN METABOLIC PANEL: CPT | Performed by: INTERNAL MEDICINE

## 2024-08-23 RX ORDER — CARVEDILOL 25 MG/1
25 TABLET ORAL 2 TIMES DAILY WITH MEALS
Qty: 180 TABLET | Refills: 3 | Status: SHIPPED | OUTPATIENT
Start: 2024-08-23

## 2024-08-23 RX ORDER — AMLODIPINE BESYLATE 5 MG/1
5 TABLET ORAL DAILY
Qty: 90 TABLET | Refills: 1 | Status: SHIPPED | OUTPATIENT
Start: 2024-08-23

## 2024-08-23 RX ORDER — FUROSEMIDE 20 MG
20 TABLET ORAL DAILY
Qty: 90 TABLET | Refills: 3 | Status: SHIPPED | OUTPATIENT
Start: 2024-08-23

## 2024-08-23 RX ORDER — ATORVASTATIN CALCIUM 40 MG/1
40 TABLET, FILM COATED ORAL DAILY
Qty: 90 TABLET | Refills: 3 | Status: SHIPPED | OUTPATIENT
Start: 2024-08-23

## 2024-08-23 RX ORDER — LOSARTAN POTASSIUM 100 MG/1
100 TABLET ORAL DAILY
Qty: 90 TABLET | Refills: 3 | Status: SHIPPED | OUTPATIENT
Start: 2024-08-23

## 2024-08-23 RX ORDER — DAPAGLIFLOZIN 10 MG/1
10 TABLET, FILM COATED ORAL DAILY
Qty: 90 TABLET | Refills: 0 | Status: SHIPPED | OUTPATIENT
Start: 2024-08-23

## 2024-08-23 NOTE — PROGRESS NOTES
Subjective:   Patient ID: Darrel Salazar is a 64 year old male.    HPI  HPI:   Darrel Salazar is a 64 year old male who presents for recheck of his diabete, HTN and PAF. Patient’s FBS have been stable..  Pt has been checking his feet on a regular basis. Pt denies any tingling of the feet. Pt denies any issues with depression. Pt complains of rash rash and bl arms. Itchy. No fever  Denies cp or sob.    Wt Readings from Last 6 Encounters:   08/23/24 267 lb (121.1 kg)   07/16/24 268 lb (121.6 kg)   06/03/24 268 lb (121.6 kg)   02/28/24 268 lb (121.6 kg)   02/23/24 268 lb (121.6 kg)   01/03/24 258 lb (117 kg)     Body mass index is 40.6 kg/m².     Lab Results   Component Value Date    A1C 7.2 06/07/2023    A1C 7.2 (H) 12/29/2022    A1C 7.1 (H) 03/04/2022     Lab Results   Component Value Date    CHOLEST 191 01/18/2023    CHOLEST 181 03/04/2022    CHOLEST 184 12/02/2021     Lab Results   Component Value Date    HDL 56 01/18/2023    HDL 55 03/04/2022    HDL 54 12/02/2021     Lab Results   Component Value Date     (H) 01/18/2023     (H) 03/04/2022     (H) 12/02/2021     Lab Results   Component Value Date    TRIG 79 01/18/2023    TRIG 63 03/04/2022    TRIG 65 12/02/2021     Lab Results   Component Value Date    AST 15 01/18/2023    AST 20 03/04/2022    AST 13 (L) 12/02/2021     Lab Results   Component Value Date    ALT 17 01/18/2023    ALT 24 03/04/2022    ALT 18 12/02/2021     Malb/Cre Calc   Date Value Ref Range Status   01/18/2023 60.0 (H) <=30.0 ug/mg Final     Comment:     <30 ug/mg creatinine       Normal     ug/mg creatinine   Microalbuminuria   >300 ug/mg creatinine      Albuminuria       03/04/2022 123.2 (H) <=30.0 ug/mg Final     Comment:     <30 ug/mg creatinine       Normal     ug/mg creatinine   Microalbuminuria   >300 ug/mg creatinine      Albuminuria       03/05/2021 19.8 <=30.0 ug/mg Final     Comment:     <30 ug/mg creatinine       Normal     ug/mg  creatinine   Microalbuminuria   >300 ug/mg creatinine      Albuminuria           Current Outpatient Medications   Medication Sig Dispense Refill    HYDROXYZINE 10 MG Oral Tab TAKE 1 TABLET(10 MG) BY MOUTH DAILY 90 tablet 0    cyclobenzaprine 10 MG Oral Tab Take 1 tablet (10 mg total) by mouth 3 (three) times daily as needed for Muscle spasms. 60 tablet 0    HYDROcodone-acetaminophen (NORCO) 7.5-325 MG Oral Tab Take 1 tablet by mouth every 8 (eight) hours as needed for Pain (back). To last one month 30 tablet 0    atorvastatin 40 MG Oral Tab       diazePAM (VALIUM) 5 MG Oral Tab Take one tablet half hour prior to imaging. Okay to repeat one, 5 mg tablet immediately before procedure if needed. 2 tablet 0    FUROSEMIDE 20 MG Oral Tab TAKE 1 TABLET DAILY 90 tablet 3    dilTIAZem HCl ER Coated Beads 180 MG Oral Capsule SR 24 Hr Take 1 capsule (180 mg total) by mouth daily. 90 capsule 3    carvedilol 25 MG Oral Tab Take 1 tablet (25 mg total) by mouth 2 (two) times daily with meals. 180 tablet 3    Tirzepatide (MOUNJARO) 10 MG/0.5ML Subcutaneous Solution Pen-injector Inject into the skin.      losartan 100 MG Oral Tab Take 1 tablet (100 mg total) by mouth daily. 90 tablet 3    Pravastatin Sodium 80 MG Oral Tab TAKE 1 TABLET NIGHTLY 90 tablet 0    Continuous Blood Gluc Sensor (FREESTYLE HENRRY 3 SENSOR) Does not apply Misc       triamcinolone 0.1 % External Cream Apply topically 2 (two) times daily as needed. 60 g 0    Continuous Blood Gluc  (FREESTYLE HENRRY 14 DAY READER) Does not apply Device Use to monitor blood sugar. 1 each 0    METFORMIN 500 MG Oral Tab TAKE 1 TABLET(500 MG) BY MOUTH TWICE DAILY WITH MEALS 180 tablet 1    FARXIGA 10 MG Oral Tab TAKE 1 TABLET DAILY 90 tablet 0    amLODIPine 5 MG Oral Tab Take 1 tablet (5 mg total) by mouth daily. 90 tablet 1    XARELTO 20 MG Oral Tab TAKE 1 TABLET DAILY WITH FOOD 90 tablet 3    LATANOPROST 0.005 % Ophthalmic Solution INSTILL 1 DROP IN BOTH EYES NIGHTLY (NO  ADDITIONAL REFILLS UNTIL EVALUATED BY DOCTOR) 7.5 mL 3    Sertraline HCl 100 MG Oral Tab Take 1 tablet (100 mg total) by mouth daily.      traZODone HCl 100 MG Oral Tab Take 1.5 tablets (150 mg total) by mouth nightly.      Insulin Syringe-Needle U-100 (BD INSULIN SYRINGE ULTRAFINE) 31G X 5/16\" 1 ML Does not apply Misc As directed 90 each 5    aspirin EC 81 MG Oral Tab EC Take 1 tablet (81 mg total) by mouth daily. 90 tablet 1      Past Medical History:    Abdominal hernia    Abdominal pain    Anxiety    Arrhythmia    A-FIB DX 2 YRS AGO    Arthritis    Back pain    Back problem    Low back pain    Cancer (HCC)    prostate    Deep vein thrombosis (HCC)    left    Depression    Diabetes mellitus (HCC)    Esophageal reflux    Fatigue    Glaucoma    Heartburn    High blood pressure    High cholesterol    History of depression    History of hip replacement    March 2020, 11/2010    Insomnia    Night sweats    OA (osteoarthritis)    Obesity    Osteoarthritis    Pain in joints    Sleep apnea    repeat SAM test neg after weight loss    Stress    Type II or unspecified type diabetes mellitus without mention of complication, not stated as uncontrolled    Visual impairment    glasses    Wears glasses      Past Surgical History:   Procedure Laterality Date    Abdomen surgery proc unlisted  01/26/2021    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    Anesth,perineal remv of prostate  2019    Colonoscopy      Colonoscopy N/A 12/8/2022    Procedure: COLONOSCOPY with forcep polypectomy;  Surgeon: Godwin Eller MD;  Location:  ENDOSCOPY    Hernia surgery      Hip replacement surgery Left 2010    Hip replacement surgery Right 03/03/2020    Laparoscopy, surgical prostatectomy, retropubic radical, w/nerve sparing  11/15/2019        Other surgical history      vocal cord polyps removed    Other surgical history  11/15/2019    RALP Dr. Muir       Social  History:   Social History     Socioeconomic History    Marital status:     Number of children: 4   Tobacco Use    Smoking status: Never    Smokeless tobacco: Never   Vaping Use    Vaping status: Never Used   Substance and Sexual Activity    Alcohol use: No    Drug use: Yes     Types: Cannabis   Other Topics Concern    Caffeine Concern Yes     Comment: 2 cups    Exercise No     Comment: WATER AEROBICS WEEKLY    Seat Belt No    Special Diet No    Stress Concern Yes    Weight Concern Yes   Social History Narrative    - lives with wife    4 grown children     Exercise: once per week,  twice per week.  Diet: watches minimally     REVIEW OF SYSTEMS:   GENERAL HEALTH: feels well otherwise  SKIN: denies any unusual skin lesions or rashes  RESPIRATORY: denies shortness of breath with exertion  CARDIOVASCULAR: denies chest pain on exertion  GI: denies abdominal pain and denies heartburn  NEURO: denies headaches    EXAM:   /80   Pulse 76   Resp 14   Ht 5' 8\" (1.727 m)   Wt 267 lb (121.1 kg)   SpO2 98%   BMI 40.60 kg/m²   GENERAL: well developed, well nourished,in no apparent distress  SKIN: no rashes,no suspicious lesions  NECK: supple,no adenopathy,no bruits  LUNGS: clear to auscultation  CARDIO: RRR without murmur  GI: good BS's,no masses, HSM or tenderness  EXTREMITIES: no cyanosis, clubbing or edema  NEURO: Bilateral barefoot skin diabetic exam is normal, visualized feet and the appearance is normal.  Bilateral monofilament/sensation of both feet is normal.  Pulsation pedal pulse exam of both lower legs/feet is normal as well.        ASSESSMENT AND PLAN:   Darrel Salazar is a 64 year old male who presents for a recheck of his diabetes. Diabetic control is stable  HTN - controlled  PAF- rate controlled. Cont current med plan.  Recommendations are: continue present meds, check HgbA1C  fasting lipids and CMP, lose wgt with carbohydrate controlled diet and exercise, refer to Ophthamology, check  feet daily.  The patient indicates understanding of these issues and agrees to the plan.  The patient is asked to return in 6 m.    History/Other:   Review of Systems  Current Outpatient Medications   Medication Sig Dispense Refill    HYDROXYZINE 10 MG Oral Tab TAKE 1 TABLET(10 MG) BY MOUTH DAILY 90 tablet 0    cyclobenzaprine 10 MG Oral Tab Take 1 tablet (10 mg total) by mouth 3 (three) times daily as needed for Muscle spasms. 60 tablet 0    HYDROcodone-acetaminophen (NORCO) 7.5-325 MG Oral Tab Take 1 tablet by mouth every 8 (eight) hours as needed for Pain (back). To last one month 30 tablet 0    atorvastatin 40 MG Oral Tab       diazePAM (VALIUM) 5 MG Oral Tab Take one tablet half hour prior to imaging. Okay to repeat one, 5 mg tablet immediately before procedure if needed. 2 tablet 0    FUROSEMIDE 20 MG Oral Tab TAKE 1 TABLET DAILY 90 tablet 3    dilTIAZem HCl ER Coated Beads 180 MG Oral Capsule SR 24 Hr Take 1 capsule (180 mg total) by mouth daily. 90 capsule 3    carvedilol 25 MG Oral Tab Take 1 tablet (25 mg total) by mouth 2 (two) times daily with meals. 180 tablet 3    Tirzepatide (MOUNJARO) 10 MG/0.5ML Subcutaneous Solution Pen-injector Inject into the skin.      losartan 100 MG Oral Tab Take 1 tablet (100 mg total) by mouth daily. 90 tablet 3    Pravastatin Sodium 80 MG Oral Tab TAKE 1 TABLET NIGHTLY 90 tablet 0    Continuous Blood Gluc Sensor (FREESTYLE HENRRY 3 SENSOR) Does not apply Misc       triamcinolone 0.1 % External Cream Apply topically 2 (two) times daily as needed. 60 g 0    Continuous Blood Gluc  (FREESTYLE HENRRY 14 DAY READER) Does not apply Device Use to monitor blood sugar. 1 each 0    METFORMIN 500 MG Oral Tab TAKE 1 TABLET(500 MG) BY MOUTH TWICE DAILY WITH MEALS 180 tablet 1    FARXIGA 10 MG Oral Tab TAKE 1 TABLET DAILY 90 tablet 0    amLODIPine 5 MG Oral Tab Take 1 tablet (5 mg total) by mouth daily. 90 tablet 1    XARELTO 20 MG Oral Tab TAKE 1 TABLET DAILY WITH FOOD 90 tablet 3     LATANOPROST 0.005 % Ophthalmic Solution INSTILL 1 DROP IN BOTH EYES NIGHTLY (NO ADDITIONAL REFILLS UNTIL EVALUATED BY DOCTOR) 7.5 mL 3    Sertraline HCl 100 MG Oral Tab Take 1 tablet (100 mg total) by mouth daily.      traZODone HCl 100 MG Oral Tab Take 1.5 tablets (150 mg total) by mouth nightly.      Insulin Syringe-Needle U-100 (BD INSULIN SYRINGE ULTRAFINE) 31G X 5/16\" 1 ML Does not apply Misc As directed 90 each 5    aspirin EC 81 MG Oral Tab EC Take 1 tablet (81 mg total) by mouth daily. 90 tablet 1     Allergies:No Known Allergies    Objective:   Physical Exam    Assessment & Plan:   1. Type 2 diabetes mellitus with microalbuminuria, without long-term current use of insulin (HCC)    2. Paroxysmal atrial fibrillation (HCC)    3. Essential hypertension    4. Need for Tdap vaccination    5. Rash        Orders Placed This Encounter   Procedures    Comp Metabolic Panel (14)    Hemoglobin A1C    Microalb/Creat Ratio, Random Urine    Lipid Panel    TdaP (Adacel, Boostrix) [88824]       Meds This Visit:  Requested Prescriptions      No prescriptions requested or ordered in this encounter       Imaging & Referrals:  DERM - INTERNAL  TETANUS, DIPHTHERIA TOXOIDS AND ACELLULAR PERTUSIS VACCINE (TDAP), >7 YEARS, IM USE

## 2024-08-24 LAB
EST. AVERAGE GLUCOSE BLD GHB EST-MCNC: 148 MG/DL (ref 68–126)
HBA1C MFR BLD: 6.8 % (ref ?–5.7)

## 2024-08-27 NOTE — PROGRESS NOTES
Nursing Follow-Up Note    Patient: Darrel Salazar  YOB: 1960  Age: 64 year old  Radiation Oncologist: Dr. Regina Genao  Referring Physician: No ref. provider found  Chief Complaint:   Chief Complaint   Patient presents with    Follow - Up     Date: 8/27/2024    Toxicities: N/A    Vital Signs: /79 (BP Location: Left arm, Patient Position: Sitting, Cuff Size: large)   Pulse 88   Temp 98 °F (36.7 °C) (Tympanic)   Resp 18   Wt 121.5 kg (267 lb 12.8 oz)   SpO2 98%   BMI 40.72 kg/m² ,   Wt Readings from Last 6 Encounters:   08/28/24 121.5 kg (267 lb 12.8 oz)   08/23/24 121.1 kg (267 lb)   07/16/24 121.6 kg (268 lb)   06/03/24 121.6 kg (268 lb)   02/28/24 121.6 kg (268 lb)   02/23/24 121.6 kg (268 lb)       Allergies:  No Known Allergies    Nursing Note: Hx of prostatectomy in Nov. 2019. Rising PSA from 2021 to Jan 2023. Completed pelvic EBRT 8/1/23. Plan for 12-24 months of ADT- started May 2023. Here for follow up today. PSA on 8/27/24 (Dr. Muir)- <0.014. Last ADT in Aug 2023. Continues to have hot flashes. Pt denies diarrhea or constipation. Denies blood in stool. Urinary symptoms improving. Less urinary urgency. Has occasional dribbling. Wears 1 pad per day. Denies dysuria or hematuria. Only complaint today is decreased libido.       PSA:    Lab Results   Component Value Date    PSA 0.20 12/02/2021    PSA 0.244 10/21/2021    PSA 0.085 02/25/2021    PSA 0.07 10/16/2020    PSA 0.05 07/09/2020    PSA 0.04 04/16/2020       PSA Screen:    Lab Results   Component Value Date    PSAS 0.30 01/18/2023    PSAS 5.37 (H) 08/27/2016    PSAS 3.45 08/08/2014

## 2024-08-27 NOTE — PROGRESS NOTES
[facility]  RADIATION ONCOLOGY   FOLLOW UP     Darrel Salazar  5/4/1960    DIAGNOSIS: Prostate cancer      CANCER HISTORY   64-year-old with history of prostatectomy in November 2019 for preoperative PSA 12 cancer.  Path findings were Beba group 3, pathologic T2 disease with 6 negative nodes on the left and 1 positive node of 7 on the right.  It was a 4 mm noah deposit with extracapsular extension.  Surgical margins were negative.  No extraprostatic or seminal vesicle invasion was seen.    PSA rise to 0.24 in October 2021.    Additional rise to 0.3 in January 2023.    PET scan in April 2023 was difficult to interpret in the prostatic fossa due to artifact from his hip prostheses.  He had equivocal noah uptake in the right external iliac region and the left common iliac region.  No evidence of distant osseous disease.      He was advised to undergo a combination of ADT for 12 to 24 months along with salvage pelvic EBRT.    48.6 Gy in 27 fx to pelvic nodes and 63 Gy in 35 fx to prostate bed, completed 8/1/2023  ADT started May 2023, Dr Muir    INTERIM HISTORY   PSA 1/10/2024:  <0.014    AUA is 5 today  BMs are same as baseline  No blood in stool  Urgency is stable, 0-1 pads per day  HFs from ADT    EXAM   267#  NAD  RRR    IMPRESSION/PLAN   Rising PSA to 0.3 after RP in Nov 2019 ([id number])  ADT May 2023  Completed pelvic and prostate fossa RT Aug 2023    Now off ADT and being followed by Dr. Zuniga  F/u here in Aug 2025 (annual)    Regina Genao MD  Radiation Oncology  [email or web address]  [phone]    15minutes were spent with the patient/family, more than 50 percent on counseling/coordination of care (discuss disease status, management of any side effects, future follow up plans)

## 2024-08-28 ENCOUNTER — HOSPITAL ENCOUNTER (OUTPATIENT)
Dept: RADIATION ONCOLOGY | Facility: HOSPITAL | Age: 64
Discharge: HOME | End: 2024-08-28
Attending: RADIOLOGY
Payer: COMMERCIAL

## 2024-08-28 VITALS
DIASTOLIC BLOOD PRESSURE: 79 MMHG | SYSTOLIC BLOOD PRESSURE: 118 MMHG | WEIGHT: 267.81 LBS | OXYGEN SATURATION: 98 % | RESPIRATION RATE: 18 BRPM | HEART RATE: 88 BPM | BODY MASS INDEX: 41 KG/M2 | TEMPERATURE: 98 F

## 2024-08-28 DIAGNOSIS — C61 PROSTATE CANCER (HCC): Primary | ICD-10-CM

## 2024-08-28 PROCEDURE — 99213 OFFICE O/P EST LOW 20 MIN: CPT

## 2024-08-28 NOTE — PATIENT INSTRUCTIONS
- WE WILL CALL TO SCHEDULE YOUR FOLLOW-UP APPOINTMENT WITH DR. HEDRICK IN AUG 2025      - CALL [phone] IF YOU HAVE ANY QUESTIONS/CONCERNS REGARDING RADIATION THERAPY

## 2024-08-30 ENCOUNTER — TELEPHONE (OUTPATIENT)
Dept: INTERNAL MEDICINE CLINIC | Facility: CLINIC | Age: 64
End: 2024-08-30

## 2024-08-30 NOTE — TELEPHONE ENCOUNTER
Fax received from [facility]: Pt prescribed Amlodipine 5 mg and Diltiazem  mg.    Per Dr. Astudillo: Discontinue Amlodipine 5 mg. Notify patient to monitor BP and heart rate. Fax signed by Dr. Astudillo and faxed back to [facility]. Detailed [employer] message sent to patient and Med list updated accordingly.

## 2024-09-06 DIAGNOSIS — M79.18 MYOFASCIAL PAIN SYNDROME OF LUMBAR SPINE: ICD-10-CM

## 2024-09-06 DIAGNOSIS — M54.50 ACUTE LEFT-SIDED LOW BACK PAIN WITHOUT SCIATICA: ICD-10-CM

## 2024-09-06 DIAGNOSIS — G89.29 OTHER CHRONIC PAIN: ICD-10-CM

## 2024-09-06 DIAGNOSIS — M79.18 MYOFASCIAL PAIN ON LEFT SIDE: ICD-10-CM

## 2024-09-09 RX ORDER — CYCLOBENZAPRINE HCL 10 MG
10 TABLET ORAL 3 TIMES DAILY PRN
Qty: 60 TABLET | Refills: 0 | Status: SHIPPED | OUTPATIENT
Start: 2024-09-09

## 2024-09-09 RX ORDER — HYDROCODONE BITARTRATE AND ACETAMINOPHEN 7.5; 325 MG/1; MG/1
1 TABLET ORAL EVERY 8 HOURS PRN
Qty: 30 TABLET | Refills: 0 | Status: SHIPPED | OUTPATIENT
Start: 2024-09-09

## 2024-09-09 NOTE — TELEPHONE ENCOUNTER
Medication: cyclobenzaprine 10 MG     Date of last refill: 07/31/24        Medication: HYDROcodone-acetaminophen (NORCO) 7.5-325 MG     Date of last refill: 08/08/24  [place]24    Last office visit: 07/16/24  Due back to clinic per last office note:  na  Date next office visit scheduled:  none    Last URINE Screen: 03/17/24  Screen Results:  as expected      Narcotic Contract EXPIRATION date: 03/12/25    Last OV note recommendation:   Plan:      > Will consult with physical therapist regarding getting PT course to improve atrophy and function of left ileal psoas and iliac us muscle group  No orders of the defined types were placed in this encounter.          
No

## 2024-10-07 ENCOUNTER — PATIENT MESSAGE (OUTPATIENT)
Dept: PAIN CLINIC | Facility: CLINIC | Age: 64
End: 2024-10-07

## 2024-10-07 DIAGNOSIS — M79.18 MYOFASCIAL PAIN ON LEFT SIDE: ICD-10-CM

## 2024-10-07 DIAGNOSIS — G89.29 OTHER CHRONIC PAIN: ICD-10-CM

## 2024-10-07 DIAGNOSIS — M54.50 ACUTE LEFT-SIDED LOW BACK PAIN WITHOUT SCIATICA: ICD-10-CM

## 2024-10-07 DIAGNOSIS — M79.18 MYOFASCIAL PAIN SYNDROME OF LUMBAR SPINE: ICD-10-CM

## 2024-10-08 RX ORDER — CYCLOBENZAPRINE HCL 10 MG
10 TABLET ORAL 3 TIMES DAILY PRN
Qty: 60 TABLET | Refills: 0 | Status: SHIPPED | OUTPATIENT
Start: 2024-10-08

## 2024-10-08 RX ORDER — HYDROCODONE BITARTRATE AND ACETAMINOPHEN 7.5; 325 MG/1; MG/1
1 TABLET ORAL EVERY 8 HOURS PRN
Qty: 30 TABLET | Refills: 0 | Status: SHIPPED | OUTPATIENT
Start: 2024-10-08

## 2024-10-08 NOTE — TELEPHONE ENCOUNTER
From: Darrel Salazar  To: Sarah Wells  Sent: 10/7/2024 7:13 PM CDT  Subject: Refill    cyclobenzaprine 10 MG Oral Tab [Sarah Wells]      HYDROcodone-acetaminophen (NORCO) 7.5-325 MG Oral Tab [Sarah Wells]

## 2024-10-08 NOTE — TELEPHONE ENCOUNTER
Medication: cyclobenzaprine 10 MG     Date of last refill: 09/09/24        Medication: HYDROcodone-acetaminophen (NORCO) 7.5-325 MG     Date of last refill: 09/09/24  [place]24    Last office visit: 07/16/24  Due back to clinic per last office note:  na  Date next office visit scheduled:  none    Last URINE Screen: 03/12/24  Screen Results:  as expected      Narcotic Contract EXPIRATION date: 03/12/25    Last OV note recommendation:   Plan:      > Will consult with physical therapist regarding getting PT course to improve atrophy and function of left ileal psoas and iliac us muscle group  No orders of the defined types were placed in this encounter.

## 2024-10-30 RX ORDER — HYDROXYZINE HYDROCHLORIDE 10 MG/1
10 TABLET, FILM COATED ORAL DAILY
Qty: 90 TABLET | Refills: 0 | Status: SHIPPED | OUTPATIENT
Start: 2024-10-30

## 2024-10-30 NOTE — TELEPHONE ENCOUNTER
Last time medication was refilled 8/1/24  Last office visit  8/23/24  Next office visit due/scheduled   Future Appointments   Date Time Provider Department Center   12/18/2024  9:15 AM Shari Wilson MD [facility] [place]   2/21/2025  1:00 PM Ryan Astudillo MD [facility] [address]     Medication not on protocol.

## 2024-11-05 DIAGNOSIS — M79.18 MYOFASCIAL PAIN SYNDROME OF LUMBAR SPINE: ICD-10-CM

## 2024-11-05 DIAGNOSIS — M79.18 MYOFASCIAL PAIN ON LEFT SIDE: ICD-10-CM

## 2024-11-05 DIAGNOSIS — G89.29 OTHER CHRONIC PAIN: ICD-10-CM

## 2024-11-05 DIAGNOSIS — M54.50 ACUTE LEFT-SIDED LOW BACK PAIN WITHOUT SCIATICA: ICD-10-CM

## 2024-11-05 RX ORDER — HYDROCODONE BITARTRATE AND ACETAMINOPHEN 7.5; 325 MG/1; MG/1
1 TABLET ORAL EVERY 8 HOURS PRN
Qty: 30 TABLET | Refills: 0 | Status: SHIPPED | OUTPATIENT
Start: 2024-11-06

## 2024-11-05 RX ORDER — CYCLOBENZAPRINE HCL 10 MG
10 TABLET ORAL 3 TIMES DAILY PRN
Qty: 60 TABLET | Refills: 0 | Status: SHIPPED | OUTPATIENT
Start: 2024-11-05

## 2024-11-05 NOTE — TELEPHONE ENCOUNTER
This advisory popup indicated that patient is receiving benzodiazepine along with the hydrocodone from our office and Narcan was recommended therefore order was placed

## 2024-11-05 NOTE — TELEPHONE ENCOUNTER
Medication: cyclobenzaprine 10 MG Oral Tab     Date of last refill: 10/8/24      Medication: HYDROcodone-acetaminophen (NORCO) 7.5-325 MG Oral Tab     Date of last refill: 10/8/24  [place]24    Last office visit: 7/16/24  Due back to clinic per last office note:  n/a  Date next office visit scheduled:  none    Last URINE Screen: 3/12/24  Screen Results:    Sarah Wells, APRN  3/19/2024  2:28 PM CDT Back to Top      As expected         Narcotic Contract EXPIRATION date: 3/12/25    Last OV note recommendation: Impression:   Patient is a 64-year-old male with history of sickle cell trait, A-fib, type 2 diabetes, morbid obesity, dyslipidemia, depression, sedative hypnotic anxiolytic dependence, hypertension, RUBEN, history of prostate cancer, obstructive sleep apnea, history of right total hip replacement as well as left total hip replacement.  The left hip was done in 2010 in [facility].  Patient has been complaining of left-sided low back pain radiating around towards the left hip with weakness in the left iliopsoas muscle.  Patient was asked to obtain a lumbar MRI and is here today for review.  Of significance finding of left psoas and iliac at this muscle atrophy with fatty replacement was noted on MRI.  Discussed with patient his symptoms are consistent with this muscle atrophy and I would like to consult with our physical therapy team to see if returning to therapy can help reduce some pain and regain some muscle strength on the side.  Patient is in agreement with this plan.  After I discussed with the physical therapist I will contact patient and notify him of the response.  In the meantime he will continue on his current regimen.        Plan:      > Will consult with physical therapist regarding getting PT course to improve atrophy and function of left ileal psoas and iliac us muscle group

## 2024-11-21 DIAGNOSIS — E11.29 TYPE 2 DIABETES MELLITUS WITH MICROALBUMINURIA, WITHOUT LONG-TERM CURRENT USE OF INSULIN (HCC): ICD-10-CM

## 2024-11-21 DIAGNOSIS — R80.9 TYPE 2 DIABETES MELLITUS WITH MICROALBUMINURIA, WITHOUT LONG-TERM CURRENT USE OF INSULIN (HCC): ICD-10-CM

## 2024-11-21 RX ORDER — DAPAGLIFLOZIN 10 MG/1
10 TABLET, FILM COATED ORAL DAILY
Qty: 90 TABLET | Refills: 0 | Status: SHIPPED | OUTPATIENT
Start: 2024-11-21

## 2024-11-21 NOTE — TELEPHONE ENCOUNTER
Last time medication was refilled 8/23/24  Last office visit  8/23/24  Next office visit due/scheduled   Future Appointments   Date Time Provider Department Center   12/18/2024  9:15 AM Shari Wilson MD [facility] [place]   2/21/2025  1:00 PM Ryan Astudillo MD [facility] [address]       Passed protocol, Medication sent.

## 2024-12-13 DIAGNOSIS — G89.29 OTHER CHRONIC PAIN: ICD-10-CM

## 2024-12-13 DIAGNOSIS — M79.18 MYOFASCIAL PAIN ON LEFT SIDE: ICD-10-CM

## 2024-12-13 DIAGNOSIS — M54.50 ACUTE LEFT-SIDED LOW BACK PAIN WITHOUT SCIATICA: ICD-10-CM

## 2024-12-13 DIAGNOSIS — M79.18 MYOFASCIAL PAIN SYNDROME OF LUMBAR SPINE: ICD-10-CM

## 2024-12-13 RX ORDER — HYDROCODONE BITARTRATE AND ACETAMINOPHEN 7.5; 325 MG/1; MG/1
1 TABLET ORAL EVERY 8 HOURS PRN
Qty: 30 TABLET | Refills: 0 | Status: SHIPPED | OUTPATIENT
Start: 2024-12-13

## 2024-12-13 RX ORDER — CYCLOBENZAPRINE HCL 10 MG
10 TABLET ORAL 3 TIMES DAILY PRN
Qty: 60 TABLET | Refills: 0 | Status: SHIPPED | OUTPATIENT
Start: 2024-12-13

## 2024-12-13 NOTE — TELEPHONE ENCOUNTER
Medication:   cyclobenzaprine 10 MG Oral Tab     Date of last refill: 11/5/24    Medication:   HYDROcodone-acetaminophen (NORCO) 7.5-325 MG Oral Tab     Date of last refill: 11/6/24  [place]24    Last office visit: 7/16/24  Due back to clinic per last office note:  NA  Date next office visit scheduled:  None    Last URINE Screen: 3/12/24  Screen Results:    As expected     Narcotic Contract EXPIRATION date: 3/12/25    Last OV note recommendation: Impression:   Patient is a 64-year-old male with history of sickle cell trait, A-fib, type 2 diabetes, morbid obesity, dyslipidemia, depression, sedative hypnotic anxiolytic dependence, hypertension, RUBEN, history of prostate cancer, obstructive sleep apnea, history of right total hip replacement as well as left total hip replacement.  The left hip was done in 2010 in [facility].  Patient has been complaining of left-sided low back pain radiating around towards the left hip with weakness in the left iliopsoas muscle.  Patient was asked to obtain a lumbar MRI and is here today for review.  Of significance finding of left psoas and iliac at this muscle atrophy with fatty replacement was noted on MRI.  Discussed with patient his symptoms are consistent with this muscle atrophy and I would like to consult with our physical therapy team to see if returning to therapy can help reduce some pain and regain some muscle strength on the side.  Patient is in agreement with this plan.  After I discussed with the physical therapist I will contact patient and notify him of the response.  In the meantime he will continue on his current regimen.        Plan:      > Will consult with physical therapist regarding getting PT course to improve atrophy and function of left ileal psoas and iliac us muscle group  No orders of the defined types were placed in this encounter.        Meds & Refills for this Visit:  Requested Prescriptions        No  prescriptions requested or ordered in this encounter         Imaging & Consults:  None     The patient indicates understanding of these issues and agrees to the plan.        ID#[id number]

## 2024-12-13 NOTE — TELEPHONE ENCOUNTER
It has been 5 months since he was seen.  Have him schedule with treating provider for any further refills, but I did send this last one.

## 2024-12-16 ENCOUNTER — VIRTUAL PHONE E/M (OUTPATIENT)
Dept: PAIN CLINIC | Facility: CLINIC | Age: 64
End: 2024-12-16
Payer: COMMERCIAL

## 2024-12-16 ENCOUNTER — TELEPHONE (OUTPATIENT)
Dept: PAIN CLINIC | Facility: CLINIC | Age: 64
End: 2024-12-16

## 2024-12-16 DIAGNOSIS — M54.16 LUMBAR RADICULOPATHY: Primary | ICD-10-CM

## 2024-12-16 NOTE — PROGRESS NOTES
HPI:   Darrel Salazar presents via phone visit in f/u to discuss his back pain   Last office visit was 7/16/24  He has been maintained on hydrocodone 7.5/325 sparingly: receives #30 per month and flexeril 10mg TID prn #60   No NSAIDs due to chronic xarelto therapy.   PT done  Continues HEP      Patient reports that his back pain has flared up over the last few months.  It was slowly progressing and now he is having radicular symptoms right lower extremity greater than the left lower extremity.  Patient states that sitting too long standing walking aggravate his symptoms.  He states he has been living with a pain score of approximately 5 out of 10 but with activity it goes up to a 10 out of 10.  He does continue to work from home and is able to modify his workstation when his pain is flared.    Patient denies any fever, chills, loss of bowel or bladder control, falls, injuries or events resulting in the return of his symptoms    The pain is described as severe aching, stabbing, grabbing, shooting, soreness that is constant .  The patient’s activity level has increased since last visit.  The pain is worst unrelated to time of day.    Changes in condition/history since last visit:   Prior procedures:  Bilateral L4-5 L5-S1 radiofrequency on December 5, 2023: 100% relief of pain  #2 bilateral lumbar medial branch blocks October 31, 2023 95% improvement during initial diagnostic phase  #1 bilateral lumbar medial branch blocks done April 2023 88% improvement during diagnostic phase  Bilateral sacroiliac joint injection September 14, 2023: Reported feeling fantastic after SI joint injections  Last procedure: Last lumbar epidural steroid injection   date: February 29, 2024     Percentage of relief experienced from the procedure: 65% relief     Duration of the relief: Sustained at follow-up visit conducted March 12, 2024       The following activities will increase the patient’s pain: sitting, any prolonged  activity    The following activities decrease the patient’s pain: changing position    Functional Assessment: Patient reports that they are able to complete all of their ADL's such as eating, bathing, using the toilet, dressing and getting up from a bed or a chair independently.  A comprehensive 10 point review of systems was completed.  Pertinent positives and negatives noted in the the HPI.     Past Medical History:   Diagnosis Date    Abdominal hernia     Abdominal pain     Anxiety     Arrhythmia     A-FIB DX 2 YRS AGO    Arthritis     Back pain     Back problem     Low back pain    Cancer (HCC) 11/2019    prostate    Deep vein thrombosis (HCC)     left    Depression     Diabetes mellitus (HCC)     Esophageal reflux     Fatigue     Glaucoma     Heartburn     High blood pressure     High cholesterol     History of depression     History of hip replacement     March 2020, 11/2010    Insomnia     Night sweats     OA (osteoarthritis)     Obesity     Osteoarthritis     Pain in joints     Sleep apnea     repeat SAM test neg after weight loss    Stress     Type II or unspecified type diabetes mellitus without mention of complication, not stated as uncontrolled     Visual impairment     glasses    Wears glasses         Past Surgical History:   Procedure Laterality Date    Abdomen surgery proc unlisted  01/26/2021    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    Anesth,perineal remv of prostate  2019    Colonoscopy      Colonoscopy N/A 12/8/2022    Procedure: COLONOSCOPY with forcep polypectomy;  Surgeon: Godwin Eller MD;  Location:  ENDOSCOPY    Hernia surgery      Hip replacement surgery Left 2010    Hip replacement surgery Right 03/03/2020    Laparoscopy, surgical prostatectomy, retropubic radical, w/nerve sparing  11/15/2019        Other surgical history      vocal cord polyps removed    Other surgical history  11/15/2019    RALP Dr. Muir        Current Medications:  Current Outpatient Medications   Medication Sig Dispense Refill    cyclobenzaprine 10 MG Oral Tab Take 1 tablet (10 mg total) by mouth 3 (three) times daily as needed for Muscle spasms. 60 tablet 0    HYDROcodone-acetaminophen (NORCO) 7.5-325 MG Oral Tab Take 1 tablet by mouth every 8 (eight) hours as needed for Pain (back). To last one month 30 tablet 0    dapagliflozin 10 MG Oral Tab TAKE 1 TABLET DAILY 90 tablet 0    Naloxone HCl 4 MG/0.1ML Nasal Liquid 4 mg by Intranasal route as needed (May repeat as needed in other nostril if symptoms persist). If patient remains unresponsive, repeat dose in other nostril 2-5 minutes after first dose. 1 kit 0    HYDROXYZINE 10 MG Oral Tab TAKE 1 TABLET(10 MG) BY MOUTH DAILY 90 tablet 0    metFORMIN 500 MG Oral Tab Take 1 tablet (500 mg total) by mouth 2 (two) times daily with meals. 180 tablet 1    furosemide 20 MG Oral Tab Take 1 tablet (20 mg total) by mouth daily. 90 tablet 3    carvedilol 25 MG Oral Tab Take 1 tablet (25 mg total) by mouth 2 (two) times daily with meals. 180 tablet 3    losartan 100 MG Oral Tab Take 1 tablet (100 mg total) by mouth daily. 90 tablet 3    atorvastatin 40 MG Oral Tab Take 1 tablet (40 mg total) by mouth daily. 90 tablet 3    diazePAM (VALIUM) 5 MG Oral Tab Take one tablet half hour prior to imaging. Okay to repeat one, 5 mg tablet immediately before procedure if needed. (Patient not taking: Reported on 8/28/2024) 2 tablet 0    dilTIAZem HCl ER Coated Beads 180 MG Oral Capsule SR 24 Hr Take 1 capsule (180 mg total) by mouth daily. 90 capsule 3    Tirzepatide (MOUNJARO) 10 MG/0.5ML Subcutaneous Solution Pen-injector Inject into the skin.      Continuous Blood Gluc Sensor (FREESTYLE HENRRY 3 SENSOR) Does not apply Misc       triamcinolone 0.1 % External Cream Apply topically 2 (two) times daily as needed. 60 g 0    Continuous Blood Gluc  (FREESTYLE HENRRY 14 DAY READER) Does not apply Device Use to monitor blood  sugar. 1 each 0    XARELTO 20 MG Oral Tab TAKE 1 TABLET DAILY WITH FOOD 90 tablet 3    LATANOPROST 0.005 % Ophthalmic Solution INSTILL 1 DROP IN BOTH EYES NIGHTLY (NO ADDITIONAL REFILLS UNTIL EVALUATED BY DOCTOR) 7.5 mL 3    Sertraline HCl 100 MG Oral Tab Take 1 tablet (100 mg total) by mouth daily.      traZODone HCl 100 MG Oral Tab Take 1.5 tablets (150 mg total) by mouth nightly.      Insulin Syringe-Needle U-100 (BD INSULIN SYRINGE ULTRAFINE) 31G X 5/16\" 1 ML Does not apply Misc As directed 90 each 5      Patient requires assistance with: No assistance required    Have you recently had any feelings of harming yourself or others? The patient's response was no.    Physical Exam:   There were no vitals taken for this visit. Phone visit  VAS Pain Score: 5-10 /10  No PE phone visit.     Radiology/Lab Test Reviewed:   PROCEDURE:  MRI SPINE LUMBAR (W+WO) (CPT=72158)      COMPARISON:  [facility]  , MRI SPINE LUMBAR (CPT=72148), 4/28/2023, 8:27 AM.  [facility], XR, XR LUMBAR SPINE COMPLETE W/FLEX + EXT (CPT=72114), 6/04/2024, 5:15 PM.     INDICATIONS:  M51.36 Degenerative lumbar disc M54.16 Lumbar radiculopathy     TECHNIQUE:  Multiplanar T1 and T2 weighted images including fat suppression sequences.  Images acquired in sagittal and axial planes. Intravenous gadolinium was administered followed by multiplanar post-infusion T1 weighted sequences.       PATIENT STATED HISTORY:(As transcribed by Technologist)  Patient has low back pain with left hip pain.      CONTRAST USED:  20 mL of Dotarem     FINDINGS:  Marked motion degradation.     There is normal lumbar lordosis.  There is a stable trace retrolisthesis of L4 on L5.  The vertebral body heights are maintained.  There is minimal disc height loss at L3-L4, L4-5, and L5-S1.  There is scattered disc desiccation.  Vacuum disc phenomenon  is identified at L3-L4 and L4-5.  There is mild T2 hyperintense signal within the L5-S1 disc on the right which is  favored to be degenerative in etiology.  This T2 hyperintense signal was also present on 9/3/2019.  The conus and cauda equina nerve roots  are unremarkable in caliber and signal.     No paraspinal soft tissue mass lesions.  There is however extensive atrophy and fatty replacement involving the left psoas and left iliacus musculature, which can be retrospectively seen and is stable.     T12-L1:  Mild facet hypertrophy.  Minimal disc bulge.  No spinal canal or foraminal narrowing.     L1-L2:  Mild disc bulge asymmetrically extending into the left neural foramen.  Mild facet hypertrophy.  No spinal canal stenosis.  Moderate left and mild right foraminal narrowing.     L2-L3:  Mild facet hypertrophy.  Trace disc bulge.  No spinal canal stenosis.  Moderate left and mild right foraminal narrowing.     L3-L4:  Mild to moderate facet hypertrophy.  Mild disc bulge.  No spinal canal stenosis.  Moderate to severe bilateral foraminal narrowing.     L4-5:  Mild to moderate diffuse disc bulge.  Mild to moderate facet hypertrophy.  No spinal canal stenosis.  Moderate left and severe right foraminal narrowing.     L5-S1:  Mild facet hypertrophy.  No spinal canal stenosis.  No significant foraminal narrowing.                  Impression  CONCLUSION:    1. There is no evidence of an acute process.  2. There is a stable degree of multilevel disc disease and facet arthropathy without interval progression.  There is again evidence of foraminal narrowing most notably at L3-4 and L4-5 as previously seen.  3. Severe atrophy and fatty replacement of the left psoas and left iliacus musculature which can be retrospectively seen and is stable.  4. Overall, there are no significant interval changes from the previous study.        LOCATION:  [facility]            Dictated by (CST): Samantha Albrecht DO on 7/12/2024 at 4:30 PM      Finalized by (CST): Samantha Albrecht DO on 7/12/2024 at 4:36 PM        Lab Results   Component Value Date    WBC 6.7  01/18/2023    WBC 6.9 03/04/2022    WBC 7.2 10/19/2021   No results found for: \"HEMOGLOBIN\"  Lab Results   Component Value Date    .0 01/18/2023    .0 03/04/2022    .0 10/19/2021         Patient Education: Patient was advised to continue normal activities as tolerated and was advised against any form of bedrest, since recent guidelines promote and encourage physical activity for improvment of functionality and overall pain.  (Family Practice Vol. 16, No. 1, 62-44Â© [employer] 1999 ), Patient was shown interactive content, shown and explained procedure on spinal model..  Patient educated and verbalized understanding.  Medical Decision Making:   Diagnosis:    Encounter Diagnosis   Name Primary?    Lumbar radiculopathy Yes     Impression:   Patient is a 64-year-old male with history of lumbar disc degeneration and foraminal narrowing at multiple levels.  He is presenting today to pain clinic reporting he is having a progressive worsening of his back pain and lower extremity symptoms right leg greater than the left leg.  He would like repeat injection therapy.  He has not had lumbar epidural steroid injections since February 29, 2024 which resulted in greater than 75% relief of symptoms.    Recommend repeat epidural steroid injection once patient has authorization to be off his Xarelto from Dr. Chairez.  Plan:   > Recommend lumbar epidural steroid injection once Xarelto can be held  > Follow-up after injection therapy to evaluate response  Orders Placed This Encounter   Procedures    [facility] PAIN NAVIGATOR       Meds & Refills for this Visit:  Requested Prescriptions      No prescriptions requested or ordered in this encounter       Imaging & Consults:  None    The patient indicates understanding of these issues and agrees to the plan.      ID#[id number]

## 2024-12-16 NOTE — TELEPHONE ENCOUNTER
Medical Clearance faxed to  Office at Fax #: [phone];confirmation r'cvd     24      RE: Darrel Salazar    : 1960    Dear Dr. Chairez,    Your patient is being scheduled for a pain management procedure at [facility].    Procedure:  Lumbar Epidural Steroid Injection.   Date of Procedure: TBD -pending medical clearance.   Physician: - Anesthesiologist     Your patient is currently taking Xarelto.  usually recommends the medication be held for 3 days prior to injection.     Please verify patient is cleared to proceed with pain management procedures

## 2024-12-16 NOTE — TELEPHONE ENCOUNTER
Order Questions    Question Answer Comment   Anesthesia Type Local    Provider Ciro    Location [facility] Procedure Lab    Procedure Lumbar RICHARD    CPT (Hit enter after each entry) NJX INTERLAMINAR LMBR/SAC    Medical clearance requested (will send to Pain Navigator) Yes xarelto   Patient has Medicare coverage? No

## 2024-12-18 ENCOUNTER — TELEPHONE (OUTPATIENT)
Dept: PAIN CLINIC | Facility: CLINIC | Age: 64
End: 2024-12-18

## 2024-12-18 ENCOUNTER — HOSPITAL ENCOUNTER (OUTPATIENT)
Dept: GENERAL RADIOLOGY | Age: 64
Discharge: HOME | End: 2024-12-18
Attending: INTERNAL MEDICINE
Payer: COMMERCIAL

## 2024-12-18 ENCOUNTER — OFFICE VISIT (OUTPATIENT)
Dept: RHEUMATOLOGY | Facility: CLINIC | Age: 64
End: 2024-12-18
Payer: COMMERCIAL

## 2024-12-18 VITALS
RESPIRATION RATE: 18 BRPM | BODY MASS INDEX: 36.4 KG/M2 | WEIGHT: 260 LBS | HEART RATE: 90 BPM | SYSTOLIC BLOOD PRESSURE: 136 MMHG | OXYGEN SATURATION: 98 % | TEMPERATURE: 97 F | HEIGHT: 71 IN | DIASTOLIC BLOOD PRESSURE: 82 MMHG

## 2024-12-18 DIAGNOSIS — M25.541 ARTHRALGIA OF BOTH HANDS: ICD-10-CM

## 2024-12-18 DIAGNOSIS — Z11.59 NEED FOR HEPATITIS C SCREENING TEST: ICD-10-CM

## 2024-12-18 DIAGNOSIS — M25.572 ARTHRALGIA OF BOTH FEET: ICD-10-CM

## 2024-12-18 DIAGNOSIS — M1A.09X1 IDIOPATHIC CHRONIC GOUT OF MULTIPLE SITES WITH TOPHUS: Primary | ICD-10-CM

## 2024-12-18 DIAGNOSIS — Z51.81 THERAPEUTIC DRUG MONITORING: ICD-10-CM

## 2024-12-18 DIAGNOSIS — Z11.59 NEED FOR HEPATITIS B SCREENING TEST: ICD-10-CM

## 2024-12-18 DIAGNOSIS — M25.571 ARTHRALGIA OF BOTH FEET: ICD-10-CM

## 2024-12-18 DIAGNOSIS — Z11.1 SCREENING FOR TUBERCULOSIS: ICD-10-CM

## 2024-12-18 DIAGNOSIS — M1A.09X1 IDIOPATHIC CHRONIC GOUT OF MULTIPLE SITES WITH TOPHUS: ICD-10-CM

## 2024-12-18 DIAGNOSIS — M25.542 ARTHRALGIA OF BOTH HANDS: ICD-10-CM

## 2024-12-18 DIAGNOSIS — Z13.79 AFRICAN ANCESTRY REQUIRING POPULATION-SPECIFIC GENETIC SCREENING: ICD-10-CM

## 2024-12-18 DIAGNOSIS — E61.1 LOW IRON: ICD-10-CM

## 2024-12-18 DIAGNOSIS — L40.9 PSORIASIS: ICD-10-CM

## 2024-12-18 PROCEDURE — 3008F BODY MASS INDEX DOCD: CPT | Performed by: INTERNAL MEDICINE

## 2024-12-18 PROCEDURE — 3079F DIAST BP 80-89 MM HG: CPT | Performed by: INTERNAL MEDICINE

## 2024-12-18 PROCEDURE — 73130 X-RAY EXAM OF HAND: CPT | Performed by: INTERNAL MEDICINE

## 2024-12-18 PROCEDURE — 73630 X-RAY EXAM OF FOOT: CPT | Performed by: INTERNAL MEDICINE

## 2024-12-18 PROCEDURE — 99204 OFFICE O/P NEW MOD 45 MIN: CPT | Performed by: INTERNAL MEDICINE

## 2024-12-18 PROCEDURE — 3075F SYST BP GE 130 - 139MM HG: CPT | Performed by: INTERNAL MEDICINE

## 2024-12-18 PROCEDURE — G2211 COMPLEX E/M VISIT ADD ON: HCPCS | Performed by: INTERNAL MEDICINE

## 2024-12-18 NOTE — TELEPHONE ENCOUNTER
Yesneia Wood Medical Assistant Signed 12:48 PM     Copy     Prior authorization request completed for: MALIA  Authorization # no auth needed   Pre-D: no  Exclusions/Restrictions: no  Covered Benefit: yes   Authorization dates: n/a  CPT codes approved: 77476  Number of visits/dates of service approved: 1  Physician: tosin  Location: [facility]   Call Ref#: [id number]  Representative Name: [facility]  Insurance Carrier: [phone]     Patient can be scheduled. Routed to Navigator.

## 2024-12-18 NOTE — TELEPHONE ENCOUNTER
Prior authorization request completed for: LESSUJATA  Authorization # no auth needed   Pre-D: no  Exclusions/Restrictions: no  Covered Benefit: yes   Authorization dates: n/a  CPT codes approved: 35736  Number of visits/dates of service approved: 1  Physician: tosin  Location: [facility]   Call Ref#: [id number]  Representative Name: jeromy  Insurance Carrier: [phone]    Patient can be scheduled. Routed to Navigator.    vancomycin  2,500 mg Intravenous Once    [START ON 2/13/2020] vancomycin  1,250 mg Intravenous Q12H    ticagrelor  90 mg Oral BID    sertraline  50 mg Oral Daily    aspirin  81 mg Oral Daily    lisinopril  2.5 mg Oral Daily    allopurinol  100 mg Oral Daily    pantoprazole  40 mg Oral QAM AC    levothyroxine  125 mcg Oral Daily    sodium chloride flush  10 mL Intravenous 2 times per day    enoxaparin  40 mg Subcutaneous Daily    ipratropium-albuterol  1 ampule Inhalation Q4H WA    cefepime  2 g Intravenous Q8H    thiamine  100 mg Oral Daily    nicotine  1 patch Transdermal Daily    folic acid  1 mg Oral Daily     ketorolac, hydrOXYzine, sodium chloride flush, magnesium hydroxide, albuterol, acetaminophen, ondansetron **OR** ondansetron, potassium chloride **OR** potassium alternative oral replacement **OR** potassium chloride, HYDROcodone 5 mg - acetaminophen **OR** HYDROcodone 5 mg - acetaminophen  IV Drips/Infusions   sodium chloride 75 mL/hr at 02/12/20 1350     Home Medications  Medications Prior to Admission: sertraline (ZOLOFT) 50 MG tablet, Take 0.5 tablets by mouth daily for 7 days, THEN 1 tablet daily.   hydrOXYzine (ATARAX) 25 MG tablet, Take 1 tablet by mouth every 8 hours as needed for Anxiety  aspirin 81 MG chewable tablet, Take 1 tablet by mouth daily  atorvastatin (LIPITOR) 40 MG tablet, Take 1 tablet by mouth nightly  lisinopril (PRINIVIL;ZESTRIL) 2.5 MG tablet, Take 1 tablet by mouth daily  allopurinol (ZYLOPRIM) 100 MG tablet, Take 1 tablet by mouth daily  omeprazole (PRILOSEC) 40 MG delayed release capsule, take 1 capsule by mouth once daily  levothyroxine (SYNTHROID) 125 MCG tablet, take 1 tablet by mouth once daily  ibuprofen (ADVIL;MOTRIN) 800 MG tablet, Take 1 tablet by mouth 2 times daily as needed for Pain  ticagrelor (BRILINTA) 90 MG TABS tablet, Take 1 tablet by mouth 2 times daily  diclofenac sodium 1 % GEL, Apply 2 g topically 4 times daily    Allergies    Patient has no Pupil reactive to light  Neck - Supple, trachea midline and straight  Lungs - positive findings: Left basilar rales  Cardiovascular - Heart sounds are normal.  Regular rhythm normal rate without murmur, gallop or rub. Abdomen - Soft, nontender, nondistended, no masses or organomegaly  Neurologic - CN II-XII are grossly intact. There are no focal motor or sensory deficits  Skin - No bruising or bleeding  Extremities - No cyanosis, clubbing. Positive edema left hand and forearm.     Labs  - Old records and notes have been reviewed in [facility]   CBC     Lab Results   Component Value Date    WBC 10.4 02/12/2020    RBC 3.81 02/12/2020    HGB 11.8 02/12/2020    HCT 35.3 02/12/2020     02/12/2020    MCV 92.7 02/12/2020    MCH 31.0 02/12/2020    MCHC 33.4 02/12/2020    RDW 14.6 02/12/2020    LYMPHOPCT 9 02/11/2020    MONOPCT 15 02/11/2020    BASOPCT 0 02/11/2020    MONOSABS 1.89 02/11/2020    LYMPHSABS 1.13 02/11/2020    EOSABS 0.25 02/11/2020    BASOSABS 0.00 02/11/2020    DIFFTYPE NOT REPORTED 02/11/2020     BMP   Lab Results   Component Value Date     02/12/2020    K 3.5 02/12/2020     02/12/2020    CO2 23 02/12/2020    BUN 12 02/12/2020    CREATININE 0.67 02/12/2020    GLUCOSE 114 02/12/2020    CALCIUM 8.1 02/12/2020    MG 1.4 02/12/2020     LFTS  Lab Results   Component Value Date    ALKPHOS 79 02/11/2020     02/11/2020     02/11/2020    PROT 6.1 02/11/2020    BILITOT 0.53 02/11/2020    BILIDIR 0.11 11/09/2019    IBILI 0.23 11/09/2019    LABALBU 2.6 02/11/2020     ABG   Lab Results   Component Value Date    VDB9GQS 30 02/07/2020     PTT  Lab Results   Component Value Date    APTT 71.0 (H) 11/09/2019     INR   Lab Results   Component Value Date    INR 1.0 02/07/2020    INR 0.9 11/09/2019    PROTIME 10.0 02/07/2020    PROTIME 9.6 (L) 11/09/2019       Radiology    CXR       CT Scans    (See actual reports for details)    \"Thank you for asking us to see this patient\"    Case discussed with nurse and patient. Questions and concerns addressed. Electronically signed by     JESUS Arias  Pulmonary Critical Care and Sleep Medicine,  Patient seen under the supervision of Mary Kate Yadav MD, [facility]     Patient seen and evaluated by me. 82-year-old  male admitted for pneumonia and possible lung abscess. He was a patient at the hospital for rhabdomyolysis/MARIO and frostbite of his left arm and hand and he left [address] yesterday due to his anxiety. He returned later that day with severe arm pain as well as left chest wall pain. He continues to have left-sided chest wall pain. He has a productive cough with blood-tinged sputum. His shortness of breath is mild in severity. Chest x-ray and CT reviewed. Plan is to continue IV cefepime and Vanco.  Continue bronchodilators. Start Symbicort. Repeat x-ray chest in a.m. Smoking cessation/nicotine patch. Spirometry every hour while awake. will ask interventional radiology if they are able to drain partially loculated pleural effusion. Above was reviewed and discussed with Pankaj Bhatt CNP. And I agree with assessment and plan of care.   Electronically signed by     Norbert Heredia MD on 2/12/2020 at 4:56 PM  [facility]  Cell: [phone]  Office: [phone]

## 2024-12-18 NOTE — PROGRESS NOTES
Rheumatology New Patient Note  =====================================================================================================    Date of visit: 12/18/2024  ?  Chief complaint: polyarthralgia  Chief Complaint   Patient presents with    New Patient     Scarring to bilateral upper arms and right wrist and lower back. Referred by PCP.      Referring (will send letter)  Dr. Laws    PCP  Ryan Astudillo MD  Fax: [phone]  Phone: [phone]    =====================================================================================================  HPI    Darrel Salazar is a 64 year old male     Here for evaluation of psoriatic arthritis.  -Seeing dermatology for psoriasis.  Using topical corticosteroids which are partially effective.  Gout x 15 year with podagra. No renal stones.  No recent gout flares.  On norco 7.5 prn. APAP 3000 mg daily prn limit.  No NSAIDs given anticoagulation.  On DOAC for Afib  Smoking cannabis and edibles  Father with gout and psoriasis   Hx of knee OA, s/p bilatearl CSI twice a year. This helps the knees and the rest of the joints.   -b/l JORGE L in the past    S/p left pheochromocytoma    14 point ROS negative except noted above    Medications:  Medications Ordered Prior to Encounter[1]    Past Medical History:  Past Medical History:    Abdominal hernia    Abdominal pain    Anxiety    Arrhythmia    A-FIB DX 2 YRS AGO    Arthritis    Back pain    Back problem    Low back pain    Cancer (HCC)    prostate    Deep vein thrombosis (HCC)    left    Depression    Diabetes mellitus (HCC)    Esophageal reflux    Fatigue    Glaucoma    Heartburn    High blood pressure    High cholesterol    History of depression    History of hip replacement    March 2020, 11/2010    Insomnia    Night sweats    OA (osteoarthritis)    Obesity    Osteoarthritis    Pain in joints    Sleep apnea    repeat SAM test neg after weight loss    Stress    Type II or unspecified type diabetes mellitus without mention  of complication, not stated as uncontrolled    Visual impairment    glasses    Wears glasses     Past Surgical History:  Past Surgical History:   Procedure Laterality Date    Abdomen surgery proc unlisted  01/26/2021    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    Anesth,perineal remv of prostate  2019    Colonoscopy      Colonoscopy N/A 12/8/2022    Procedure: COLONOSCOPY with forcep polypectomy;  Surgeon: Godwin Eller MD;  Location:  ENDOSCOPY    Hernia surgery      Hip replacement surgery Left 2010    Hip replacement surgery Right 03/03/2020    Laparoscopy, surgical prostatectomy, retropubic radical, w/nerve sparing  11/15/2019        Other surgical history      vocal cord polyps removed    Other surgical history  11/15/2019    RALP Dr. Muir      Family History:  Family History   Problem Relation Age of Onset    Cancer Father 64        lung    Other (Other) Mother 72        aneurism    Diabetes Sister     Suicide History Sister     Alcohol and Other Disorders Associated Brother     Cancer Brother 72        prostate cancer     Social History:  Social History     Socioeconomic History    Marital status:     Number of children: 4   Tobacco Use    Smoking status: Never    Smokeless tobacco: Never   Vaping Use    Vaping status: Never Used   Substance and Sexual Activity    Alcohol use: No    Drug use: Yes     Types: Cannabis   Other Topics Concern    Caffeine Concern Yes     Comment: 2 cups    Exercise No     Comment: WATER AEROBICS WEEKLY    Seat Belt No    Special Diet No    Stress Concern Yes    Weight Concern Yes   Social History Narrative    - lives with wife    4 grown children     ?  Allergies:  Allergies[2]      Objective    Vitals:    12/18/24 0933 12/18/24 0955   BP: (!) 142/102 136/82   BP Location: Right arm Left arm   Patient Position: Sitting Sitting   Cuff Size: large large   Pulse: 90    Resp: 18    Temp:  97.1 °F (36.2 °C)    TempSrc: Temporal    SpO2: 98%    Weight: 260 lb (117.9 kg)    Height: 5' 11\" (1.803 m)        GEN: NAD, well-nourished.   HEENT: Head: NCAT. Face: No lesions. Eyes: Conjunctiva clear. Sclera are anicteric. PERRLA. EOMs are full. Ears: The right and left ear canals are clear.  Nose: No external or internal nasal deformities. Nasal septum is midline. Mouth: The lips are within normal limits.  No oral ulcers Tongue is midline with no lesions. The oral cavity is clear.   Neck: Supple. No neck masses. No thyromegaly. No LAD, parotid or submandicular gland palpated.   PULM: easy effort  Extremities: No cyanosis, edema or deformities.   Neurologic: Strength, CN2-12 grossly intact   Psych: normal affect.   Skin: Scattered psoriatic plaques overlying the extensor surface of the upper extremities  MSK: 28 joint count performed. No evidence of synovitis in mcp, pip, dip, wrist, elbows, shoulders, hips, knees, ankles, mtp unless otherwise noted. Full ROM of elbows, wrists, knees.     MCP, elbow bursal thickening  -Bursal thickening of the bilateral MTP ones with hallux valgus.  No tenderness  -Bilateral knee OA changes with mild effusion      ?  Labs:  Lab Results   Component Value Date    WBC 6.7 01/18/2023    RBC 5.55 01/18/2023    HGB 14.8 01/18/2023    HCT 43.8 01/18/2023    .0 01/18/2023    MCV 78.9 (L) 01/18/2023    MCH 26.7 01/18/2023    MCHC 33.8 01/18/2023    RDW 14.9 01/18/2023    NEPRELIM 4.16 01/18/2023    NEPERCENT 62.2 01/18/2023    LYPERCENT 26.4 01/18/2023    MOPERCENT 8.8 01/18/2023    EOPERCENT 1.9 01/18/2023    BAPERCENT 0.4 01/18/2023    NE 4.16 01/18/2023    LYMABS 1.77 01/18/2023    MOABSO 0.59 01/18/2023    EOABSO 0.13 01/18/2023    BAABSO 0.03 01/18/2023     Lab Results   Component Value Date     (H) 08/23/2024    BUN 14 08/23/2024    BUNCREA 8.3 (L) 03/05/2021    CREATSERUM 1.05 08/23/2024    ANIONGAP 5 08/23/2024    GFR 68 10/29/2017    GFRNAA 77 07/22/2022    GFRAA  89 07/22/2022    CA 9.5 08/23/2024    OSMOCALC 290 08/23/2024    ALKPHO 150 (H) 08/23/2024    AST 23 08/23/2024    ALT 18 08/23/2024    BILT 0.8 08/23/2024    TP 7.6 08/23/2024    ALB 4.6 08/23/2024    GLOBULIN 3.0 08/23/2024     08/23/2024    K 3.6 08/23/2024     08/23/2024    CO2 28.0 08/23/2024         No results found for: \"ANATI\", \"CATHERINE\", \"ANAS\", \"ANASCRN\", \"ANASCRNRFLX\", \"ADOLPH\"  No results found for: \"SSA\", \"SSAUR\", \"ANTISSA\", \"SSA52\", \"SSA60\", \"SSADD\", \"SSB\", \"ANTISSB\"  No results found for: \"DSDNA\", \"ANTIDSDNA\", \"SMUD\", \"ANTISM\", \"SM\", \"RNP\", \"ANTIRNP\", \"SMITHRNP\"  No results found for: \"SCL70\", \"SCL\", \"KMKQDJG94\"  No results found for: \"C3\", \"C4\"  No results found for: \"DRVVT\", \"LAINT\", \"PTTLUPUS\", \"LUPUSINTERP\", \"LA\", \"J0RX9IIENG\", \"M9EZ5FFAPE\", \"T2PZPYNNPG\", \"A6IMHTONIE\"  No results found for: \"CARDIOLIPIGG\", \"CARDIOLIPIGM\", \"CARDIOLIPIGA\", \"CARDIOIGA\", \"CARLIP\"      Additional Labs:    Radiology:    7/2024 MRI Spine lumbar spine    Impression   CONCLUSION:    1. There is no evidence of an acute process.  2. There is a stable degree of multilevel disc disease and facet arthropathy without interval progression.  There is again evidence of foraminal narrowing most notably at L3-4 and L4-5 as previously seen.  3. Severe atrophy and fatty replacement of the left psoas and left iliacus musculature which can be retrospectively seen and is stable.  4. Overall, there are no significant interval changes from the previous study.     X-ray left knee 4 views 2022  Lateral compartment is bone-on-bone with sclerosis and osteophytes.  Severe osteoarthritis.  No acute findings.    Radiology review:      =====================================================================================================  Assessment and Plan  Assessment:  1. Idiopathic chronic gout of multiple sites with tophus    2. Need for hepatitis B screening test    3. Need for hepatitis C screening test    4. Therapeutic drug monitoring     5. Arthralgia of both hands    6. Arthralgia of both feet    7.  ancestry requiring population-specific genetic screening    8. Screening for tuberculosis    9. Low iron    10. Psoriasis      #Psoriasis  -No active psoriatic arthritis or synovitis appreciated today    #History of gout  -For more than 15 years this  -Has never been treated with urate lowering therapy  -Evidence of chronic MSU deposition in hands, olecranon bursa, and particularly feet  -Suspect gouty arthropathy may be contributing to chronic polyarthralgia    #Chronic multisite osteoarthritis  -S/p bilateral JORGE L  -Severe, bone-on-bone knee OA; orthopedics providing corticosteroid injections with good benefit  -Lumbar DJD noted    #Reported history of low iron    #A-fib  -On DOAC  ?  Plan:  -HLA-B 5801 to understand if the patient could be a candidate for allopurinol.  Needs prior Auth first  -Further inflammatory arthritis workup including RF/CCP, uric acid.  General arthralgia/iron deficiency labs  -Screening for hep B/C/HIV/LTBI/baseline pulmonary disease for high risk med use use and monitoring for toxicity  , eval chronic viral inflammatory arthrits: Hepatitis B: sAB, cAB IgM AND Total, HBsAg, Hepatitis C AB, IGRA  -X-ray of the bilateral hands and feet to evaluate for gouty erosions    -No NSAIDs given on DOAC  -Continue Tylenol and Norco per PCP      Diagnoses and all orders for this visit:    Idiopathic chronic gout of multiple sites with tophus  -     HCV Antibody; Future  -     Hep B Core AB, Tot; Future  -     Hepatitis B Surface Antibody; Future  -     Hepatitis B Surface Antigen; Future  -     Quantiferon TB Plus; Future  -     Miscellaneous Testing; Future  -     Uric Acid; Future  -     Cyclic Citrullinate Pep. IGG; Future  -     Rheumatoid Arthritis Factor; Future  -     B12 AND FOLATE; Future  -     TSH W Reflex To Free T4; Future  -     XR HAND BILAT (MIN 3 VIEWS) (CPT=73130-50); Future  -     XR FOOT, COMPLETE (MIN 3  VIEWS), LEFT (CPT=73630); Future  -     XR FOOT, COMPLETE (MIN 3 VIEWS), RIGHT (CPT=73630); Future  -     Comp Metabolic Panel (14); Future  -     CBC With Differential With Platelet; Future    Need for hepatitis B screening test  -     Hep B Core AB, Tot; Future  -     Hepatitis B Surface Antibody; Future  -     Hepatitis B Surface Antigen; Future    Need for hepatitis C screening test  -     HCV Antibody; Future    Therapeutic drug monitoring  -     HCV Antibody; Future  -     Hep B Core AB, Tot; Future  -     Hepatitis B Surface Antibody; Future  -     Hepatitis B Surface Antigen; Future  -     Quantiferon TB Plus; Future  -     Miscellaneous Testing; Future  -     Uric Acid; Future  -     Cyclic Citrullinate Pep. IGG; Future  -     Rheumatoid Arthritis Factor; Future  -     B12 AND FOLATE; Future  -     Iron And Tibc; Future  -     Ferritin; Future  -     TSH W Reflex To Free T4; Future  -     XR HAND BILAT (MIN 3 VIEWS) (CPT=73130-50); Future  -     XR FOOT, COMPLETE (MIN 3 VIEWS), LEFT (CPT=73630); Future  -     XR FOOT, COMPLETE (MIN 3 VIEWS), RIGHT (CPT=73630); Future  -     Comp Metabolic Panel (14); Future  -     CBC With Differential With Platelet; Future    Arthralgia of both hands  -     HCV Antibody; Future  -     Hep B Core AB, Tot; Future  -     Hepatitis B Surface Antibody; Future  -     Hepatitis B Surface Antigen; Future  -     Quantiferon TB Plus; Future  -     Miscellaneous Testing; Future  -     Uric Acid; Future  -     Cyclic Citrullinate Pep. IGG; Future  -     Rheumatoid Arthritis Factor; Future  -     B12 AND FOLATE; Future  -     Iron And Tibc; Future  -     Ferritin; Future  -     TSH W Reflex To Free T4; Future  -     XR HAND BILAT (MIN 3 VIEWS) (CPT=73130-50); Future    Arthralgia of both feet  -     HCV Antibody; Future  -     Hep B Core AB, Tot; Future  -     Hepatitis B Surface Antibody; Future  -     Hepatitis B Surface Antigen; Future  -     Quantiferon TB Plus; Future  -      Miscellaneous Testing; Future  -     Uric Acid; Future  -     Cyclic Citrullinate Pep. IGG; Future  -     Rheumatoid Arthritis Factor; Future  -     B12 AND FOLATE; Future  -     Iron And Tibc; Future  -     Ferritin; Future  -     TSH W Reflex To Free T4; Future  -     XR HAND BILAT (MIN 3 VIEWS) (CPT=73130-50); Future  -     XR FOOT, COMPLETE (MIN 3 VIEWS), LEFT (CPT=73630); Future  -     XR FOOT, COMPLETE (MIN 3 VIEWS), RIGHT (CPT=73630); Future  -     Comp Metabolic Panel (14); Future  -     CBC With Differential With Platelet; Future     ancestry requiring population-specific genetic screening  -     Miscellaneous Testing; Future    Screening for tuberculosis  -     Quantiferon TB Plus; Future    Low iron  -     Iron And Tibc; Future  -     Ferritin; Future    Psoriasis        No follow-ups on file.      The above plan of care, diagnosis, orders, and follow-up were discussed with the patient. Questions related to this recommended plan of care were answered.    Thank you for referring this delightful patient to me. Please feel free to contact me with any questions.     This report was performed utilizing speech recognition software technology. Despite proofreading, speech recognition errors could escape detection. If a word or phrase is confusing or out of context, please do not hesitate to call for   clarification.       Kind regards      Shari Wilson MD  [facility]         [1]   Current Outpatient Medications on File Prior to Visit   Medication Sig Dispense Refill    cyclobenzaprine 10 MG Oral Tab Take 1 tablet (10 mg total) by mouth 3 (three) times daily as needed for Muscle spasms. 60 tablet 0    HYDROcodone-acetaminophen (NORCO) 7.5-325 MG Oral Tab Take 1 tablet by mouth every 8 (eight) hours as needed for Pain (back). To last one month 30 tablet 0    dapagliflozin 10 MG Oral Tab TAKE 1 TABLET DAILY 90 tablet 0    Naloxone HCl 4 MG/0.1ML Nasal Liquid 4 mg by Intranasal route as needed (May  repeat as needed in other nostril if symptoms persist). If patient remains unresponsive, repeat dose in other nostril 2-5 minutes after first dose. 1 kit 0    HYDROXYZINE 10 MG Oral Tab TAKE 1 TABLET(10 MG) BY MOUTH DAILY 90 tablet 0    metFORMIN 500 MG Oral Tab Take 1 tablet (500 mg total) by mouth 2 (two) times daily with meals. 180 tablet 1    furosemide 20 MG Oral Tab Take 1 tablet (20 mg total) by mouth daily. 90 tablet 3    carvedilol 25 MG Oral Tab Take 1 tablet (25 mg total) by mouth 2 (two) times daily with meals. 180 tablet 3    losartan 100 MG Oral Tab Take 1 tablet (100 mg total) by mouth daily. 90 tablet 3    atorvastatin 40 MG Oral Tab Take 1 tablet (40 mg total) by mouth daily. 90 tablet 3    dilTIAZem HCl ER Coated Beads 180 MG Oral Capsule SR 24 Hr Take 1 capsule (180 mg total) by mouth daily. 90 capsule 3    Tirzepatide (MOUNJARO) 10 MG/0.5ML Subcutaneous Solution Pen-injector Inject into the skin.      Continuous Blood Gluc Sensor (FREESTYLE HENRRY 3 SENSOR) Does not apply Misc       triamcinolone 0.1 % External Cream Apply topically 2 (two) times daily as needed. 60 g 0    Continuous Blood Gluc  (FREESTYLE HENRRY 14 DAY READER) Does not apply Device Use to monitor blood sugar. 1 each 0    XARELTO 20 MG Oral Tab TAKE 1 TABLET DAILY WITH FOOD 90 tablet 3    LATANOPROST 0.005 % Ophthalmic Solution INSTILL 1 DROP IN BOTH EYES NIGHTLY (NO ADDITIONAL REFILLS UNTIL EVALUATED BY DOCTOR) 7.5 mL 3    Sertraline HCl 100 MG Oral Tab Take 1 tablet (100 mg total) by mouth daily.      Insulin Syringe-Needle U-100 (BD INSULIN SYRINGE ULTRAFINE) 31G X 5/16\" 1 ML Does not apply Misc As directed 90 each 5     No current facility-administered medications on file prior to visit.   [2] No Known Allergies

## 2024-12-23 NOTE — PROGRESS NOTES
PA for HLA B*58:01, Allopurinol Hypersensitivity completed with HP.. call ref# [id number]  TEST: [id number]   CPT: 29678

## 2024-12-26 ENCOUNTER — LAB ENCOUNTER (OUTPATIENT)
Dept: LAB | Age: 64
End: 2024-12-26
Attending: INTERNAL MEDICINE
Payer: COMMERCIAL

## 2024-12-26 DIAGNOSIS — M25.541 ARTHRALGIA OF BOTH HANDS: ICD-10-CM

## 2024-12-26 DIAGNOSIS — Z11.59 NEED FOR HEPATITIS C SCREENING TEST: ICD-10-CM

## 2024-12-26 DIAGNOSIS — M25.541 ARTHRALGIA OF RIGHT HAND: ICD-10-CM

## 2024-12-26 DIAGNOSIS — M1A.09X1 IDIOPATHIC CHRONIC GOUT OF MULTIPLE SITES WITH TOPHUS: ICD-10-CM

## 2024-12-26 DIAGNOSIS — M25.571 ARTHRALGIA OF BOTH FEET: ICD-10-CM

## 2024-12-26 DIAGNOSIS — M25.542 ARTHRALGIA OF LEFT HAND: ICD-10-CM

## 2024-12-26 DIAGNOSIS — Z11.59 NEED FOR HEPATITIS B SCREENING TEST: ICD-10-CM

## 2024-12-26 DIAGNOSIS — M25.572 ARTHRALGIA OF BOTH FEET: ICD-10-CM

## 2024-12-26 DIAGNOSIS — M25.542 ARTHRALGIA OF BOTH HANDS: ICD-10-CM

## 2024-12-26 DIAGNOSIS — Z11.1 SCREENING FOR TUBERCULOSIS: ICD-10-CM

## 2024-12-26 DIAGNOSIS — Z13.79 AFRICAN ANCESTRY REQUIRING POPULATION-SPECIFIC GENETIC SCREENING: ICD-10-CM

## 2024-12-26 DIAGNOSIS — Z51.81 ENCOUNTER FOR THERAPEUTIC DRUG MONITORING: ICD-10-CM

## 2024-12-26 DIAGNOSIS — Z51.81 THERAPEUTIC DRUG MONITORING: ICD-10-CM

## 2024-12-26 DIAGNOSIS — E61.1 LOW IRON: ICD-10-CM

## 2024-12-26 LAB
ALBUMIN SERPL-MCNC: 4.1 G/DL (ref 3.2–4.8)
ALBUMIN/GLOB SERPL: 1.3 {RATIO} (ref 1–2)
ALP LIVER SERPL-CCNC: 126 U/L
ALT SERPL-CCNC: 13 U/L
ANION GAP SERPL CALC-SCNC: 8 MMOL/L (ref 0–18)
AST SERPL-CCNC: 17 U/L (ref ?–34)
BASOPHILS # BLD AUTO: 0.02 X10(3) UL (ref 0–0.2)
BASOPHILS NFR BLD AUTO: 0.5 %
BILIRUB SERPL-MCNC: 0.7 MG/DL (ref 0.2–1.1)
BUN BLD-MCNC: 13 MG/DL (ref 9–23)
CALCIUM BLD-MCNC: 9.1 MG/DL (ref 8.7–10.4)
CHLORIDE SERPL-SCNC: 109 MMOL/L (ref 98–112)
CO2 SERPL-SCNC: 23 MMOL/L (ref 21–32)
CREAT BLD-MCNC: 1.09 MG/DL
DEPRECATED HBV CORE AB SER IA-ACNC: 183 NG/ML
EGFRCR SERPLBLD CKD-EPI 2021: 77 ML/MIN/1.73M2 (ref 60–?)
EOSINOPHIL # BLD AUTO: 0.07 X10(3) UL (ref 0–0.7)
EOSINOPHIL NFR BLD AUTO: 1.8 %
ERYTHROCYTE [DISTWIDTH] IN BLOOD BY AUTOMATED COUNT: 14.5 %
FASTING STATUS PATIENT QL REPORTED: YES
FOLATE SERPL-MCNC: 7.5 NG/ML (ref 5.4–?)
GLOBULIN PLAS-MCNC: 3.2 G/DL (ref 2–3.5)
GLUCOSE BLD-MCNC: 144 MG/DL (ref 70–99)
HBV CORE AB SERPL QL IA: NONREACTIVE
HBV SURFACE AB SER QL: NONREACTIVE
HBV SURFACE AB SERPL IA-ACNC: 4.63 MIU/ML
HBV SURFACE AG SER-ACNC: <0.1 [IU]/L
HBV SURFACE AG SERPL QL IA: NONREACTIVE
HCT VFR BLD AUTO: 36.7 %
HCV AB SERPL QL IA: NONREACTIVE
HGB BLD-MCNC: 12.1 G/DL
IMM GRANULOCYTES # BLD AUTO: 0.01 X10(3) UL (ref 0–1)
IMM GRANULOCYTES NFR BLD: 0.3 %
IRON SATN MFR SERPL: 30 %
IRON SERPL-MCNC: 77 UG/DL
LYMPHOCYTES # BLD AUTO: 0.53 X10(3) UL (ref 1–4)
LYMPHOCYTES NFR BLD AUTO: 13.5 %
MCH RBC QN AUTO: 27.2 PG (ref 26–34)
MCHC RBC AUTO-ENTMCNC: 33 G/DL (ref 31–37)
MCV RBC AUTO: 82.5 FL
MONOCYTES # BLD AUTO: 0.36 X10(3) UL (ref 0.1–1)
MONOCYTES NFR BLD AUTO: 9.1 %
NEUTROPHILS # BLD AUTO: 2.95 X10 (3) UL (ref 1.5–7.7)
NEUTROPHILS # BLD AUTO: 2.95 X10(3) UL (ref 1.5–7.7)
NEUTROPHILS NFR BLD AUTO: 74.8 %
OSMOLALITY SERPL CALC.SUM OF ELEC: 293 MOSM/KG (ref 275–295)
PLATELET # BLD AUTO: 214 10(3)UL (ref 150–450)
POTASSIUM SERPL-SCNC: 4 MMOL/L (ref 3.5–5.1)
PROT SERPL-MCNC: 7.3 G/DL (ref 5.7–8.2)
RBC # BLD AUTO: 4.45 X10(6)UL
RHEUMATOID FACT SERPL-ACNC: 24.3 IU/ML (ref ?–14)
SODIUM SERPL-SCNC: 140 MMOL/L (ref 136–145)
TOTAL IRON BINDING CAPACITY: 255 UG/DL (ref 250–425)
TRANSFERRIN SERPL-MCNC: 193 MG/DL (ref 215–365)
TSI SER-ACNC: 1.54 UIU/ML (ref 0.55–4.78)
URATE SERPL-MCNC: 4.2 MG/DL
VIT B12 SERPL-MCNC: 374 PG/ML (ref 211–911)
WBC # BLD AUTO: 3.9 X10(3) UL (ref 4–11)

## 2024-12-26 PROCEDURE — 86431 RHEUMATOID FACTOR QUANT: CPT | Performed by: INTERNAL MEDICINE

## 2024-12-26 PROCEDURE — 83540 ASSAY OF IRON: CPT | Performed by: INTERNAL MEDICINE

## 2024-12-26 PROCEDURE — 84550 ASSAY OF BLOOD/URIC ACID: CPT | Performed by: INTERNAL MEDICINE

## 2024-12-26 PROCEDURE — 82746 ASSAY OF FOLIC ACID SERUM: CPT | Performed by: INTERNAL MEDICINE

## 2024-12-26 PROCEDURE — 80050 GENERAL HEALTH PANEL: CPT | Performed by: INTERNAL MEDICINE

## 2024-12-26 PROCEDURE — 86480 TB TEST CELL IMMUN MEASURE: CPT | Performed by: INTERNAL MEDICINE

## 2024-12-26 PROCEDURE — 82607 VITAMIN B-12: CPT | Performed by: INTERNAL MEDICINE

## 2024-12-26 PROCEDURE — 86200 CCP ANTIBODY: CPT | Performed by: INTERNAL MEDICINE

## 2024-12-26 PROCEDURE — 86803 HEPATITIS C AB TEST: CPT | Performed by: INTERNAL MEDICINE

## 2024-12-26 PROCEDURE — 87340 HEPATITIS B SURFACE AG IA: CPT | Performed by: INTERNAL MEDICINE

## 2024-12-26 PROCEDURE — 82728 ASSAY OF FERRITIN: CPT | Performed by: INTERNAL MEDICINE

## 2024-12-26 PROCEDURE — 83550 IRON BINDING TEST: CPT | Performed by: INTERNAL MEDICINE

## 2024-12-26 PROCEDURE — 86706 HEP B SURFACE ANTIBODY: CPT | Performed by: INTERNAL MEDICINE

## 2024-12-26 PROCEDURE — 81381 HLA I TYPING 1 ALLELE HR: CPT | Performed by: INTERNAL MEDICINE

## 2024-12-26 PROCEDURE — 86704 HEP B CORE ANTIBODY TOTAL: CPT | Performed by: INTERNAL MEDICINE

## 2024-12-27 LAB — CCP IGG SERPL-ACNC: 0.9 U/ML (ref 0–6.9)

## 2024-12-29 LAB
M TB IFN-G CD4+ T-CELLS BLD-ACNC: 0.01 IU/ML
M TB TUBERC IFN-G BLD QL: NEGATIVE
M TB TUBERC IGNF/MITOGEN IGNF CONTROL: 1.1 IU/ML
QFT TB1 AG MINUS NIL: 0.04 IU/ML
QFT TB2 AG MINUS NIL: 0 IU/ML

## 2024-12-30 ENCOUNTER — TELEPHONE (OUTPATIENT)
Dept: RHEUMATOLOGY | Facility: CLINIC | Age: 64
End: 2024-12-30

## 2024-12-30 DIAGNOSIS — M1A.9XX1 TOPHUS OF RIGHT FOOT DUE TO GOUT: Primary | ICD-10-CM

## 2025-01-08 NOTE — PROGRESS NOTES
Patient presents in office today with reported pain in left side low back    Current pain level reported = 6/10    Last reported dose of norco, pt states he hasn't taken any in awhile      Narcotic Contract renewal 4/4/23    Urine Drug screen 4/4/23
Timeout completed prior to procedure @ 9447. Participants present for timeout:  Dr. Oswaldo Villasenor  and patient.
[address] [place], [address]

## 2025-01-09 ENCOUNTER — APPOINTMENT (OUTPATIENT)
Dept: GENERAL RADIOLOGY | Facility: HOSPITAL | Age: 65
End: 2025-01-09
Attending: ANESTHESIOLOGY
Payer: COMMERCIAL

## 2025-01-09 ENCOUNTER — HOSPITAL ENCOUNTER (OUTPATIENT)
Facility: HOSPITAL | Age: 65
Setting detail: HOSPITAL OUTPATIENT SURGERY
Discharge: HOME | End: 2025-01-09
Attending: ANESTHESIOLOGY | Admitting: ANESTHESIOLOGY
Payer: COMMERCIAL

## 2025-01-09 VITALS
HEART RATE: 91 BPM | BODY MASS INDEX: 36.4 KG/M2 | RESPIRATION RATE: 16 BRPM | DIASTOLIC BLOOD PRESSURE: 90 MMHG | OXYGEN SATURATION: 96 % | TEMPERATURE: 97 F | SYSTOLIC BLOOD PRESSURE: 156 MMHG | WEIGHT: 260 LBS | HEIGHT: 71 IN

## 2025-01-09 PROBLEM — M54.16 LUMBAR RADICULITIS: Status: ACTIVE | Noted: 2025-01-09

## 2025-01-09 LAB — GLUCOSE BLD-MCNC: 120 MG/DL (ref 70–99)

## 2025-01-09 PROCEDURE — 3E0R33Z INTRODUCTION OF ANTI-INFLAMMATORY INTO SPINAL CANAL, PERCUTANEOUS APPROACH: ICD-10-PCS | Performed by: ANESTHESIOLOGY

## 2025-01-09 PROCEDURE — 62323 NJX INTERLAMINAR LMBR/SAC: CPT | Performed by: ANESTHESIOLOGY

## 2025-01-09 RX ORDER — DEXAMETHASONE SODIUM PHOSPHATE 10 MG/ML
INJECTION, SOLUTION INTRAMUSCULAR; INTRAVENOUS
Status: DISCONTINUED | OUTPATIENT
Start: 2025-01-09 | End: 2025-01-09

## 2025-01-09 RX ORDER — LIDOCAINE HYDROCHLORIDE 10 MG/ML
INJECTION, SOLUTION EPIDURAL; INFILTRATION; INTRACAUDAL; PERINEURAL
Status: DISCONTINUED | OUTPATIENT
Start: 2025-01-09 | End: 2025-01-09

## 2025-01-09 RX ORDER — ONDANSETRON 2 MG/ML
4 INJECTION INTRAMUSCULAR; INTRAVENOUS ONCE AS NEEDED
Status: DISCONTINUED | OUTPATIENT
Start: 2025-01-09 | End: 2025-01-09

## 2025-01-09 RX ORDER — DIPHENHYDRAMINE HYDROCHLORIDE 50 MG/ML
50 INJECTION INTRAMUSCULAR; INTRAVENOUS ONCE AS NEEDED
Status: DISCONTINUED | OUTPATIENT
Start: 2025-01-09 | End: 2025-01-09

## 2025-01-09 RX ORDER — MIDAZOLAM HYDROCHLORIDE 1 MG/ML
INJECTION INTRAMUSCULAR; INTRAVENOUS
Status: DISCONTINUED | OUTPATIENT
Start: 2025-01-09 | End: 2025-01-09

## 2025-01-09 RX ORDER — NALOXONE HYDROCHLORIDE 0.4 MG/ML
0.08 INJECTION, SOLUTION INTRAMUSCULAR; INTRAVENOUS; SUBCUTANEOUS AS NEEDED
Status: DISCONTINUED | OUTPATIENT
Start: 2025-01-09 | End: 2025-01-09

## 2025-01-09 RX ORDER — SODIUM CHLORIDE 9 MG/ML
INJECTION, SOLUTION INTRAMUSCULAR; INTRAVENOUS; SUBCUTANEOUS
Status: DISCONTINUED | OUTPATIENT
Start: 2025-01-09 | End: 2025-01-09

## 2025-01-09 RX ORDER — SODIUM CHLORIDE, SODIUM LACTATE, POTASSIUM CHLORIDE, CALCIUM CHLORIDE 600; 310; 30; 20 MG/100ML; MG/100ML; MG/100ML; MG/100ML
100 INJECTION, SOLUTION INTRAVENOUS CONTINUOUS
Status: DISCONTINUED | OUTPATIENT
Start: 2025-01-09 | End: 2025-01-09

## 2025-01-09 NOTE — DISCHARGE INSTRUCTIONS
You have been given medication that makes you sleepy. This may have included both pain medication and sleeping medication. Most of the effects have worn off but you may still have some drowsiness for the next 6 to 8 hours.    Home Care  Follow these guidelines when you get home:    --Don't drink any alcohol for the next 24 hours.    --Don't drive, operate dangerous machinery, make important business or personal    decisions, or sign legal documents during the next 24 hours.    Follow Up Care  Follow up with your healthcare provider if you are not alert and back to your usual level of activity within 12 hours    When to seek medical advice  Call your healthcare provider right away if any of these occur:    --Drowsiness that gets worse  --Weakness or dizziness that gets worse  --Repeated vomiting  --You can not be awakened   Home Care Instructions Following Your Pain Procedure     Darrel,  It has been a pleasure to have you as our patient. To help you at home, you must follow these general discharge instructions. We will review these with you before you are discharged. It is our hope that you have a complete and uneventful recovery from our procedure.     General Instructions:  What to Expect:  Bandages from your procedure today can be removed when you get home.  Please avoid soaking and/or swimming for 24 hours.  Showering is okay  It is normal to have increased pain symptoms and/or pain at injection site for up to 3-5 days after procedure, you can use heat or ice (20 minutes on 20 minutes off) for comfort.  You may experience some temporary side effects which may include restlessness or insomnia, flushing of the face, or heart palpitations.  Please contact the provider if these symptoms do not resolve within 3-4 days.  Lightheadedness or nausea may occur and should resolve within 24 to 48 hours.  If you develop a headache after treatment, rest, drink fluids (with caffeine, if possible) and take mild over-the-counter  pain medication.  If the headache does not improve with the above treatment, contact the physician.  Home Medications:  Resume all previously prescribed medication.  Please avoid taking NSAIDs (Non-Steriodal Anti-Inflammatory Drugs) such as:  Ibuprofen ( Advil, Motrin) Aleve (Naproxen), Diclofenac, Meloxicam for 6 hours after procedure.   If you are on Coumadin (Warfarin) or any other anti-coagulant (or \"blood thinning\") medication such as Plavix (Clopidogrel), Xarelto (Rivaroxaban), Eliquis (Apixaban), Effient (Prasugrel) etc., restart on the following day from the procedure unless otherwise directed by your provider.  If you are a diabetic, please increase the frequency of your glucose monitoring after the procedure as steroids may cause a temporary (2-3 day) increase in your blood sugar.  Contact your primary care physician if your blood sugar remains elevated as you may require some medication adjustment.  Diet:  Resume your regular diet as tolerated.  Activity:  We recommend that you relax and rest during the rest of your procedure day.  If you feel weakness in your arms or legs do not drive.  Follow-up Appointment  Please schedule a follow-up visit within 3 to 4 weeks after your last procedure date.  Question or Concerns:  Feel free to call our office with any questions or concerns at [phone] (option #2)    Darrel  Thank you for coming to [facility] for your procedure.  The nurses try very hard to make sure you receive the best care possible.  Your trust in them as well as us is greatly appreciated.    Thanks so much,   Dr. Aman Tanner

## 2025-01-09 NOTE — H&P
History & Physical Examination    Patient Name: Darrel Salazar  MRN: [id number]  CSN: [id number]  YOB: 1960    Pre-Operative Diagnosis:  Lumbar radiculopathy [M54.16]    Present Illness: Lumbar radiculopathy    ASA: 3  MP class: 1  Sedation:   IV sedation (anxiolysis)    Prescriptions Prior to Admission[1]  Current Facility-Administered Medications   Medication Dose Route Frequency    lactated ringers infusion  100 mL/hr Intravenous Continuous    ondansetron (Zofran) 4 MG/2ML injection 4 mg  4 mg Intravenous Once PRN       Allergies: Allergies[2]    Past Medical History:    Abdominal hernia    Abdominal pain    Anxiety    Arrhythmia    A-FIB DX 2 YRS AGO    Arthritis    Back pain    Back problem    Low back pain    Cancer (HCC)    prostate    Deep vein thrombosis (HCC)    left    Depression    Diabetes mellitus (HCC)    Esophageal reflux    Fatigue    Glaucoma    Heartburn    High blood pressure    High cholesterol    History of depression    History of hip replacement    March 2020, 11/2010    Insomnia    Night sweats    OA (osteoarthritis)    Obesity    Osteoarthritis    Pain in joints    Sleep apnea    repeat SAM test neg after weight loss    Stress    Type II or unspecified type diabetes mellitus without mention of complication, not stated as uncontrolled    Visual impairment    glasses    Wears glasses     Past Surgical History:   Procedure Laterality Date    Abdomen surgery proc unlisted  01/26/2021    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    Anesth,perineal remv of prostate  2019    Colonoscopy      Colonoscopy N/A 12/8/2022    Procedure: COLONOSCOPY with forcep polypectomy;  Surgeon: Godwin Eller MD;  Location:  ENDOSCOPY    Hernia surgery      Hip replacement surgery Left 2010    Hip replacement surgery Right 03/03/2020    Laparoscopy, surgical prostatectomy, retropubic radical, w/nerve sparing  11/15/2019         Other surgical history      vocal cord polyps removed    Other surgical history  11/15/2019    BONIFACIOP Dr. Muir      Family History   Problem Relation Age of Onset    Cancer Father 64        lung    Other (Other) Mother 72        aneurism    Diabetes Sister     Suicide History Sister     Alcohol and Other Disorders Associated Brother     Cancer Brother 72        prostate cancer     Social History     Tobacco Use    Smoking status: Never    Smokeless tobacco: Never   Substance Use Topics    Alcohol use: No       SYSTEM Check if Review is Normal Check if Physical Exam is Normal If not normal, please explain:   HEENT [x ] [x ]    NECK & BACK [x ] [x ]    HEART [x ] [x ]    LUNGS [x ] [x ]    ABDOMEN [x ] [x ]    UROGENITAL [x ] [x ]    EXTREMITIES [x ] [x ]    OTHER        [ x ] I have discussed the risks and benefits and alternatives with the patient/family.  They understand and agree to proceed with plan of care.  [ x ] I have reviewed the History and Physical done within the last 30 days.  Any changes noted above.    Aman Tanner MD              [1]   Medications Prior to Admission   Medication Sig Dispense Refill Last Dose/Taking    cyclobenzaprine 10 MG Oral Tab Take 1 tablet (10 mg total) by mouth 3 (three) times daily as needed for Muscle spasms. 60 tablet 0     HYDROcodone-acetaminophen (NORCO) 7.5-325 MG Oral Tab Take 1 tablet by mouth every 8 (eight) hours as needed for Pain (back). To last one month 30 tablet 0     dapagliflozin 10 MG Oral Tab TAKE 1 TABLET DAILY 90 tablet 0     Naloxone HCl 4 MG/0.1ML Nasal Liquid 4 mg by Intranasal route as needed (May repeat as needed in other nostril if symptoms persist). If patient remains unresponsive, repeat dose in other nostril 2-5 minutes after first dose. 1 kit 0     HYDROXYZINE 10 MG Oral Tab TAKE 1 TABLET(10 MG) BY MOUTH DAILY 90 tablet 0     metFORMIN 500 MG Oral Tab Take 1 tablet (500 mg total) by mouth 2 (two) times daily with meals. 180 tablet 1      furosemide 20 MG Oral Tab Take 1 tablet (20 mg total) by mouth daily. 90 tablet 3     carvedilol 25 MG Oral Tab Take 1 tablet (25 mg total) by mouth 2 (two) times daily with meals. 180 tablet 3     losartan 100 MG Oral Tab Take 1 tablet (100 mg total) by mouth daily. 90 tablet 3     atorvastatin 40 MG Oral Tab Take 1 tablet (40 mg total) by mouth daily. 90 tablet 3     dilTIAZem HCl ER Coated Beads 180 MG Oral Capsule SR 24 Hr Take 1 capsule (180 mg total) by mouth daily. 90 capsule 3     Tirzepatide (MOUNJARO) 10 MG/0.5ML Subcutaneous Solution Pen-injector Inject into the skin.       Continuous Blood Gluc Sensor (YieldexSTYLE HENRRY 3 SENSOR) Does not apply Misc        triamcinolone 0.1 % External Cream Apply topically 2 (two) times daily as needed. 60 g 0     Continuous Blood Gluc  (FREESTYLE HENRRY 14 DAY READER) Does not apply Device Use to monitor blood sugar. 1 each 0     XARELTO 20 MG Oral Tab TAKE 1 TABLET DAILY WITH FOOD 90 tablet 3     LATANOPROST 0.005 % Ophthalmic Solution INSTILL 1 DROP IN BOTH EYES NIGHTLY (NO ADDITIONAL REFILLS UNTIL EVALUATED BY DOCTOR) 7.5 mL 3     Sertraline HCl 100 MG Oral Tab Take 1 tablet (100 mg total) by mouth daily.       Insulin Syringe-Needle U-100 (BD INSULIN SYRINGE ULTRAFINE) 31G X 5/16\" 1 ML Does not apply Misc As directed 90 each 5    [2] No Known Allergies

## 2025-01-09 NOTE — OPERATIVE REPORT
[facility]  Operative Report  2025     Darrel Salazar Patient Status:  Hospital Outpatient Surgery    1960 MRN [id number]   Location [facility] Attending Aman Tanner MD   Hosp Day # 0 PCP Ryan Astudillo MD     Indication: Darrel is a 64 year old male lumbar radiculopathy    Preoperative Diagnosis:  Lumbar radiculopathy [M54.16]    Postoperative Diagnosis: Same as above.    Procedure performed: LUMBAR INTERLAMINAR EPIDURAL INJECTION with sedation    Anesthesia: Local and IV Sedation.    EBL: Less than 1 ml.    Procedure Description:  After reviewing the patient's history and performing a focused physical examination, the diagnosis and positive previous diagnostic tests were confirmed and contraindications such as infection and coagulopathy were ruled out.  Following review of allergies and potential side effects and complications, including but not necessarily limited to infection, allergic reaction, local tissue breakdown, nerve injury, post-dural puncture headache and paresis, the patient consented for the procedure.    The patient was brought to the procedure room in prone position.  After comprehensive monitors were applied and IV access in the patient's arm, moderate intravenous sedation was given with versed and fentanyl. Moderate sedation was given for 4 minutes. After sterile prep of the lumbar spine, the L5-S1 interspace was identified with the help of fluoroscopy. Local anesthetic was given by a 25 gauge 1.5 inch needle with 1% lidocaine in that space level.  Thereafter, a 20 gauge Tuohy needle was introduced and advanced under fluoroscopy.  The epidural space was accessed by using loss of resistance to air technique.  The needle position was confirmed with AP and lateral view under fluoroscopy.  Omnipaque 180 was injected in 1 mL volume. A good epidurogram was obtained.  Thereafter, dexamethasone 10 mg with normal saline of 5 mL in total volume of 6 mL  was injected through the Tuohy needle.  The needle was flushed with 1 mL lidocaine.  The needle was withdrawn with the stylet intact in situ.  The needle's tip was intact.  The patient tolerated the procedure very well without significant immediate complication.  The patient's back was cleaned and sterile dressing was applied.  The patient was discharged with an instruction to a responsible adult after discharge criteria were met.  The patient was advised to contact us should any problems happen.  The patient was also informed to go to the emergency room immediately if experiencing severe numbness or weakness in the extremities or experiencing bowel or bladder incontinence.            Complications: None.    Follow up: The patient was followed in the pain clinic as needed basis.          Aman Tanner MD

## 2025-01-10 ENCOUNTER — TELEPHONE (OUTPATIENT)
Dept: PAIN CLINIC | Facility: CLINIC | Age: 65
End: 2025-01-10

## 2025-01-10 NOTE — PATIENT INSTRUCTIONS
Physical Therapy    Patient not seen in therapy.     Patient intubated this morning due to respiratory failure, hypoxia, aspiration. Will discontinue physical therapy orders. Please place new orders for therapy when medically stable.       OBJECTIVE                          Therapy procedure time and total treatment time can be found documented on the Time Entry flowsheet   Surgery:  Resection of intra abdominal pheochromocytoma    Date of Surgery:  TBD    Hospital:    [facility] [address] [place], [address]  Phone: [phone]    · This is an Inpatient procedure.   · Use the provided Chlorhexadine jennifer procedure. · Inform your primary care physician of your surgery and ask if him/her will need to see you prior to surgery. · If you develop symptoms of another illness prior to surgery please contact our office immediately.    · If this is an inpatient jennifer

## 2025-01-10 NOTE — TELEPHONE ENCOUNTER
Patient requesting I ask if he can do the other side as well? If so can you place an order or does he require an appointment first?  Thank you              Courtesy called placed to patient for post procedure follow up. Patient stated he is feeling pretty good. Informed patient that soreness is to be expected after the procedure. Educated patient that it takes 3-5 days for the steroid to be effective and to allow adequate time for medication to work. Encouraged patient to alternate ice and heat and to take medications as prescribed. Pt verbalized understanding to call with any questions or concerns.      Procedure: LUMBAR INTERLAMINAR EPIDURAL INJECTION with sedation   Date: 01/09/25  Follow up Visit Scheduled:

## 2025-01-13 ENCOUNTER — HOSPITAL ENCOUNTER (OUTPATIENT)
Dept: CT IMAGING | Facility: HOSPITAL | Age: 65
Discharge: HOME | End: 2025-01-13
Attending: INTERNAL MEDICINE
Payer: COMMERCIAL

## 2025-01-13 DIAGNOSIS — M1A.9XX1 TOPHUS OF RIGHT FOOT DUE TO GOUT: ICD-10-CM

## 2025-01-13 PROCEDURE — 73700 CT LOWER EXTREMITY W/O DYE: CPT | Performed by: INTERNAL MEDICINE

## 2025-01-14 ENCOUNTER — TELEMEDICINE (OUTPATIENT)
Dept: RHEUMATOLOGY | Facility: CLINIC | Age: 65
End: 2025-01-14
Payer: COMMERCIAL

## 2025-01-14 DIAGNOSIS — Z51.81 THERAPEUTIC DRUG MONITORING: ICD-10-CM

## 2025-01-14 DIAGNOSIS — M1A.9XX1 TOPHUS OF RIGHT FOOT DUE TO GOUT: Primary | ICD-10-CM

## 2025-01-14 RX ORDER — TIRZEPATIDE 12.5 MG/.5ML
INJECTION, SOLUTION SUBCUTANEOUS
COMMUNITY
Start: 2025-01-13

## 2025-01-14 RX ORDER — PREDNISONE 5 MG/1
TABLET ORAL
Qty: 32 TABLET | Refills: 1 | Status: SHIPPED | OUTPATIENT
Start: 2025-01-14 | End: 2025-01-24

## 2025-01-14 RX ORDER — ALLOPURINOL 100 MG/1
TABLET ORAL
Qty: 255 TABLET | Refills: 2 | Status: SHIPPED | OUTPATIENT
Start: 2025-01-14 | End: 2025-07-13

## 2025-01-14 RX ORDER — CALCIPOTRIENE 50 UG/G
OINTMENT TOPICAL
COMMUNITY
Start: 2024-09-03

## 2025-01-14 RX ORDER — CLOBETASOL PROPIONATE 0.5 MG/G
OINTMENT TOPICAL
COMMUNITY
Start: 2024-09-03

## 2025-01-14 NOTE — PROGRESS NOTES
Rheumatology f/u Patient Note  =====================================================================================================    Chief complaint: gout  Chief Complaint   Patient presents with    Follow - Up     LOV 12/18/2024  Rapid 3 score is a 2.7  Patient states he is doing well.      Referring   Dr. Eusebia Astudillo MD  Fax: [phone]  Phone: [phone]    =====================================================================================================  HPI   Date of visit: 12/18/2024  ?    Darrel Salazar is a 64 year old male   Here for evaluation of psoriatic arthritis.  -Seeing dermatology for psoriasis.  Using topical corticosteroids which are partially effective.  Gout x 15 year with podagra. No renal stones.  No recent gout flares.  On norco 7.5 prn. APAP 3000 mg daily prn limit.  No NSAIDs given anticoagulation.  On DOAC for Afib  Smoking cannabis and edibles  Father with gout and psoriasis   Hx of knee OA, s/p bilatearl CSI twice a year. This helps the knees and the rest of the joints.   -b/l JORGE L in the past  S/p left pheochromocytoma  ==============================================================================================================  Today's Visit: 01/14/25    Here to discuss DECT demonstrating MSU deposits.     5 point ROS negative except noted above  I had reviewed past medical and family histories together with allergy and medication lists documented.      Medications:   Calcipotriene 0.005 % External Ointment       clobetasol 0.05 % External Ointment       MOUNJARO 12.5 MG/0.5ML Subcutaneous Solution Auto-injector       allopurinol 100 MG Oral Tab Take 0.5 tablets (50 mg total) by mouth daily for 30 days, THEN 1 tablet (100 mg total) daily for 30 days, THEN 2 tablets (200 mg total) daily for 30 days, THEN 3 tablets (300 mg total) daily. 255 tablet 2    predniSONE 5 MG Oral Tab Take 6 tablets (30 mg total) by mouth daily for 2 days, THEN 4 tablets (20 mg  total) daily for 2 days, THEN 3 tablets (15 mg total) daily for 2 days, THEN 2 tablets (10 mg total) daily for 2 days, THEN 1 tablet (5 mg total) daily for 2 days. 32 tablet 1    cyclobenzaprine 10 MG Oral Tab Take 1 tablet (10 mg total) by mouth 3 (three) times daily as needed for Muscle spasms. 60 tablet 0    HYDROcodone-acetaminophen (NORCO) 7.5-325 MG Oral Tab Take 1 tablet by mouth every 8 (eight) hours as needed for Pain (back). To last one month 30 tablet 0    dapagliflozin 10 MG Oral Tab TAKE 1 TABLET DAILY 90 tablet 0    HYDROXYZINE 10 MG Oral Tab TAKE 1 TABLET(10 MG) BY MOUTH DAILY 90 tablet 0    metFORMIN 500 MG Oral Tab Take 1 tablet (500 mg total) by mouth 2 (two) times daily with meals. 180 tablet 1    furosemide 20 MG Oral Tab Take 1 tablet (20 mg total) by mouth daily. 90 tablet 3    carvedilol 25 MG Oral Tab Take 1 tablet (25 mg total) by mouth 2 (two) times daily with meals. 180 tablet 3    losartan 100 MG Oral Tab Take 1 tablet (100 mg total) by mouth daily. 90 tablet 3    atorvastatin 40 MG Oral Tab Take 1 tablet (40 mg total) by mouth daily. 90 tablet 3    dilTIAZem HCl ER Coated Beads 180 MG Oral Capsule SR 24 Hr Take 1 capsule (180 mg total) by mouth daily. 90 capsule 3    triamcinolone 0.1 % External Cream Apply topically 2 (two) times daily as needed. 60 g 0    XARELTO 20 MG Oral Tab TAKE 1 TABLET DAILY WITH FOOD 90 tablet 3    LATANOPROST 0.005 % Ophthalmic Solution INSTILL 1 DROP IN BOTH EYES NIGHTLY (NO ADDITIONAL REFILLS UNTIL EVALUATED BY DOCTOR) 7.5 mL 3    Sertraline HCl 100 MG Oral Tab Take 1 tablet (100 mg total) by mouth daily.         ?  Allergies:  Allergies[1]      Objective    There were no vitals filed for this visit.      GEN: NAD, well-nourished.   HEENT: Head: NCAT. Face: No lesions. Eyes: Conjunctiva clear.   PULM:  easy effort  Extremities: No cyanosis, edema or deformities.   Neurologic: Strength, CN2-12 grossly intact   Skin: No lesions or rashes.    ?  Labs:  Lab  Results   Component Value Date    URIC 4.2 12/26/2024       Lab Results   Component Value Date    WBC 3.9 (L) 12/26/2024    RBC 4.45 12/26/2024    HGB 12.1 (L) 12/26/2024    HCT 36.7 (L) 12/26/2024    .0 12/26/2024    MCV 82.5 12/26/2024    MCH 27.2 12/26/2024    MCHC 33.0 12/26/2024    RDW 14.5 12/26/2024    NEPRELIM 2.95 12/26/2024    NEPERCENT 74.8 12/26/2024    LYPERCENT 13.5 12/26/2024    MOPERCENT 9.1 12/26/2024    EOPERCENT 1.8 12/26/2024    BAPERCENT 0.5 12/26/2024    NE 2.95 12/26/2024    LYMABS 0.53 (L) 12/26/2024    MOABSO 0.36 12/26/2024    EOABSO 0.07 12/26/2024    BAABSO 0.02 12/26/2024     Lab Results   Component Value Date     (H) 12/26/2024    BUN 13 12/26/2024    BUNCREA 8.3 (L) 03/05/2021    CREATSERUM 1.09 12/26/2024    ANIONGAP 8 12/26/2024    GFR 68 10/29/2017    GFRNAA 77 07/22/2022    GFRAA 89 07/22/2022    CA 9.1 12/26/2024    OSMOCALC 293 12/26/2024    ALKPHO 126 (H) 12/26/2024    AST 17 12/26/2024    ALT 13 12/26/2024    BILT 0.7 12/26/2024    TP 7.3 12/26/2024    ALB 4.1 12/26/2024    GLOBULIN 3.2 12/26/2024     12/26/2024    K 4.0 12/26/2024     12/26/2024    CO2 23.0 12/26/2024         No results found for: \"ANATI\", \"CATHERINE\", \"ANAS\", \"ANASCRN\", \"ANASCRNRFLX\", \"ADOLPH\"  No results found for: \"SSA\", \"SSAUR\", \"ANTISSA\", \"SSA52\", \"SSA60\", \"SSADD\", \"SSB\", \"ANTISSB\"  No results found for: \"DSDNA\", \"ANTIDSDNA\", \"SMUD\", \"ANTISM\", \"SM\", \"RNP\", \"ANTIRNP\", \"SMITHRNP\"  No results found for: \"SCL70\", \"SCL\", \"DFWUXAV23\"  No results found for: \"C3\", \"C4\"  No results found for: \"DRVVT\", \"LAINT\", \"PTTLUPUS\", \"LUPUSINTERP\", \"LA\", \"G2BY4DNPLK\", \"U6TO9ZHABM\", \"H4JCGTSFIA\", \"J6VWNTOTRG\"  No results found for: \"CARDIOLIPIGG\", \"CARDIOLIPIGM\", \"CARDIOLIPIGA\", \"CARDIOIGA\", \"CARLIP\"      Additional Labs:    Radiology:    7/2024 MRI Spine lumbar spine    Impression   CONCLUSION:    1. There is no evidence of an acute process.  2. There is a stable degree of multilevel disc disease and facet  arthropathy without interval progression.  There is again evidence of foraminal narrowing most notably at L3-4 and L4-5 as previously seen.  3. Severe atrophy and fatty replacement of the left psoas and left iliacus musculature which can be retrospectively seen and is stable.  4. Overall, there are no significant interval changes from the previous study.     X-ray left knee 4 views 2022  Lateral compartment is bone-on-bone with sclerosis and osteophytes.  Severe osteoarthritis.  No acute findings.    Radiology review:  CT FOOT RIGHT (CPT=73700)    Result Date: 1/14/2025  CONCLUSION:   1. Hallux valgus and bunion deformity with mild osteoarthritis of the 1st MTP joint, additionally with degenerative changes of the hallux sesamoids.   2. Mild uric acid crystal deposition along the plantar aspect of the 1st MTP joint.    LOCATION:  [facility]   Dictated by (CST): Deshawn Robles MD on 1/14/2025 at 8:12 AM     Finalized by (CST): Deshawn Robles MD on 1/14/2025 at 8:17 AM       XR PAIN CLINIC FLUOROSCOPY - N/C    Result Date: 1/9/2025  CONCLUSION:  Please see the procedure/operative report for further details.    LOCATION:  [facility]    Dictated by (CST): Stromberg, LeRoy, MD on 1/09/2025 at 12:10 PM     Finalized by (CST): Stromberg, LeRoy, MD on 1/09/2025 at 12:10 PM      =====================================================================================================  Assessment and Plan  Assessment:  1. 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 Therapeutic drug monitoring        #tophaceous gout  -For more than 15 years   -Has never been treated with urate lowering therapy  -Evidence of chronic MSU deposition in hands, olecranon bursa, and particularly feet  -low serum urate, but DECT with MSU in the 1st MTP  -Suspect gouty arthropathy may be contributing to chronic polyarthralgia  -Extensive discussion of pathophysiology of gout today.  Discussed gout is often caused by diminished renal handling of urate, resultant  hyperuricemia, and subsequent deposition of monosodium urate crystals into articular structures.  Management of gouty arthropathy is twofold: 1) treatment of acute gout flares with NSAIDs, colchicine, corticosteroids, and/or anakinra; 2) dissolution of MSU crystals by urate lowering therapy (ULT).     #Psoriasis  -No active psoriatic arthritis or synovitis appreciated today    #Chronic multisite osteoarthritis  -S/p bilateral JORGE L  -Severe, bone-on-bone knee OA; orthopedics providing corticosteroid injections with good benefit  -Lumbar DJD noted    #Reported history of low iron    #A-fib  -On DOAC    High risk medication labs including CMP and CBC w/ diff reviewed from 12/2024. Results are stable.   -[id number] negative 12/2024  ?  Plan:    Allopurinol uptitration: (will cure gout)   Take 0.5 tablets (50 mg total) by mouth daily for 30 days  THEN 1 tablet (100 mg total) daily for 30 days  THEN 2 tablets (200 mg total) daily for 30 days  THEN 3 tablets (300 mg total) daily    Repeat blood work in 4 months.     No colchicine given on coreg/diltiazem    Prednisone taper given (only as needed for gout flares):    6 tabs (30 mg) daily for 2 days  THEN 4 tabs (20 mg) daily for 2 days,   THEN 3 tabs (15 mg) daily for 2 day  THEN 2 tabs (10 mg ) daily for 2 days  THEN 1 tablet (5 mg total) daily for 2 days.      Called patient via number listed in chart today    Original appnt date: Today's Visit: 01/14/25    This video telehealth visit (with [employer] ) was conducted in lieu of a previously scheduled clinic visit because of the COVID-19 pandemic. The patient or patient guardian verbally consented to virtual/remote treatment. All medical decision making was made in conjunction with the patient. This telehealth visit was initiated by the patient or patient guardian given the in-person appointment time as above who was located at [home]. The patient presented alone. Risks and benefits of therapy recommended, and potential  side effects of all medications prescribed were discussed.  Patient was counseled on symptoms that would necessitate more emergency follow up.     The patient was within the [facility] during our visit.     Patient understands and accepts financial responsibility for any deductible, coinsurance and/or co-pays associated with the service. The patient understands that the physical exam will be limited.    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  -     predniSONE 5 MG Oral Tab; Take 6 tablets (30 mg total) by mouth daily for 2 days, THEN 4 tablets (20 mg total) daily for 2 days, THEN 3 tablets (15 mg total) daily for 2 days, THEN 2 tablets (10 mg total) daily for 2 days, THEN 1 tablet (5 mg total) daily for 2 days.    Therapeutic drug monitoring  -     Comp Metabolic Panel (14); Future  -     Uric Acid; Future  -     allopurinol 100 MG Oral Tab; Take 0.5 tablets (50 mg total) by mouth daily for 30 days, THEN 1 tablet (100 mg total) daily for 30 days, THEN 2 tablets (200 mg total) daily for 30 days, THEN 3 tablets (300 mg total) daily.  -     predniSONE 5 MG Oral Tab; Take 6 tablets (30 mg total) by mouth daily for 2 days, THEN 4 tablets (20 mg total) daily for 2 days, THEN 3 tablets (15 mg total) daily for 2 days, THEN 2 tablets (10 mg total) daily for 2 days, THEN 1 tablet (5 mg total) daily for 2 days.          Return in about 5 months (around 6/14/2025).      The above plan of care, diagnosis, orders, and follow-up were discussed with the patient.  Questions related to this recommended plan of care were answered.    Thank you for referring this delightful patient to me. Please feel free to contact me with any questions.     This report was performed utilizing speech recognition software technology. Despite proofreading, speech recognition errors could escape detection. If a word or phrase is confusing or out of context, please do not hesitate to call for   clarification.       Kind regards      Shari Wilson MD  [facility]         [1] No Known Allergies

## 2025-01-14 NOTE — PATIENT INSTRUCTIONS
Allopurinol uptitration: (will cure gout)   Take 0.5 tablets (50 mg total) by mouth daily for 30 days  THEN 1 tablet (100 mg total) daily for 30 days  THEN 2 tablets (200 mg total) daily for 30 days  THEN 3 tablets (300 mg total) daily    Repeat blood work in 4 months.     No colchicine given on coreg/diltiazem    Prednisone taper given (only as needed for gout flares):    6 tabs (30 mg) daily for 2 days  THEN 4 tabs (20 mg) daily for 2 days,   THEN 3 tabs (15 mg) daily for 2 day  THEN 2 tabs (10 mg ) daily for 2 days  THEN 1 tablet (5 mg total) daily for 2 days.

## 2025-01-24 DIAGNOSIS — M79.18 MYOFASCIAL PAIN SYNDROME OF LUMBAR SPINE: ICD-10-CM

## 2025-01-24 DIAGNOSIS — G89.29 OTHER CHRONIC PAIN: ICD-10-CM

## 2025-01-24 DIAGNOSIS — M54.50 ACUTE LEFT-SIDED LOW BACK PAIN WITHOUT SCIATICA: ICD-10-CM

## 2025-01-24 DIAGNOSIS — M79.18 MYOFASCIAL PAIN ON LEFT SIDE: ICD-10-CM

## 2025-01-27 RX ORDER — CYCLOBENZAPRINE HCL 10 MG
10 TABLET ORAL 3 TIMES DAILY PRN
Qty: 60 TABLET | Refills: 0 | Status: SHIPPED | OUTPATIENT
Start: 2025-01-27

## 2025-01-27 RX ORDER — HYDROCODONE BITARTRATE AND ACETAMINOPHEN 7.5; 325 MG/1; MG/1
1 TABLET ORAL EVERY 8 HOURS PRN
Qty: 30 TABLET | Refills: 0 | Status: SHIPPED | OUTPATIENT
Start: 2025-01-27

## 2025-01-27 NOTE — TELEPHONE ENCOUNTER
Medication: cyclobenzaprine 10 MG     Date of last refill: 12/13/24          Medication: HYDROcodone-acetaminophen (NORCO) 7.5-325 MG     Date of last refill: 12/13/24  [place] 12/13/24    Last office visit: 12/16/24 virtual  Due back to clinic per last office note:  na  Date next office visit scheduled:  none    Last URINE Screen: 03/17/24  Screen Results:  as expected      Narcotic Contract EXPIRATION date: 03/12/25    Last OV note recommendation: Plan:   > Recommend lumbar epidural steroid injection once Xarelto can be held  > Follow-up after injection therapy to evaluate response

## 2025-01-31 DIAGNOSIS — R80.9 TYPE 2 DIABETES MELLITUS WITH MICROALBUMINURIA, WITHOUT LONG-TERM CURRENT USE OF INSULIN (HCC): ICD-10-CM

## 2025-01-31 DIAGNOSIS — E11.29 TYPE 2 DIABETES MELLITUS WITH MICROALBUMINURIA, WITHOUT LONG-TERM CURRENT USE OF INSULIN (HCC): ICD-10-CM

## 2025-01-31 RX ORDER — DAPAGLIFLOZIN 10 MG/1
10 TABLET, FILM COATED ORAL DAILY
Qty: 90 TABLET | Refills: 0 | Status: SHIPPED | OUTPATIENT
Start: 2025-01-31

## 2025-01-31 NOTE — TELEPHONE ENCOUNTER
Last time medication was refilled 11/21/24  Last office visit  8/23/24  Next office visit due/scheduled   Future Appointments   Date Time Provider Department Center   2/21/2025  1:00 PM Ryan Astudillo MD [facility] [address] [address]   6/10/2025 11:15 AM Shari Wilson MD [facility]       Passed protocol, Medication sent.

## 2025-02-13 PROBLEM — D44.7 PARAGANGLIOMA (HCC): Status: RESOLVED | Noted: 2020-12-21 | Resolved: 2025-02-13

## 2025-02-13 PROBLEM — C61 PROSTATE CANCER (HCC): Status: RESOLVED | Noted: 2017-03-21 | Resolved: 2025-02-13

## 2025-02-17 ENCOUNTER — OFFICE VISIT (OUTPATIENT)
Dept: RHEUMATOLOGY | Facility: CLINIC | Age: 65
End: 2025-02-17
Payer: COMMERCIAL

## 2025-02-17 VITALS
OXYGEN SATURATION: 96 % | SYSTOLIC BLOOD PRESSURE: 128 MMHG | DIASTOLIC BLOOD PRESSURE: 80 MMHG | TEMPERATURE: 98 F | RESPIRATION RATE: 16 BRPM | HEART RATE: 103 BPM | HEIGHT: 71 IN | BODY MASS INDEX: 36.54 KG/M2 | WEIGHT: 261 LBS

## 2025-02-17 DIAGNOSIS — M1A.9XX1 TOPHUS OF RIGHT FOOT DUE TO GOUT: Primary | ICD-10-CM

## 2025-02-17 DIAGNOSIS — M10.071 ACUTE IDIOPATHIC GOUT INVOLVING TOE OF RIGHT FOOT: ICD-10-CM

## 2025-02-17 RX ORDER — ALLOPURINOL 100 MG/1
TABLET ORAL
Qty: 195 TABLET | Refills: 1 | Status: SHIPPED | OUTPATIENT
Start: 2025-02-17 | End: 2025-08-16

## 2025-02-17 RX ORDER — PREDNISONE 5 MG/1
TABLET ORAL
Qty: 32 TABLET | Refills: 0 | Status: SHIPPED | OUTPATIENT
Start: 2025-02-17 | End: 2025-02-27

## 2025-02-17 NOTE — PROGRESS NOTES
Rheumatology f/u Patient Note  =====================================================================================================    Chief complaint: gout  Chief Complaint   Patient presents with    Follow - Up     LOV 1/14/2025 video visit.   Rapid 3 score is 3.3  Patient had a flare this past weekend to his big toe on the rt foot. He states he took Prednisone and this gave him relief.      Referring   Dr. Eusebia Astudillo MD  Fax: [phone]  Phone: [phone]    =====================================================================================================  [facility]   Date of visit: 12/18/2024  ?    Darrel Salazar is a 64 year old male   Here for evaluation of psoriatic arthritis.  -Seeing dermatology for psoriasis.  Using topical corticosteroids which are partially effective.  Gout x 15 year with podagra. No renal stones.  No recent gout flares.  On norco 7.5 prn. APAP 3000 mg daily prn limit.  No NSAIDs given anticoagulation.  On DOAC for Afib  Smoking cannabis and edibles  Father with gout and psoriasis   Hx of knee OA, s/p bilatearl CSI twice a year. This helps the knees and the rest of the joints.   -b/l JORGE L in the past  S/p left pheochromocytoma  ==============================================================================================================  Visit: 01/14/25  Here to discuss DECT demonstrating MSU deposits.   ==============================================================================================================  Today's Visit: 02/17/25    History of Present Illness  Rio Salazar is a 64 year old male with gout who presents with a burning sensation in the foot.    He has been experiencing a burning sensation in his foot, described as feeling like it was 'on fire' yesterday. The pain has improved significantly today after taking allopurinol and prednisone. He took two prednisone 5 mg pills yesterday, which helped alleviate the pain.    He recently  experienced a flare-up of gout, 2 days agocharacterized by redness and swelling, particularly in the big toe and some discomfort in the small toe. The swelling has decreased since yesterday. He recalls a previous incident where his knee flared up, causing difficulty walking, but he is not currently experiencing that issue.    He has been taking allopurinol daily and is currently taking two pills, although he was advised to take one. He confirms that he has taken all his prescribed shots.    He has a history of pinched nerves in the back, which he mentions in the context of discussing his foot pain. No shooting pain from the back is reported.    He has been using creams for rashes that appeared recently, which he attributes to the medication. No new rashes apart from those managed with creams.    5 point ROS negative except noted above  I had reviewed past medical and family histories together with allergy and medication lists documented.      Medications:   predniSONE 5 MG Oral Tab Take 6 tablets (30 mg total) by mouth daily for 2 days, THEN 4 tablets (20 mg total) daily for 2 days, THEN 3 tablets (15 mg total) daily for 2 days, THEN 2 tablets (10 mg total) daily for 2 days, THEN 1 tablet (5 mg total) daily for 2 days. For gout flares only. 32 tablet 0    allopurinol 100 MG Oral Tab Take 1 tablet (100 mg total) by mouth daily for 40 days, THEN 2 tablets (200 mg total) daily for 30 days, THEN 3 tablets (300 mg total) daily. 195 tablet 1    dapagliflozin 10 MG Oral Tab Take 1 tablet (10 mg total) by mouth daily. 90 tablet 0    cyclobenzaprine 10 MG Oral Tab Take 1 tablet (10 mg total) by mouth 3 (three) times daily as needed for Muscle spasms. 60 tablet 0    HYDROcodone-acetaminophen (NORCO) 7.5-325 MG Oral Tab Take 1 tablet by mouth every 8 (eight) hours as needed for Pain (back). To last one month 30 tablet 0    Calcipotriene 0.005 % External Ointment       clobetasol 0.05 % External Ointment       allopurinol 100  MG Oral Tab Take 0.5 tablets (50 mg total) by mouth daily for 30 days, THEN 1 tablet (100 mg total) daily for 30 days, THEN 2 tablets (200 mg total) daily for 30 days, THEN 3 tablets (300 mg total) daily. 255 tablet 2    HYDROXYZINE 10 MG Oral Tab TAKE 1 TABLET(10 MG) BY MOUTH DAILY 90 tablet 0    furosemide 20 MG Oral Tab Take 1 tablet (20 mg total) by mouth daily. 90 tablet 3    carvedilol 25 MG Oral Tab Take 1 tablet (25 mg total) by mouth 2 (two) times daily with meals. 180 tablet 3    losartan 100 MG Oral Tab Take 1 tablet (100 mg total) by mouth daily. 90 tablet 3    atorvastatin 40 MG Oral Tab Take 1 tablet (40 mg total) by mouth daily. 90 tablet 3    dilTIAZem HCl ER Coated Beads 180 MG Oral Capsule SR 24 Hr Take 1 capsule (180 mg total) by mouth daily. 90 capsule 3    triamcinolone 0.1 % External Cream Apply topically 2 (two) times daily as needed. 60 g 0    XARELTO 20 MG Oral Tab TAKE 1 TABLET DAILY WITH FOOD 90 tablet 3    LATANOPROST 0.005 % Ophthalmic Solution INSTILL 1 DROP IN BOTH EYES NIGHTLY (NO ADDITIONAL REFILLS UNTIL EVALUATED BY DOCTOR) 7.5 mL 3    Sertraline HCl 100 MG Oral Tab Take 1 tablet (100 mg total) by mouth daily.         ?  Allergies:  Allergies[1]      Objective    Vitals:    02/17/25 1413 02/17/25 1429   BP: 150/70 128/80   Pulse: 103    Resp: 16    Temp: 98 °F (36.7 °C)    SpO2: 96%    Weight: 261 lb (118.4 kg)    Height: 5' 11\" (1.803 m)      GEN: NAD, well-nourished.   HEENT: Head: NCAT. Face: No lesions. Eyes: Conjunctiva clear.   PULM:  easy effort  Extremities: No cyanosis, edema or deformities.   Neurologic: Strength, CN2-12 grossly intact   Skin: No lesions or rashes.  MSK: 28 joint count performed. No evidence of synovitis in mcp, pip, dip, wrist, elbows, shoulders, hips, knees, ankles, mtp unless otherwise noted. Full ROM of elbows, wrists, knees.     Slight right MTP 1 bursal thickening, slight effusion of the MTP 1 with minimal tenderness  -Negative straight leg  raise  Labs:  Lab Results   Component Value Date    URIC 4.2 12/26/2024       Lab Results   Component Value Date    WBC 3.9 (L) 12/26/2024    RBC 4.45 12/26/2024    HGB 12.1 (L) 12/26/2024    HCT 36.7 (L) 12/26/2024    .0 12/26/2024    MCV 82.5 12/26/2024    MCH 27.2 12/26/2024    MCHC 33.0 12/26/2024    RDW 14.5 12/26/2024    NEPRELIM 2.95 12/26/2024    NEPERCENT 74.8 12/26/2024    LYPERCENT 13.5 12/26/2024    MOPERCENT 9.1 12/26/2024    EOPERCENT 1.8 12/26/2024    BAPERCENT 0.5 12/26/2024    NE 2.95 12/26/2024    LYMABS 0.53 (L) 12/26/2024    MOABSO 0.36 12/26/2024    EOABSO 0.07 12/26/2024    BAABSO 0.02 12/26/2024     Lab Results   Component Value Date     (H) 12/26/2024    BUN 13 12/26/2024    BUNCREA 8.3 (L) 03/05/2021    CREATSERUM 1.09 12/26/2024    ANIONGAP 8 12/26/2024    GFR 68 10/29/2017    GFRNAA 77 07/22/2022    GFRAA 89 07/22/2022    CA 9.1 12/26/2024    OSMOCALC 293 12/26/2024    ALKPHO 126 (H) 12/26/2024    AST 17 12/26/2024    ALT 13 12/26/2024    BILT 0.7 12/26/2024    TP 7.3 12/26/2024    ALB 4.1 12/26/2024    GLOBULIN 3.2 12/26/2024     12/26/2024    K 4.0 12/26/2024     12/26/2024    CO2 23.0 12/26/2024         No results found for: \"ANATI\", \"CATHERINE\", \"ANAS\", \"ANASCRN\", \"ANASCRNRFLX\", \"ADOLPH\"  No results found for: \"SSA\", \"SSAUR\", \"ANTISSA\", \"SSA52\", \"SSA60\", \"SSADD\", \"SSB\", \"ANTISSB\"  No results found for: \"DSDNA\", \"ANTIDSDNA\", \"SMUD\", \"ANTISM\", \"SM\", \"RNP\", \"ANTIRNP\", \"SMITHRNP\"  No results found for: \"SCL70\", \"SCL\", \"XCCPTBF82\"  No results found for: \"C3\", \"C4\"  No results found for: \"DRVVT\", \"LAINT\", \"PTTLUPUS\", \"LUPUSINTERP\", \"LA\", \"Z9QC2QJTYC\", \"T3RB4NCYYF\", \"P2BDJCONUS\", \"Q1ZRPNIBVI\"  No results found for: \"CARDIOLIPIGG\", \"CARDIOLIPIGM\", \"CARDIOLIPIGA\", \"CARDIOIGA\", \"CARLIP\"      Additional Labs:    Radiology:    7/2024 MRI Spine lumbar spine    Impression   CONCLUSION:    1. There is no evidence of an acute process.  2. There is a stable degree of multilevel disc  disease and facet arthropathy without interval progression.  There is again evidence of foraminal narrowing most notably at L3-4 and L4-5 as previously seen.  3. Severe atrophy and fatty replacement of the left psoas and left iliacus musculature which can be retrospectively seen and is stable.  4. Overall, there are no significant interval changes from the previous study.     X-ray left knee 4 views 2022  Lateral compartment is bone-on-bone with sclerosis and osteophytes.  Severe osteoarthritis.  No acute findings.    Radiology review:  CT FOOT RIGHT (CPT=73700)    Result Date: 1/14/2025  CONCLUSION:   1. Hallux valgus and bunion deformity with mild osteoarthritis of the 1st MTP joint, additionally with degenerative changes of the hallux sesamoids.   2. Mild uric acid crystal deposition along the plantar aspect of the 1st MTP joint.    LOCATION:  [facility]   Dictated by (CST): Deshawn Robles MD on 1/14/2025 at 8:12 AM     Finalized by (CST): Deshawn Robles MD on 1/14/2025 at 8:17 AM       XR PAIN CLINIC FLUOROSCOPY - N/C    Result Date: 1/9/2025  CONCLUSION:  Please see the procedure/operative report for further details.    LOCATION:  [facility]    Dictated by (CST): Stromberg, LeRoy, MD on 1/09/2025 at 12:10 PM     Finalized by (CST): Stromberg, LeRoy, MD on 1/09/2025 at 12:10 PM      =====================================================================================================  Assessment and Plan  Assessment:  1. 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 Acute idiopathic gout involving toe of right foot      #tophaceous gout  -For more than 15 years   -Has never been treated with urate lowering therapy  -Evidence of chronic MSU deposition in hands, olecranon bursa, and particularly feet  -low serum urate, but DECT with MSU in the 1st MTP  -Suspect gouty arthropathy may be contributing to chronic polyarthralgia  -Recent gout flare of the right MTP 1 in the setting of initiation of urate lowering therapy about 1 month ago.   Escalation of urate lowering therapy was done at a pace faster than the recommended up titration schedule which may have set off the flareup.  Prednisone 10 mg was started yesterday with significant relief of his podagra.    #Psoriasis  -No active psoriatic arthritis or synovitis appreciated today    The following in italics were not discussed today:  #Chronic multisite osteoarthritis  -S/p bilateral JORGE L  -Severe, bone-on-bone knee OA; orthopedics providing corticosteroid injections with good benefit  -Lumbar DJD noted  #Reported history of low iron  #A-fib  -On DOAC    High risk medication labs including CMP and CBC w/ diff reviewed from 12/2024. Results are stable.   -[id number] negative 12/2024  ?  Plan:    Prednisone 5 mg tablets:  -take 2 pills daily for 4 more days, then on Friday take one pill daily x 7 days. Then stop     taking too much allopurinol too quickly which may have set of gout flare.  Decrease allopurinol to one pill daily until April 1st  -then increase to 2 pills daily  -take 2 pills daily until May 1st.   -Increase to 3 pills daily thereafter.    Prednisone taper given (only as needed for gout flares):    6 tabs (30 mg) daily for 2 days  THEN 4 tabs (20 mg) daily for 2 days,   THEN 3 tabs (15 mg) daily for 2 day  THEN 2 tabs (10 mg ) daily for 2 days  THEN 1 tablet (5 mg total) daily for 2 days.    Repeat bloodwork in mid/late May 2025.    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 For gout flares only.  -     allopurinol 100 MG Oral Tab; Take 1 tablet (100 mg total) by mouth daily for 40 days, THEN 2 tablets (200 mg total) daily for 30 days, THEN 3 tablets (300 mg total) daily.    Acute idiopathic gout involving toe of right foot        No follow-ups on file.      The above plan of care, diagnosis, orders, and follow-up were discussed with the patient. Questions related to this recommended plan of care were answered.    Thank you for referring this delightful patient to me. Please feel free to contact me with any questions.     This report was performed utilizing speech recognition software technology. Despite proofreading, speech recognition errors could escape detection. If a word or phrase is confusing or out of context, please do not hesitate to call for   clarification.       Kind regards      Shari Wilson MD  [facility]         [1] No Known Allergies

## 2025-02-17 NOTE — PATIENT INSTRUCTIONS
Prednisone 5 mg tablets:  -take 2 pills daily for 4 more days, then on Friday take one pill daily x 7 days. Then stop     Decrease allopurinol to one pill daily until April 1st  -then increase to 2 pills daily  -take 2 pills daily until May 1st.   -Increase to 3 pills daily thereafter.    Prednisone taper given (only as needed for gout flares):    6 tabs (30 mg) daily for 2 days  THEN 4 tabs (20 mg) daily for 2 days,   THEN 3 tabs (15 mg) daily for 2 day  THEN 2 tabs (10 mg ) daily for 2 days  THEN 1 tablet (5 mg total) daily for 2 days.    Repeat bloodwork in mid/late May 2025.    No colchicine given on coreg/diltiazem

## 2025-02-20 PROBLEM — M54.16 LUMBAR RADICULITIS: Status: RESOLVED | Noted: 2025-01-09 | Resolved: 2025-02-20

## 2025-02-21 ENCOUNTER — OFFICE VISIT (OUTPATIENT)
Dept: INTERNAL MEDICINE CLINIC | Facility: CLINIC | Age: 65
End: 2025-02-21
Payer: COMMERCIAL

## 2025-02-21 ENCOUNTER — LAB ENCOUNTER (OUTPATIENT)
Dept: LAB | Age: 65
End: 2025-02-21
Attending: INTERNAL MEDICINE
Payer: COMMERCIAL

## 2025-02-21 VITALS
SYSTOLIC BLOOD PRESSURE: 130 MMHG | WEIGHT: 261 LBS | DIASTOLIC BLOOD PRESSURE: 82 MMHG | BODY MASS INDEX: 36.54 KG/M2 | TEMPERATURE: 98 F | RESPIRATION RATE: 18 BRPM | OXYGEN SATURATION: 99 % | HEART RATE: 81 BPM | HEIGHT: 71 IN

## 2025-02-21 DIAGNOSIS — Z00.00 ROUTINE GENERAL MEDICAL EXAMINATION AT A HEALTH CARE FACILITY: ICD-10-CM

## 2025-02-21 DIAGNOSIS — Z12.5 PROSTATE CANCER SCREENING: ICD-10-CM

## 2025-02-21 DIAGNOSIS — Z00.00 ANNUAL PHYSICAL EXAM: Primary | ICD-10-CM

## 2025-02-21 DIAGNOSIS — E11.29 TYPE 2 DIABETES MELLITUS WITH MICROALBUMINURIA, WITHOUT LONG-TERM CURRENT USE OF INSULIN (HCC): ICD-10-CM

## 2025-02-21 DIAGNOSIS — Z23 NEED FOR TDAP VACCINATION: ICD-10-CM

## 2025-02-21 DIAGNOSIS — R80.9 TYPE 2 DIABETES MELLITUS WITH MICROALBUMINURIA, WITHOUT LONG-TERM CURRENT USE OF INSULIN (HCC): ICD-10-CM

## 2025-02-21 DIAGNOSIS — I15.2 HYPERTENSION ASSOCIATED WITH DIABETES (HCC): ICD-10-CM

## 2025-02-21 DIAGNOSIS — E11.59 HYPERTENSION ASSOCIATED WITH DIABETES (HCC): ICD-10-CM

## 2025-02-21 LAB
ALBUMIN SERPL-MCNC: 4.6 G/DL (ref 3.2–4.8)
ALBUMIN/GLOB SERPL: 1.8 {RATIO} (ref 1–2)
ALP LIVER SERPL-CCNC: 129 U/L
ALT SERPL-CCNC: 20 U/L
ANION GAP SERPL CALC-SCNC: 12 MMOL/L (ref 0–18)
AST SERPL-CCNC: 20 U/L (ref ?–34)
BASOPHILS # BLD AUTO: 0.02 X10(3) UL (ref 0–0.2)
BASOPHILS NFR BLD AUTO: 0.4 %
BILIRUB SERPL-MCNC: 0.6 MG/DL (ref 0.2–1.1)
BUN BLD-MCNC: 16 MG/DL (ref 9–23)
CALCIUM BLD-MCNC: 9.5 MG/DL (ref 8.7–10.6)
CHLORIDE SERPL-SCNC: 104 MMOL/L (ref 98–112)
CHOLEST SERPL-MCNC: 192 MG/DL (ref ?–200)
CO2 SERPL-SCNC: 25 MMOL/L (ref 21–32)
COMPLEXED PSA SERPL-MCNC: <0.04 NG/ML (ref ?–4)
CREAT BLD-MCNC: 1.2 MG/DL
CREAT UR-SCNC: 29.8 MG/DL
EGFRCR SERPLBLD CKD-EPI 2021: 68 ML/MIN/1.73M2 (ref 60–?)
EOSINOPHIL # BLD AUTO: 0.05 X10(3) UL (ref 0–0.7)
EOSINOPHIL NFR BLD AUTO: 1 %
ERYTHROCYTE [DISTWIDTH] IN BLOOD BY AUTOMATED COUNT: 14.3 %
EST. AVERAGE GLUCOSE BLD GHB EST-MCNC: 171 MG/DL (ref 68–126)
FASTING PATIENT LIPID ANSWER: YES
FASTING STATUS PATIENT QL REPORTED: YES
GLOBULIN PLAS-MCNC: 2.6 G/DL (ref 2–3.5)
GLUCOSE BLD-MCNC: 166 MG/DL (ref 70–99)
HBA1C MFR BLD: 7.6 % (ref ?–5.7)
HCT VFR BLD AUTO: 38 %
HDLC SERPL-MCNC: 62 MG/DL (ref 40–59)
HGB BLD-MCNC: 12.7 G/DL
IMM GRANULOCYTES # BLD AUTO: 0.05 X10(3) UL (ref 0–1)
IMM GRANULOCYTES NFR BLD: 1 %
LDLC SERPL CALC-MCNC: 117 MG/DL (ref ?–100)
LYMPHOCYTES # BLD AUTO: 0.69 X10(3) UL (ref 1–4)
LYMPHOCYTES NFR BLD AUTO: 13.8 %
MCH RBC QN AUTO: 26.7 PG (ref 26–34)
MCHC RBC AUTO-ENTMCNC: 33.4 G/DL (ref 31–37)
MCV RBC AUTO: 80 FL
MICROALBUMIN UR-MCNC: 0.4 MG/DL
MICROALBUMIN/CREAT 24H UR-RTO: 13.4 UG/MG (ref ?–30)
MONOCYTES # BLD AUTO: 0.51 X10(3) UL (ref 0.1–1)
MONOCYTES NFR BLD AUTO: 10.2 %
NEUTROPHILS # BLD AUTO: 3.69 X10 (3) UL (ref 1.5–7.7)
NEUTROPHILS # BLD AUTO: 3.69 X10(3) UL (ref 1.5–7.7)
NEUTROPHILS NFR BLD AUTO: 73.6 %
NONHDLC SERPL-MCNC: 130 MG/DL (ref ?–130)
OSMOLALITY SERPL CALC.SUM OF ELEC: 297 MOSM/KG (ref 275–295)
PLATELET # BLD AUTO: 259 10(3)UL (ref 150–450)
POTASSIUM SERPL-SCNC: 3.8 MMOL/L (ref 3.5–5.1)
PROT SERPL-MCNC: 7.2 G/DL (ref 5.7–8.2)
RBC # BLD AUTO: 4.75 X10(6)UL
SODIUM SERPL-SCNC: 141 MMOL/L (ref 136–145)
TRIGL SERPL-MCNC: 71 MG/DL (ref 30–149)
TSI SER-ACNC: 1.9 UIU/ML (ref 0.55–4.78)
VLDLC SERPL CALC-MCNC: 12 MG/DL (ref 0–30)
WBC # BLD AUTO: 5 X10(3) UL (ref 4–11)

## 2025-02-21 PROCEDURE — 82043 UR ALBUMIN QUANTITATIVE: CPT

## 2025-02-21 PROCEDURE — 36415 COLL VENOUS BLD VENIPUNCTURE: CPT

## 2025-02-21 PROCEDURE — 85025 COMPLETE CBC W/AUTO DIFF WBC: CPT

## 2025-02-21 PROCEDURE — 80061 LIPID PANEL: CPT

## 2025-02-21 PROCEDURE — 84443 ASSAY THYROID STIM HORMONE: CPT

## 2025-02-21 PROCEDURE — 80053 COMPREHEN METABOLIC PANEL: CPT

## 2025-02-21 PROCEDURE — 83036 HEMOGLOBIN GLYCOSYLATED A1C: CPT

## 2025-02-21 PROCEDURE — 82570 ASSAY OF URINE CREATININE: CPT

## 2025-02-21 RX ORDER — TIRZEPATIDE 15 MG/.5ML
INJECTION, SOLUTION SUBCUTANEOUS WEEKLY
COMMUNITY

## 2025-02-21 NOTE — PROGRESS NOTES
Subjective:   Patient ID: Darrel Salazar is a 64 year old male.    HPI  Darrel Salazar is a 64 year old male who presents for a complete physical exam.   HPI:   Pt reports normal state of health    He follows with Dr Amaya for dm2 contol. Las A1c 1/2025 6.5%  No worsening vision.  Denies edema of legs  Denies cp or tatum    PAST MEDICAL, SOCIAL, FAMILY HISTORIES REVIEWED WITH PT  .    Immunization History   Administered Date(s) Administered    >=3 YRS QUAD MULTIDOSE VIAL (60183) FLU CLINIC 10/12/2016    Afluria, 9 Years & >, IM 09/25/2015    Covid-19 Vaccine Moderna 50 Mcg/0.25 Ml 05/18/2022    Covid-19 Vaccine Pfizer 30 mcg/0.3 ml 03/19/2021, 04/09/2021, 10/31/2021    Covid-19 Vaccine Pfizer Bivalent 30mcg/0.3mL 09/08/2022    FLULAVAL 6 months & older 0.5 ml Prefilled syringe (75956) 09/08/2017, 09/24/2019, 09/22/2020, 09/28/2021    FLUZONE 6 months and older PFS 0.5 ml (36273) 12/31/2015, 09/08/2017, 10/08/2018, 09/24/2019, 09/22/2020, 09/28/2021, 10/17/2023    FLUZONE 6-35 Mos 0.25 ml Dose Quad Split PF ([id number]) 09/21/2022    IPV 11/12/2015, 11/12/2015    Influenza 09/26/2013, 10/22/2014, 09/21/2022, 09/21/2022, 10/19/2023, 11/08/2024    Pfizer Covid-19 Vaccine 30mcg/0.3ml 12yrs+ 10/21/2023    Pneumococcal (Prevnar 13) 11/09/2017    Pneumococcal Conjugate PCV20 09/08/2022, 04/14/2023    Pneumovax 23 12/06/2016    RSV, bivalent, diluent reconstituted PF (Abrysvo) 10/21/2023    TDAP 03/26/2014    Typhoid, Oral 11/12/2015    Zoster Vaccine Recombinant Adjuvanted (Shingrix) 10/03/2021, 05/18/2022   Pended Date(s) Pended    TDAP 08/23/2024, 02/21/2025     Wt Readings from Last 6 Encounters:   02/21/25 261 lb (118.4 kg)   02/17/25 261 lb (118.4 kg)   01/08/25 260 lb (117.9 kg)   12/18/24 260 lb (117.9 kg)   08/28/24 267 lb 12.8 oz (121.5 kg)   08/23/24 267 lb (121.1 kg)     Body mass index is 36.4 kg/m².     Lab Results   Component Value Date     (H) 12/26/2024     (H) 08/23/2024     (H)  01/18/2023     Lab Results   Component Value Date    CHOLEST 154 08/23/2024    CHOLEST 191 01/18/2023    CHOLEST 181 03/04/2022     Lab Results   Component Value Date    HDL 39 (L) 08/23/2024    HDL 56 01/18/2023    HDL 55 03/04/2022     Lab Results   Component Value Date    LDL 95 08/23/2024     (H) 01/18/2023     (H) 03/04/2022     Lab Results   Component Value Date    AST 17 12/26/2024    AST 23 08/23/2024    AST 15 01/18/2023     Lab Results   Component Value Date    ALT 13 12/26/2024    ALT 18 08/23/2024    ALT 17 01/18/2023     Lab Results   Component Value Date    PSA 0.20 12/02/2021    PSA 0.244 10/21/2021    PSA 0.085 02/25/2021        Current Outpatient Medications   Medication Sig Dispense Refill    Tirzepatide (MOUNJARO) 15 MG/0.5ML Subcutaneous Solution Auto-injector Inject into the skin once a week.      dapagliflozin 10 MG Oral Tab Take 1 tablet (10 mg total) by mouth daily. 90 tablet 0    cyclobenzaprine 10 MG Oral Tab Take 1 tablet (10 mg total) by mouth 3 (three) times daily as needed for Muscle spasms. 60 tablet 0    HYDROcodone-acetaminophen (NORCO) 7.5-325 MG Oral Tab Take 1 tablet by mouth every 8 (eight) hours as needed for Pain (back). To last one month 30 tablet 0    clobetasol 0.05 % External Ointment       allopurinol 100 MG Oral Tab Take 0.5 tablets (50 mg total) by mouth daily for 30 days, THEN 1 tablet (100 mg total) daily for 30 days, THEN 2 tablets (200 mg total) daily for 30 days, THEN 3 tablets (300 mg total) daily. 255 tablet 2    Naloxone HCl 4 MG/0.1ML Nasal Liquid 4 mg by Intranasal route as needed (May repeat as needed in other nostril if symptoms persist). If patient remains unresponsive, repeat dose in other nostril 2-5 minutes after first dose. 1 kit 0    HYDROXYZINE 10 MG Oral Tab TAKE 1 TABLET(10 MG) BY MOUTH DAILY 90 tablet 0    furosemide 20 MG Oral Tab Take 1 tablet (20 mg total) by mouth daily. 90 tablet 3    carvedilol 25 MG Oral Tab Take 1 tablet (25 mg  total) by mouth 2 (two) times daily with meals. 180 tablet 3    losartan 100 MG Oral Tab Take 1 tablet (100 mg total) by mouth daily. 90 tablet 3    atorvastatin 40 MG Oral Tab Take 1 tablet (40 mg total) by mouth daily. 90 tablet 3    dilTIAZem HCl ER Coated Beads 180 MG Oral Capsule SR 24 Hr Take 1 capsule (180 mg total) by mouth daily. 90 capsule 3    Continuous Blood Gluc Sensor (FREESTYLE HENRRY 3 SENSOR) Does not apply Misc       triamcinolone 0.1 % External Cream Apply topically 2 (two) times daily as needed. 60 g 0    Continuous Blood Gluc  (FREESTYLE HENRRY 14 DAY READER) Does not apply Device Use to monitor blood sugar. 1 each 0    XARELTO 20 MG Oral Tab TAKE 1 TABLET DAILY WITH FOOD 90 tablet 3    LATANOPROST 0.005 % Ophthalmic Solution INSTILL 1 DROP IN BOTH EYES NIGHTLY (NO ADDITIONAL REFILLS UNTIL EVALUATED BY DOCTOR) 7.5 mL 3    Sertraline HCl 100 MG Oral Tab Take 1 tablet (100 mg total) by mouth daily.      allopurinol 100 MG Oral Tab Take 1 tablet (100 mg total) by mouth daily for 40 days, THEN 2 tablets (200 mg total) daily for 30 days, THEN 3 tablets (300 mg total) daily. 195 tablet 1    Calcipotriene 0.005 % External Ointment       Insulin Syringe-Needle U-100 (BD INSULIN SYRINGE ULTRAFINE) 31G X 5/16\" 1 ML Does not apply Misc As directed (Patient not taking: Reported on 1/14/2025) 90 each 5      Past Medical History:    Abdominal hernia    Abdominal pain    Anxiety    Arrhythmia    A-FIB DX 2 YRS AGO    Arthritis    Back pain    Back problem    Low back pain    Cancer (HCC)    prostate    Deep vein thrombosis (HCC)    left    Depression    Diabetes mellitus (HCC)    Esophageal reflux    Fatigue    Glaucoma    Heartburn    High blood pressure    High cholesterol    History of depression    History of hip replacement    March 2020, 11/2010    Insomnia    Night sweats    OA (osteoarthritis)    Obesity    Osteoarthritis    Pain in joints    Sleep apnea    repeat SAM test neg after weight loss     Stress    Type II or unspecified type diabetes mellitus without mention of complication, not stated as uncontrolled    Visual impairment    glasses    Wears glasses      Past Surgical History:   Procedure Laterality Date    Abdomen surgery proc unlisted  01/26/2021    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    Anesth,perineal remv of prostate  2019    Colonoscopy      Colonoscopy N/A 12/8/2022    Procedure: COLONOSCOPY with forcep polypectomy;  Surgeon: Godwin Eller MD;  Location:  ENDOSCOPY    Hernia surgery      Hip replacement surgery Left 2010    Hip replacement surgery Right 03/03/2020    Laparoscopy, surgical prostatectomy, retropubic radical, w/nerve sparing  11/15/2019        Other surgical history      vocal cord polyps removed    Other surgical history  11/15/2019    RALP Dr. Muir       Family History   Problem Relation Age of Onset    Cancer Father 64        lung    Other (Other) Mother 72        aneurism    Diabetes Sister     Suicide History Sister     Alcohol and Other Disorders Associated Brother     Cancer Brother 72        prostate cancer      Social History:  Social History     Socioeconomic History    Marital status:     Number of children: 4   Tobacco Use    Smoking status: Never    Smokeless tobacco: Never   Vaping Use    Vaping status: Never Used   Substance and Sexual Activity    Alcohol use: No    Drug use: Yes     Types: Cannabis   Other Topics Concern    Caffeine Concern Yes     Comment: 2 cups    Exercise No     Comment: WATER AEROBICS WEEKLY    Seat Belt No    Special Diet No    Stress Concern Yes    Weight Concern Yes   Social History Narrative    - lives with wife    4 grown children      Occ: yes. : yes. Children: yes.   Exercise: minimal.  Diet: watches sugar closely     REVIEW OF SYSTEMS:   GENERAL: feels well otherwise  A comprehensive 10 point review of systems was completed.      Pertinent positives and negatives noted in the HPI.      EXAM:   /82   Pulse 81   Temp 97.8 °F (36.6 °C)   Resp 18   Ht 5' 11\" (1.803 m)   Wt 261 lb (118.4 kg)   SpO2 99%   BMI 36.40 kg/m²   Body mass index is 36.4 kg/m².   GENERAL: well developed, well nourished,in no apparent distress  SKIN: no rashes,no suspicious lesions  HEENT: atraumatic, normocephalic,ears and throat are clear  EYES:PERRLA, EOMI, conjunctiva are clear  NECK: supple,no adenopathy,no bruits  LUNGS: clear to auscultation  CARDIO: RRR without murmur  GI: good BS's,no masses, HSM or tenderness  :deferred  MUSCULOSKELETAL: back is not tender  EXTREMITIES: no cyanosis, clubbing or edema  Bilateral barefoot skin diabetic exam is normal, visualized feet and the appearance is normal.  Bilateral monofilament/sensation of both feet is normal.  Pulsation pedal pulse exam of both lower legs/feet is normal as well.  NEURO: Oriented times three,motor and sensory are grossly intact    ASSESSMENT AND PLAN:   Darrel Salazar is a 64 year old male who presents for a complete physical exam.   Body mass index is 36.4 kg/m²., recommended low fat diet and aerobic exercise 30 minutes three times weekly.     Health maintenance, will check fasting Lipids, CMP, CBC and PSA.     Type 2 diabetes mellitus with microalbuminuria, without long-term current use of insulin (HCC) and Hypertension associated with diabetes (HCC) which are controlled    Pt is up to date with screening colonoscopy.     Pt info handouts given for: exercise, low fat diet,     The patient indicates understanding of these issues and agrees to the plan.  The patient is asked to return in 6 m.    History/Other:   Review of Systems  Current Outpatient Medications   Medication Sig Dispense Refill    Tirzepatide (MOUNJARO) 15 MG/0.5ML Subcutaneous Solution Auto-injector Inject into the skin once a week.      dapagliflozin 10 MG Oral Tab Take 1 tablet (10 mg total) by mouth daily. 90  tablet 0    cyclobenzaprine 10 MG Oral Tab Take 1 tablet (10 mg total) by mouth 3 (three) times daily as needed for Muscle spasms. 60 tablet 0    HYDROcodone-acetaminophen (NORCO) 7.5-325 MG Oral Tab Take 1 tablet by mouth every 8 (eight) hours as needed for Pain (back). To last one month 30 tablet 0    clobetasol 0.05 % External Ointment       allopurinol 100 MG Oral Tab Take 0.5 tablets (50 mg total) by mouth daily for 30 days, THEN 1 tablet (100 mg total) daily for 30 days, THEN 2 tablets (200 mg total) daily for 30 days, THEN 3 tablets (300 mg total) daily. 255 tablet 2    Naloxone HCl 4 MG/0.1ML Nasal Liquid 4 mg by Intranasal route as needed (May repeat as needed in other nostril if symptoms persist). If patient remains unresponsive, repeat dose in other nostril 2-5 minutes after first dose. 1 kit 0    HYDROXYZINE 10 MG Oral Tab TAKE 1 TABLET(10 MG) BY MOUTH DAILY 90 tablet 0    furosemide 20 MG Oral Tab Take 1 tablet (20 mg total) by mouth daily. 90 tablet 3    carvedilol 25 MG Oral Tab Take 1 tablet (25 mg total) by mouth 2 (two) times daily with meals. 180 tablet 3    losartan 100 MG Oral Tab Take 1 tablet (100 mg total) by mouth daily. 90 tablet 3    atorvastatin 40 MG Oral Tab Take 1 tablet (40 mg total) by mouth daily. 90 tablet 3    dilTIAZem HCl ER Coated Beads 180 MG Oral Capsule SR 24 Hr Take 1 capsule (180 mg total) by mouth daily. 90 capsule 3    Continuous Blood Gluc Sensor ([employer]STYLE HENRRY 3 SENSOR) Does not apply Misc       triamcinolone 0.1 % External Cream Apply topically 2 (two) times daily as needed. 60 g 0    Continuous Blood Gluc  (FREESTYLE HENRRY 14 DAY READER) Does not apply Device Use to monitor blood sugar. 1 each 0    XARELTO 20 MG Oral Tab TAKE 1 TABLET DAILY WITH FOOD 90 tablet 3    LATANOPROST 0.005 % Ophthalmic Solution INSTILL 1 DROP IN BOTH EYES NIGHTLY (NO ADDITIONAL REFILLS UNTIL EVALUATED BY DOCTOR) 7.5 mL 3    Sertraline HCl 100 MG Oral Tab Take 1 tablet (100 mg  total) by mouth daily.      allopurinol 100 MG Oral Tab Take 1 tablet (100 mg total) by mouth daily for 40 days, THEN 2 tablets (200 mg total) daily for 30 days, THEN 3 tablets (300 mg total) daily. 195 tablet 1    Calcipotriene 0.005 % External Ointment       Insulin Syringe-Needle U-100 (BD INSULIN SYRINGE ULTRAFINE) 31G X 5/16\" 1 ML Does not apply Misc As directed (Patient not taking: Reported on 1/14/2025) 90 each 5     Allergies:Allergies[1]    Objective:   Physical Exam    Assessment & Plan:   1. Annual physical exam    2. Type 2 diabetes mellitus with microalbuminuria, without long-term current use of insulin (HCC)    3. Hypertension associated with diabetes (HCC)    4. Routine general medical examination at a health care facility    5. Prostate cancer screening    6. Need for Tdap vaccination        Orders Placed This Encounter   Procedures    Microalb/Creat Ratio, Random Urine    Comp Metabolic Panel (14)    Lipid Panel    CBC With Differential With Platelet    PSA Total, Screen    TSH W Reflex To Free T4    Hemoglobin A1C    TdaP (Adacel, Boostrix) [52006]       Meds This Visit:  Requested Prescriptions      No prescriptions requested or ordered in this encounter       Imaging & Referrals:  TETANUS, DIPHTHERIA TOXOIDS AND ACELLULAR PERTUSIS VACCINE (TDAP), >7 YEARS, IM USE         [1] No Known Allergies

## 2025-02-24 DIAGNOSIS — M79.18 MYOFASCIAL PAIN ON LEFT SIDE: ICD-10-CM

## 2025-02-24 DIAGNOSIS — M79.18 MYOFASCIAL PAIN SYNDROME OF LUMBAR SPINE: ICD-10-CM

## 2025-02-24 DIAGNOSIS — G89.29 OTHER CHRONIC PAIN: ICD-10-CM

## 2025-02-24 DIAGNOSIS — M54.50 ACUTE LEFT-SIDED LOW BACK PAIN WITHOUT SCIATICA: ICD-10-CM

## 2025-02-25 PROBLEM — E11.8 DIABETES MELLITUS WITH COMPLICATION (HCC): Status: ACTIVE | Noted: 2021-01-08

## 2025-02-25 PROBLEM — R80.9 TYPE 2 DIABETES MELLITUS WITH MICROALBUMINURIA, WITHOUT LONG-TERM CURRENT USE OF INSULIN (HCC): Status: RESOLVED | Noted: 2024-01-10 | Resolved: 2025-02-25

## 2025-02-25 PROBLEM — E11.29 TYPE 2 DIABETES MELLITUS WITH MICROALBUMINURIA, WITHOUT LONG-TERM CURRENT USE OF INSULIN (HCC): Status: RESOLVED | Noted: 2024-01-10 | Resolved: 2025-02-25

## 2025-02-25 RX ORDER — CYCLOBENZAPRINE HCL 10 MG
10 TABLET ORAL 3 TIMES DAILY PRN
Qty: 60 TABLET | Refills: 0 | Status: SHIPPED | OUTPATIENT
Start: 2025-02-25

## 2025-02-25 RX ORDER — HYDROCODONE BITARTRATE AND ACETAMINOPHEN 7.5; 325 MG/1; MG/1
1 TABLET ORAL EVERY 8 HOURS PRN
Qty: 30 TABLET | Refills: 0 | Status: SHIPPED | OUTPATIENT
Start: 2025-02-25

## 2025-02-25 NOTE — TELEPHONE ENCOUNTER
Medication: HYDROcodone-acetaminophen (NORCO) 7.5-325 MG Oral Tab     Date of last refill: 1/27/25    [place]    Medication: cyclobenzaprine 10 MG Oral Tab     Date of last refill: 1/27/25    Last office visit: 12/16/24  Due back to clinic per last office note:  Follow up after injection  Date next office visit scheduled:  none    Last URINE Screen: 3/17/25  Screen Results:    Sarah Wells, APRN  3/19/2024  2:28 PM CDT Back to Top      As expected         Narcotic Contract EXPIRATION date: 3/12/25    Last OV note recommendation:   Medical Decision Making:   Diagnosis:         Encounter Diagnosis   Name Primary?    Lumbar radiculopathy Yes      Impression:   Patient is a 64-year-old male with history of lumbar disc degeneration and foraminal narrowing at multiple levels.  He is presenting today to pain clinic reporting he is having a progressive worsening of his back pain and lower extremity symptoms right leg greater than the left leg.  He would like repeat injection therapy.  He has not had lumbar epidural steroid injections since February 29, 2024 which resulted in greater than 75% relief of symptoms.     Recommend repeat epidural steroid injection once patient has authorization to be off his Xarelto from Dr. Chairez.  Plan:   > Recommend lumbar epidural steroid injection once Xarelto can be held  > Follow-up after injection therapy to evaluate response

## 2025-02-27 DIAGNOSIS — I10 ESSENTIAL HYPERTENSION: ICD-10-CM

## 2025-02-27 RX ORDER — DILTIAZEM HYDROCHLORIDE 180 MG/1
180 CAPSULE, COATED, EXTENDED RELEASE ORAL DAILY
Qty: 90 CAPSULE | Refills: 3 | Status: SHIPPED | OUTPATIENT
Start: 2025-02-27

## 2025-02-27 NOTE — TELEPHONE ENCOUNTER
Last time medication was refilled 03/19/2024  Last office visit  02/21/2025  Next office visit due/scheduled   Future Appointments   Date Time Provider Department Center   6/10/2025 11:15 AM Shari Wilson MD [facility] [place]   8/22/2025  2:00 PM Ryan Astudillo MD [facility]     Medication passed protocol, routed for review of medication contradiction.

## 2025-03-11 RX ORDER — HYDROXYZINE HYDROCHLORIDE 10 MG/1
10 TABLET, FILM COATED ORAL DAILY
Qty: 90 TABLET | Refills: 0 | Status: SHIPPED | OUTPATIENT
Start: 2025-03-11

## 2025-03-11 NOTE — TELEPHONE ENCOUNTER
Last time medication was refilled 10/30/24  Last office visit  2/21/25  Next office visit due/scheduled   Future Appointments   Date Time Provider Department Center   6/10/2025 11:15 AM Shari Wilson MD [facility] [place]   8/22/2025  2:00 PM Ryan Astudillo MD [facility]     Medication not on protocol.

## 2025-04-14 DIAGNOSIS — M79.18 MYOFASCIAL PAIN ON LEFT SIDE: ICD-10-CM

## 2025-04-14 DIAGNOSIS — G89.29 OTHER CHRONIC PAIN: ICD-10-CM

## 2025-04-14 DIAGNOSIS — M79.18 MYOFASCIAL PAIN SYNDROME OF LUMBAR SPINE: ICD-10-CM

## 2025-04-14 DIAGNOSIS — M54.50 ACUTE LEFT-SIDED LOW BACK PAIN WITHOUT SCIATICA: ICD-10-CM

## 2025-04-15 RX ORDER — CYCLOBENZAPRINE HCL 10 MG
10 TABLET ORAL 3 TIMES DAILY PRN
Qty: 60 TABLET | Refills: 0 | Status: SHIPPED | OUTPATIENT
Start: 2025-04-15

## 2025-04-15 RX ORDER — HYDROCODONE BITARTRATE AND ACETAMINOPHEN 7.5; 325 MG/1; MG/1
1 TABLET ORAL EVERY 8 HOURS PRN
Qty: 30 TABLET | Refills: 0 | Status: SHIPPED | OUTPATIENT
Start: 2025-04-15

## 2025-04-15 NOTE — TELEPHONE ENCOUNTER
Medication: cyclobenzaprine 10 MG     Date of last refill: 02/25/25        Medication: HYDROcodone-acetaminophen (NORCO) 7.5-325 MG     Date of last refill: 02/25/25  [place]    Last office visit: 12/16/24 virtual  Due back to clinic per last office note:  na  Date next office visit scheduled:  04/21/25    Last URINE Screen: 03/17/24  Screen Results:  as expected      Narcotic Contract EXPIRATION date: 03/12/25    Last OV note recommendation:   Plan:   > Recommend lumbar epidural steroid injection once Xarelto can be held  > Follow-up after injection therapy to evaluate response          Specialty Pharmacy - Refill Coordination    Specialty Medication Orders Linked to Encounter      Flowsheet Row Most Recent Value   Medication #1 etanercept (ENBREL SURECLICK) 50 mg/mL (1 mL) (Order#[id number], Rx#[id number])            Refill Questions - Documented Responses      Flowsheet Row Most Recent Value   Refill Screening Questions    Changes to allergies? No   Changes to medications? No   New conditions since last clinic visit? No   Unplanned office visit, urgent care, ED, or hospital admission in the last 4 weeks? No   How does patient/caregiver feel medication is working? Good   Financial problems or insurance changes? No   How many doses of your specialty medications were missed in the last 4 weeks? 0   Would patient like to speak to a pharmacist? No   When does the patient need to receive the medication? 06/05/23   Refill Delivery Questions    How will the patient receive the medication? MED[facility]   When does the patient need to receive the medication? 06/05/23   Shipping Address Home   Address in [facility] confirmed and updated if neccessary? Yes   Expected Copay ($) 0   Is the patient able to afford the medication copay? Yes   Payment Method zero copay   Days supply of Refill 28   Supplies needed? No supplies needed   Refill activity completed? Yes   Refill activity plan Refill scheduled   Shipment/Pickup Date: 05/31/23            Current Outpatient Medications   Medication Sig    benzonatate (TESSALON) 200 MG capsule Take 1 capsule (200 mg total) by mouth 3 (three) times daily as needed for Cough. (Patient not taking: Reported on 4/3/2023)    buPROPion (WELLBUTRIN XL) 300 MG 24 hr tablet Take 300 mg by mouth once daily.     clonazePAM (KLONOPIN) 0.5 MG tablet     dextroamphetamine-amphetamine (ADDERALL XR) 20 MG 24 hr capsule Take 20 mg by mouth every morning.     EPINEPHrine (EPIPEN) 0.3 mg/0.3 mL AtIn Inject 0.6 mLs (0.6 mg total) into the muscle once. for 1 dose    etanercept (ENBREL  SURECLICK) 50 mg/mL (1 mL) Inject 1 mL (50 mg total) into the skin once a week.    meloxicam (MOBIC) 15 MG tablet Take 1 tablet (15 mg total) by mouth once daily.    metoprolol succinate (TOPROL-XL) 25 MG 24 hr tablet TAKE ONE TABLET (25 MG TOTAL) BY MOUTH ONCE DAILY    omeprazole (PRILOSEC) 20 MG capsule Take 1 capsule (20 mg total) by mouth once daily.    predniSONE (DELTASONE) 5 MG tablet TAKE ONE TABLET (5MG) BY MOUTH ONCE DAILY    sertraline (ZOLOFT) 100 MG tablet Take 100 mg by mouth once daily.    tiZANidine (ZANAFLEX) 4 MG tablet TAKE ONE TABLET (4 MG TOTAL) BY MOUTH TWO TIMES A DAY AS NEEDED    traZODone (DESYREL) 50 MG tablet Take 50 mg by mouth nightly.   Last reviewed on 4/3/2023 11:30 AM by Glory St, DO    Review of patient's allergies indicates:   Allergen Reactions    Gabapentin Other (See Comments)     Increased pain level    Lithium analogues Other (See Comments)     Suicidal thoughts    Nortriptyline Other (See Comments)     Paradoxical effect: sleepless, anxious and increased pain levels    Topamax [topiramate] Other (See Comments)     Lost vision    Pseudoephedrine Anxiety    Sudafed [pseudoephedrine hcl] Anxiety and Palpitations    Last reviewed on  4/3/2023 11:30 AM by Glory St      Tasks added this encounter   No tasks added.   Tasks due within next 3 months   5/29/2023 - Refill Coordination Outreach (1 time occurrence)     Aileen Rodriguez  [facility]  [address] [place] [id number]  Phone: [phone]  Fax: [phone]

## 2025-04-19 DIAGNOSIS — R80.9 TYPE 2 DIABETES MELLITUS WITH MICROALBUMINURIA, WITHOUT LONG-TERM CURRENT USE OF INSULIN (HCC): ICD-10-CM

## 2025-04-19 DIAGNOSIS — E11.29 TYPE 2 DIABETES MELLITUS WITH MICROALBUMINURIA, WITHOUT LONG-TERM CURRENT USE OF INSULIN (HCC): ICD-10-CM

## 2025-04-20 NOTE — TELEPHONE ENCOUNTER
Pt confirms he is no longer on it.   Per PT:  Thank you..  Please do not refill.. I will change the auto fill.

## 2025-04-30 DIAGNOSIS — E11.29 TYPE 2 DIABETES MELLITUS WITH MICROALBUMINURIA, WITHOUT LONG-TERM CURRENT USE OF INSULIN (HCC): ICD-10-CM

## 2025-04-30 DIAGNOSIS — R80.9 TYPE 2 DIABETES MELLITUS WITH MICROALBUMINURIA, WITHOUT LONG-TERM CURRENT USE OF INSULIN (HCC): ICD-10-CM

## 2025-05-01 RX ORDER — DAPAGLIFLOZIN 10 MG/1
10 TABLET, FILM COATED ORAL DAILY
Qty: 90 TABLET | Refills: 0 | Status: SHIPPED | OUTPATIENT
Start: 2025-05-01

## 2025-05-01 NOTE — TELEPHONE ENCOUNTER
Last time medication was refilled 01/31/2025  Last office visit  02/21/2025  Next office visit due/scheduled   Future Appointments   Date Time Provider Department Center   6/10/2025 11:15 AM Shari Wilson MD [facility] [place]   8/22/2025  2:00 PM Ryan Astudillo MD [facility] [address] [address]         Medication failed protocol.

## 2025-05-14 ENCOUNTER — LAB ENCOUNTER (OUTPATIENT)
Dept: LAB | Facility: HOSPITAL | Age: 65
End: 2025-05-14
Attending: INTERNAL MEDICINE
Payer: COMMERCIAL

## 2025-05-14 ENCOUNTER — MED REC SCAN ONLY (OUTPATIENT)
Dept: PAIN CLINIC | Facility: CLINIC | Age: 65
End: 2025-05-14

## 2025-05-14 DIAGNOSIS — M1A.9XX1 TOPHUS OF RIGHT FOOT DUE TO GOUT: ICD-10-CM

## 2025-05-14 DIAGNOSIS — Z51.81 THERAPEUTIC DRUG MONITORING: ICD-10-CM

## 2025-05-14 DIAGNOSIS — F11.20 CHRONIC NARCOTIC DEPENDENCE (HCC): ICD-10-CM

## 2025-05-14 LAB
ALBUMIN SERPL-MCNC: 4.1 G/DL (ref 3.2–4.8)
ALBUMIN/GLOB SERPL: 1.6 {RATIO} (ref 1–2)
ALP LIVER SERPL-CCNC: 130 U/L (ref 45–117)
ALT SERPL-CCNC: 19 U/L (ref 10–49)
ANION GAP SERPL CALC-SCNC: 8 MMOL/L (ref 0–18)
AST SERPL-CCNC: 17 U/L (ref ?–34)
BILIRUB SERPL-MCNC: 1 MG/DL (ref 0.2–1.1)
BUN BLD-MCNC: 11 MG/DL (ref 9–23)
CALCIUM BLD-MCNC: 8.9 MG/DL (ref 8.7–10.6)
CHLORIDE SERPL-SCNC: 105 MMOL/L (ref 98–112)
CO2 SERPL-SCNC: 26 MMOL/L (ref 21–32)
CREAT BLD-MCNC: 1.14 MG/DL (ref 0.7–1.3)
EGFRCR SERPLBLD CKD-EPI 2021: 72 ML/MIN/1.73M2 (ref 60–?)
FASTING STATUS PATIENT QL REPORTED: YES
GLOBULIN PLAS-MCNC: 2.6 G/DL (ref 2–3.5)
GLUCOSE BLD-MCNC: 152 MG/DL (ref 70–99)
OSMOLALITY SERPL CALC.SUM OF ELEC: 290 MOSM/KG (ref 275–295)
POTASSIUM SERPL-SCNC: 4 MMOL/L (ref 3.5–5.1)
PROT SERPL-MCNC: 6.7 G/DL (ref 5.7–8.2)
SODIUM SERPL-SCNC: 139 MMOL/L (ref 136–145)
URATE SERPL-MCNC: 3 MG/DL (ref 3.7–9.2)

## 2025-05-14 PROCEDURE — 84550 ASSAY OF BLOOD/URIC ACID: CPT

## 2025-05-14 PROCEDURE — 36415 COLL VENOUS BLD VENIPUNCTURE: CPT

## 2025-05-14 PROCEDURE — 80307 DRUG TEST PRSMV CHEM ANLYZR: CPT

## 2025-05-14 PROCEDURE — 80053 COMPREHEN METABOLIC PANEL: CPT

## 2025-05-22 ENCOUNTER — TELEPHONE (OUTPATIENT)
Dept: PAIN CLINIC | Facility: CLINIC | Age: 65
End: 2025-05-22

## 2025-05-22 DIAGNOSIS — G89.29 OTHER CHRONIC PAIN: ICD-10-CM

## 2025-05-22 DIAGNOSIS — M54.50 ACUTE LEFT-SIDED LOW BACK PAIN WITHOUT SCIATICA: ICD-10-CM

## 2025-05-22 DIAGNOSIS — M79.18 MYOFASCIAL PAIN SYNDROME OF LUMBAR SPINE: ICD-10-CM

## 2025-05-22 DIAGNOSIS — M79.18 MYOFASCIAL PAIN ON LEFT SIDE: ICD-10-CM

## 2025-05-22 DIAGNOSIS — M54.16 LUMBAR RADICULITIS: Primary | ICD-10-CM

## 2025-05-22 RX ORDER — CYCLOBENZAPRINE HCL 10 MG
10 TABLET ORAL 3 TIMES DAILY PRN
Qty: 60 TABLET | Refills: 0 | Status: SHIPPED | OUTPATIENT
Start: 2025-05-22

## 2025-05-22 RX ORDER — HYDROCODONE BITARTRATE AND ACETAMINOPHEN 7.5; 325 MG/1; MG/1
1 TABLET ORAL EVERY 8 HOURS PRN
Qty: 30 TABLET | Refills: 0 | Status: SHIPPED | OUTPATIENT
Start: 2025-05-22

## 2025-05-22 NOTE — TELEPHONE ENCOUNTER
Medication: cyclobenzaprine 10 MG     [place]        Medication: HYDROcodone-acetaminophen (NORCO) 7.5-325 MG     [place]    Last office visit: 05/14/25  Due back to clinic per last office note:  3 months  Date next office visit scheduled:  NA    Last URINE Screen: 05/20/25  Screen Results:  please review in chart      Narcotic Contract EXPIRATION date: 05/14/26    Last OV note recommendation:   ASSESSMENT AND PLAN:      1. Chronic narcotic dependence (HCC)    2. Lumbar radiculitis       The patient is a pleasant 65-year-old gentleman with history of lumbar radiculitis.  Desires repeat epidural injection as injections generally lead to more than 3 months of 75% pain relief and improved function.  Patient also managed with hydrocodone for other pain issues.  Has been stable on his medications.  Denies any side effects.  States medications provide analgesia improved function.  Reviewed safe narcotic usage.  Updated pain contract.  Patient due for UDS which was ordered.  To follow-up in 3 months for medication review.

## 2025-05-22 NOTE — TELEPHONE ENCOUNTER
Order Questions    Question Answer   Anesthesia Type Sedation   Provider [facility] Procedure Lab   Procedure Lumbar RICHARD   CPT (Hit enter after each entry) NJX INTERLAMINAR LMBR/SAC   Medications to hold xarelto   Medical clearance requested (will send to Pain Navigator) No   Patient has Medicare coverage? No   Comments (Please list entire procedure name here.) lumbar epidural steroid injection

## 2025-05-22 NOTE — TELEPHONE ENCOUNTER
Prior authorization request completed for: MALIA   Authorization #No Prior Authorization Required.  Pre-D: Not Required.   Exclusions/Restrictions: -based on medical necessity.  Covered Benefit: -based on medical necessity.  Authorization dates: n/a  CPT codes approved: 03859  Number of visits/dates of service approved: 1  Physician: Ciro  Location: [facility]     Call Ref#: [id number]  Representative Name: [place]  Insurance Carrier: [facility] (phone #: [phone])  Total Call Time : 21:45     Patient can be scheduled. Routed to Navigator.

## 2025-05-22 NOTE — TELEPHONE ENCOUNTER
Medical Clearance faxed to 's Office at Fax #:[phone];confirmation r'cvd.    25      RE: Darrel Salazar    : 1960    Dear Dr. Chairez,    Your patient is being scheduled for a pain management procedure at [facility].    Procedure:  Lumbar Epidural Steroid Injection.  Date of Procedure: TBD -pending medical clearance.  Physician: Dr. Tanner- Anesthesiologist     Your patient is currently taking Xarelto. Dr. Tanner usually recommends this medication to be held for 3 days prior to injection.     Please verify patient is cleared to proceed with pain management procedures.

## 2025-05-29 NOTE — TELEPHONE ENCOUNTER
Received Medical Clearance from  -- patient is cleared to hold Xarelto for 3 days prior to injection.     Sent to scan.

## 2025-05-29 NOTE — TELEPHONE ENCOUNTER
Patient advised of insurance approval to proceed with injections and is agreeable to scheduling. Patient scheduled for procedure, pre-procedure instructions reviewed. Patient prefers conscious  sedation. Reviewed sedation instructions including Fast 8 hours prior &  Required. Patient advised to hold Xarelto for 3 days (06/09/25) prior to procedure. Instructed patient that he can take BP medication w/small sip of water. Patient verbalized understanding of instructions, no further needs at this time.       [facility]  PRE-PROCEDURE INSTRUCTIONS WITH IV SEDATION     Procedure: LESI    Appointment Date: 06/12/2025      Check-In Time: 02:30 PM     Follow-Up Date/Time: 06/25/2025 @ 02:30 PM    Prior to the procedure:  Please update us prior to the procedure if you are experiencing any symptoms of infection such as cough, fever, chills, urinary symptoms, or have recently been prescribed antibiotics, have open wounds, have recently had surgery or dental procedures.    Day of Procedure:  Do not eat or drink anything (including water) 8 hours prior to your procedure.  If you take morning blood pressure medication or oral diabetic medication, please take with a small sip of water.  You are required to have a responsible adult drive you home after your procedure. You may not take a cab or ride share unless you have a responsible adult with you in the cab or ride share.  A family member or friend is required to stay in our waiting room or hospital garage because of the sedation you will receive, you may be sleepy and forgetful. You may not remember anything told to you after your procedure including discharge instructions. Please note: children are not allowed in the holding area so please make appropriate arrangements.  Any additional family members and friends will need to remain in the surgical waiting room or hospital garage for the duration of your procedure. *If your family member or friend elects to  wait in the garage, they must leave a cell phone number with the lab staff in case they need to be reached.  Please park in the [facility] garage and follow the signs to the [facility].  Please bring your Insurance Card, Photo ID, List of Current Medications and Referral (if applicable) to your appointment. Check in at [facility] ([address] [place] [address]) outpatient registration in the [facility].  Please note-No prescriptions will be written by Pain Clinic in OR on the day of procedure. If you require a refill of medications, please contact the office 48 hours prior to your procedure.  If you have an implanted Spinal Cord or Peripheral Nerve Stimulator: Please remember to turn device off for procedure    *If you are fasting, you may take blood pressure and thyroid medications with a small sip of water the day of your procedure.   *If you are diabetic, your glucose must be within a normal range for you. If you are fasting, you should check your glucose levels and adjust with medication if needed.    Medication Hold:  Number of days you need to be off for the following medications:    Aggrenox 10 days   Agrylin (Anagrelide) 10 days  Brilinta (Ticagrelor) 7 days  Imbruvica (Ibrutinib) 3 days   Enbrel (Etanercept) 24 hours   Fragmin (Dalteparin) 24 hours   Pletal (Cilostazol) 7 days  Effient (Prasugrel) 7 days  Pradaxa 10 days  Trental 7 days  Eliquis (Apixaban) 3 days  Xarelto (Rivaroxaban) 3 days  Lovenox (Enoxaparin) 24 hours  Aspirin  Greater than 81mg but less than 325mg   5 days  325mg and greater                  7 days  (*81 mg      24 hours preferred, but not required)  Coumadin       5 days  Procedure may be cancelled if INR is elevated.   Excedrin (with aspirin) 7 days  Plavix (Clopidogrel)                             7 days    NSAIDs: 24 hours preferred, but not required      Ibuprofen (Motrin, Advil, Vicoprofen), Naproxen (Naprosyn, Aleve), Piroxcam (Feldene), Meloxicam (Mobic),  Oxaprozin (Daypro), Diclofenac (Voltaren), Indomethacin (Indocin), Etodolac (Lodine), Nabumetone (Relafen), Celebrex (Celecoxib)           HERBAL SUPPLEMENTS  5 days preferred, but not required  Fish oil, krill oil, Omega-3, Vascepa, Vitamin E, Turmeric, Garlic                       Insurance Authorization:   Most insurances are now requiring a preauthorization for all procedures.     Please contact your insurance carrier to determine what your financial responsibility will be for the procedure(s).    Cancellation/Rescheduling Appointment:   In the event you need to cancel or reschedule your appointment, you must notify the office 24 hours prior.    Post-procedure instructions:        Please schedule a follow up visit within 2 to 4 weeks after your last procedure date   Please call our office with any questions or concerns before or after your procedure at [phone], #[phone].  If you are a diabetic, please increase the frequency of your glucose monitoring after the procedure as this may cause a temporary increase in your blood sugar. Contact your primary care physician if your blood sugar rises as you may require some medication adjustment.        It is normal to have increased pain at injection site for up to 3-5 days after procedure, you can        use heat or ice (20 minutes on 20 minutes off) for comfort.

## 2025-06-10 ENCOUNTER — OFFICE VISIT (OUTPATIENT)
Dept: RHEUMATOLOGY | Facility: CLINIC | Age: 65
End: 2025-06-10
Payer: COMMERCIAL

## 2025-06-10 VITALS
HEART RATE: 66 BPM | WEIGHT: 257.19 LBS | OXYGEN SATURATION: 99 % | BODY MASS INDEX: 36.01 KG/M2 | TEMPERATURE: 97 F | HEIGHT: 71 IN | DIASTOLIC BLOOD PRESSURE: 70 MMHG | SYSTOLIC BLOOD PRESSURE: 128 MMHG | RESPIRATION RATE: 16 BRPM

## 2025-06-10 DIAGNOSIS — M1A.9XX1 TOPHUS OF RIGHT FOOT DUE TO GOUT: Primary | ICD-10-CM

## 2025-06-10 DIAGNOSIS — Z51.81 THERAPEUTIC DRUG MONITORING: ICD-10-CM

## 2025-06-10 DIAGNOSIS — M75.41 ROTATOR CUFF IMPINGEMENT SYNDROME OF RIGHT SHOULDER: ICD-10-CM

## 2025-06-10 PROCEDURE — 3074F SYST BP LT 130 MM HG: CPT | Performed by: INTERNAL MEDICINE

## 2025-06-10 PROCEDURE — 3078F DIAST BP <80 MM HG: CPT | Performed by: INTERNAL MEDICINE

## 2025-06-10 PROCEDURE — 99214 OFFICE O/P EST MOD 30 MIN: CPT | Performed by: INTERNAL MEDICINE

## 2025-06-10 PROCEDURE — 3008F BODY MASS INDEX DOCD: CPT | Performed by: INTERNAL MEDICINE

## 2025-06-10 RX ORDER — ALLOPURINOL 300 MG/1
300 TABLET ORAL DAILY
Qty: 270 TABLET | Refills: 3 | Status: SHIPPED | OUTPATIENT
Start: 2025-06-10 | End: 2025-09-08

## 2025-06-10 NOTE — H&P
The following individual(s) verbally consented to be recorded using ambient AI listening technology and understand that they can each withdraw their consent to this listening technology at any point by asking the clinician to turn off or pause the recording:    Patient name: Darrel Jonasam Martin  Additional names:

## 2025-06-10 NOTE — PROGRESS NOTES
Rheumatology f/u Patient Note  =====================================================================================================    Chief complaint: gout  Chief Complaint   Patient presents with    Follow - Up     LOV 2/17/2025     Referring   Dr. Eusebia Astudillo MD  Fax: [phone]  Phone: [phone]    =====================================================================================================  HPI   Date of visit: 12/18/2024  Darrel Salazar is a 65 year old male   Here for evaluation of psoriatic arthritis.  -Seeing dermatology for psoriasis.  Using topical corticosteroids which are partially effective.  Gout x 15 year with podagra. No renal stones.  No recent gout flares.  On norco 7.5 prn. APAP 3000 mg daily prn limit.  No NSAIDs given anticoagulation.  On DOAC for Afib  Smoking cannabis and edibles  Father with gout and psoriasis   Hx of knee OA, s/p bilatearl CSI twice a year. This helps the knees and the rest of the joints.   -b/l JORGE L in the past  S/p left pheochromocytoma  ==============================================================================================================  Visit: 01/14/25  Here to discuss DECT demonstrating MSU deposits.   ==============================================================================================================  Visit: 02/17/25  Rio Salazar is a 64 year old male with gout who presents with a burning sensation in the foot.  He has been experiencing a burning sensation in his foot, described as feeling like it was 'on fire' yesterday. The pain has improved significantly today after taking allopurinol and prednisone. He took two prednisone 5 mg pills yesterday, which helped alleviate the pain.  He recently experienced a flare-up of gout, 2 days agocharacterized by redness and swelling, particularly in the big toe and some discomfort in the small toe. The swelling has decreased since yesterday. He recalls a previous  incident where his knee flared up, causing difficulty walking, but he is not currently experiencing that issue.  He has been taking allopurinol daily and is currently taking two pills, although he was advised to take one. He confirms that he has taken all his prescribed shots.  He has a history of pinched nerves in the back, which he mentions in the context of discussing his foot pain. No shooting pain from the back is reported.  He has been using creams for rashes that appeared recently, which he attributes to the medication. No new rashes apart from those managed with creams.  ==============================================================================================================  Today's Visit: 06/10/25    Doing better since last visit.  Gout arthritis feels better.  Right shoulder bothersome in the last couple weeks or so.  -Continues on allopurinol 300 mg daily.  No severe gout flares since last visit.    Medications:   allopurinol 300 MG Oral Tab Take 1 tablet (300 mg total) by mouth daily. Stop the allopurinol 100 mg tablet 270 tablet 3    HYDROXYZINE 10 MG Oral Tab TAKE 1 TABLET(10 MG) BY MOUTH DAILY 90 tablet 0    cyclobenzaprine 10 MG Oral Tab Take 1 tablet (10 mg total) by mouth 3 (three) times daily as needed for Muscle spasms. 60 tablet 0    HYDROcodone-acetaminophen (NORCO) 7.5-325 MG Oral Tab Take 1 tablet by mouth every 8 (eight) hours as needed for Pain (back). To last one month 30 tablet 0    DILTIAZEM HCL ER COATED BEADS 180 MG Oral Capsule SR 24 Hr TAKE 1 CAPSULE DAILY 90 capsule 3    Tirzepatide (MOUNJARO) 15 MG/0.5ML Subcutaneous Solution Auto-injector Inject into the skin once a week.      Calcipotriene 0.005 % External Ointment       clobetasol 0.05 % External Ointment       Naloxone HCl 4 MG/0.1ML Nasal Liquid 4 mg by Intranasal route as needed (May repeat as needed in other nostril if symptoms persist). If patient remains unresponsive, repeat dose in other nostril 2-5 minutes after  first dose. 1 kit 0    furosemide 20 MG Oral Tab Take 1 tablet (20 mg total) by mouth daily. 90 tablet 3    carvedilol 25 MG Oral Tab Take 1 tablet (25 mg total) by mouth 2 (two) times daily with meals. 180 tablet 3    losartan 100 MG Oral Tab Take 1 tablet (100 mg total) by mouth daily. 90 tablet 3    atorvastatin 40 MG Oral Tab Take 1 tablet (40 mg total) by mouth daily. 90 tablet 3    triamcinolone 0.1 % External Cream Apply topically 2 (two) times daily as needed. 60 g 0    XARELTO 20 MG Oral Tab TAKE 1 TABLET DAILY WITH FOOD 90 tablet 3    LATANOPROST 0.005 % Ophthalmic Solution INSTILL 1 DROP IN BOTH EYES NIGHTLY (NO ADDITIONAL REFILLS UNTIL EVALUATED BY DOCTOR) 7.5 mL 3    Sertraline HCl 100 MG Oral Tab Take 1 tablet (100 mg total) by mouth daily.         ?  Allergies:  Allergies[1]      Objective    Vitals:    06/10/25 1107 06/10/25 1117   BP: 150/90 128/70   Pulse: 66    Resp: 16    Temp: 96.7 °F (35.9 °C)    SpO2: 99%    Weight: 257 lb 3.2 oz (116.7 kg)    Height: 5' 11\" (1.803 m)    GEN: NAD, well-nourished.   HEENT: Head: NCAT. Face: No lesions. Eyes: Conjunctiva clear.   PULM:  easy effort  Extremities: No cyanosis, edema or deformities.   Neurologic: Strength, CN2-12 grossly intact   Skin: No lesions or rashes.  MSK: 28 joint count performed. No evidence of synovitis in mcp, pip, dip, wrist, elbows, shoulders, hips, knees, ankles, mtp unless otherwise noted. Full ROM of elbows, wrists, knees.     -Right Nieves is positive, full range of motion of the bilateral shoulders but there is a painful arc  - Bilateral MCP 1 effusions and hypertrophy but no significant tenderness  - MCP/PIP thickening noted  Labs:  Lab Results   Component Value Date    URIC 3.0 (L) 05/14/2025    URIC 4.2 12/26/2024       Lab Results   Component Value Date    WBC 5.0 02/21/2025    RBC 4.75 02/21/2025    HGB 12.7 (L) 02/21/2025    HCT 38.0 (L) 02/21/2025    .0 02/21/2025    MCV 80.0 02/21/2025    MCH 26.7 02/21/2025     MCHC 33.4 02/21/2025    RDW 14.3 02/21/2025    NEPRELIM 3.69 02/21/2025    NEPERCENT 73.6 02/21/2025    LYPERCENT 13.8 02/21/2025    MOPERCENT 10.2 02/21/2025    EOPERCENT 1.0 02/21/2025    BAPERCENT 0.4 02/21/2025    NE 3.69 02/21/2025    LYMABS 0.69 (L) 02/21/2025    MOABSO 0.51 02/21/2025    EOABSO 0.05 02/21/2025    BAABSO 0.02 02/21/2025     Lab Results   Component Value Date     (H) 05/14/2025    BUN 11 05/14/2025    BUNCREA 8.3 (L) 03/05/2021    CREATSERUM 1.14 05/14/2025    ANIONGAP 8 05/14/2025    GFR 68 10/29/2017    GFRNAA 77 07/22/2022    GFRAA 89 07/22/2022    CA 8.9 05/14/2025    OSMOCALC 290 05/14/2025    ALKPHO 130 (H) 05/14/2025    AST 17 05/14/2025    ALT 19 05/14/2025    BILT 1.0 05/14/2025    TP 6.7 05/14/2025    ALB 4.1 05/14/2025    GLOBULIN 2.6 05/14/2025     05/14/2025    K 4.0 05/14/2025     05/14/2025    CO2 26.0 05/14/2025         No results found for: \"ANATI\", \"CATHERINE\", \"ANAS\", \"ANASCRN\", \"ANASCRNRFLX\", \"ADOLPH\"  No results found for: \"SSA\", \"SSAUR\", \"ANTISSA\", \"SSA52\", \"SSA60\", \"SSADD\", \"SSB\", \"ANTISSB\"  No results found for: \"DSDNA\", \"ANTIDSDNA\", \"SMUD\", \"ANTISM\", \"SM\", \"RNP\", \"ANTIRNP\", \"SMITHRNP\"  No results found for: \"SCL70\", \"SCL\", \"SDPBWFT80\"  No results found for: \"C3\", \"C4\"  No results found for: \"DRVVT\", \"LAINT\", \"PTTLUPUS\", \"LUPUSINTERP\", \"LA\", \"Y6GH5VKVUF\", \"Z9OQ8TNPGZ\", \"Q9JXRDPYXI\", \"T4PBHUQBMX\"  No results found for: \"CARDIOLIPIGG\", \"CARDIOLIPIGM\", \"CARDIOLIPIGA\", \"CARDIOIGA\", \"CARLIP\"      Additional Labs:    Radiology:    7/2024 MRI Spine lumbar spine    Impression   CONCLUSION:    1. There is no evidence of an acute process.  2. There is a stable degree of multilevel disc disease and facet arthropathy without interval progression.  There is again evidence of foraminal narrowing most notably at L3-4 and L4-5 as previously seen.  3. Severe atrophy and fatty replacement of the left psoas and left iliacus musculature which can be retrospectively seen and is  stable.  4. Overall, there are no significant interval changes from the previous study.     X-ray left knee 4 views 2022  Lateral compartment is bone-on-bone with sclerosis and osteophytes.  Severe osteoarthritis.  No acute findings.    Radiology review:  CT FOOT RIGHT (CPT=73700)    Result Date: 1/14/2025  CONCLUSION:   1. Hallux valgus and bunion deformity with mild osteoarthritis of the 1st MTP joint, additionally with degenerative changes of the hallux sesamoids.   2. Mild uric acid crystal deposition along the plantar aspect of the 1st MTP joint.    LOCATION:  [facility]   Dictated by (CST): Deshawn Robles MD on 1/14/2025 at 8:12 AM     Finalized by (CST): Deshawn Robles MD on 1/14/2025 at 8:17 AM       XR PAIN CLINIC FLUOROSCOPY - N/C    Result Date: 1/9/2025  CONCLUSION:  Please see the procedure/operative report for further details.    LOCATION:  [facility]    Dictated by (CST): Stromberg, LeRoy, MD on 1/09/2025 at 12:10 PM     Finalized by (CST): Stromberg, LeRoy, MD on 1/09/2025 at 12:10 PM      =====================================================================================================  Assessment and Plan  Assessment:  1. 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 Rotator cuff impingement syndrome of right shoulder    3. Therapeutic drug monitoring      #tophaceous gout  -since 2010s  -had never been treated with urate lowering therapy until 1/2025  -Evidence of chronic MSU deposition in hands, olecranon bursa, and particularly feet  - Episodes of podagra noted in past  -low serum urate, but DECT with MSU in the 1st MTP  -Suspect gouty arthropathy may be contributing to chronic polyarthralgia  - Improving after initiation of urate lowering therapy    #Psoriasis  -No active psoriatic arthritis or synovitis appreciated today    The following in italics were not discussed today:  #Chronic multisite osteoarthritis  -S/p bilateral JORGE L  -Severe, bone-on-bone knee OA; orthopedics providing corticosteroid injections with  good benefit  -Lumbar DJD noted  #Reported history of low iron  #A-fib  -On DOAC    High risk medication labs including CMP and CBC w/ diff reviewed from 5/2025 and 2/2025. Results are stable.   -[id number] negative 12/2024  ?  Plan:    Take allopurinol 300 mg daily    - Repeat CMP, uric acid in May 2026    - For right rotator cuff impingement: Home exercise program from [facility] given.  If no improvement in 1 month then send to physical therapy; referral put in system    Prednisone taper given (only as needed for gout flares):    6 tabs (30 mg) daily for 2 days  THEN 4 tabs (20 mg) daily for 2 days,   THEN 3 tabs (15 mg) daily for 2 day  THEN 2 tabs (10 mg ) daily for 2 days  THEN 1 tablet (5 mg total) daily for 2 days.    Repeat bloodwork in mid/late May 2025.    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 Stop the allopurinol 100 mg tablet  -     Comp Metabolic Panel (14); Future  -     Uric Acid; Future    Rotator cuff impingement syndrome of right shoulder  -     Physical Therapy Referral - [facility] Location  -     Comp Metabolic Panel (14); Future  -     Uric Acid; Future    Therapeutic drug monitoring          Return in about 1 year (around 6/10/2026).      The above plan of care, diagnosis, orders, and follow-up were discussed with the patient. Questions related to this recommended plan of care were answered.    Thank you for referring this delightful patient to me. Please feel free to contact me with any questions.     This report was performed utilizing speech recognition software technology. Despite proofreading, speech recognition errors could escape detection. If a word or phrase is confusing or out of context, please do not hesitate to call for   clarification.       Kind regards      Shari Wilson MD  [facility]         [1] No Known  Allergies

## 2025-06-12 ENCOUNTER — APPOINTMENT (OUTPATIENT)
Dept: GENERAL RADIOLOGY | Facility: HOSPITAL | Age: 65
End: 2025-06-12
Attending: ANESTHESIOLOGY
Payer: COMMERCIAL

## 2025-06-12 ENCOUNTER — HOSPITAL ENCOUNTER (OUTPATIENT)
Facility: HOSPITAL | Age: 65
Setting detail: HOSPITAL OUTPATIENT SURGERY
Discharge: HOME | End: 2025-06-12
Attending: ANESTHESIOLOGY | Admitting: ANESTHESIOLOGY
Payer: COMMERCIAL

## 2025-06-12 VITALS
HEIGHT: 71 IN | DIASTOLIC BLOOD PRESSURE: 89 MMHG | HEART RATE: 82 BPM | RESPIRATION RATE: 18 BRPM | SYSTOLIC BLOOD PRESSURE: 143 MMHG | TEMPERATURE: 98 F | WEIGHT: 257 LBS | OXYGEN SATURATION: 94 % | BODY MASS INDEX: 35.98 KG/M2

## 2025-06-12 PROBLEM — M54.16 LUMBAR RADICULITIS: Status: ACTIVE | Noted: 2025-06-12

## 2025-06-12 LAB — GLUCOSE BLD-MCNC: 125 MG/DL (ref 70–99)

## 2025-06-12 PROCEDURE — 62323 NJX INTERLAMINAR LMBR/SAC: CPT | Performed by: ANESTHESIOLOGY

## 2025-06-12 RX ORDER — SODIUM CHLORIDE, SODIUM LACTATE, POTASSIUM CHLORIDE, CALCIUM CHLORIDE 600; 310; 30; 20 MG/100ML; MG/100ML; MG/100ML; MG/100ML
100 INJECTION, SOLUTION INTRAVENOUS CONTINUOUS
Status: DISCONTINUED | OUTPATIENT
Start: 2025-06-12 | End: 2025-06-12

## 2025-06-12 RX ORDER — LIDOCAINE HYDROCHLORIDE 10 MG/ML
INJECTION, SOLUTION EPIDURAL; INFILTRATION; INTRACAUDAL; PERINEURAL
Status: DISCONTINUED | OUTPATIENT
Start: 2025-06-12 | End: 2025-06-12

## 2025-06-12 RX ORDER — IOPAMIDOL 408 MG/ML
INJECTION, SOLUTION INTRATHECAL
Status: DISCONTINUED | OUTPATIENT
Start: 2025-06-12 | End: 2025-06-12

## 2025-06-12 RX ORDER — DEXAMETHASONE SODIUM PHOSPHATE 10 MG/ML
INJECTION, SOLUTION INTRAMUSCULAR; INTRAVENOUS
Status: DISCONTINUED | OUTPATIENT
Start: 2025-06-12 | End: 2025-06-12

## 2025-06-12 RX ORDER — SODIUM CHLORIDE 9 MG/ML
INJECTION, SOLUTION INTRAMUSCULAR; INTRAVENOUS; SUBCUTANEOUS
Status: DISCONTINUED | OUTPATIENT
Start: 2025-06-12 | End: 2025-06-12

## 2025-06-12 RX ORDER — ONDANSETRON 2 MG/ML
4 INJECTION INTRAMUSCULAR; INTRAVENOUS ONCE AS NEEDED
Status: DISCONTINUED | OUTPATIENT
Start: 2025-06-12 | End: 2025-06-12

## 2025-06-12 RX ORDER — DIPHENHYDRAMINE HYDROCHLORIDE 50 MG/ML
50 INJECTION, SOLUTION INTRAMUSCULAR; INTRAVENOUS ONCE AS NEEDED
Status: DISCONTINUED | OUTPATIENT
Start: 2025-06-12 | End: 2025-06-12

## 2025-06-12 RX ORDER — NALOXONE HYDROCHLORIDE 0.4 MG/ML
0.08 INJECTION, SOLUTION INTRAMUSCULAR; INTRAVENOUS; SUBCUTANEOUS AS NEEDED
Status: DISCONTINUED | OUTPATIENT
Start: 2025-06-12 | End: 2025-06-12

## 2025-06-12 RX ORDER — MIDAZOLAM HYDROCHLORIDE 1 MG/ML
INJECTION INTRAMUSCULAR; INTRAVENOUS
Status: DISCONTINUED | OUTPATIENT
Start: 2025-06-12 | End: 2025-06-12

## 2025-06-12 NOTE — OPERATIVE REPORT
[facility]  Operative Report  2025     Darrel Salazar Patient Status:  Hospital Outpatient Surgery    1960 MRN [id number]   Location [facility] Attending Aman Tanner MD   Hosp Day # 0 PCP Ryan Astudillo MD     Indication: Darrel is a 65 year old male with lumbar radiculitis    Preoperative Diagnosis:  Lumbar radiculitis [M54.16]    Postoperative Diagnosis: Same as above.    Procedure performed: LUMBAR INTERLAMINAR EPIDURAL INJECTION WITH SEDATION    Anesthesia: Local and IV Sedation.    EBL: Less than 1 ml.    Procedure Description:  After reviewing the patient's history and performing a focused physical examination, the diagnosis and positive previous diagnostic tests were confirmed and contraindications such as infection and coagulopathy were ruled out.  Following review of allergies and potential side effects and complications, including but not necessarily limited to infection, allergic reaction, local tissue breakdown, nerve injury, post-dural puncture headache and paresis, the patient consented for the procedure.    The patient was brought to the procedure room in prone position.  After comprehensive monitors were applied and IV access in the patient's arm, moderate intravenous sedation was given with versed and fentanyl. Moderate sedation was given for 9 minutes. After sterile prep of the lumbar spine, the L5-S1 interspace was identified with the help of fluoroscopy. Local anesthetic was given by a 25 gauge 1.5 inch needle with 1% lidocaine in that space level.  Thereafter, a 20 gauge Tuohy needle was introduced and advanced under fluoroscopy.  The epidural space was accessed by using loss of resistance to air technique.  The needle position was confirmed with AP and lateral view under fluoroscopy.  Omnipaque 180 was injected in 1 mL volume. A good epidurogram was obtained.  Thereafter, dexamethasone 10 mg with normal saline of 5 mL in total volume of 6 mL  was injected through the Tuohy needle.  The needle was flushed with 1 mL lidocaine.  The needle was withdrawn with the stylet intact in situ.  The needle's tip was intact.  The patient tolerated the procedure very well without significant immediate complication.  The patient's back was cleaned and sterile dressing was applied.  The patient was discharged with an instruction to a responsible adult after discharge criteria were met.  The patient was advised to contact us should any problems happen.  The patient was also informed to go to the emergency room immediately if experiencing severe numbness or weakness in the extremities or experiencing bowel or bladder incontinence.            Complications: None.    Follow up: The patient was followed in the pain clinic as needed basis.          Aman Tanner MD

## 2025-06-12 NOTE — H&P
History & Physical Examination    Patient Name: Darrel Salazar  MRN: [id number]  [facility]: [id number]  YOB: 1960    Pre-Operative Diagnosis:  Lumbar radiculitis [M54.16]    Present Illness: Lumbar radiculitis    ASA: 3  MP class: 1  Sedation:  IV sedation (anxiolysis)    Prescriptions Prior to Admission[1]  Current Hospital Medications[2]    Allergies: Allergies[3]    Past Medical History[4]  Past Surgical History[5]  Family History[6]  Social History     Tobacco Use    Smoking status: Never    Smokeless tobacco: Never   Substance Use Topics    Alcohol use: No       SYSTEM Check if Review is Normal Check if Physical Exam is Normal If not normal, please explain:   HEENT [x ] [x ]    NECK & BACK [x ] [x ]    HEART [x ] [x ]    LUNGS [x ] [x ]    ABDOMEN [x ] [x ]    UROGENITAL [x ] [x ]    EXTREMITIES [x ] [x ]    OTHER        [ x ] I have discussed the risks and benefits and alternatives with the patient/family.  They understand and agree to proceed with plan of care.  [ x ] I have reviewed the History and Physical done within the last 30 days.  Any changes noted above.    Aman Tanner MD              [1]   Medications Prior to Admission   Medication Sig Dispense Refill Last Dose/Taking    allopurinol 300 MG Oral Tab Take 1 tablet (300 mg total) by mouth daily. Stop the allopurinol 100 mg tablet 270 tablet 3     HYDROXYZINE 10 MG Oral Tab TAKE 1 TABLET(10 MG) BY MOUTH DAILY 90 tablet 0     cyclobenzaprine 10 MG Oral Tab Take 1 tablet (10 mg total) by mouth 3 (three) times daily as needed for Muscle spasms. 60 tablet 0     HYDROcodone-acetaminophen (NORCO) 7.5-325 MG Oral Tab Take 1 tablet by mouth every 8 (eight) hours as needed for Pain (back). To last one month 30 tablet 0     DILTIAZEM HCL ER COATED BEADS 180 MG Oral Capsule SR 24 Hr TAKE 1 CAPSULE DAILY 90 capsule 3     Tirzepatide (MOUNJARO) 15 MG/0.5ML Subcutaneous Solution Auto-injector Inject into the skin once a week.       Calcipotriene  0.005 % External Ointment        clobetasol 0.05 % External Ointment        Naloxone HCl 4 MG/0.1ML Nasal Liquid 4 mg by Intranasal route as needed (May repeat as needed in other nostril if symptoms persist). If patient remains unresponsive, repeat dose in other nostril 2-5 minutes after first dose. 1 kit 0     furosemide 20 MG Oral Tab Take 1 tablet (20 mg total) by mouth daily. 90 tablet 3     carvedilol 25 MG Oral Tab Take 1 tablet (25 mg total) by mouth 2 (two) times daily with meals. 180 tablet 3     losartan 100 MG Oral Tab Take 1 tablet (100 mg total) by mouth daily. 90 tablet 3     atorvastatin 40 MG Oral Tab Take 1 tablet (40 mg total) by mouth daily. 90 tablet 3     Continuous Blood Gluc Sensor (FREESTYLE HENRRY 3 SENSOR) Does not apply Misc        triamcinolone 0.1 % External Cream Apply topically 2 (two) times daily as needed. 60 g 0     Continuous Blood Gluc  (FREESTYLE HENRRY 14 DAY READER) Does not apply Device Use to monitor blood sugar. 1 each 0     XARELTO 20 MG Oral Tab TAKE 1 TABLET DAILY WITH FOOD 90 tablet 3     LATANOPROST 0.005 % Ophthalmic Solution INSTILL 1 DROP IN BOTH EYES NIGHTLY (NO ADDITIONAL REFILLS UNTIL EVALUATED BY DOCTOR) 7.5 mL 3     Sertraline HCl 100 MG Oral Tab Take 1 tablet (100 mg total) by mouth daily.       Insulin Syringe-Needle U-100 (BD INSULIN SYRINGE ULTRAFINE) 31G X 5/16\" 1 ML Does not apply Misc As directed (Patient not taking: Reported on 5/14/2025) 90 each 5    [2]   No current facility-administered medications for this encounter.   [3] No Known Allergies  [4]   Past Medical History:   Abdominal hernia    Abdominal pain    Anxiety    Arrhythmia    A-FIB DX 2 YRS AGO    Arthritis    Back pain    Back problem    Low back pain    Cancer (HCC)    prostate    Deep vein thrombosis (HCC)    left    Depression    Diabetes mellitus (HCC)    Esophageal reflux    Fatigue    Glaucoma    Heartburn    High blood pressure    High cholesterol    History of depression     History of hip replacement    March 2020, 11/2010    Insomnia    Night sweats    OA (osteoarthritis)    Obesity    Osteoarthritis    Pain in joints    Sleep apnea    repeat SAM test neg after weight loss    Stress    Type II or unspecified type diabetes mellitus without mention of complication, not stated as uncontrolled    Visual impairment    glasses    Wears glasses   [5]   Past Surgical History:  Procedure Laterality Date    Abdomen surgery proc unlisted  01/26/2021    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    Anesth,perineal remv of prostate  2019    Colonoscopy      Colonoscopy N/A 12/8/2022    Procedure: COLONOSCOPY with forcep polypectomy;  Surgeon: Godwin Eller MD;  Location:  ENDOSCOPY    Hernia surgery      Hip replacement surgery Left 2010    Hip replacement surgery Right 03/03/2020    Laparoscopy, surgical prostatectomy, retropubic radical, w/nerve sparing  11/15/2019        Other surgical history      vocal cord polyps removed    Other surgical history  11/15/2019    RALP Dr. Muir    [6]   Family History  Problem Relation Age of Onset    Cancer Father 64        lung    Other (Other) Mother 72        aneurism    Diabetes Sister     Suicide History Sister     Alcohol and Other Disorders Associated Brother     Cancer Brother 72        prostate cancer

## 2025-06-12 NOTE — DISCHARGE INSTRUCTIONS
Home Care Instructions Following Your Pain Procedure     Darrel,  It has been a pleasure to have you as our patient. To help you at home, you must follow these general discharge instructions. We will review these with you before you are discharged. It is our hope that you have a complete and uneventful recovery from our procedure.     General Instructions:  What to Expect:  Bandages from your procedure today can be removed when you get home.  Please avoid soaking and/or swimming for 24 hours.  Showering is okay  It is normal to have increased pain symptoms and/or pain at injection site for up to 3-5 days after procedure, you can use heat or ice (20 minutes on 20 minutes off) for comfort.  You may experience some temporary side effects which may include restlessness or insomnia, flushing of the face, or heart palpitations.  Please contact the provider if these symptoms do not resolve within 3-4 days.  Lightheadedness or nausea may occur and should resolve within 24 to 48 hours.  If you develop a headache after treatment, rest, drink fluids (with caffeine, if possible) and take mild over-the-counter pain medication.  If the headache does not improve with the above treatment, contact the physician.  Home Medications:  Resume all previously prescribed medication.  Please avoid taking NSAIDs (Non-Steriodal Anti-Inflammatory Drugs) such as:  Ibuprofen ( Advil, Motrin) Aleve (Naproxen), Diclofenac, Meloxicam for 6 hours after procedure.   If you are on Coumadin (Warfarin) or any other anti-coagulant (or \"blood thinning\") medication such as Plavix (Clopidogrel), Xarelto (Rivaroxaban), Eliquis (Apixaban), Effient (Prasugrel) etc., restart on the following day from the procedure unless otherwise directed by your provider.  If you are a diabetic, please increase the frequency of your glucose monitoring after the procedure as steroids may cause a temporary (2-3 day) increase in your blood sugar.  Contact your primary care  physician if your blood sugar remains elevated as you may require some medication adjustment.  Diet:  Resume your regular diet as tolerated.  Activity:  We recommend that you relax and rest during the rest of your procedure day.  If you feel weakness in your arms or legs do not drive.  Follow-up Appointment  Please schedule a follow-up visit within 3 to 4 weeks after your last procedure date.  Question or Concerns:  Feel free to call our office with any questions or concerns at [phone] (option #2)    Darrel  Thank you for coming to [facility] for your procedure.  The nurses try very hard to make sure you receive the best care possible.  Your trust in them as well as us is greatly appreciated.    Thanks so much,   Dr. Aman Tanner

## 2025-06-13 ENCOUNTER — TELEPHONE (OUTPATIENT)
Dept: PAIN CLINIC | Facility: CLINIC | Age: 65
End: 2025-06-13

## 2025-06-13 NOTE — TELEPHONE ENCOUNTER
Courtesy called placed to patient for post procedure follow up. Patient stated doing great. Informed patient that soreness is to be expected after the procedure. Educated patient that it takes 3-5 days for the steroid to be effective and to allow adequate time for medication to work. Encouraged patient to alternate ice and heat and to take medications as prescribed. Pt verbalized understanding to call with any questions or concerns.      Procedure: LUMBAR INTERLAMINAR EPIDURAL INJECTION WITH SEDATION   Date: 06/12/25  Follow up Visit Scheduled: 06/25/25

## 2025-06-16 NOTE — H&P
[address]  [id number] [place], [place] [address]  Phone: [phone]      Attending Physician Attestation    I performed a history and physical examination of the patient and discussed management with the mid level provider. I reviewed the mid level provider's note and agree with the documented findings and plan of care. Any areas of disagreement are noted on the chart. I was personally present for the key portions of any procedures. I have documented in the chart those procedures where I was not present during the key portions. I have reviewed the emergency nurses triage note. I agree with the chief complaint, past medical history, past surgical history, allergies, medications, social and family history as documented unless otherwise noted below. Documentation of the HPI, Physical Exam and Medical Decision Making performed by mid level providers is based on my personal performance of the HPI, PE and MDM. For Physician Assistant/ Nurse Practitioner cases/documentation I have personally evaluated this patient and have completed at least one if not all key elements of the E/M (history, physical exam, and MDM). Additional findings are as noted.      CHIEF COMPLAINT       Chief Complaint   Patient presents with    Hypotension     Arrives from Allergist office for Hypotension after getting allergy shot. Allergist called and states pt has a mast cell disorder and this response is typical. Initial 52 SBP with syncope, given 1300 IV fluids, total 1 mg IM epi and 1 mg IV epi, 125 solumedrol 50 mg IV benadryl and 10 mg IM decadron PTA. Allergist advised glucagon and Epi Drip with admission.          HISTORY OF PRESENT ILLNESS    Erin Hernandez is a 66 y.o. female who presents with an allergic reaction, anaphylaxis while in the allergist/immunologists office today.       PAST MEDICAL HISTORY    has a past medical history of Colon polyp, Eczema, Hearing loss, Hypothyroid, Osteoarthritis, Osteopenia,  History & Physical Examination    Patient Name: Marvel Mccabe  MRN: [id number]  [facility]: [id number]  YOB: 1960    Pre-Operative Diagnosis:  Osteoarthritis of right hip, unspecified osteoarthritis type [M16.11]  Greater trochanteric bursitis of righ BESYLATE 5 MG Oral Tab TAKE 1 TABLET BY MOUTH DAILY Disp: 90 tablet Rfl: 1 Taking   INVOKANA 100 MG Oral Tab Take 300 mg by mouth daily. Disp: 30 tablet Rfl: 3 Taking   latanoprost 0.005 % Ophthalmic Solution Place 1 drop into both eyes nightly.  Disp: 7.5 Moustapha PSG 8-13-13 TX 12-30-13    AHI 65 SaO2 comfort 69 % BIPAP 23/19 [facility]     Past Surgical History:   Procedure Laterality Date   • COLONOSCOPY     • HIP JOINT INJECTION Right 2/19/2019    Performed by Twan Ruano MD at [facility]

## 2025-06-17 DIAGNOSIS — G89.29 OTHER CHRONIC PAIN: ICD-10-CM

## 2025-06-17 DIAGNOSIS — M79.18 MYOFASCIAL PAIN SYNDROME OF LUMBAR SPINE: ICD-10-CM

## 2025-06-17 DIAGNOSIS — M54.50 ACUTE LEFT-SIDED LOW BACK PAIN WITHOUT SCIATICA: ICD-10-CM

## 2025-06-17 DIAGNOSIS — M79.18 MYOFASCIAL PAIN ON LEFT SIDE: ICD-10-CM

## 2025-06-19 RX ORDER — CYCLOBENZAPRINE HCL 10 MG
10 TABLET ORAL 3 TIMES DAILY PRN
Qty: 60 TABLET | Refills: 0 | Status: SHIPPED | OUTPATIENT
Start: 2025-06-19

## 2025-06-19 RX ORDER — HYDROCODONE BITARTRATE AND ACETAMINOPHEN 7.5; 325 MG/1; MG/1
1 TABLET ORAL EVERY 8 HOURS PRN
Qty: 30 TABLET | Refills: 0 | Status: SHIPPED | OUTPATIENT
Start: 2025-06-21

## 2025-06-19 NOTE — TELEPHONE ENCOUNTER
Medication: cyclobenzaprine 10 MG Oral Tab     [place]    MedicationHYDROcodone-acetaminophen (NORCO) 7.5-325 MG Oral Tab     [place]25    Last office visit: 5/14/2025  Due back to clinic per last office note:  3 months  Date next office visit scheduled:  6/25/2025    Last URINE Screen: 5/14/2025  Screen Results:  no notes      Narcotic Contract EXPIRATION date: 5/14/2026    Last OV note recommendation:    1. Chronic narcotic dependence (HCC)    2. Lumbar radiculitis       The patient is a pleasant 65-year-old gentleman with history of lumbar radiculitis.  Desires repeat epidural injection as injections generally lead to more than 3 months of 75% pain relief and improved function.  Patient also managed with hydrocodone for other pain issues.  Has been stable on his medications.  Denies any side effects.  States medications provide analgesia improved function.  Reviewed safe narcotic usage.  Updated pain contract.  Patient due for UDS which was ordered.  To follow-up in 3 months for medication review.  Medications filled today:  Requested Prescriptions

## 2025-06-25 ENCOUNTER — OFFICE VISIT (OUTPATIENT)
Dept: PAIN CLINIC | Facility: CLINIC | Age: 65
End: 2025-06-25
Payer: COMMERCIAL

## 2025-06-25 VITALS
WEIGHT: 253 LBS | DIASTOLIC BLOOD PRESSURE: 78 MMHG | BODY MASS INDEX: 35 KG/M2 | HEART RATE: 59 BPM | SYSTOLIC BLOOD PRESSURE: 120 MMHG | OXYGEN SATURATION: 99 %

## 2025-06-25 DIAGNOSIS — M47.816 LUMBAR SPONDYLOSIS: Primary | ICD-10-CM

## 2025-06-25 PROCEDURE — 3078F DIAST BP <80 MM HG: CPT | Performed by: ANESTHESIOLOGY

## 2025-06-25 PROCEDURE — G2211 COMPLEX E/M VISIT ADD ON: HCPCS | Performed by: ANESTHESIOLOGY

## 2025-06-25 PROCEDURE — 3074F SYST BP LT 130 MM HG: CPT | Performed by: ANESTHESIOLOGY

## 2025-06-25 PROCEDURE — 99214 OFFICE O/P EST MOD 30 MIN: CPT | Performed by: ANESTHESIOLOGY

## 2025-06-25 NOTE — PATIENT INSTRUCTIONS
Refill policies:    Allow 2-3 business days for refills; controlled substances may take longer.  Contact your pharmacy at least 5 days prior to running out of medication and have them send an electronic request or submit request through the “request refill” option in your [email or web address] account.  Refills are not addressed on weekends; covering physicians do not authorize routine medications on weekends.  No narcotics or controlled substances are refilled after noon on Fridays or by on call physicians.  By law, narcotics must be electronically prescribed.  A 30 day supply with no refills is the maximum allowed.  If your prescription is due for a refill, you may be due for a follow up appointment.  To best provide you care, patients receiving routine medications need to be seen at least once a year.  Patients receiving narcotic/controlled substance medications need to be seen at least once every 3 months.  In the event that your preferred pharmacy does not have the requested medication in stock (e.g. Backordered), it is your responsibility to find another pharmacy that has the requested medication available.  We will gladly send a new prescription to that pharmacy at your request.    Scheduling Tests:    If your physician has ordered radiology tests such as MRI or CT scans, please contact Central Scheduling at [phone] right away to schedule the test.  Once scheduled, the [facility] Centralized Referral Team will work with your insurance carrier to obtain pre-certification or prior authorization.  Depending on your insurance carrier, approval may take 3-10 days.  It is highly recommended patients assure they have received an authorization before having a test performed.  If test is done without insurance authorization, patient may be responsible for the entire amount billed.      Precertification and Prior Authorizations:  If your physician has recommended that you have a procedure or additional testing performed the [facility]  Centralized Referral Team will contact your insurance carrier to obtain pre-certification or prior authorization.    You are strongly encouraged to contact your insurance carrier to verify that your procedure/test has been approved and is a COVERED benefit.  Although the [facility] Centralized Referral Team does its due diligence, the insurance carrier gives the disclaimer that \"Although the procedure is authorized, this does not guarantee payment.\"    Ultimately the patient is responsible for payment.   Thank you for your understanding in this matter.  Paperwork Completion:  If you require FMLA or disability paperwork for your recovery, please make sure to either drop it off or have it faxed to our office at [phone]. Be sure the form has your name and date of birth on it.  The form will be faxed to our Forms Department and they will complete it for you.  There is a 25$ fee for all forms that need to be filled out.  Please be aware there is a 10-14 day turnaround time.  You will need to sign a release of information (DEBBIE) form if your paperwork does not come with one.  You may call the Forms Department with any questions at [phone].  Their fax number is [phone].

## 2025-06-25 NOTE — PROGRESS NOTES
Name: Darrel Salazar   : 1960   DOS: 2025     Pain Clinic Follow Up Visit:     Chief Complaint   Patient presents with    Procedure Follow Up     LUMBAR INTERLAMINAR EPIDURAL INJECTION WITH SEDATION from 25       Darrel Salazar is a 65 year old male with a history multilevel lumbar degenerative disc disease with facet arthropathy here for follow-up.  The patient complains of returning axial back pain.  The pain is 100% localized to the low back.  It is focal and nonradicular.  Patient had excellent success with radiofrequency ablation.  This was performed in 2023.  This led to more than 16 months of excellent symptom relief.    Pt denies any chills, fever, or weakness. There is no bladder or bowel incontinence associated with the pain.    REVIEW OF SYSTEMS:  A ten point review of systems was performed with pertinent positives and negatives in the HPI.    Allergies[1]    Current Medications[2]      EXAM:   /78 (BP Location: Left arm, Patient Position: Sitting, Cuff Size: large)   Pulse 59   Wt 253 lb (114.8 kg)   SpO2 99%   BMI 35.29 kg/m²   General:  Patient is a(n) 65 year old year old male in no acute distress.  Neurologic:: WNL-Orientation to time, place and person, normal mood & affect, concentration & attention span intact.   Inspection:  Ambulates with well-coordinated, fluid, non-antalgic gait.  Gait is normal.  Neck: Full range of motion  Cranial nerve: Grossly intact  Respiratory: Speech is nonlabored  Back: Gait intact.  Facet loading positive bilaterally    IMAGES:     Lumbar MRI reviewed with evidence of multilevel lumbar degenerative disc disease with moderate facet arthropathy at L3, L4, L5    ASSESSMENT AND PLAN:     1. Lumbar spondylosis      Patient is a pleasant 65-year-old gentleman with longstanding history of low back pain.  This is 100% axial and nonradicular.  This is made worse by standing and twisting.  Does have positive facet loading.  The  patient does have lumbar facet arthropathy seen on MRI.  Was treated with radiofrequency ablation in December 2023 and this led to more than 16 months of excellent pain relief.  Patient also had significant improved function.  Able to complete ADLs and go to the gym.  This is a able weight loss.  Recommend repeat radiofrequency ablation given return of 100% axial back pain.    Current VAS Score: 8  Functional Deficits: Standing, twisting, driving  Duration of pain symptoms: Chronic  Specific Levels (Only 2 allowed): L4, L5  Initial treatment or subsequent? (Per area of the body): Subsequent  Radiculopathy & neurogenic claudication ruled out?:  Yes  Pre-procedure pain score: Pain score prior to radiofrequency ablation was 8 out of 10 VAS scale  Post-procedure pain score: Pain score after radiofrequency ablation for duration of 16 months was 1 out of 10 VAS scale  % of pain relief from previous procedure/s: 90%  VAS Score during diagnostic phase: 1  Duration of relief from previous procedure/s: 16 months  Prior Conservative Treatment: Nonsteroidals, PT, home exercise    Orders:  Orders Placed This Encounter   Procedures    [facility] PAIN NAVIGATOR         Radiology orders and consultations:None  The patient indicates understanding of these issues and agrees to the plan.  No follow-ups on file.    Aman Tanner MD, 6/25/2025, 3:00 PM              [1] No Known Allergies  [2]   Current Outpatient Medications   Medication Sig Dispense Refill    cyclobenzaprine 10 MG Oral Tab Take 1 tablet (10 mg total) by mouth 3 (three) times daily as needed for Muscle spasms. 60 tablet 0    HYDROcodone-acetaminophen (NORCO) 7.5-325 MG Oral Tab Take 1 tablet by mouth every 8 (eight) hours as needed for Pain (back). To last one month 30 tablet 0    allopurinol 300 MG Oral Tab Take 1 tablet (300 mg total) by mouth daily. Stop the allopurinol 100 mg tablet 270 tablet 3    HYDROXYZINE 10 MG Oral Tab TAKE 1 TABLET(10 MG) BY MOUTH DAILY 90 tablet  0    DILTIAZEM HCL ER COATED BEADS 180 MG Oral Capsule SR 24 Hr TAKE 1 CAPSULE DAILY 90 capsule 3    Tirzepatide (MOUNJARO) 15 MG/0.5ML Subcutaneous Solution Auto-injector Inject into the skin once a week.      Calcipotriene 0.005 % External Ointment       clobetasol 0.05 % External Ointment       Naloxone HCl 4 MG/0.1ML Nasal Liquid 4 mg by Intranasal route as needed (May repeat as needed in other nostril if symptoms persist). If patient remains unresponsive, repeat dose in other nostril 2-5 minutes after first dose. 1 kit 0    furosemide 20 MG Oral Tab Take 1 tablet (20 mg total) by mouth daily. 90 tablet 3    carvedilol 25 MG Oral Tab Take 1 tablet (25 mg total) by mouth 2 (two) times daily with meals. 180 tablet 3    losartan 100 MG Oral Tab Take 1 tablet (100 mg total) by mouth daily. 90 tablet 3    atorvastatin 40 MG Oral Tab Take 1 tablet (40 mg total) by mouth daily. 90 tablet 3    Continuous Blood Gluc Sensor (Wow! StuffSTYLE HENRRY 3 SENSOR) Does not apply Misc       triamcinolone 0.1 % External Cream Apply topically 2 (two) times daily as needed. 60 g 0    Continuous Blood Gluc  (FREESTYLE HENRRY 14 DAY READER) Does not apply Device Use to monitor blood sugar. 1 each 0    XARELTO 20 MG Oral Tab TAKE 1 TABLET DAILY WITH FOOD 90 tablet 3    LATANOPROST 0.005 % Ophthalmic Solution INSTILL 1 DROP IN BOTH EYES NIGHTLY (NO ADDITIONAL REFILLS UNTIL EVALUATED BY DOCTOR) 7.5 mL 3    Sertraline HCl 100 MG Oral Tab Take 1 tablet (100 mg total) by mouth daily.      Insulin Syringe-Needle U-100 (BD INSULIN SYRINGE ULTRAFINE) 31G X 5/16\" 1 ML Does not apply Misc As directed (Patient not taking: Reported on 6/25/2025) 90 each 5

## 2025-06-25 NOTE — PROGRESS NOTES
Last procedure: LUMBAR INTERLAMINAR EPIDURAL INJECTION WITH SEDATION   Date: 6/12/25  Percentage of relief obtained: 50%  Duration of relief: 1 week     Current Pain Score: 3-4/10    Narcotic Contract Exp: 5/14/26

## 2025-07-01 ENCOUNTER — TELEPHONE (OUTPATIENT)
Dept: PAIN CLINIC | Facility: CLINIC | Age: 65
End: 2025-07-01

## 2025-07-01 DIAGNOSIS — M47.816 LUMBAR SPONDYLOSIS: Primary | ICD-10-CM

## 2025-07-01 NOTE — TELEPHONE ENCOUNTER
Medical Clearance faxed to  Office at Fax #: [phone];confirmation r'cvd.     25      RE: Darrel Salazar    : 1960    Dear Dr. Chairez,    Your patient is being scheduled for a pain management procedure at [facility].    Procedure:  Bilateral L4,L5 Radiofrequency Ablation.   Date of Procedure: TBD -pending medical clearance.  Physician: Dr. Tanner- Anesthesiologist     Your patient is currently taking Xarelto. Dr. Tanner usually recommends this medication to be held for 3 days prior to procedure.     Please verify patient is cleared to proceed with pain management procedures.

## 2025-07-01 NOTE — TELEPHONE ENCOUNTER
Prior authorization request completed for: Bilateral L4,L5 RFA    Authorization #Requested Service does not require preauthorization.   Transaction ID  [id number]    Authorization dates: n/a  CPT codes approved: 76031,68409  Number of visits/dates of service approved: 1  Physician: Ciro  Location: [facility]     Call Ref#: [id number]  Confirmed prior authorization requirements on Availity.     Patient can be scheduled. Routed to Navigator.

## 2025-07-03 NOTE — TELEPHONE ENCOUNTER
Medication: Methocarbamol 750 mg 1 tab Q8H #90. Date of last refill: 5/8/18 #90  [place] 5[place]8/96 #29    Last office visit: 4/25/18  Due back to clinic per last office note: N/A  Date next office visit scheduled: N/A    Last OV note recommendation: Plan: My recommendation would be right hip joint injection and right trochanteric bursa injection. I will also  recommend him to do home physical therapy. If needed I will bring him back for right sacroiliac joint injection. I also ordered MRI of lumbar spine to see if there is a disc pathology involved. Thank you very much for referring the patient to me. Amrita Delvalle M.D.   Pain Management
Patient tolerated procedure well.

## 2025-07-08 NOTE — TELEPHONE ENCOUNTER
Patient advised of insurance approval to proceed with injections and is agreeable to scheduling. Patient scheduled for procedure, pre-procedure instructions reviewed. Patient prefers conscious  sedation. Reviewed sedation instructions including Fast 8 hours prior &  Required. Patient advised to hold Xarelto for 3 days (07/28/25) prior to procedure. Instructed patient that he can take BP medication w/small sip of water. Patient verbalized understanding of instructions, no further needs at this time.       [facility]  PRE-PROCEDURE INSTRUCTIONS WITH IV SEDATION     Procedure: Bilateral L4,L5 RFA     Appointment Date: 07/31/2025    Check-In Time: 08:00 AM       Prior to the procedure:  Please update us prior to the procedure if you are experiencing any symptoms of infection such as cough, fever, chills, urinary symptoms, or have recently been prescribed antibiotics, have open wounds, have recently had surgery or dental procedures.    Day of Procedure:  Do not eat or drink anything (including water) 8 hours prior to your procedure.  If you take morning blood pressure medication or oral diabetic medication, please take with a small sip of water.  You are required to have a responsible adult drive you home after your procedure. You may not take a cab or ride share unless you have a responsible adult with you in the cab or ride share.  A family member or friend is required to stay in our waiting room or hospital garage because of the sedation you will receive, you may be sleepy and forgetful. You may not remember anything told to you after your procedure including discharge instructions. Please note: children are not allowed in the holding area so please make appropriate arrangements.  Any additional family members and friends will need to remain in the surgical waiting room or hospital garage for the duration of your procedure. *If your family member or friend elects to wait in the garage, they must  leave a cell phone number with the lab staff in case they need to be reached.  Please park in the [facility] parking garage and follow the signs to the [facility] [place].  Please bring your Insurance Card, Photo ID, List of Current Medications and Referral (if applicable) to your appointment. Check in at [facility] ([address] [place] [address]) outpatient registration in the [facility] [place].  Please note-No prescriptions will be written by Pain Clinic in [place] on the day of procedure. If you require a refill of medications, please contact the office 48 hours prior to your procedure.  If you have an implanted Spinal Cord or Peripheral Nerve Stimulator: Please remember to turn device off for procedure    *If you are fasting, you may take blood pressure and thyroid medications with a small sip of water the day of your procedure.   *If you are diabetic, your glucose must be within a normal range for you. If you are fasting, you should check your glucose levels and adjust with medication if needed.    Medication Hold:  Number of days you need to be off for the following medications:    Aggrenox 10 days   Agrylin (Anagrelide) 10 days  Brilinta (Ticagrelor) 7 days  Imbruvica (Ibrutinib) 3 days   Enbrel (Etanercept) 24 hours   Fragmin (Dalteparin) 24 hours   Pletal (Cilostazol) 7 days  Effient (Prasugrel) 7 days  Pradaxa 10 days  Trental 7 days  Eliquis (Apixaban) 3 days  Xarelto (Rivaroxaban) 3 days  Lovenox (Enoxaparin) 24 hours  Aspirin  Greater than 81mg but less than 325mg   5 days  325mg and greater                  7 days  (*81 mg      24 hours preferred, but not required)  Coumadin       5 days  Procedure may be cancelled if INR is elevated.   Excedrin (with aspirin) 7 days  Plavix (Clopidogrel)                             7 days    NSAIDs: 24 hours preferred, but not required      Ibuprofen (Motrin, Advil, Vicoprofen), Naproxen (Naprosyn, Aleve), Piroxcam (Feldene), Meloxicam (Mobic), Oxaprozin (Daypro), Diclofenac  (Voltaren), Indomethacin (Indocin), Etodolac (Lodine), Nabumetone (Relafen), Celebrex (Celecoxib)           HERBAL SUPPLEMENTS  5 days preferred, but not required  Fish oil, krill oil, Omega-3, Vascepa, Vitamin E, Turmeric, Garlic                       Insurance Authorization:   Most insurances are now requiring a preauthorization for all procedures.     Please contact your insurance carrier to determine what your financial responsibility will be for the procedure(s).    Cancellation/Rescheduling Appointment:   In the event you need to cancel or reschedule your appointment, you must notify the office 24 hours prior.    Post-procedure instructions:        Please schedule a follow up visit within 2 to 4 weeks after your last procedure date   Please call our office with any questions or concerns before or after your procedure at [phone], #2.  If you are a diabetic, please increase the frequency of your glucose monitoring after the procedure as this may cause a temporary increase in your blood sugar. Contact your primary care physician if your blood sugar rises as you may require some medication adjustment.        It is normal to have increased pain at injection site for up to 3-5 days after procedure, you can        use heat or ice (20 minutes on 20 minutes off) for comfort.

## 2025-07-08 NOTE — TELEPHONE ENCOUNTER
Received Medical Clearance from  -- patient is cleared to hold Xarelto for 3 days prior to procedure.     Sent to scan.

## 2025-07-22 ENCOUNTER — PATIENT MESSAGE (OUTPATIENT)
Dept: PAIN CLINIC | Facility: CLINIC | Age: 65
End: 2025-07-22

## 2025-07-22 DIAGNOSIS — M79.18 MYOFASCIAL PAIN ON LEFT SIDE: ICD-10-CM

## 2025-07-22 DIAGNOSIS — G89.29 OTHER CHRONIC PAIN: ICD-10-CM

## 2025-07-22 DIAGNOSIS — M54.50 ACUTE LEFT-SIDED LOW BACK PAIN WITHOUT SCIATICA: ICD-10-CM

## 2025-07-22 DIAGNOSIS — M79.18 MYOFASCIAL PAIN SYNDROME OF LUMBAR SPINE: ICD-10-CM

## 2025-07-23 RX ORDER — CYCLOBENZAPRINE HCL 10 MG
10 TABLET ORAL 3 TIMES DAILY PRN
Qty: 60 TABLET | Refills: 0 | Status: SHIPPED | OUTPATIENT
Start: 2025-07-23

## 2025-07-23 RX ORDER — HYDROCODONE BITARTRATE AND ACETAMINOPHEN 7.5; 325 MG/1; MG/1
1 TABLET ORAL EVERY 8 HOURS PRN
Qty: 30 TABLET | Refills: 0 | Status: SHIPPED | OUTPATIENT
Start: 2025-07-27

## 2025-07-23 NOTE — TELEPHONE ENCOUNTER
Medication: HYDROcodone-acetaminophen (NORCO) 7.5-325 MG   & Cyclobenzaprine 10mg     [place]): [place]/25  **But patient also picked up an additional refill of  Tylenol with codeine #3 300mg-30mg on 7/11/25 #20 tabs from Dentist in [place] Kyle Brady MD    Last office visit: 6/25/25  Due back to clinic per last office note:  n/a  Date next office visit scheduled:  None on file      Last URINE Screen: 5/14/25  Screen Results: not reviewed by provider yet       Narcotic Contract EXPIRATION date: 5/14/26    Last OV note recommendation:    Darrel Salazar is a 65 year old male with a history multilevel lumbar degenerative disc disease with facet arthropathy here for follow-up.  The patient complains of returning axial back pain.  The pain is 100% localized to the low back.  It is focal and nonradicular.  Patient had excellent success with radiofrequency ablation.  This was performed in December 2023.  This led to more than 16 months of excellent symptom relief.

## 2025-07-30 DIAGNOSIS — E11.29 TYPE 2 DIABETES MELLITUS WITH MICROALBUMINURIA, WITHOUT LONG-TERM CURRENT USE OF INSULIN (HCC): ICD-10-CM

## 2025-07-30 DIAGNOSIS — R80.9 TYPE 2 DIABETES MELLITUS WITH MICROALBUMINURIA, WITHOUT LONG-TERM CURRENT USE OF INSULIN (HCC): ICD-10-CM

## 2025-07-30 RX ORDER — DAPAGLIFLOZIN 10 MG/1
10 TABLET, FILM COATED ORAL DAILY
Qty: 90 TABLET | Refills: 0 | Status: SHIPPED | OUTPATIENT
Start: 2025-07-30

## 2025-07-31 ENCOUNTER — HOSPITAL ENCOUNTER (OUTPATIENT)
Facility: HOSPITAL | Age: 65
Setting detail: HOSPITAL OUTPATIENT SURGERY
Discharge: HOME | End: 2025-07-31
Attending: ANESTHESIOLOGY | Admitting: ANESTHESIOLOGY

## 2025-07-31 ENCOUNTER — APPOINTMENT (OUTPATIENT)
Dept: GENERAL RADIOLOGY | Facility: HOSPITAL | Age: 65
End: 2025-07-31
Attending: ANESTHESIOLOGY

## 2025-07-31 VITALS
SYSTOLIC BLOOD PRESSURE: 160 MMHG | HEART RATE: 88 BPM | WEIGHT: 253 LBS | RESPIRATION RATE: 17 BRPM | BODY MASS INDEX: 35.42 KG/M2 | DIASTOLIC BLOOD PRESSURE: 100 MMHG | HEIGHT: 71 IN | TEMPERATURE: 97 F | OXYGEN SATURATION: 97 %

## 2025-07-31 LAB — GLUCOSE BLD-MCNC: 149 MG/DL (ref 70–99)

## 2025-07-31 PROCEDURE — 64636 DESTROY L/S FACET JNT ADDL: CPT | Performed by: ANESTHESIOLOGY

## 2025-07-31 PROCEDURE — 99152 MOD SED SAME PHYS/QHP 5/>YRS: CPT | Performed by: ANESTHESIOLOGY

## 2025-07-31 PROCEDURE — 64635 DESTROY LUMB/SAC FACET JNT: CPT | Performed by: ANESTHESIOLOGY

## 2025-07-31 RX ORDER — ONDANSETRON 2 MG/ML
4 INJECTION INTRAMUSCULAR; INTRAVENOUS ONCE AS NEEDED
Status: DISCONTINUED | OUTPATIENT
Start: 2025-07-31 | End: 2025-07-31

## 2025-07-31 RX ORDER — NALOXONE HYDROCHLORIDE 0.4 MG/ML
0.08 INJECTION, SOLUTION INTRAMUSCULAR; INTRAVENOUS; SUBCUTANEOUS AS NEEDED
Status: DISCONTINUED | OUTPATIENT
Start: 2025-07-31 | End: 2025-07-31

## 2025-07-31 RX ORDER — MIDAZOLAM HYDROCHLORIDE 1 MG/ML
INJECTION INTRAMUSCULAR; INTRAVENOUS
Status: DISCONTINUED | OUTPATIENT
Start: 2025-07-31 | End: 2025-07-31

## 2025-07-31 RX ORDER — LIDOCAINE HYDROCHLORIDE 10 MG/ML
INJECTION, SOLUTION EPIDURAL; INFILTRATION; INTRACAUDAL; PERINEURAL
Status: DISCONTINUED | OUTPATIENT
Start: 2025-07-31 | End: 2025-07-31

## 2025-07-31 RX ORDER — METHYLPREDNISOLONE ACETATE 40 MG/ML
INJECTION, SUSPENSION INTRA-ARTICULAR; INTRALESIONAL; INTRAMUSCULAR; SOFT TISSUE
Status: DISCONTINUED | OUTPATIENT
Start: 2025-07-31 | End: 2025-07-31

## 2025-07-31 RX ORDER — SODIUM CHLORIDE, SODIUM LACTATE, POTASSIUM CHLORIDE, CALCIUM CHLORIDE 600; 310; 30; 20 MG/100ML; MG/100ML; MG/100ML; MG/100ML
100 INJECTION, SOLUTION INTRAVENOUS CONTINUOUS
Status: DISCONTINUED | OUTPATIENT
Start: 2025-07-31 | End: 2025-07-31

## 2025-07-31 RX ORDER — DIPHENHYDRAMINE HYDROCHLORIDE 50 MG/ML
50 INJECTION, SOLUTION INTRAMUSCULAR; INTRAVENOUS ONCE AS NEEDED
Status: DISCONTINUED | OUTPATIENT
Start: 2025-07-31 | End: 2025-07-31

## 2025-08-01 ENCOUNTER — TELEPHONE (OUTPATIENT)
Dept: PAIN CLINIC | Facility: CLINIC | Age: 65
End: 2025-08-01

## 2025-08-18 DIAGNOSIS — I10 ESSENTIAL HYPERTENSION: ICD-10-CM

## 2025-08-18 DIAGNOSIS — R80.9 TYPE 2 DIABETES MELLITUS WITH MICROALBUMINURIA, WITHOUT LONG-TERM CURRENT USE OF INSULIN (HCC): ICD-10-CM

## 2025-08-18 DIAGNOSIS — E11.29 TYPE 2 DIABETES MELLITUS WITH MICROALBUMINURIA, WITHOUT LONG-TERM CURRENT USE OF INSULIN (HCC): ICD-10-CM

## 2025-08-18 RX ORDER — CARVEDILOL 25 MG/1
25 TABLET ORAL 2 TIMES DAILY WITH MEALS
Qty: 180 TABLET | Refills: 3 | Status: SHIPPED | OUTPATIENT
Start: 2025-08-18

## 2025-08-18 RX ORDER — FUROSEMIDE 20 MG/1
20 TABLET ORAL DAILY
Qty: 90 TABLET | Refills: 3 | Status: SHIPPED | OUTPATIENT
Start: 2025-08-18

## 2025-08-18 RX ORDER — ATORVASTATIN CALCIUM 40 MG/1
40 TABLET, FILM COATED ORAL DAILY
Qty: 90 TABLET | Refills: 0 | Status: SHIPPED | OUTPATIENT
Start: 2025-08-18

## 2025-08-21 PROBLEM — M54.16 LUMBAR RADICULITIS: Status: RESOLVED | Noted: 2025-06-12 | Resolved: 2025-08-21

## 2025-08-22 ENCOUNTER — OFFICE VISIT (OUTPATIENT)
Dept: INTERNAL MEDICINE CLINIC | Facility: CLINIC | Age: 65
End: 2025-08-22

## 2025-08-22 VITALS
DIASTOLIC BLOOD PRESSURE: 70 MMHG | WEIGHT: 257 LBS | HEIGHT: 71 IN | RESPIRATION RATE: 18 BRPM | SYSTOLIC BLOOD PRESSURE: 130 MMHG | BODY MASS INDEX: 35.98 KG/M2 | OXYGEN SATURATION: 100 % | TEMPERATURE: 98 F | HEART RATE: 77 BPM

## 2025-08-22 DIAGNOSIS — E11.59 HYPERTENSION ASSOCIATED WITH DIABETES (HCC): ICD-10-CM

## 2025-08-22 DIAGNOSIS — E11.8 DIABETES MELLITUS WITH COMPLICATION (HCC): Primary | ICD-10-CM

## 2025-08-22 DIAGNOSIS — I15.2 HYPERTENSION ASSOCIATED WITH DIABETES (HCC): ICD-10-CM

## 2025-08-22 PROCEDURE — 3008F BODY MASS INDEX DOCD: CPT | Performed by: INTERNAL MEDICINE

## 2025-08-22 PROCEDURE — 99214 OFFICE O/P EST MOD 30 MIN: CPT | Performed by: INTERNAL MEDICINE

## 2025-08-22 PROCEDURE — 3075F SYST BP GE 130 - 139MM HG: CPT | Performed by: INTERNAL MEDICINE

## 2025-08-22 PROCEDURE — G2211 COMPLEX E/M VISIT ADD ON: HCPCS | Performed by: INTERNAL MEDICINE

## 2025-08-22 PROCEDURE — 3078F DIAST BP <80 MM HG: CPT | Performed by: INTERNAL MEDICINE

## 2025-08-25 ENCOUNTER — OFFICE VISIT (OUTPATIENT)
Dept: PAIN CLINIC | Facility: CLINIC | Age: 65
End: 2025-08-25

## 2025-08-25 VITALS — HEART RATE: 73 BPM | SYSTOLIC BLOOD PRESSURE: 120 MMHG | DIASTOLIC BLOOD PRESSURE: 72 MMHG | OXYGEN SATURATION: 98 %

## 2025-08-25 DIAGNOSIS — M54.50 ACUTE LEFT-SIDED LOW BACK PAIN WITHOUT SCIATICA: ICD-10-CM

## 2025-08-25 PROCEDURE — 3078F DIAST BP <80 MM HG: CPT | Performed by: ANESTHESIOLOGY

## 2025-08-25 PROCEDURE — 3074F SYST BP LT 130 MM HG: CPT | Performed by: ANESTHESIOLOGY

## 2025-08-25 PROCEDURE — G2211 COMPLEX E/M VISIT ADD ON: HCPCS | Performed by: ANESTHESIOLOGY

## 2025-08-25 PROCEDURE — 99214 OFFICE O/P EST MOD 30 MIN: CPT | Performed by: ANESTHESIOLOGY

## 2025-08-25 RX ORDER — TIZANIDINE HYDROCHLORIDE 2 MG/1
2 CAPSULE, GELATIN COATED ORAL 3 TIMES DAILY PRN
Qty: 90 CAPSULE | Refills: 0 | Status: SHIPPED | OUTPATIENT
Start: 2025-08-25

## 2025-08-25 RX ORDER — HYDROCODONE BITARTRATE AND ACETAMINOPHEN 7.5; 325 MG/1; MG/1
1 TABLET ORAL EVERY 8 HOURS PRN
Qty: 30 TABLET | Refills: 0 | Status: SHIPPED | OUTPATIENT
Start: 2025-08-25

## 2025-08-27 ENCOUNTER — TELEPHONE (OUTPATIENT)
Dept: PAIN CLINIC | Facility: CLINIC | Age: 65
End: 2025-08-27

## 2025-08-27 ENCOUNTER — HOSPITAL ENCOUNTER (OUTPATIENT)
Dept: RADIATION ONCOLOGY | Facility: HOSPITAL | Age: 65
Discharge: HOME | End: 2025-08-27
Attending: RADIOLOGY

## 2025-08-27 VITALS
HEART RATE: 80 BPM | SYSTOLIC BLOOD PRESSURE: 119 MMHG | RESPIRATION RATE: 20 BRPM | TEMPERATURE: 98 F | WEIGHT: 256.19 LBS | BODY MASS INDEX: 36 KG/M2 | OXYGEN SATURATION: 98 % | DIASTOLIC BLOOD PRESSURE: 80 MMHG

## 2025-08-27 DIAGNOSIS — C61 PROSTATE CANCER (HCC): Primary | ICD-10-CM

## 2025-08-27 PROCEDURE — 99213 OFFICE O/P EST LOW 20 MIN: CPT

## 2025-08-27 RX ORDER — TIZANIDINE 2 MG/1
2 TABLET ORAL 3 TIMES DAILY PRN
Qty: 90 TABLET | Refills: 0 | Status: SHIPPED | OUTPATIENT
Start: 2025-08-27 | End: 2025-09-26

## (undated) DIAGNOSIS — E11.65 UNCONTROLLED TYPE 2 DIABETES MELLITUS WITH HYPERGLYCEMIA (HCC): Primary | ICD-10-CM

## (undated) DIAGNOSIS — E66.01 MORBID OBESITY WITH BMI OF 45.0-49.9, ADULT (HCC): ICD-10-CM

## (undated) DIAGNOSIS — F41.1 GENERALIZED ANXIETY DISORDER: ICD-10-CM

## (undated) DIAGNOSIS — M54.50 ACUTE LEFT-SIDED LOW BACK PAIN WITHOUT SCIATICA: ICD-10-CM

## (undated) DIAGNOSIS — M54.42 ACUTE LEFT-SIDED LOW BACK PAIN WITH BILATERAL SCIATICA: ICD-10-CM

## (undated) DIAGNOSIS — E78.2 MIXED HYPERLIPIDEMIA: ICD-10-CM

## (undated) DIAGNOSIS — I10 ESSENTIAL HYPERTENSION: ICD-10-CM

## (undated) DIAGNOSIS — M54.41 ACUTE LEFT-SIDED LOW BACK PAIN WITH BILATERAL SCIATICA: ICD-10-CM

## (undated) DEVICE — TOTAL HIP CDS: Brand: MEDLINE INDUSTRIES, INC.

## (undated) DEVICE — ANCHOR TISSUE RETRIEVAL SYSTEM, BAG SIZE 175 ML, PORT SIZE 10 MM: Brand: ANCHOR TISSUE RETRIEVAL SYSTEM

## (undated) DEVICE — PAIN TRAY: Brand: MEDLINE INDUSTRIES, INC.

## (undated) DEVICE — SYRINGE 10ML SLIP TIP LOSS OF RESIST PLAS

## (undated) DEVICE — TRI-LUMEN FILTERED TUBE SET WITH ACTIVATED CHARCOAL FILTER: Brand: AIRSEAL

## (undated) DEVICE — TRAY SURESTEP 16 BARDEX UMETR

## (undated) DEVICE — HEMOCLIP HORIZON MED 002200

## (undated) DEVICE — NEEDLE SPINAL 22X5 405148

## (undated) DEVICE — DRAIN ROUND HUBLESS 15FR

## (undated) DEVICE — REM POLYHESIVE ADULT PATIENT RETURN ELECTRODE: Brand: VALLEYLAB

## (undated) DEVICE — OINTMENT BACITRACIN PKT

## (undated) DEVICE — SUTURE PROLENE 5-0 RB-1

## (undated) DEVICE — REMOVER LOT 4OZ NONIRRITATING UNSCNT SFT

## (undated) DEVICE — ENDOSCOPY PACK UPPER: Brand: MEDLINE INDUSTRIES, INC.

## (undated) DEVICE — GLV SURG SZ 7.5 THK10MIL WHT ISOLEX SYN

## (undated) DEVICE — SHEET, T, LAPAROTOMY, STERILE: Brand: MEDLINE

## (undated) DEVICE — MLPD DISPOSABLE PAD (6' ROLL) 3 ROLLS: Brand: SCHAERER MEDICAL USA

## (undated) DEVICE — SUTURE VICRYL 0 CT-1

## (undated) DEVICE — LARGE NEEDLE DRIVER: Brand: ENDOWRIST

## (undated) DEVICE — BANDAID COVERLET 1X3

## (undated) DEVICE — HARMONIC ACE +7 SHEARS ADVANCED HEMOSTASIS 5MM DIAMETER 23CM SHAFT LENGTH  FOR USE WITH GRAY HAND PIECE ONLY: Brand: HARMONIC ACE

## (undated) DEVICE — FORCEP BIOPSY RJ4 LG CAP W/ND

## (undated) DEVICE — DERMABOND LIQUID ADHESIVE

## (undated) DEVICE — GLOVE SURG SENSICARE SZ 7

## (undated) DEVICE — SYRINGE 20CC LL TIP

## (undated) DEVICE — REMOVER DURAPREP 3M

## (undated) DEVICE — TROCAR: Brand: KII FIOS FIRST ENTRY

## (undated) DEVICE — BANDAGE ADH W1INXL3IN NAT FAB WVN N ADH PD

## (undated) DEVICE — GLOVE SURG SENSICARE SZ 6-1/2

## (undated) DEVICE — ELECTRO LUBE IS A SINGLE PATIENT USE DEVICE THAT IS INTENDED TO BE USED ON ELECTROSURGICAL ELECTRODES TO REDUCE STICKING.: Brand: KEY SURGICAL ELECTRO LUBE

## (undated) DEVICE — BLADELESS OBTURATOR: Brand: WECK VISTA

## (undated) DEVICE — Device: Brand: DEFENDO AIR/WATER/SUCTION AND BIOPSY VALVE

## (undated) DEVICE — DV KIT ACCESSORY 4-ARM

## (undated) DEVICE — PAD SACRAL SPAN AID

## (undated) DEVICE — TISSEEL SPRAY SET

## (undated) DEVICE — HEMOCLIP HORIZON LG 004200

## (undated) DEVICE — ECHELON FLEX POWERED PLUS COMPACT ARTICULATING ENDOSCOPIC LINEAR CUTTER, 60MM: Brand: ECHELON FLEX

## (undated) DEVICE — STERILE POLYISOPRENE POWDER-FREE SURGICAL GLOVES: Brand: PROTEXIS

## (undated) DEVICE — BANDAGE ADH 1INX3IN NAT FAB N ADH PD CURAD

## (undated) DEVICE — MONOPOLAR CURVED SCISSORS: Brand: ENDOWRIST

## (undated) DEVICE — VIOLET BRAIDED (POLYGLACTIN 910), SYNTHETIC ABSORBABLE SUTURE: Brand: COATED VICRYL

## (undated) DEVICE — 40580 - THE PINK PAD - ADVANCED TRENDELENBURG POSITIONING KIT: Brand: 40580 - THE PINK PAD - ADVANCED TRENDELENBURG POSITIONING KIT

## (undated) DEVICE — GLOVE SUR 7.5 SENSICARE PIP WHT PWD F

## (undated) DEVICE — SYRINGE 10ML LL CONTRL SYRINGE

## (undated) DEVICE — SUTURE VLOC 90 3-0 9\" 1944

## (undated) DEVICE — SUTURE VICRYL 3-0 SH

## (undated) DEVICE — PILLOW ABDUCTION HIP SM

## (undated) DEVICE — SUTURE VICRYL 2-0 CP-1

## (undated) DEVICE — GLOVE SURG SENSICARE SZ 7-1/2

## (undated) DEVICE — SUTURE ETHILON 5-0 PS-3

## (undated) DEVICE — CLIP HEMOLOK LARGE PURPLE

## (undated) DEVICE — SKIN REG/FINE DUAL MARKER, RULER, LABELS: Brand: MEDLINE

## (undated) DEVICE — ROBOTIC UROLOGY PROSTATE: Brand: MEDLINE INDUSTRIES, INC.

## (undated) DEVICE — DRAPE EQUIPMENT INTRATEMP THER

## (undated) DEVICE — SUTURE SILK 3-0 SH

## (undated) DEVICE — NON-ADHERENT STRIPS,OIL EMULSION: Brand: CURITY

## (undated) DEVICE — ELECTRODE ES RET 2 PATE W/ 10FT CRD MPLR DISP

## (undated) DEVICE — 3M™ STERI-STRIP™ REINFORCED ADHESIVE SKIN CLOSURES, R1547, 1/2 IN X 4 IN (12 MM X 100 MM), 6 STRIPS/ENVELOPE: Brand: 3M™ STERI-STRIP™

## (undated) DEVICE — SUTURE MONOCRYL 4-0 PS-2

## (undated) DEVICE — AVANOS* TUOHY EPIDURAL NEEDLE: Brand: AVANOS

## (undated) DEVICE — SUTURE PROLENE 2-0 MH

## (undated) DEVICE — LIGHT HANDLE

## (undated) DEVICE — DV OBTURATOR BLADELESS 8M LONG

## (undated) DEVICE — CANNULA SEAL

## (undated) DEVICE — SKIN MARKER DUAL TIP WITH RULER CAP AND LABELS: Brand: DEVON

## (undated) DEVICE — MARKER SKIN 2 TIP

## (undated) DEVICE — ABSORBABLE HEMOSTAT (OXIDIZED REGENERATED CELLULOSE, U.S.P.): Brand: SURGICEL

## (undated) DEVICE — HEMOCLIP MED 24 CLIP/CARTRIDGE

## (undated) DEVICE — Device

## (undated) DEVICE — Device: Brand: PLUMEPEN

## (undated) DEVICE — ARM DRAPE

## (undated) DEVICE — SUTURE VLOC 90 3-0 9\" 0844

## (undated) DEVICE — VISUALIZATION SYSTEM: Brand: CLEARIFY

## (undated) DEVICE — KENDALL SCD EXPRESS SLEEVES, KNEE LENGTH, MEDIUM: Brand: KENDALL SCD

## (undated) DEVICE — GAUZE SPONGES,USP TYPE VII GAUZE, 12 PLY: Brand: CURITY

## (undated) DEVICE — STERILE SYNTHETIC POLYISOPRENE POWDER-FREE SURGICAL GLOVES WITH HYDROGEL COATING, SMOOTH FINISH, STRAIGHT FINGER: Brand: PROTEXIS

## (undated) DEVICE — SUTURE VICRYL 1 OS-6

## (undated) DEVICE — SUTURE VICRYL 0 UR-6

## (undated) DEVICE — DRAIN RELIAVAC 100CC

## (undated) DEVICE — SUTURE SILK 2-0 SH

## (undated) DEVICE — GLOVE SUR 7 SENSICARE PIP WHT PWD F

## (undated) DEVICE — UPPER EXTREMITY CDS-LF: Brand: MEDLINE INDUSTRIES, INC.

## (undated) DEVICE — FENESTRATED BIPOLAR FORCEPS: Brand: ENDOWRIST

## (undated) DEVICE — BANDAID CURAD 3IN X 1IN

## (undated) DEVICE — DRAPE LAP 102X78X121IN POLYPR SMS STD T INSTR

## (undated) DEVICE — 1200CC GUARDIAN II: Brand: GUARDIAN

## (undated) DEVICE — PACK PAIN MGMT

## (undated) DEVICE — WRAP COOLING HIP W/ICE PILLOW

## (undated) DEVICE — BANDAGE ADH COVERLET 3X1IN

## (undated) DEVICE — COLUMN DRAPE

## (undated) DEVICE — SOL  .9 1000ML BTL

## (undated) DEVICE — STERILE POLYISOPRENE POWDER-FREE SURGICAL GLOVES WITH EMOLLIENT COATING: Brand: PROTEXIS

## (undated) DEVICE — SUT VICRYL 0 J608H

## (undated) DEVICE — MEGA NEEDLE DRIVER: Brand: ENDOWRIST

## (undated) DEVICE — GLOVE,SURG,SENSICARE,ALOE,LF,PF,7: Brand: MEDLINE

## (undated) DEVICE — Device: Brand: STABLECUT®

## (undated) DEVICE — TIP COVER ACCESSORY

## (undated) DEVICE — SURGICEL SNOW ABS 4X4

## (undated) DEVICE — ALCOHOL 70% 4 OZ

## (undated) DEVICE — INSUFFLATION NEEDLE TO ESTABLISH PNEUMOPERITONEUM.: Brand: INSUFFLATION NEEDLE

## (undated) DEVICE — DRAPE WARMER ORS-300

## (undated) DEVICE — SUTURE SILK 0

## (undated) DEVICE — ENDOSCOPY PACK - LOWER: Brand: MEDLINE INDUSTRIES, INC.

## (undated) DEVICE — SUTURE VLOC 90 3-0 9\" 0644

## (undated) DEVICE — STERILE HOOK LOCK LATEX FREE ELASTIC BANDAGE 6INX5YD: Brand: HOOK LOCK™

## (undated) DEVICE — CASED DISP BIPOLAR CORD

## (undated) DEVICE — SUTURE ETHILON 3-0 PS-2

## (undated) DEVICE — SPECIMEN CONTAINER,POSITIVE SEAL INDICATOR, OR PACKAGED: Brand: PRECISION

## (undated) DEVICE — ENDOSCOPIC ULTRASOUND FINE NEEDLE BIOPSY (FNB) DEVICE: Brand: ACQUIRE

## (undated) DEVICE — AIRSEAL 12 MM ACCESS PORT AND PALM GRIP OBTURATOR WITH BLADELESS OPTICAL TIP, 120 MM LENGTH: Brand: AIRSEAL

## (undated) DEVICE — 3M™ STERI-DRAPE™ INSTRUMENT POUCH 1018: Brand: STERI-DRAPE™

## (undated) DEVICE — 3M™ RED DOT™ MONITORING ELECTRODE WITH FOAM TAPE AND STICKY GEL, 50/BAG, 20/CASE, 72/PLT 2570: Brand: RED DOT™

## (undated) DEVICE — SUTURE ETHILON 4-0 PS-2

## (undated) DEVICE — GAUZE SPONGES,12 PLY: Brand: CURITY

## (undated) DEVICE — DISPOSABLE BIPOLAR FORCEPS 4" (10.2CM) JEWELERS, STRAIGHT 0.4MM TIP AND 12 FT. (3.6M) CABLE: Brand: KIRWAN

## (undated) DEVICE — GLOVE SUR 6.5 SENSICARE PIP WHT PWD F

## (undated) DEVICE — SUTURE VLOC 90 2-0 9\" 2145

## (undated) DEVICE — DECANTER BAG 9": Brand: MEDLINE INDUSTRIES, INC.

## (undated) DEVICE — SUTURE PROLENE 4-0 RB-1

## (undated) DEVICE — STERILE HOOK LOCK LATEX FREE ELASTIC BANDAGE 2INX5YD: Brand: HOOK LOCK™

## (undated) DEVICE — CADIERE FORCEPS: Brand: ENDOWRIST

## (undated) DEVICE — CHLORAPREP 26ML APPLICATOR

## (undated) DEVICE — SUT VICRYL 5-0 PC-1 J834G

## (undated) DEVICE — HIPEC PACK: Brand: MEDLINE INDUSTRIES, INC.

## (undated) DEVICE — GAMMEX® NON-LATEX PI ORTHO SIZE 7.5, STERILE POLYISOPRENE POWDER-FREE SURGICAL GLOVE: Brand: GAMMEX

## (undated) DEVICE — INTENDED TO AID IN THE PASSING OF SUTURES THROUGH BONE AND SOFT TISSUE DURING ORTHOPEDIC SURGERY: Brand: HOFFEE SUTURE RETRIEVER

## (undated) DEVICE — REMOVER PREP SOLUTION 4OZ

## (undated) DEVICE — ENDOPATH ULTRA VERESS INSUFFLATION NEEDLES WITH LUER LOCK CONNECTORS: Brand: ENDOPATH

## (undated) DEVICE — SUTURE SILK 2-0 FS

## (undated) DEVICE — SUT VICRYL 3-0 SH J416H

## (undated) DEVICE — RF CANNULA, CVD: Brand: VENOM

## (undated) DEVICE — BIOGUARD CLEANING ADAPTER

## (undated) DEVICE — SUTURE VICRYL 0

## (undated) DEVICE — FILTERLINE NASAL ADULT O2/CO2

## (undated) DEVICE — SOL H2O 1000ML BTL

## (undated) DEVICE — DRAIN BLAKE ROUND 15FR

## (undated) DEVICE — TOWEL OR BLU 16X26 STRL

## (undated) DEVICE — REMOVER LOT 4OZ N IRRIG UNSCNT SFT MOIST LIQ

## (undated) DEVICE — ENDOPATH ECHELON ENDOSCOPIC LINEAR CUTTER RELOADS, WHITE, 60MM: Brand: ECHELON ENDOPATH

## (undated) DEVICE — SUTURE ETHIBOND 5 CCS

## (undated) DEVICE — COVER WAND RF DETECT

## (undated) DEVICE — ROBOTIC GENERAL: Brand: MEDLINE INDUSTRIES, INC.

## (undated) DEVICE — DV OBTURATOR BLADELESS 8MM

## (undated) DEVICE — SUTURE PROLENE 3-0 SH

## (undated) DEVICE — PROGRASP FORCEPS: Brand: ENDOWRIST

## (undated) DEVICE — BLADE ELECTRODE: Brand: EDGE

## (undated) DEVICE — NEEDLE SPNL 22GA L5IN BLK HUB QNCKE BVL DISP

## (undated) DEVICE — TROCAR: Brand: KII SHIELDED BLADED ACCESS SYSTEM

## (undated) DEVICE — ABC BEND-A-BEAM 3"(7.6CM) HANDCONTROL MALLEABLE HANDPIECE: Brand: ABC BEND-A-BEAM

## (undated) DEVICE — DRESSING AQUACEL AG 3.5X12

## (undated) DEVICE — SUTURE VLOC 90 2-0 9" 2145

## (undated) DEVICE — SUTURE VLOC NONAB 0 6" 0306

## (undated) DEVICE — DISPOSABLE TOURNIQUET CUFF SINGLE BLADDER, DUAL PORT AND QUICK CONNECT CONNECTOR: Brand: COLOR CUFF

## (undated) DEVICE — STANDARD HYPODERMIC NEEDLE,POLYPROPYLENE HUB: Brand: MONOJECT

## (undated) DEVICE — CLIP HEMOLOK MEDIUM GREEN

## (undated) DEVICE — SOL  .9 1000ML BAG

## (undated) DEVICE — GAMMEX® PI HYBRID SIZE 7, STERILE POWDER-FREE SURGICAL GLOVE, POLYISOPRENE AND NEOPRENE BLEND: Brand: GAMMEX

## (undated) DEVICE — HOOD, PEEL-AWAY: Brand: FLYTE

## (undated) DEVICE — DRAPE,U/SHT,SPLIT,FILM,60X84,STERILE: Brand: MEDLINE

## (undated) NOTE — LETTER
Date & Time: 10/19/2021, 11:29 PM  Patient: Sam Hernández  Encounter Provider(s):    Son Prasad MD         This certifies that I, Sam Hernández, a patient at an [facility] facility, am leaving the facility voluntari

## (undated) NOTE — LETTER
25      RE: Darrel Salazar    : 1960    Dear Dr. Chairez,    Your patient is being scheduled for a pain management procedure at [facility].    Procedure:  Lumbar Epidural Steroid Injection.  Date of Procedure: TBD -pending medical clearance.  Physician: Dr. Tanner- Anesthesiologist     Your patient is currently taking Xarelto. Dr. Tanner usually recommends this medication to be held for 3 days prior to injection.     Please verify patient is cleared to proceed with pain management procedures.      Clearance Approved   ____________    Clearance Denied       ____________      Comments: ______________________________________________________    Signature: ________________________________  Date: _________________       If you have any questions please feel free to contact our office at [phone], option # 3.    Please fax this clearance request to our office at fax # [phone] or send electronically.       Thank you,      [facility] Staff

## (undated) NOTE — LETTER
01/24/18      Dejamagaly Ulloa  5/4/1960  [id number]      To Whom It May Concern:    Mirza Ulloa is a current patient of mine and I have prescribed VSL #3 as the preferred probiotic for him to take.  This particular probiot

## (undated) NOTE — Clinical Note
Start STAT prior auth for   [facility]:  HLA B*58:01, Allopurinol Hypersensitivity TEST: [id number] CPT: 86950

## (undated) NOTE — LETTER
23    RE: Della Rivers    : 1960    Dear Dr. Renetta Watson,    Your patient is being scheduled for a pain management procedure at [facility].    Procedure:  Bilateral L3/4,L4/5 Radiofrequency Ablation   Date of Procedure: TBD  Physician: Presley Salazar- Anesthesiologist     Your patient is currently taking Xarelto.  usually recommends the medication be held for 3 days prior to injection. Please verify patient is cleared to proceed with pain management procedures. Clearance Approved   ____________    Clearance Denied       ____________      Comments: ______________________________________________________    Signature: ________________________________  Date: _________________       If you have any questions please feel free to contact our office at [phone], option # 2. Please fax this clearance request to our office at fax # [phone] or send electronically.        Thank you,

## (undated) NOTE — LETTER
OSR/SAM Notification    To: Dr Garcia Sides                                            Date:  9/17/2021  Fax #: [phone]    Patient Name: Yohan Castellanos / Sex: 5/4/1960-A: 63 y  male  Phone:

## (undated) NOTE — LETTER
25      RE: Darrel Salazar    : 1960    Dear Dr. Chairez,    Your patient is being scheduled for a pain management procedure at [facility].    Procedure:  Bilateral L4,L5 Radiofrequency Ablation.   Date of Procedure: TBD -pending medical clearance.  Physician: Dr. Tanner- Anesthesiologist     Your patient is currently taking Xarelto. Dr. Tanner usually recommends this medication to be held for 3 days prior to procedure.     Please verify patient is cleared to proceed with pain management procedures.      Clearance Approved   ____________    Clearance Denied       ____________      Comments: ______________________________________________________    Signature: ________________________________  Date: _________________       If you have any questions please feel free to contact our office at [phone], option # 3.    Please fax this clearance request to our office at fax # [phone] or send electronically.       Thank you,      [facility] Staff

## (undated) NOTE — LETTER
23    RE: Portia Leung    : 1960    Dear Dr. Elvis Gomez,    Your patient is being scheduled for a pain management procedure at [facility].    Procedure:  Bilateral L4,L5 Facet Injection  Date of Procedure: TBD -pending medical clearance. Physician: Meenakshi Cobb- Anesthesiologist     Your patient is currently taking Xarelto.  usually recommends the medication be held for 3 days prior to injection. Please verify patient is cleared to proceed with pain management procedures. Clearance Approved   ____________    Clearance Denied       ____________      Comments: ______________________________________________________    Signature: ________________________________  Date: _________________       If you have any questions please feel free to contact our office at [phone], option # 2. Please fax this clearance request to our office at fax # [phone] or send electronically.        Thank you,

## (undated) NOTE — LETTER
10/17/23    RE: Joe Nelson    : 1960    Dear Dr. Benn Aase,    Your patient is being scheduled for a pain management procedure at [facility].    Procedure:  Bilateral L4,L5 Medial Branch Block   Date of Procedure: TBD -pending medical clearance. Physician: Nick Carson- Anesthesiologist     Your patient is currently taking Xarelto.  usually recommends the medication be held for 3 days prior to injection. Please verify patient is cleared to proceed with pain management procedures. Clearance Approved   ____________    Clearance Denied       ____________      Comments: ______________________________________________________    Signature: ________________________________  Date: _________________       If you have any questions please feel free to contact our office at [phone], option # 2. Please fax this clearance request to our office at fax # [phone] or send electronically.        Thank you,

## (undated) NOTE — LETTER
18    RE: Kiel Gillette    : 1960    Dear Dr. Nohemi Simmons    Your patient is being scheduled for a pain management procedure at [facility].    Procedure:  Sacroiliac joint steroid injection with sedation    Date of Procedure: 18

## (undated) NOTE — LETTER
Date: 1/7/2019    Patient Name: Jaclyn Hussein          To Whom it may concern: The above patient was seen at the [facility] for treatment of a medical condition. Mr Salazar's blood pressure was controlled in my office today.  His blood pr

## (undated) NOTE — LETTER
[address] [place] [address] Department  Phone: [phone]  Right Fax: [phone]  [facility] 20 By:  Hemal Laguna Date: 2/24/20    Patient Name: Bashir Cornea  Surgery Date: 3/2/2020    CSN: [id number]  Medical Record: [id number]   DO

## (undated) NOTE — LETTER
11/10/23      RE: Ramiro Jiang    : 1960    Dear Dr. Radha Burton,    Your patient is being scheduled for a pain management procedure at [facility].    Procedure:  Bilateral L4/5,L5/S1 Radiofrequency Ablation   Date of Procedure: TBD -pending medical clearance. Physician: Haley Ugalde- Anesthesiologist     Your patient is currently taking Xarelto.  usually recommends the medication be held for 3 days prior to injection. Please verify patient is cleared to proceed with pain management procedures. Clearance Approved   ____________    Clearance Denied       ____________      Comments: ______________________________________________________    Signature: ________________________________  Date: _________________       If you have any questions please feel free to contact our office at [phone], option # 2. Please fax this clearance request to our office at fax # [phone] or send electronically.        Thank you,

## (undated) NOTE — LETTER
[facility]  [facility], [address] [address]  [employer]: [phone]  FAX: [phone] [facility] Clearance Request    Randel Cockayne, MD [address]  [address] [place] [address]  [place]

## (undated) NOTE — LETTER
Abdelrahman Neely. STEVE Ortega, F.A.C.S.     Surgical Clearance Needed    Date: 11/12/2020                                                                       From: Denny Ji: DR Kedar Polanco

## (undated) NOTE — LETTER
24      RE: Darrel Salazar    : 1960    Dear Dr. Chairez,    Your patient is being scheduled for a pain management procedure at [facility].    Procedure:  Lumbar Epidural Steroid Injection.   Date of Procedure: TBD -pending medical clearance.   Physician: - Anesthesiologist     Your patient is currently taking Xarelto.  usually recommends the medication be held for 3 days prior to injection.     Please verify patient is cleared to proceed with pain management procedures.      Clearance Approved   ____________    Clearance Denied       ____________      Comments: ______________________________________________________    Signature: ________________________________  Date: _________________       If you have any questions please feel free to contact our office at [phone], option # 3.    Please fax this clearance request to our office at fax # [phone] or send electronically.       Thank you,      [facility] Staff

## (undated) NOTE — LETTER
24      RE: Darrel Salazar    : 1960    Dear Dr. Chairez,    Your patient is being scheduled for a pain management procedure at [facility].    Procedure:  Lumbar Epidural Steroid Injection.   Date of Procedure: TBD -pending medical clearance.   Physician: - Anesthesiologist     Your patient is currently taking Xarelto.  usually recommends the medication be held for 3 days prior to injection.     Please verify patient is cleared to proceed with pain management procedures.      Clearance Approved   ____________    Clearance Denied       ____________      Comments: ______________________________________________________    Signature: ________________________________  Date: _________________       If you have any questions please feel free to contact our office at [phone], option # 3.    Please fax this clearance request to our office at fax # [phone] or send electronically.       Thank you,

## (undated) NOTE — MR AVS SNAPSHOT
[address] [address]               Thank you for choosing us for your health care visit with DONNA Harrell.   We are glad to serve you and happy to provide you with this summary complication, with long-term current use of insulin ([facility] [address] [E11.9, Z79.4], Essential hypertension [I10]           Vitamin D, 25-Hydroxy [E]    Complete by:  Jun 08, 2017 (Approximate)    Assoc Dx:  Encounter for therapeutic drug monitoring [Z51.81], Morbid much or too little which would hinder weight loss. When you set the shay up chose 1-2 lbs/week as a goal.    Really try to start exercising and start with 1 day and progress as able with long-term goal of 30 minutes 5 days a week.       Weight Management: Alon family members and friends for support. They may even want to join you. Also look to your healthcare provider, registered dietitian, and  for help.  Your local hospital can give you more information about nutrition, exercise, and weight Fat is an energy source that can be stored until needed. Fat does not raise blood sugar. However, it can raise blood cholesterol, increasing the risk of heart disease. Fat is also high in calories, which can cause weight gain.  Not all types of fat are the Date Last Reviewed: 3/1/2016  © 5869-3188 The [address] [place] [address], [address] [place], [place] [address]. All rights reserved. This information is not intended as a substitute for professional medical care.  Always follow your healthcare professional' Generic drug: Tadalafil           ClonazePAM 1 MG Tabs   TAKE 1 TABLET BY MOUTH TWICE A DAY AS NEEDED FOR ANXIETY   Commonly known as:  KLONOPIN           DULoxetine HCl 60 MG Cpep   Take 1 capsule (60 mg total) by mouth daily.    Commonly known as:  CYMBA Bring a paper prescription for each of these medications    - VSL#3 Caps            MyChart     Visit [employer]  You can access your [employer] to more actively manage your health care and view more details from this visit by going to [email or web address] [facility].

## (undated) NOTE — LETTER
22  RE: Ramiro Jiang    : 1960    Dear Dr. Radha Burton    Your patient is being scheduled for a pain management procedure at [facility] within the next 4 weeks. Procedure:  Bilateral sacroiliac joint arthrogram and injection, Right diagnostic lumbar facet joint injection, Left diagnostic lumbar facet joint injection   Physician: Steven Tay- Anesthesiologist     Your patient is currently taking Xarelto. Steven Tay usually recommends the medication be held for 3 days prior to injection. Please verify patient is cleared to proceed with pain management procedures. Clearance Approved   ____________    Clearance Denied       ____________      Comments: ______________________________________________________    Signature: ________________________________  Date: _________________       If you have any questions please feel free to contact our office at [phone], option # 2. Please fax this clearance request to our office at fax # [phone] or send electronically.        Thank you,      [employer] [facility]

## (undated) NOTE — LETTER
Tc Brand M.D., F.A.C.S.,F.A.C.R.S. Surgical Clearance Needed    Date: 7/14/2022                                                                       From: Nathalie Herrera     Attn: Nsair Angelo MD                                                                                     RE: Surgical Clearance               # of Pages: 1        Patient Name: Jenny Ball    Patient YOB: 1960    Consult For: MEDICAL CLEARANCE AND ANTI COAGULATION CLEARANCE    Surgery: ROBOTIC VENTRAL HERNIA REPAIR WITH M[employer]    Date of Surgery: 7/25/2022        This facsimile transmission is provided for your internal use only. If the reader of this message is not the intended recipient, you are hereby notified that you have received this document in error, and that any review, dissemination, distribution, or copying of this message is strictly prohibited. If you have received this communication in error, please notify us immediately by telephone and return the original message to us by mail.

## (undated) NOTE — MR AVS SNAPSHOT
[address] [place] [address] & Book  [address] [facility] [address]  [phone]               Thank you for choosing us for your health care visit with Yoav Wheeler MD.  We are glad to serve you and happy to provide you with this s Return in about 1 week (around 5/19/2017) for incontinence .          Reason for Today's Visit     Incontinence           Medical Issues Discussed Today     Overflow incontinence    -  Primary    Hematuria          Instructions and Information about Your H * LANTUS SOLOSTAR 100 UNIT/ML Sopn   Generic drug:  Insulin Glargine           Losartan Potassium-HCTZ 100-25 MG Tabs   Take 1 tablet by mouth daily.    Commonly known as:  HYZAAR           MetFORMIN HCl 500 MG Tabs   Take 500 mg by mouth 2 (two) times fouzia Summaries. If you've been to the Emergency Department or your doctor's office, you can view your past visit information in [employer] by going to Visits < Visit Summaries. [employer] questions? Call [phone] for help.   [employer] is NOT to be used for urge

## (undated) NOTE — MR AVS SNAPSHOT
[address]  [address] [facility], [address] [address] [phone]               Thank you for choosing us for your health care visit with Soco Castanon PA-C.   We are glad to serve you and happy to provide you with JOSH Flynn [address] [place] [address]   Phone:  [phone]    Diagnoses:   Morbid obesity with BMI of 45.0-49.9, adult [facility])   Order:  Op Referral To Weight Loss [address] I 124/82 mmHg 87 97.7 °F (36.5 °C) (Oral) 71\" 358 lb 49.95 kg/m2         Current Medications          This list is accurate as of: 3/21/17  5:09 PM.  Always use your most recent med list.                AmLODIPine Besylate 5 MG Tabs   TAKE 1 BY MOUTH DAILY * LANTUS SOLOSTAR 100 UNIT/ML Sopn   Generic drug:  Insulin Glargine           Losartan Potassium-HCTZ 100-25 MG Tabs   TAKE 1 BY MOUTH DAILY   Commonly known as:  HYZAAR           MetFORMIN HCl 500 MG Tabs   Take 500 mg by mouth 2 (two) times daily with Fully enjoy your food when eating. Don’t eat while distracted and slow down. Avoid over sized portions. Don’t eat while when you’re bored.      EAT THESE FOODS MORE OFTEN: EAT THESE FOODS LESS OFTEN:   Make half your plate fruits and vegetables Highly

## (undated) NOTE — LETTER
18    RE: Marcela Chapman    : 1960    Dear Dr. Samantha Aviles    Your patient is being scheduled for a pain management procedure at [facility].    Procedure:  Right hip joint injection   Date of Procedure: 18  Physician: Fran Redman

## (undated) NOTE — LETTER
19    RE: Sheila Gamino    : 1960    Dear Dr. Keyla Johnston,    Your patient is being scheduled for a pain management procedure at [facility].    Procedure:  Bilateral sacroiliac joint injection with conscious sedation  Date of Procedure:

## (undated) NOTE — MR AVS SNAPSHOT
[address]  [address] [address] [phone]               Thank you for choosing us for your health care visit with Meaghan García MD.  We are glad to serve you and happy to provide you with this cruz Instructions and Information about Your Health      Diet: Diabetes  Food is an important tool that you can use to control diabetes and stay healthy.  Eating well-balanced meals in the correct amounts will help you control your blood glucose levels and preve · Talk with your dietitian about safe sugar substitutes. · Avoid added salt. It can contribute to high blood pressure, which can cause heart disease. People with diabetes already have a risk of high blood pressure and heart disease.   · Stay at a healthy w TAKE 1 TABLET BY MOUTH TWICE A DAY AS NEEDED FOR ANXIETY   Commonly known as:  KLONOPIN           DULoxetine HCl 60 MG Cpep   TAKE 1 CAPSULE (60 MG TOTAL) BY MOUTH DAILY.    Commonly known as:  CYMBALTA           ergocalciferol 93115 units Caps   Take 1 cap discharge instructions in [facility] by going to Visits < Admission Summaries. If you've been to the Emergency Department or your doctor's office, you can view your past visit information in [facility] by going to Visits < Visit Summaries. Sanlorenzo questions?

## (undated) NOTE — IP AVS SNAPSHOT
Patient Demographics     [facility] [address] Phone  [phone] [facility])  [phone] (Mobile) *Preferred* E-mail Address  [email or web address]      Emergency Contact(s)     Name Relation Home Work Mobile    Isabella Salazar Xarelto is a blood thinner to prevent blood clots. Take Xarelto 10 mg Tuesday, Wednesday and Thursday. You will resume your regular 20 mg dose on Friday. Tylenol may be taken around the clock to ease your mild to moderate pain.  Extra Strength Tylenol 1 ta ? Usually allowed after four to six weeks – check with surgeon at your office visit. Return to work  ? Usually allowed after four to six weeks. Discuss specific work activities with your surgeon. Restrictions  ?  For hip replacement surgery, fol blood in your urine. Use electric razors and soft toothbrushes only. ? Do not take aspirin while taking blood thinners unless ordered by your physician. ? Review anticoagulant education information sheet provided. Discomfort  ?  Surgical discomfort is ? An enema or suppository may be needed if above measures do not work. Prevention of infection and promotion of healing  ? Good hand washing is important.  Everyone should wash their hands or use hand  as soon as they walk in your [facility] ? Increased or foul smelling drainage from incision  ? Red streaks on skin near incision. ? Temperature >100.4F.  ? Increased pain at incision not relieved by pain medication. Signs of Possible Dislocation  ? Increased severe leg or groin pain  ?  Sandhya Suggs space to open car doors to position yourself properly with walker to get in and out of your car safely; some parking spaces are  practically on top of each other and do not give you enough room.        SPECIAL  INSTRUCTIONS:    View hip discharge education acetaminophen 500 MG Tabs  Commonly known as:  TYLENOL EXTRA STRENGTH      Take 1 tablet (500 mg total) by mouth 4 (four) times daily. Mauri Davidson         aspirin EC 81 MG Tbec      Take 1 tablet (81 mg total) by mouth daily.    Elisa Dowling Take 1 tablet (100 mg total) by mouth daily. Bobby Centeno MD         metFORMIN HCl 500 MG Tabs  Commonly known as:  GLUCOPHAGE      Take 1 tablet (500 mg total) by mouth 2 (two) times daily with meals.    Bobby Centeno MD         mupirocin 2 % Oint  Com Order ID Medication Name Action Time Action Reason Comments    [id number] PEG [facility]) powder packet 17 g 03/04/20 0552 Given      [id number] Senna (SENOKOT) tab 17.2 mg 03/03/20 2138 Given      [id number] atorvastatin (LIPITOR) tab 10 mg 03/03/20 2138 Result status: Final result   Ordering provider:  Andrea Calderón MD  03/03/20 2300 Resulting lab:  [facility]    Specimen Information    Type Source Collected On   Blood — 03/04/20 5859          Components    Component Value Reference Range Flag Lab   Glu Order Status:  Completed Lab Status:  Preliminary result Updated:  03/04/20 0800    Specimen:  Blood,peripheral      Blood Culture Result No Growth 1 Day    Blood Culture FREQ X 2 [[id number]] Collected:  03/03/20 0725    Order Status:  Completed Lab [employer] of nuclear stress test; saw his cardiologist for cardiac clearance on 2/11. Today, he complains of worsened right hip pain s/p fall on his driveway 4 days ago. Pain is more intense and more medial than his usual arthritis pain  No other complaints today. times daily as needed for Anxiety. , Disp: 60 tablet, Rfl: 2  •  latanoprost 0.005 % Ophthalmic Solution, Place 1 drop into both eyes nightly., Disp: 7.5 mL, Rfl: 3  •  Insulin Syringe-Needle U-100 (BD INSULIN SYRINGE ULTRAFINE) 31G X 5/16\" 1 ML Does not a HEENT: denies nasal congestion, sinus pain or sore throat  LUNGS: denies shortness of breath with exertion. Denies history of COPD. + sleep apnea compliant on CPAP  CARDIOVASCULAR: denies chest pain on exertion  GI: denies abdominal pain, denies heartburn. NEURO: Oriented times three, cranial nerves are intact, motor and sensory are grossly intact.  DTRs +2 and symmetric b/l.     ASSESSMENT AND PLAN:    Grazyna Santo is a 64year old male who presents for cardiac risk assessment prior to his right total hip r patient's condition since the H&P was performed.  I discussed with the patient and/or legal representative the potential benefits, risks and side effects of this procedure; the likelihood of the patient achieving goals; and potential problems that might oc • Other and unspecified hyperlipidemia    • Type II or unspecified type diabetes mellitus without mention of complication, not stated as uncontrolled    • Unspecified essential hypertension    • Unspecified sleep apnea Edward PSG 8-13-13 TX 12-30-13    AHI • insulin detemir  40 Units Subcutaneous Daily       Continuous Infusions:  • lactated ringers 20 mL/hr at 03/02/20 1210   • sodium chloride 83 mL/hr at 03/02/20 1618       Social History:   reports that he has never smoked.  He has never used smokeless tob CXR, 3/3/2020:         FINDINGS:    LUNGS/PLEURA:  No focal consolidation. No evidence of mass. No pleural effusions. Mild elevation of the right hemidiaphragm unchanged. CARDIAC:  Normal heart size and vascularity.   MEDIASTINUM:  Normal.  ANDREY: Physical Therapy Notes (last 72 hours) (Notes from 3/1/2020  1:10 PM through 3/4/2020  1:10 PM)      Physical Therapy Note signed by Abi Samuels PTA at 3/3/2020  3:21 PM  Version 1 of 1    Author:  Abi Samuels PTA Service:  Rehab Author T • LAPAROSCOPY, SURGICAL PROSTATECTOMY, RETROPUBIC RADICAL, W/NERVE SPARING  11/15/2019       • OTHER SURGICAL HISTORY      vocal cord polyps removed   • OTHER SURGICAL HISTORY  11/15/2019    RALP Dr. Gypsy Guy    • ROBOT-ASSISTED LAPAROSCOPIC Approx Degree of Impairment: 46.58%   Standardized Score (AM-PAC Scale): 43.63   CMS Modifier (G-Code): CK    FUNCTIONAL ABILITY STATUS  Gait Assessment   Gait Assistance: Contact guard assist;Supervision  Distance (ft): 175  Assistive Device: Rolling walk Patient End of Session: Up in chair;Call light within reach; Needs met;RN aware of session/findings; All patient questions and concerns addressed; Family present    ASSESSMENT   Pt continues to present with impaired strength and decreased ROM[CY. 1] R LE[CY. 2] Physician Order: PT Eval and Treat    Presenting Problem: s/p right lateral JORGE L 3/2/2020[SP. 3]  Reason for Therapy: Mobility Dysfunction and Discharge Planning    History related to current admission:[SP.1] Pt admitted 3/2/2020 for elective right lateral T • OTHER SURGICAL HISTORY  11/15/2019    RALP Dr. Roni Lance    • ROBOT-ASSISTED LAPAROSCOPIC PROSTATECTOMY/ORICAL N/A 12/19/2018    Performed by Samanta Newman MD at [facility] MAIN OR   • SACROILIAC JOINT INJECTION BILATERAL Bilateral 9/12/2019    Performed by Leonardo Wild ADDITIONAL TESTS                                 ACTIVITY TOLERANCE                         O2 WALK                  AM-PAC '6-Clicks' INPATIENT SHORT FORM - BASIC MOBILITY  How much difficulty does the patient currently have. ..[SP.1]  -   Turning over in on heel toe pattern, upright posture. Pt returned to bs chair, educated about POC.   Pt appropriate for group PT in am.  Discussed session with Rn and pct, assist recommendations.[SP.2]        Exercise/Education Provided:[SP.1]  Bed mobility  Energy conser Number of Visits to Meet Established Goals: 3[SP.3]      CURRENT GOALS  Goal #1       Goal #2  Patient is able to demonstrate transfers Sit to/from Stand at assistance level: supervison     Goal #3    Patient is able to ambulate 150 feet with assistive dev use of insulin ([facility] [address]    Essential hypertension    Morbid obesity with BMI of 45.0-49.9, adult (HCC)[BW.3]      Past Medical History[BW.1]  Past Medical History:   Diagnosis Date   • Anesthesia complication     \"breathing issues\",    • Arrhythmia     A-FI OBJECTIVE[BW.1]  Precautions: JORGE L - anterior[BW.3]    WEIGHT BEARING RESTRICTION[BW.1]  Weight Bearing Restriction: R lower extremity        R Lower Extremity: Weight Bearing as Tolerated[BW.3]       PAIN ASSESSMENT[BW.1]  Rating: 3  Location: R hip  Manag home), toileting via SBA. Patient also educated on OT role, safety, fall prevention, pain management, shower transfers with good verbal understanding.[BW.2]   Patient End of Session: Up in chair;Needs met;Call light within reach; All patient questions and c Occupational Therapy Note signed by Rome Camacho OT at 3/3/2020 10:51 AM  Version 1 of 1    Author:  Rome Camacho OT Service:  Rehab Author Type:  Occupational Therapist    Filed:  3/3/2020 10:51 AM Date of Service:  3/3/2020  9:50 AM Status: • COLONOSCOPY     • HIP JOINT INJECTION Right 10/3/2019    Performed by Joshua Wyatt MD at [address] Right 2/19/2019    Performed by Joshua Wyatt MD at [address] Right 5/29/2018    Performed b techniques;Relaxation;Repositioning    COGNITION  Overall Cognitive Status:  WFL - within functional limits    Communication: WFL    Behavioral/Emotional/Social: WFL    RANGE OF MOTION AND STRENGTH ASSESSMENT  Upper extremity ROM is within functional limit additional activity deferred at this time due to pain levels up to 9/10, RN present and aware. Patient also educated on OT role, safety, fall prevention, pain management with good verbal understanding. Patient End of Session: Up in chair; With [facility] staff;Ne OT Treatment Plan: Balance activities; ADL training;Functional transfer training; Endurance training;Patient/Family education;Patient/Family training; Compensatory technique education  Rehab Potential : Good  Frequency (Obs): 5x/week  Number of Visits to Meet 2/11/2021 9:40 AM Yaz Chairez MD SP CARDIOLOGY [facility]      Multidisciplinary Problems     Active Goals        Problem: Patient/Family Goals    Goal Priority Disciplines Outcome Interventions   Patient/Family Long Term Goal     Inter

## (undated) NOTE — Clinical Note
Hi, I see Jamison Mcardle in the weight loss clinic. I saw your most recent note with concern for glaucoma. Just wanted to confirm if I should stop his topamax? I have asked him to hold it until I check with you, thanks!   Dr. Tip Parkinson

## (undated) NOTE — Clinical Note
CEDRICK, TCM call made, see notes. SAMUEL confirmed with patient that he has a HFU on 3/11/2020 with EDUARDO Ceron PA-C, SAMUEL changed visit type to TCM HFU.

## (undated) NOTE — LETTER
23    RE: Kelvin Aminah    : 1960    Dear Dr. Octavia Romero,     Your patient is being scheduled for a pain management procedure at [facility].    Procedure:  Bilateral L3/4,L4/5 Facet Injection  Date of Procedure: TBD  Physician: Ameena De Leon- Anesthesiologist     Your patient is currently taking Xarelto.  usually recommends the medication be held for 3 days prior to injection. Please verify patient is cleared to proceed with pain management procedures. Clearance Approved   ____________    Clearance Denied       ____________      Comments: ______________________________________________________    Signature: ________________________________  Date: _________________       If you have any questions please feel free to contact our office at [phone], option # 2. Please fax this clearance request to our office at fax # [phone] or send electronically.        Thank you,

## (undated) NOTE — LETTER
To:  EDUARDO Garcia MD  Date:  7/21/2022         Patient Name: John Hardin / Sex: 5/4/1960-A: 63 y  male         CSN: [id number]         Medical Records: [id number]    Clearance for Surgery Requested by Surgeon    Request for:  Cardiac Clearance and Anticoagulation Plan Approval (Hold Xarelto for 3 days prior to surgery and 3 days post op per Katya Paredes note). Requested by Surgeon: Hazel Duran MD    Surgical Date: 7/25/2022    Procedure: ROBOTIC VENTRAL HERNIA REPAIR WITH MESH-COMPLEX, N/A      Please fax the clearance/anticoagulation plan approval or alternate note to the Gennaro Flores [address] [place] [address] [phone]. Thank you.

## (undated) NOTE — LETTER
21  RE: Fitz Munoz    : 1960    Dear Dr. Tatiana Desai    Your patient is being scheduled for pain management procedures at [facility] that will take place over the next 8 weeks.     Procedures:  Right followed by Left L3-S1 Facet  Phy

## (undated) NOTE — LETTER
Patient Name: Darrel Salazar        : 1960       Medical Record #: [id number]    CONSENT FOR PROCEDURES/SEDATION    Date: 2024       Time: 11:23 AM        1. I authorize the performance upon Darrel Salazar the following:    _____________________TORADOL INJECTION____________________    2. I authorize Sarah Wells (and whomever is designated as the doctor’s assistant), to perform the above mentioned procedures.    3. If any unforeseen conditions arise during this procedure calling for additional procedures, operations, or medications (including anesthesia and blood transfusion), I  further request and authorize the doctor to do whatever he/she deems advisable in my interest.    4. I consent to the taking and reproduction of any photographs in the course of this procedure for professional purposes.    5. I consent to the administration of such sedation as may be considered necessary or advisable by the physician responsible for this service, with the exception of  _____________________________.    6. I have been informed by my doctor of the nature and purpose of this procedure/sedation, possible alternative methods of treatment, risk involved and possible complications.      Signature of Patient:  ___________________________    Signature of person authorized to consent for patient: Relationship to patient:  ___________________________    ___________________    Witness: ____________________     Date: ______________    Provider: ____________________     Date: ______________

## (undated) NOTE — LETTER
19    RE: Diane Green    : 1960    Dear Dr. Cleopatra Chery,    Your patient is being scheduled for a pain management procedure at [facility].    Procedure:  Bilateral sacroiliac joint injection and right hip joint and trochanteric bursa,

## (undated) NOTE — LETTER
19    RE: Orquidea Manish    : 1960    Dear Dr. Mat Riley,    Your patient is being scheduled for a pain management procedure at [facility].    Procedure:  Right hip and trochanteric bursa with conscious sedation  Date of Procedure: D

## (undated) NOTE — LETTER
OUTSIDE TESTING RESULT REQUEST     IMPORTANT: FOR YOUR IMMEDIATE ATTENTION  Please FAX all test results listed below to: [phone]     Had appointment on 9/16/21      Patient Name: Jorje Spine  Surgery Date: 9/22/2021  CSN: [id number]  Medical

## (undated) NOTE — MR AVS SNAPSHOT
[address] [facility]  [address] [facility] [id number] [phone]               Thank you for choosing us for your health care visit with Shira Kwan MD.  We are glad to serve you and happy to provide you with this s Instructions and Information about Your Health     None      Allergies as of May 24, 2017     No Known Allergies                Today's Vital Signs     BP Pulse Temp Height Weight BMI    124/84 mmHg 88 98 °F (36.7 °C) (Oral) 71\" 348 lb 48.56 kg/m2 Generic drug:  Insulin Glargine           Losartan Potassium-HCTZ 100-25 MG Tabs   Take 1 tablet by mouth daily. Commonly known as:  HYZAAR           MetFORMIN HCl 500 MG Tabs   Take 500 mg by mouth 2 (two) times daily with meals.    Commonly known as:  G

## (undated) NOTE — LETTER
Date: 3/13/2023    Patient Name: Rafael Boyer          To Whom it may concern: The above patient was seen at the [facility] for treatment of a medical condition. The patient may return to work on 3/15/23 with the following limitations: working no longer than 4 hours. This restriction will continue through 3/20/23. On 3/21/23 the patient may return to full time status.       Sincerely,  Annie Dia MD

## (undated) NOTE — LETTER
Date: 10/12/2017      Patient Name: Ashly Sage            To Whom it may concern: The above patient was seen at the [facility] for treatment of a medical condition.       The patient may return to work/school on 10/13/17 with no limita